# Patient Record
Sex: MALE | Race: WHITE | NOT HISPANIC OR LATINO | Employment: FULL TIME | URBAN - NONMETROPOLITAN AREA
[De-identification: names, ages, dates, MRNs, and addresses within clinical notes are randomized per-mention and may not be internally consistent; named-entity substitution may affect disease eponyms.]

---

## 2017-02-06 ENCOUNTER — ANESTHESIA (OUTPATIENT)
Dept: SURGERY | Facility: HOSPITAL | Age: 32
DRG: 853 | End: 2017-02-06
Payer: COMMERCIAL

## 2017-02-06 ENCOUNTER — ANESTHESIA EVENT (OUTPATIENT)
Dept: SURGERY | Facility: HOSPITAL | Age: 32
DRG: 853 | End: 2017-02-06
Payer: COMMERCIAL

## 2017-02-06 ENCOUNTER — HOSPITAL ENCOUNTER (INPATIENT)
Facility: HOSPITAL | Age: 32
LOS: 3 days | Discharge: HOME | DRG: 853 | End: 2017-02-10
Attending: PHYSICIAN ASSISTANT | Admitting: SURGERY
Payer: COMMERCIAL

## 2017-02-06 DIAGNOSIS — K35.32 ACUTE PERFORATED APPENDICITIS: ICD-10-CM

## 2017-02-06 LAB
ALBUMIN SERPL-MCNC: 3.9 G/DL (ref 3.4–5)
ALBUMIN UR-MCNC: NEGATIVE MG/DL
ALP SERPL-CCNC: 76 U/L (ref 40–150)
ALT SERPL W P-5'-P-CCNC: 41 U/L (ref 0–70)
ANION GAP SERPL CALCULATED.3IONS-SCNC: 7 MMOL/L (ref 3–14)
APPEARANCE UR: CLEAR
AST SERPL W P-5'-P-CCNC: 16 U/L (ref 0–45)
BASOPHILS # BLD AUTO: 0 10E9/L (ref 0–0.2)
BASOPHILS NFR BLD AUTO: 0.1 %
BILIRUB SERPL-MCNC: 0.6 MG/DL (ref 0.2–1.3)
BILIRUB UR QL STRIP: NEGATIVE
BUN SERPL-MCNC: 14 MG/DL (ref 7–30)
CALCIUM SERPL-MCNC: 8.8 MG/DL (ref 8.5–10.1)
CHLORIDE SERPL-SCNC: 101 MMOL/L (ref 94–109)
CO2 SERPL-SCNC: 30 MMOL/L (ref 20–32)
COLOR UR AUTO: NORMAL
CREAT SERPL-MCNC: 1.13 MG/DL (ref 0.66–1.25)
CRP SERPL-MCNC: 103 MG/L (ref 0–8)
DIFFERENTIAL METHOD BLD: ABNORMAL
EOSINOPHIL # BLD AUTO: 0 10E9/L (ref 0–0.7)
EOSINOPHIL NFR BLD AUTO: 0.2 %
ERYTHROCYTE [DISTWIDTH] IN BLOOD BY AUTOMATED COUNT: 11.7 % (ref 10–15)
GFR SERPL CREATININE-BSD FRML MDRD: 76 ML/MIN/1.7M2
GLUCOSE SERPL-MCNC: 90 MG/DL (ref 70–99)
GLUCOSE UR STRIP-MCNC: NEGATIVE MG/DL
HCT VFR BLD AUTO: 46.4 % (ref 40–53)
HGB BLD-MCNC: 16 G/DL (ref 13.3–17.7)
HGB UR QL STRIP: NEGATIVE
IMM GRANULOCYTES # BLD: 0.1 10E9/L (ref 0–0.4)
IMM GRANULOCYTES NFR BLD: 0.4 %
KETONES UR STRIP-MCNC: NEGATIVE MG/DL
LACTATE SERPL-SCNC: 1.2 MMOL/L (ref 0.4–2)
LEUKOCYTE ESTERASE UR QL STRIP: NEGATIVE
LYMPHOCYTES # BLD AUTO: 2.2 10E9/L (ref 0.8–5.3)
LYMPHOCYTES NFR BLD AUTO: 16.2 %
MCH RBC QN AUTO: 29.9 PG (ref 26.5–33)
MCHC RBC AUTO-ENTMCNC: 34.5 G/DL (ref 31.5–36.5)
MCV RBC AUTO: 87 FL (ref 78–100)
MONOCYTES # BLD AUTO: 1.2 10E9/L (ref 0–1.3)
MONOCYTES NFR BLD AUTO: 8.8 %
NEUTROPHILS # BLD AUTO: 10 10E9/L (ref 1.6–8.3)
NEUTROPHILS NFR BLD AUTO: 74.3 %
NITRATE UR QL: NEGATIVE
NRBC # BLD AUTO: 0 10*3/UL
NRBC BLD AUTO-RTO: 0 /100
PH UR STRIP: 7 PH (ref 4.7–8)
PLATELET # BLD AUTO: 227 10E9/L (ref 150–450)
POTASSIUM SERPL-SCNC: 3.5 MMOL/L (ref 3.4–5.3)
PROT SERPL-MCNC: 8.5 G/DL (ref 6.8–8.8)
RBC # BLD AUTO: 5.36 10E12/L (ref 4.4–5.9)
SODIUM SERPL-SCNC: 138 MMOL/L (ref 133–144)
SP GR UR STRIP: 1 (ref 1–1.03)
URN SPEC COLLECT METH UR: NORMAL
UROBILINOGEN UR STRIP-MCNC: NORMAL MG/DL (ref 0–2)
WBC # BLD AUTO: 13.4 10E9/L (ref 4–11)

## 2017-02-06 PROCEDURE — 99285 EMERGENCY DEPT VISIT HI MDM: CPT | Mod: 25

## 2017-02-06 PROCEDURE — 86140 C-REACTIVE PROTEIN: CPT | Performed by: PHYSICIAN ASSISTANT

## 2017-02-06 PROCEDURE — 96374 THER/PROPH/DIAG INJ IV PUSH: CPT

## 2017-02-06 PROCEDURE — 99284 EMERGENCY DEPT VISIT MOD MDM: CPT | Performed by: PHYSICIAN ASSISTANT

## 2017-02-06 PROCEDURE — 99222 1ST HOSP IP/OBS MODERATE 55: CPT | Mod: 57 | Performed by: SURGERY

## 2017-02-06 PROCEDURE — 83605 ASSAY OF LACTIC ACID: CPT | Performed by: PHYSICIAN ASSISTANT

## 2017-02-06 PROCEDURE — 25000125 ZZHC RX 250: Performed by: PHYSICIAN ASSISTANT

## 2017-02-06 PROCEDURE — 36415 COLL VENOUS BLD VENIPUNCTURE: CPT | Performed by: PHYSICIAN ASSISTANT

## 2017-02-06 PROCEDURE — 74177 CT ABD & PELVIS W/CONTRAST: CPT | Mod: TC

## 2017-02-06 PROCEDURE — 81003 URINALYSIS AUTO W/O SCOPE: CPT | Performed by: PHYSICIAN ASSISTANT

## 2017-02-06 PROCEDURE — 80053 COMPREHEN METABOLIC PANEL: CPT | Performed by: PHYSICIAN ASSISTANT

## 2017-02-06 PROCEDURE — 85025 COMPLETE CBC W/AUTO DIFF WBC: CPT | Performed by: PHYSICIAN ASSISTANT

## 2017-02-06 PROCEDURE — 25000128 H RX IP 250 OP 636: Performed by: PHYSICIAN ASSISTANT

## 2017-02-06 PROCEDURE — 87040 BLOOD CULTURE FOR BACTERIA: CPT | Performed by: PHYSICIAN ASSISTANT

## 2017-02-06 RX ORDER — IOPAMIDOL 612 MG/ML
100 INJECTION, SOLUTION INTRAVASCULAR ONCE
Status: COMPLETED | OUTPATIENT
Start: 2017-02-06 | End: 2017-02-06

## 2017-02-06 RX ORDER — ACETAMINOPHEN 10 MG/ML
1000 INJECTION, SOLUTION INTRAVENOUS ONCE
Status: COMPLETED | OUTPATIENT
Start: 2017-02-06 | End: 2017-02-06

## 2017-02-06 RX ORDER — MORPHINE SULFATE 2 MG/ML
4 INJECTION, SOLUTION INTRAMUSCULAR; INTRAVENOUS
Status: COMPLETED | OUTPATIENT
Start: 2017-02-06 | End: 2017-02-06

## 2017-02-06 RX ORDER — SODIUM CHLORIDE 9 MG/ML
1000 INJECTION, SOLUTION INTRAVENOUS CONTINUOUS
Status: DISCONTINUED | OUTPATIENT
Start: 2017-02-06 | End: 2017-02-07

## 2017-02-06 RX ADMIN — ACETAMINOPHEN 1000 MG: 10 INJECTION, SOLUTION INTRAVENOUS at 21:37

## 2017-02-06 RX ADMIN — MORPHINE SULFATE 2 MG: 2 INJECTION, SOLUTION INTRAMUSCULAR; INTRAVENOUS at 23:49

## 2017-02-06 RX ADMIN — SODIUM CHLORIDE 1000 ML: 9 INJECTION, SOLUTION INTRAVENOUS at 20:41

## 2017-02-06 RX ADMIN — TAZOBACTAM SODIUM AND PIPERACILLIN SODIUM 3.38 G: 375; 3 INJECTION, SOLUTION INTRAVENOUS at 21:59

## 2017-02-06 RX ADMIN — SODIUM CHLORIDE 1000 ML: 9 INJECTION, SOLUTION INTRAVENOUS at 21:59

## 2017-02-06 RX ADMIN — IOPAMIDOL 100 ML: 612 INJECTION, SOLUTION INTRAVASCULAR at 21:24

## 2017-02-06 ASSESSMENT — ENCOUNTER SYMPTOMS
APPETITE CHANGE: 1
CARDIOVASCULAR NEGATIVE: 1
HEADACHES: 0
DIARRHEA: 1
FEVER: 1
SHORTNESS OF BREATH: 0
ABDOMINAL PAIN: 1
NAUSEA: 0
ACTIVITY CHANGE: 1
VOMITING: 0
CHILLS: 0

## 2017-02-06 ASSESSMENT — LIFESTYLE VARIABLES: TOBACCO_USE: 1

## 2017-02-06 NOTE — IP AVS SNAPSHOT
[facility]    [address] [place] [address]    Phone:  [phone]    Fax:  [phone]                                       After Visit Summary   2/6/2017    Mason Garcia    MRN: [id number]           After Visit Summary Signature Page     I have received my discharge instructions, and my questions have been answered. I have discussed any challenges I see with this plan with the nurse or doctor.    ..........................................................................................................................................  Patient/Patient Representative Signature      ..........................................................................................................................................  Patient Representative Print Name and Relationship to Patient    ..................................................               ................................................  Date                                            Time    ..........................................................................................................................................  Reviewed by Signature/Title    ...................................................              ..............................................  Date                                                            Time

## 2017-02-06 NOTE — IP AVS SNAPSHOT
MRN:[id number]                      After Visit Summary   2/6/2017    Mason Garcia    MRN: [id number]           Thank you!     Thank you for choosing [facility] for your care. Our goal is always to provide you with excellent care. Hearing back from our patients is one way we can continue to improve our services. Please take a few minutes to complete the written survey that you may receive in the mail after you visit with us. Thank you!        Patient Information     Date Of Birth          1985        About your hospital stay     You were admitted on:  February 7, 2017 You last received care in the:  [facility]    You were discharged on:  February 10, 2017        Reason for your hospital stay       Acute perforated appendicitis                  Who to Call     For medical emergencies, please call 911.  For non-urgent questions about your medical care, please call your primary care provider or clinic, [phone]  For questions related to your surgery, please call your surgery clinic        Attending Provider     Provider    Megan Bonner, Chanel Whitt DO       Primary Care Provider Office Phone # Fax #    Ruben Valencia [phone]       [facility] [address] [place] [address]         When to contact your care team       Things to watch for are fevers greater than 101.3, pain uncontrolled by pain narcotics, deep red skin around the incision and puss coming out of the incision.                  After Care Instructions     Diet       Follow this diet upon discharge: Regular            Tubes and drains       You are going home with the following tubes or drains: CHERRY.  Tube cares per hospital or home care instructions.  Strip the drain every day.  Empty and record how much and what it looks like each day.  Bring with you to your surgery appointment            Wound care and dressings       No shower for 48 hours post procedure. May shower  "Postoperative Day (POD)  #2.  At that time, the bandages may come off.  There are steri-stirps over the wounds.  Its okay to shower with these on, do not scrub.  Just let the soapy water run over the incisions and pat dry.  The steri-strips will fall off on their own in approximately 2 weeks.    Do not immerse incision in water for two weeks.  No baths, hot tubs, pools, rivers, lakes, oceans, etc.                  Follow-up Appointments     Follow-up and recommended labs and tests        Surgery clinic early next week.  PCP within 1 week                  Your next 10 appointments already scheduled     Feb 13, 2017  3:45 PM   (Arrive by 3:30 PM)   Post Op with Pradeep Patten DO   [facility] ([facility])    [address] [place] [address]   [phone]              Further instructions from your care team       You have a hospital follow up with Dr. Valencia at [facility] on Wednesday, February 15th at 12:30 pm. If you need to reschedule these appointment please call [phone].    Pending Results     Date and Time Order Name Status Description    2/7/2017 0319 Fluid Culture Aerobic Bacterial Preliminary     2/7/2017 0149 Anaerobic bacterial culture Preliminary     2/6/2017 2028 Blood culture Preliminary     2/6/2017 2020 Blood culture Preliminary             Statement of Approval     Ordered          02/10/17 0702  I have reviewed and agree with all the recommendations and orders detailed in this document.   EFFECTIVE NOW     Approved and electronically signed by:  Pradeep Patten DO             Admission Information        Provider Department Dept Phone    2/6/2017 Chanel Pagan DO [facility] [phone]      Your Vitals Were     Blood Pressure Pulse Temperature Respirations Height Weight    117/62 mmHg 77 98.2  F (36.8  C) (Tympanic) 16 1.854 m (6' 1\") 104.327 kg (230 lb)    Pulse Oximetry                   97%           MyChart Information     " "[email or web address] lets you send messages to your doctor, view your test results, renew your prescriptions, schedule appointments and more. To sign up, go to [email or web address] . Click on \"Log in\" on the left side of the screen, which will take you to the Welcome page. Then click on \"Sign up Now\" on the right side of the page.     You will be asked to enter the access code listed below, as well as some personal information. Please follow the directions to create your username and password.     Your access code is: [email or web address]  Expires: 2017  7:16 AM     Your access code will  in 90 days. If you need help or a new code, please call your [facility] clinic or [phone].        Care EveryWhere ID     This is your Care EveryWhere ID. This could be used by other organizations to access your [facility] medical records  [id number]           Review of your medicines      START taking        Dose / Directions    amoxicillin-clavulanate 875-125 MG per tablet   Commonly known as:  AUGMENTIN   Used for:  Acute perforated appendicitis        Dose:  1 tablet   Take 1 tablet by mouth 2 times daily for 3 days   Quantity:  6 tablet   Refills:  0       oxyCODONE 5 MG IR tablet   Commonly known as:  ROXICODONE   Used for:  Acute perforated appendicitis        Dose:  5-10 mg   Take 1-2 tablets (5-10 mg) by mouth every 6 hours as needed for moderate to severe pain   Quantity:  30 tablet   Refills:  0       senna-docusate 8.6-50 MG per tablet   Commonly known as:  SENOKOT-S;PERICOLACE   Used for:  Acute perforated appendicitis        Dose:  2 tablet   Take 2 tablets by mouth 2 times daily   Quantity:  120 tablet   Refills:  1            Where to get your medicines      These medications were sent to [facility] - [place], [place] -  [address]  [place] [facility] [place] [facility] [address] [place] [address]     Phone:  [phone]    - amoxicillin-clavulanate 875-125 MG per tablet  - senna-docusate 8.6-50 MG per tablet "      Some of these will need a paper prescription and others can be bought over the counter. Ask your nurse if you have questions.     Bring a paper prescription for each of these medications    - oxyCODONE 5 MG IR tablet             Protect others around you: Learn how to safely use, store and throw away your medicines at www.disposemymeds.org.             Medication List: This is a list of all your medications and when to take them. Check marks below indicate your daily home schedule. Keep this list as a reference.      Medications           Morning Afternoon Evening Bedtime As Needed    amoxicillin-clavulanate 875-125 MG per tablet   Commonly known as:  AUGMENTIN   Take 1 tablet by mouth 2 times daily for 3 days                                oxyCODONE 5 MG IR tablet   Commonly known as:  ROXICODONE   Take 1-2 tablets (5-10 mg) by mouth every 6 hours as needed for moderate to severe pain   Last time this was given:  5 mg on 2/10/2017  9:31 AM                                senna-docusate 8.6-50 MG per tablet   Commonly known as:  SENOKOT-S;PERICOLACE   Take 2 tablets by mouth 2 times daily                                          More Information        Discharge Instructions for Laparoscopic Appendectomy (Appendix Removal)  You have had a procedure known as laparoscopic appendectomy to remove your appendix. The appendix is a worm-shaped hollow pouch attached to your large intestine. During your procedure, the doctor made two to four small incisions. One was near your bellybutton, and the others were elsewhere on your abdomen. Through one incision, the doctor inserted a thin tube with a camera attached (called a laparoscope). Surgical tools were inserted in the other incisions.  While you recover you may have discomfort in your shoulder and chest for up to 48 hours after surgery. This is common. It is caused by carbon dioxide (gas) used during the operation. It will go away.   Activity    Resume light activities  around your home as soon as possible.    Don t lift anything heavier than 10 pounds until your doctor says it s OK.    Limit sports and strenuous activities for 1 or 2 weeks.    Shower as usual:    Gently wash around your incisions with soap and water.    Don t bathe or soak in a tub until your incisions are well healed.    Wear loose-fitting clothes. This will help you be more comfortable and cause less irritation around your incisions.    Don t drive until you are no longer taking prescription pain medication.  Diet    Eat a bland, low-fat diet, such as the following:    Well-cooked soft cereals    Mashed potatoes    Plain toast or bread, crackers    Plain spaghetti    Rice    Macaroni (plain or with cheese)    Cottage cheese    Puddings    Low-fat yogurt    Low-fat milk    Ripe bananas    Drink 6 to 8 glasses of water a day, unless directed otherwise.    If you are constipated, take a fiber laxative such as Metamucil.  When to Call Your Doctor  Call your doctor immediately if you have any of the following:    Swelling, oozing, worsening pain, or unusual redness around the incision    Fever of 101.5 F (38.5 C) or higher    Increasing abdominal pain    Severe diarrhea, bloating, or constipation    Nausea or vomiting    5789-0412 [employer]. [address] [place] [address], [place] [address]. All rights reserved. This information is not intended as a substitute for professional medical care. Always follow your healthcare professional's instructions.                Discharge Instructions: Caring for Your Milad-Chiang Drainage Tube  Your doctor discharges you with a Milad-Chiang drainage tube. Doctors commonly leave this drain within the abdominal cavity after surgery. It helps prevent swelling and reduces the risk for infection. The tube is held in place by a few stitches. It is covered with a bandage. Your doctor will remove the drain when he or she determines you no longer need it.  Home care    Don t sleep  on the same side as the tube.    Secure the tube and bag inside your clothing with a safety pin. This helps keep the tube from being pulled out.    Empty your drain at least twice a day. Empty it more often if the drain is full.    Lift the opening on the drain.    Drain the fluid into a measuring cup.    Record the amount of fluid each time you empty the drain. Share this information with your doctor on your next visit.    Squeeze the bulb with your hands until you hear air coming out of the bulb if your doctor has instructed you to do so (sometimes the bulb is used as a reservoir without suction). Check with your doctor about specific drain instructions.    Close the opening.    Change the dressing around the tube every day.    Wash your hands.    Remove the old bandage.    Wash your hands again.    Wet a cotton swab and clean the skin around the incision and tube site. Use normal saline solution (salt and water). Or, you can use warm, soapy water.    Put a new bandage on the incision and tube site. Make the bandage large enough to cover the whole incision area.    Tape the bandage in place.    Keep the bandage and tube site dry when you shower. Ask your healthcare provider about the best way to do this.     Stripping  the tube helps keep blood clots from blocking the tube. Ask your nurse how often you should strip the tube. However, depending on where and why your doctor placed the tube, stripping may not be necessary. It may even be dangerous in some cases.     Hold the tubing where it leaves the skin, with one hand. This keeps it from pulling on the skin.    Pinch the tubing with the thumb and first finger of your other hand.    Slowly and firmly pull your thumb and first finger down the tubing. You may find it helpful to hold an alcohol swab between your fingers and the tube to lubricate the tubing.    If the pulling hurts or feels like it s coming out of the skin, stop. Begin again more gently.  Follow-up  care  Make a follow-up appointment as directed by our staff.  When to seek medical care  Call your doctor right away if you have any of the following:    New or increased pain around the tube    Redness, swelling, or warmth around the incision or tube    Drainage that is foul-smelling    Vomiting    Fever over 101.5 F (38.5 C)    Fluid leaking around the tube    Incision seems not to be healing    Stitches become loose    Tube falls out    Drainage that changes from light pink to dark red    Blood clots in the drainage bulb    A sudden increase or decrease in the amount of drainage (over 30 mL)     0566-0764 [employer]. [address] [place] [address]. All rights reserved. This information is not intended as a substitute for professional medical care. Always follow your healthcare professional's instructions.                Patient Education    Oxycodone Hydrochloride Oral capsule    Oxycodone Hydrochloride Oral solution    Oxycodone Hydrochloride Oral tablet    Oxycodone Hydrochloride Oral tablet [Abuse Deterrent]    Oxycodone Hydrochloride Oral tablet, extended-release  Oxycodone Hydrochloride Oral tablet  What is this medicine?  OXYCODONE (ox i KOE done) is a pain reliever. It is used to treat moderate to severe pain.  This medicine may be used for other purposes; ask your health care provider or pharmacist if you have questions.  What should I tell my health care provider before I take this medicine?  They need to know if you have any of these conditions:    Monroe's disease    brain tumor    drug abuse or addiction    head injury    heart disease    if you frequently drink alcohol containing drinks    kidney disease or problems going to the bathroom    liver disease    lung disease, asthma, or breathing problems    mental problems    an unusual or allergic reaction to oxycodone, codeine, hydrocodone, morphine, other medicines, foods, dyes, or preservatives    pregnant or trying to get  pregnant    breast-feeding  How should I use this medicine?  Take this medicine by mouth with a glass of water. Follow the directions on the prescription label. You can take it with or without food. If it upsets your stomach, take it with food. Take your medicine at regular intervals. Do not take it more often than directed. Do not stop taking except on your doctor's advice.  Some brands of this medicine, like Oxecta, have special instructions. Ask your doctor or pharmacist if these directions are for you: Do not cut, crush or chew this medicine. Swallow only one tablet at a time. Do not wet, soak, or lick the tablet before you take it.  Talk to your pediatrician regarding the use of this medicine in children. Special care may be needed.  Overdosage: If you think you have taken too much of this medicine contact a poison control center or emergency room at once.  NOTE: This medicine is only for you. Do not share this medicine with others.  What if I miss a dose?  If you miss a dose, take it as soon as you can. If it is almost time for your next dose, take only that dose. Do not take double or extra doses.  What may interact with this medicine?    alcohol    antihistamines    certain medicines used for nausea like chlorpromazine, droperidol    erythromycin    ketoconazole    medicines for depression, anxiety, or psychotic disturbances    medicines for sleep    muscle relaxants    naloxone    naltrexone    narcotic medicines (opiates) for pain    nilotinib    phenobarbital    phenytoin    rifampin    ritonavir    voriconazole  This list may not describe all possible interactions. Give your health care provider a list of all the medicines, herbs, non-prescription drugs, or dietary supplements you use. Also tell them if you smoke, drink alcohol, or use illegal drugs. Some items may interact with your medicine.  What should I watch for while using this medicine?  Tell your doctor or health care professional if your pain  does not go away, if it gets worse, or if you have new or a different type of pain. You may develop tolerance to the medicine. Tolerance means that you will need a higher dose of the medicine for pain relief. Tolerance is normal and is expected if you take this medicine for a long time.  Do not suddenly stop taking your medicine because you may develop a severe reaction. Your body becomes used to the medicine. This does NOT mean you are addicted. Addiction is a behavior related to getting and using a drug for a non-medical reason. If you have pain, you have a medical reason to take pain medicine. Your doctor will tell you how much medicine to take. If your doctor wants you to stop the medicine, the dose will be slowly lowered over time to avoid any side effects.  You may get drowsy or dizzy when you first start taking this medicine or change doses. Do not drive, use machinery, or do anything that may be dangerous until you know how the medicine affects you. Stand or sit up slowly.  There are different types of narcotic medicines (opiates) for pain. If you take more than one type at the same time, you may have more side effects. Give your health care provider a list of all medicines you use. Your doctor will tell you how much medicine to take. Do not take more medicine than directed. Call emergency for help if you have problems breathing.  This medicine will cause constipation. Try to have a bowel movement at least every 2 to 3 days. If you do not have a bowel movement for 3 days, call your doctor or health care professional.  Your mouth may get dry. Drinking water, chewing sugarless gum, or sucking on hard candy may help. See your dentist every 6 months.  What side effects may I notice from receiving this medicine?  Side effects that you should report to your doctor or health care professional as soon as possible:    allergic reactions like skin rash, itching or hives, swelling of the face, lips, or  tongue    breathing problems    confusion    feeling faint or lightheaded, falls    trouble passing urine or change in the amount of urine    unusually weak or tired  Side effects that usually do not require medical attention (report to your doctor or health care professional if they continue or are bothersome):    constipation    dry mouth    itching    nausea, vomiting    upset stomach  This list may not describe all possible side effects. Call your doctor for medical advice about side effects. You may report side effects to FDA at 6-612-IUJ-7323.  Where should I keep my medicine?  Keep out of the reach of children. This medicine can be abused. Keep your medicine in a safe place to protect it from theft. Do not share this medicine with anyone. Selling or giving away this medicine is dangerous and against the law.  Store at room temperature between 15 and 30 degrees C (59 and 86 degrees F). Protect from light. Keep container tightly closed.  This medicine may cause accidental overdose and death if it is taken by other adults, children, or pets. Flush any unused medicine down the toilet to reduce the chance of harm. Do not use the medicine after the expiration date.  NOTE: This sheet is a summary. It may not cover all possible information. If you have questions about this medicine, talk to your doctor, pharmacist, or health care provider.  NOTE:This sheet is a summary. It may not cover all possible information. If you have questions about this medicine, talk to your doctor, pharmacist, or health care provider. Copyright  2016 [employer]                Appendectomy    The goal of appendectomy is to remove the appendix safely. In most cases, the surgery lasts from 30 to 60 minutes. If your appendix has burst, surgery may take longer.  Before Surgery  You may receive fluids, antibiotics, and other medications through an IV (intravenous) line. Tell your doctor if you are allergic to any medications or have any other  health concerns. An anesthesiologist or nurse anesthetist will give you general anesthesia just before your appendectomy. This keeps you pain-free and allows you to sleep during the surgery.  Reaching the Appendix  One of 2 techniques may be used to reach the appendix. Your surgeon will discuss which is best for you:    Open surgery. One incision (several inches long) is made in your lower right side. A bigger incision may be used if the appendix has burst.    Laparoscopic surgery. From 2 to 4 small incisions are used. One is near your bellybutton. The others are elsewhere on your abdomen. A laparoscope, a thin tube with a camera attached, is inserted through one incision. The camera shows the inside of your abdomen on a monitor. This image helps guide the surgery. Surgical tools are inserted in the other incisions.  Finishing the Surgery  In most cases, the entire incision is closed with stitches or staples. Your surgeon may place a temporary drain in the wound or in the abdomen. This helps any excess fluid to drain which may help prevent infection. This drain is usually removed before you are discharged. If your appendix has burst, the outer layers of the incision may be left open. Leaving the skin open prevents infection from forming under the skin. It may heal on its own, or be closed about 5 days later.  Risks and Complications    Infection or bleeding from the incision site    Infection or swelling in the abdomen, or leakage of bowel material    Bowel ileus (slowness of bowel muscles) or bowel blockage    Problems from anesthesia     9321-6557 [employer]. [address] [place] [address], [place] [address]. All rights reserved. This information is not intended as a substitute for professional medical care. Always follow your healthcare professional's instructions.

## 2017-02-07 PROBLEM — K35.32 PERFORATED APPENDICITIS: Status: ACTIVE | Noted: 2017-02-07

## 2017-02-07 LAB
BACTERIA SPEC CULT: NORMAL
BASOPHILS # BLD AUTO: 0 10E9/L (ref 0–0.2)
BASOPHILS NFR BLD AUTO: 0.1 %
DIFFERENTIAL METHOD BLD: ABNORMAL
EOSINOPHIL # BLD AUTO: 0 10E9/L (ref 0–0.7)
EOSINOPHIL NFR BLD AUTO: 0 %
ERYTHROCYTE [DISTWIDTH] IN BLOOD BY AUTOMATED COUNT: 11.8 % (ref 10–15)
GRAM STN SPEC: ABNORMAL
HCT VFR BLD AUTO: 43.7 % (ref 40–53)
HGB BLD-MCNC: 15.4 G/DL (ref 13.3–17.7)
IMM GRANULOCYTES # BLD: 0.1 10E9/L (ref 0–0.4)
IMM GRANULOCYTES NFR BLD: 0.6 %
LYMPHOCYTES # BLD AUTO: 1.1 10E9/L (ref 0.8–5.3)
LYMPHOCYTES NFR BLD AUTO: 6.5 %
MCH RBC QN AUTO: 30.4 PG (ref 26.5–33)
MCHC RBC AUTO-ENTMCNC: 35.2 G/DL (ref 31.5–36.5)
MCV RBC AUTO: 86 FL (ref 78–100)
MICRO REPORT STATUS: ABNORMAL
MICRO REPORT STATUS: NORMAL
MONOCYTES # BLD AUTO: 0.6 10E9/L (ref 0–1.3)
MONOCYTES NFR BLD AUTO: 3.4 %
NEUTROPHILS # BLD AUTO: 14.7 10E9/L (ref 1.6–8.3)
NEUTROPHILS NFR BLD AUTO: 89.4 %
NRBC # BLD AUTO: 0 10*3/UL
NRBC BLD AUTO-RTO: 0 /100
PLATELET # BLD AUTO: 215 10E9/L (ref 150–450)
RBC # BLD AUTO: 5.06 10E12/L (ref 4.4–5.9)
SPECIMEN SOURCE: ABNORMAL
SPECIMEN SOURCE: NORMAL
WBC # BLD AUTO: 16.5 10E9/L (ref 4–11)

## 2017-02-07 PROCEDURE — 85025 COMPLETE CBC W/AUTO DIFF WBC: CPT | Performed by: SURGERY

## 2017-02-07 PROCEDURE — 87075 CULTR BACTERIA EXCEPT BLOOD: CPT | Performed by: SURGERY

## 2017-02-07 PROCEDURE — 25000125 ZZHC RX 250: Performed by: SURGERY

## 2017-02-07 PROCEDURE — 25800025 ZZH RX 258: Performed by: NURSE ANESTHETIST, CERTIFIED REGISTERED

## 2017-02-07 PROCEDURE — 25000128 H RX IP 250 OP 636: Performed by: SURGERY

## 2017-02-07 PROCEDURE — 87181 SC STD AGAR DILUTION PER AGT: CPT | Performed by: SURGERY

## 2017-02-07 PROCEDURE — 25800025 ZZH RX 258: Performed by: SURGERY

## 2017-02-07 PROCEDURE — 27110028 ZZH OR GENERAL SUPPLY NON-STERILE: Performed by: SURGERY

## 2017-02-07 PROCEDURE — 12000000 ZZH R&B MED SURG/OB

## 2017-02-07 PROCEDURE — 25000128 H RX IP 250 OP 636: Performed by: PHYSICIAN ASSISTANT

## 2017-02-07 PROCEDURE — 44970 LAPAROSCOPY APPENDECTOMY: CPT | Performed by: SURGERY

## 2017-02-07 PROCEDURE — 87077 CULTURE AEROBIC IDENTIFY: CPT | Performed by: SURGERY

## 2017-02-07 PROCEDURE — 87076 CULTURE ANAEROBE IDENT EACH: CPT | Performed by: SURGERY

## 2017-02-07 PROCEDURE — 27210995 ZZH RX 272: Performed by: SURGERY

## 2017-02-07 PROCEDURE — 25000128 H RX IP 250 OP 636

## 2017-02-07 PROCEDURE — 01999 UNLISTED ANES PROCEDURE: CPT | Performed by: NURSE ANESTHETIST, CERTIFIED REGISTERED

## 2017-02-07 PROCEDURE — 25000125 ZZHC RX 250: Performed by: NURSE ANESTHETIST, CERTIFIED REGISTERED

## 2017-02-07 PROCEDURE — 36415 COLL VENOUS BLD VENIPUNCTURE: CPT | Performed by: SURGERY

## 2017-02-07 PROCEDURE — 71000014 ZZH RECOVERY PHASE 1 LEVEL 2 FIRST HR: Performed by: SURGERY

## 2017-02-07 PROCEDURE — 87070 CULTURE OTHR SPECIMN AEROBIC: CPT | Performed by: SURGERY

## 2017-02-07 PROCEDURE — 25000566 ZZH SEVOFLURANE, EA 15 MIN: Performed by: NURSE ANESTHETIST, CERTIFIED REGISTERED

## 2017-02-07 PROCEDURE — 87186 SC STD MICRODIL/AGAR DIL: CPT | Performed by: SURGERY

## 2017-02-07 PROCEDURE — 36000056 ZZH SURGERY LEVEL 3 1ST 30 MIN: Performed by: SURGERY

## 2017-02-07 PROCEDURE — 0DTJ4ZZ RESECTION OF APPENDIX, PERCUTANEOUS ENDOSCOPIC APPROACH: ICD-10-PCS | Performed by: SURGERY

## 2017-02-07 PROCEDURE — 87184 SC STD DISK METHOD PER PLATE: CPT | Performed by: SURGERY

## 2017-02-07 PROCEDURE — 37000008 ZZH ANESTHESIA TECHNICAL FEE, 1ST 30 MIN: Performed by: SURGERY

## 2017-02-07 PROCEDURE — 25000128 H RX IP 250 OP 636: Performed by: NURSE ANESTHETIST, CERTIFIED REGISTERED

## 2017-02-07 PROCEDURE — 37000009 ZZH ANESTHESIA TECHNICAL FEE, EACH ADDTL 15 MIN: Performed by: SURGERY

## 2017-02-07 PROCEDURE — 25000132 ZZH RX MED GY IP 250 OP 250 PS 637: Performed by: SURGERY

## 2017-02-07 PROCEDURE — 36000058 ZZH SURGERY LEVEL 3 EA 15 ADDTL MIN: Performed by: SURGERY

## 2017-02-07 PROCEDURE — 40000275 ZZH STATISTIC RCP TIME EA 10 MIN

## 2017-02-07 PROCEDURE — 27210794 ZZH OR GENERAL SUPPLY STERILE: Performed by: SURGERY

## 2017-02-07 PROCEDURE — 88304 TISSUE EXAM BY PATHOLOGIST: CPT | Mod: TC | Performed by: SURGERY

## 2017-02-07 PROCEDURE — 87205 SMEAR GRAM STAIN: CPT | Performed by: SURGERY

## 2017-02-07 RX ORDER — MEPERIDINE HYDROCHLORIDE 25 MG/ML
12.5 INJECTION INTRAMUSCULAR; INTRAVENOUS; SUBCUTANEOUS
Status: DISCONTINUED | OUTPATIENT
Start: 2017-02-07 | End: 2017-02-07 | Stop reason: HOSPADM

## 2017-02-07 RX ORDER — FENTANYL CITRATE 50 UG/ML
25-50 INJECTION, SOLUTION INTRAMUSCULAR; INTRAVENOUS EVERY 5 MIN PRN
Status: DISCONTINUED | OUTPATIENT
Start: 2017-02-07 | End: 2017-02-07 | Stop reason: HOSPADM

## 2017-02-07 RX ORDER — NALOXONE HYDROCHLORIDE 0.4 MG/ML
.1-.4 INJECTION, SOLUTION INTRAMUSCULAR; INTRAVENOUS; SUBCUTANEOUS
Status: DISCONTINUED | OUTPATIENT
Start: 2017-02-07 | End: 2017-02-08 | Stop reason: ALTCHOICE

## 2017-02-07 RX ORDER — KETOROLAC TROMETHAMINE 30 MG/ML
INJECTION, SOLUTION INTRAMUSCULAR; INTRAVENOUS PRN
Status: DISCONTINUED | OUTPATIENT
Start: 2017-02-07 | End: 2017-02-07

## 2017-02-07 RX ORDER — SODIUM CHLORIDE, SODIUM LACTATE, POTASSIUM CHLORIDE, CALCIUM CHLORIDE 600; 310; 30; 20 MG/100ML; MG/100ML; MG/100ML; MG/100ML
INJECTION, SOLUTION INTRAVENOUS CONTINUOUS
Status: DISCONTINUED | OUTPATIENT
Start: 2017-02-07 | End: 2017-02-07 | Stop reason: HOSPADM

## 2017-02-07 RX ORDER — FENTANYL CITRATE 50 UG/ML
25-50 INJECTION, SOLUTION INTRAMUSCULAR; INTRAVENOUS
Status: DISCONTINUED | OUTPATIENT
Start: 2017-02-07 | End: 2017-02-07 | Stop reason: HOSPADM

## 2017-02-07 RX ORDER — ONDANSETRON 2 MG/ML
4 INJECTION INTRAMUSCULAR; INTRAVENOUS EVERY 6 HOURS PRN
Status: DISCONTINUED | OUTPATIENT
Start: 2017-02-07 | End: 2017-02-10 | Stop reason: HOSPADM

## 2017-02-07 RX ORDER — PHENYLEPHRINE HCL IN 0.9% NACL 1 MG/10 ML
SYRINGE (ML) INTRAVENOUS PRN
Status: DISCONTINUED | OUTPATIENT
Start: 2017-02-07 | End: 2017-02-07

## 2017-02-07 RX ORDER — ONDANSETRON 2 MG/ML
INJECTION INTRAMUSCULAR; INTRAVENOUS PRN
Status: DISCONTINUED | OUTPATIENT
Start: 2017-02-07 | End: 2017-02-07

## 2017-02-07 RX ORDER — LABETALOL HYDROCHLORIDE 5 MG/ML
10 INJECTION, SOLUTION INTRAVENOUS
Status: DISCONTINUED | OUTPATIENT
Start: 2017-02-07 | End: 2017-02-07 | Stop reason: HOSPADM

## 2017-02-07 RX ORDER — DEXAMETHASONE SODIUM PHOSPHATE 4 MG/ML
4 INJECTION, SOLUTION INTRA-ARTICULAR; INTRALESIONAL; INTRAMUSCULAR; INTRAVENOUS; SOFT TISSUE EVERY 10 MIN PRN
Status: DISCONTINUED | OUTPATIENT
Start: 2017-02-07 | End: 2017-02-07 | Stop reason: HOSPADM

## 2017-02-07 RX ORDER — ALBUTEROL SULFATE 0.83 MG/ML
2.5 SOLUTION RESPIRATORY (INHALATION) EVERY 4 HOURS PRN
Status: DISCONTINUED | OUTPATIENT
Start: 2017-02-07 | End: 2017-02-07 | Stop reason: HOSPADM

## 2017-02-07 RX ORDER — NEOSTIGMINE METHYLSULFATE 1 MG/ML
VIAL (ML) INJECTION PRN
Status: DISCONTINUED | OUTPATIENT
Start: 2017-02-07 | End: 2017-02-07

## 2017-02-07 RX ORDER — SODIUM CHLORIDE, SODIUM LACTATE, POTASSIUM CHLORIDE, CALCIUM CHLORIDE 600; 310; 30; 20 MG/100ML; MG/100ML; MG/100ML; MG/100ML
INJECTION, SOLUTION INTRAVENOUS CONTINUOUS PRN
Status: DISCONTINUED | OUTPATIENT
Start: 2017-02-07 | End: 2017-02-07

## 2017-02-07 RX ORDER — FENTANYL CITRATE 50 UG/ML
INJECTION, SOLUTION INTRAMUSCULAR; INTRAVENOUS PRN
Status: DISCONTINUED | OUTPATIENT
Start: 2017-02-07 | End: 2017-02-07

## 2017-02-07 RX ORDER — CEFAZOLIN SODIUM 1 G/3ML
INJECTION, POWDER, FOR SOLUTION INTRAMUSCULAR; INTRAVENOUS PRN
Status: DISCONTINUED | OUTPATIENT
Start: 2017-02-07 | End: 2017-02-07

## 2017-02-07 RX ORDER — PROPOFOL 10 MG/ML
INJECTION, EMULSION INTRAVENOUS PRN
Status: DISCONTINUED | OUTPATIENT
Start: 2017-02-07 | End: 2017-02-07

## 2017-02-07 RX ORDER — ONDANSETRON 2 MG/ML
4 INJECTION INTRAMUSCULAR; INTRAVENOUS EVERY 30 MIN PRN
Status: DISCONTINUED | OUTPATIENT
Start: 2017-02-07 | End: 2017-02-07 | Stop reason: HOSPADM

## 2017-02-07 RX ORDER — ACETAMINOPHEN 325 MG/1
650 TABLET ORAL EVERY 4 HOURS PRN
Status: DISCONTINUED | OUTPATIENT
Start: 2017-02-10 | End: 2017-02-10 | Stop reason: HOSPADM

## 2017-02-07 RX ORDER — MORPHINE SULFATE 4 MG/ML
4-6 INJECTION, SOLUTION INTRAMUSCULAR; INTRAVENOUS
Status: DISCONTINUED | OUTPATIENT
Start: 2017-02-07 | End: 2017-02-08 | Stop reason: ALTCHOICE

## 2017-02-07 RX ORDER — HYDRALAZINE HYDROCHLORIDE 20 MG/ML
2.5-5 INJECTION INTRAMUSCULAR; INTRAVENOUS EVERY 10 MIN PRN
Status: DISCONTINUED | OUTPATIENT
Start: 2017-02-07 | End: 2017-02-07 | Stop reason: HOSPADM

## 2017-02-07 RX ORDER — FENTANYL CITRATE 50 UG/ML
INJECTION, SOLUTION INTRAMUSCULAR; INTRAVENOUS
Status: DISCONTINUED
Start: 2017-02-07 | End: 2017-02-07 | Stop reason: HOSPADM

## 2017-02-07 RX ORDER — HYDROMORPHONE HYDROCHLORIDE 1 MG/ML
.3-.5 INJECTION, SOLUTION INTRAMUSCULAR; INTRAVENOUS; SUBCUTANEOUS EVERY 10 MIN PRN
Status: DISCONTINUED | OUTPATIENT
Start: 2017-02-07 | End: 2017-02-07 | Stop reason: HOSPADM

## 2017-02-07 RX ORDER — ACETAMINOPHEN 325 MG/1
975 TABLET ORAL EVERY 8 HOURS
Status: COMPLETED | OUTPATIENT
Start: 2017-02-07 | End: 2017-02-09

## 2017-02-07 RX ORDER — ONDANSETRON 4 MG/1
4 TABLET, ORALLY DISINTEGRATING ORAL EVERY 30 MIN PRN
Status: DISCONTINUED | OUTPATIENT
Start: 2017-02-07 | End: 2017-02-07 | Stop reason: HOSPADM

## 2017-02-07 RX ORDER — SODIUM CHLORIDE, SODIUM LACTATE, POTASSIUM CHLORIDE, CALCIUM CHLORIDE 600; 310; 30; 20 MG/100ML; MG/100ML; MG/100ML; MG/100ML
INJECTION, SOLUTION INTRAVENOUS CONTINUOUS
Status: DISCONTINUED | OUTPATIENT
Start: 2017-02-07 | End: 2017-02-08 | Stop reason: ALTCHOICE

## 2017-02-07 RX ORDER — DEXAMETHASONE SODIUM PHOSPHATE 10 MG/ML
INJECTION, SOLUTION INTRAMUSCULAR; INTRAVENOUS PRN
Status: DISCONTINUED | OUTPATIENT
Start: 2017-02-07 | End: 2017-02-07

## 2017-02-07 RX ORDER — ONDANSETRON 4 MG/1
4 TABLET, ORALLY DISINTEGRATING ORAL EVERY 6 HOURS PRN
Status: DISCONTINUED | OUTPATIENT
Start: 2017-02-07 | End: 2017-02-10 | Stop reason: HOSPADM

## 2017-02-07 RX ORDER — GLYCOPYRROLATE 0.2 MG/ML
INJECTION, SOLUTION INTRAMUSCULAR; INTRAVENOUS PRN
Status: DISCONTINUED | OUTPATIENT
Start: 2017-02-07 | End: 2017-02-07

## 2017-02-07 RX ORDER — OXYCODONE HYDROCHLORIDE 5 MG/1
5-10 TABLET ORAL
Status: DISCONTINUED | OUTPATIENT
Start: 2017-02-07 | End: 2017-02-10 | Stop reason: HOSPADM

## 2017-02-07 RX ORDER — CEFAZOLIN SODIUM 2 G/100ML
2 INJECTION, SOLUTION INTRAVENOUS
Status: DISCONTINUED | OUTPATIENT
Start: 2017-02-07 | End: 2017-02-07 | Stop reason: HOSPADM

## 2017-02-07 RX ORDER — LIDOCAINE HYDROCHLORIDE 20 MG/ML
INJECTION, SOLUTION INFILTRATION; PERINEURAL PRN
Status: DISCONTINUED | OUTPATIENT
Start: 2017-02-07 | End: 2017-02-07

## 2017-02-07 RX ORDER — NALOXONE HYDROCHLORIDE 0.4 MG/ML
.1-.4 INJECTION, SOLUTION INTRAMUSCULAR; INTRAVENOUS; SUBCUTANEOUS
Status: DISCONTINUED | OUTPATIENT
Start: 2017-02-07 | End: 2017-02-07 | Stop reason: HOSPADM

## 2017-02-07 RX ADMIN — SODIUM CHLORIDE, POTASSIUM CHLORIDE, SODIUM LACTATE AND CALCIUM CHLORIDE: 600; 310; 30; 20 INJECTION, SOLUTION INTRAVENOUS at 13:12

## 2017-02-07 RX ADMIN — LIDOCAINE HYDROCHLORIDE 40 MG: 20 INJECTION, SOLUTION INFILTRATION; PERINEURAL at 01:00

## 2017-02-07 RX ADMIN — SUCCINYLCHOLINE CHLORIDE 140 MG: 20 INJECTION, SOLUTION INTRAMUSCULAR; INTRAVENOUS at 01:00

## 2017-02-07 RX ADMIN — SODIUM CHLORIDE, POTASSIUM CHLORIDE, SODIUM LACTATE AND CALCIUM CHLORIDE: 600; 310; 30; 20 INJECTION, SOLUTION INTRAVENOUS at 02:40

## 2017-02-07 RX ADMIN — MORPHINE SULFATE 2 MG: 4 INJECTION, SOLUTION INTRAMUSCULAR; INTRAVENOUS at 04:26

## 2017-02-07 RX ADMIN — ACETAMINOPHEN 975 MG: 325 TABLET, FILM COATED ORAL at 11:04

## 2017-02-07 RX ADMIN — ACETAMINOPHEN 975 MG: 325 TABLET, FILM COATED ORAL at 04:27

## 2017-02-07 RX ADMIN — FENTANYL CITRATE 100 MCG: 50 INJECTION, SOLUTION INTRAMUSCULAR; INTRAVENOUS at 00:55

## 2017-02-07 RX ADMIN — FENTANYL CITRATE 50 MCG: 50 INJECTION, SOLUTION INTRAMUSCULAR; INTRAVENOUS at 03:21

## 2017-02-07 RX ADMIN — KETOROLAC TROMETHAMINE 30 MG: 30 INJECTION, SOLUTION INTRAMUSCULAR at 02:32

## 2017-02-07 RX ADMIN — SODIUM CHLORIDE: 9 INJECTION, SOLUTION INTRAVENOUS at 00:50

## 2017-02-07 RX ADMIN — MORPHINE SULFATE 2 MG: 4 INJECTION, SOLUTION INTRAMUSCULAR; INTRAVENOUS at 07:35

## 2017-02-07 RX ADMIN — MIDAZOLAM HYDROCHLORIDE 2 MG: 1 INJECTION, SOLUTION INTRAMUSCULAR; INTRAVENOUS at 00:50

## 2017-02-07 RX ADMIN — CEFAZOLIN 2 G: 1 INJECTION, POWDER, FOR SOLUTION INTRAMUSCULAR; INTRAVENOUS at 01:08

## 2017-02-07 RX ADMIN — NEOSTIGMINE METHYLSULFATE 3 MG: 1 INJECTION INTRAMUSCULAR; INTRAVENOUS; SUBCUTANEOUS at 02:31

## 2017-02-07 RX ADMIN — Medication 50 MCG: at 01:15

## 2017-02-07 RX ADMIN — Medication 50 MCG: at 01:44

## 2017-02-07 RX ADMIN — ROCURONIUM BROMIDE 10 MG: 10 INJECTION INTRAVENOUS at 01:18

## 2017-02-07 RX ADMIN — SODIUM CHLORIDE, POTASSIUM CHLORIDE, SODIUM LACTATE AND CALCIUM CHLORIDE: 600; 310; 30; 20 INJECTION, SOLUTION INTRAVENOUS at 01:21

## 2017-02-07 RX ADMIN — Medication 50 MCG: at 01:11

## 2017-02-07 RX ADMIN — TAZOBACTAM SODIUM AND PIPERACILLIN SODIUM 3.38 G: 375; 3 INJECTION, SOLUTION INTRAVENOUS at 21:58

## 2017-02-07 RX ADMIN — ROCURONIUM BROMIDE 10 MG: 10 INJECTION INTRAVENOUS at 01:23

## 2017-02-07 RX ADMIN — FENTANYL CITRATE 50 MCG: 50 INJECTION, SOLUTION INTRAMUSCULAR; INTRAVENOUS at 01:21

## 2017-02-07 RX ADMIN — TAZOBACTAM SODIUM AND PIPERACILLIN SODIUM 3.38 G: 375; 3 INJECTION, SOLUTION INTRAVENOUS at 16:30

## 2017-02-07 RX ADMIN — GLYCOPYRROLATE 0.4 MG: 0.2 INJECTION, SOLUTION INTRAMUSCULAR; INTRAVENOUS at 02:31

## 2017-02-07 RX ADMIN — OXYCODONE HYDROCHLORIDE 5 MG: 5 TABLET ORAL at 21:57

## 2017-02-07 RX ADMIN — DEXAMETHASONE SODIUM PHOSPHATE 10 MG: 10 INJECTION, SOLUTION INTRAMUSCULAR; INTRAVENOUS at 01:19

## 2017-02-07 RX ADMIN — PROPOFOL 200 MG: 10 INJECTION, EMULSION INTRAVENOUS at 01:00

## 2017-02-07 RX ADMIN — ONDANSETRON 4 MG: 2 INJECTION INTRAMUSCULAR; INTRAVENOUS at 02:27

## 2017-02-07 RX ADMIN — ROCURONIUM BROMIDE 10 MG: 10 INJECTION INTRAVENOUS at 01:09

## 2017-02-07 RX ADMIN — SODIUM CHLORIDE, POTASSIUM CHLORIDE, SODIUM LACTATE AND CALCIUM CHLORIDE: 600; 310; 30; 20 INJECTION, SOLUTION INTRAVENOUS at 04:11

## 2017-02-07 RX ADMIN — ACETAMINOPHEN 975 MG: 325 TABLET, FILM COATED ORAL at 20:14

## 2017-02-07 RX ADMIN — TAZOBACTAM SODIUM AND PIPERACILLIN SODIUM 3.38 G: 375; 3 INJECTION, SOLUTION INTRAVENOUS at 04:26

## 2017-02-07 RX ADMIN — TAZOBACTAM SODIUM AND PIPERACILLIN SODIUM 3.38 G: 375; 3 INJECTION, SOLUTION INTRAVENOUS at 10:01

## 2017-02-07 RX ADMIN — MORPHINE SULFATE 2 MG: 4 INJECTION, SOLUTION INTRAMUSCULAR; INTRAVENOUS at 16:28

## 2017-02-07 RX ADMIN — FENTANYL CITRATE 50 MCG: 50 INJECTION, SOLUTION INTRAMUSCULAR; INTRAVENOUS at 03:25

## 2017-02-07 ASSESSMENT — ENCOUNTER SYMPTOMS
NAUSEA: 0
CONSTIPATION: 0
FEVER: 1
DIARRHEA: 0
PALPITATIONS: 0
PAIN SEVERITY NOW: MODERATE
ABDOMINAL PAIN: 1
SUBJECTIVE PATIENT PAIN CONTROL: REQUIRING PAIN MEDICATION
FEVER: 0
ACTIVITY IMPAIRMENT: IMPAIRED DUE TO PAIN
CHILLS: 1
DIETARY ISSUES: NPO
VOMITING: 0
SHORTNESS OF BREATH: 0

## 2017-02-07 ASSESSMENT — PAIN DESCRIPTION - DESCRIPTORS
DESCRIPTORS: ACHING
DESCRIPTORS: ACHING

## 2017-02-07 ASSESSMENT — ACTIVITIES OF DAILY LIVING (ADL)
COGNITION: 0 - NO COGNITION ISSUES REPORTED
TOILETING: 0-->INDEPENDENT
RETIRED_COMMUNICATION: 0-->UNDERSTANDS/COMMUNICATES WITHOUT DIFFICULTY
DRESS: 0-->INDEPENDENT
FALL_HISTORY_WITHIN_LAST_SIX_MONTHS: NO
AMBULATION: 0-->INDEPENDENT
BATHING: 0-->INDEPENDENT
RETIRED_EATING: 0-->INDEPENDENT
SWALLOWING: 0-->SWALLOWS FOODS/LIQUIDS WITHOUT DIFFICULTY
TRANSFERRING: 0-->INDEPENDENT

## 2017-02-07 NOTE — BRIEF OP NOTE
[facility]   Brief Operative Note    Pre-operative diagnosis: Acute Perforated Apendicitis   Post-operative diagnosis * No post-op diagnosis entered *  Perforated appendicitis   Procedure: Procedure(s):  Laparoscopic Appendectomy  - Wound Class: IV-Dirty or Infected   Surgeon(s): Surgeon(s) and Role:     * Chanel Pagan, DO - Primary   Estimated blood loss: 75 mL    Specimens:   ID Type Source Tests Collected by Time Destination   1 : Peritoneal Purulent Ascites Fluid Peritoneum ANAEROBIC BACTERIAL CULTURE, GRAM STAIN, MISCELLANEOUS CULTURE AEROBIC BACTERIAL Chanel Pagan,  2/7/2017  1:44 AM    A :  Tissue Appendix SURGICAL PATHOLOGY EXAM Chanel Pagan DO 2/7/2017  1:55 AM       Findings: Perforated appendix with appendecolith and purulent ascitis

## 2017-02-07 NOTE — ED NOTES
Pt left with WYATT Christianson from surgery. Pt's significant other Joyce took pt's belongings and went home. She states that she will be back in the morning. Ok for staff to call her after pt gets to floor and advise of room number.

## 2017-02-07 NOTE — ANESTHESIA PREPROCEDURE EVALUATION
Anesthesia Evaluation     . Pt has not had prior anesthetic       ROS/MED HX    ENT/Pulmonary:     (+)FREDERICK risk factors snores loudly, tobacco use, Current use , . .    Neurologic:  - neg neurologic ROS     Cardiovascular:  - neg cardiovascular ROS   (+) ----. : . . . :. . No previous cardiac testing       METS/Exercise Tolerance:     Hematologic:  - neg hematologic  ROS       Musculoskeletal:  - neg musculoskeletal ROS       GI/Hepatic:  - neg GI/hepatic ROS       Renal/Genitourinary:  - ROS Renal section negative       Endo:     (+) Obesity, .      Psychiatric:  - neg psychiatric ROS       Infectious Disease:  - neg infectious disease ROS       Malignancy:      - no malignancy   Other:    - neg other ROS           Physical Exam  Normal systems: cardiovascular and dental    Airway   Mallampati: II  TM distance: >3 FB  Neck ROM: full    Dental     Cardiovascular       Pulmonary    breath sounds clear to auscultation    Other findings: Labs reviewed  230# 6'1  NPO 8pm solids and liquids                  Anesthesia Plan      History & Physical Review  History and physical reviewed and following examination; no interval change.    ASA Status:  2 emergent.    NPO Status:  > 4 hours    Plan for General, RSI and ETT with Intravenous induction. Maintenance will be Balanced.    PONV prophylaxis:  Ondansetron (or other 5HT-3) and Dexamethasone or Solumedrol  Additional equipment: Videolaryngoscope Discussed risks and benefits with patient for general anesthesia including sore throat, nausea, vomiting, aspiration, dental damage, loss of airway, CV complications, stroke, MI, death. Pt wishes to proceed.       Postoperative Care  Postoperative pain management:  Multi-modal analgesia.      Consents  Anesthetic plan, risks, benefits and alternatives discussed with:  Patient.  Use of blood products discussed: Yes.   Use of blood products discussed with Patient.  Consented to blood products.  .                          .

## 2017-02-07 NOTE — H&P
Mason Garcia is an 31 year old male who reports abdominal pain that started 3 days ago.  He has been having chills, but no fever for the past 2 days.  No nausea or vomiting.  Some loose stools but no constipation.  Pain worsened today so patient came to the ER.  CT scan shows perforated appendix.    No past medical history on file.    Allergies:   Allergies   Allergen Reactions     Sudafed [Pseudoephedrine]        Active Problems:    * No active hospital problems. *    Blood pressure 116/72, pulse 101, temperature 102.7  F (39.3  C), temperature source Oral, resp. rate 21, SpO2 95 %.    Review of Systems   Constitutional: Positive for chills. Negative for fever.   Respiratory: Negative for shortness of breath.    Cardiovascular: Negative for chest pain and palpitations.   Gastrointestinal: Positive for abdominal pain. Negative for nausea, vomiting, diarrhea and constipation.       Physical Exam   Constitutional: He appears well-developed and well-nourished.   HENT:   Head: Normocephalic and atraumatic.   Neck: Neck supple.   Cardiovascular: Regular rhythm.    Pulmonary/Chest: Effort normal. No respiratory distress.   Abdominal:   Soft, tender B/L LQ, localized LLQ rebound, no guarding   Musculoskeletal: He exhibits no edema.   Skin: Skin is warm and dry.       Assessment:  Perforated appendicitis    Plan:  Laparoscopic appendecomy    Chanel Pagan  2/6/2017

## 2017-02-07 NOTE — OP NOTE
PreOp: Acute Perforated Appendicitis  PostOp: Acute Perforated Appendicitis  Procedure: Laparoscopic Appendectomy  Surgeon: Chanel Pagan DO  Anesth: GETA  EBL: 20cc  Complications: None  Specimen: 1. Appendix     Procedure:    The patient was brought to the OR where he was properly identified, consent was confirmed and on the chart, a time out was performed.  The patient was then placed in the supine position where he underwent general anesthesia without complication.  The left arm was tucked.  A boss catheter was place under sterile conditions.      The abdomen was then prepped and draped in a sterile fashion.  The inferior umbilicus was the point of entry.  Using an fifteen blade scalpel, a small longitudinal incision was made, S retractors were used to bluntly dissect down to fascia. The umbilical stalk was grasped using a kocher and elevated.  An incision was made in the fascia and the 2 stay sutures of 0 vicryl were placed on either side.  The Levin trochar was inserted under direct visualization and the abdomen was insufflated up to the level of 15mmHg.   The laparoscope was inserted and there was no injury to the bowel noted.  Under direct camera visualization, a 5mm trochar was placed in the suprapubic region and a 5mm trochar placed in the left lower quadrant.     Using graspers, the appendix was identified, and carefully freed from the side wall.  It was dilated and thickened with adhesions and inflammatory changes.  A window was created at its base and it was amputed using an endogastrointestinal staple.  The mesoappendix was transected with the endo KALLI stapler as well.  The appendix was then placed into an endocatch bag and removed from the umbilical port site.  The port was returned, hemostasis checked.  The purulent ascites was suctioned from the pelvis.  A CHERRY drain was placed in the right lower quadrant.  The abdomen was deflated. The umbilical  port site was closed using 0 Vicryl figure of eight  stitch.   All port sites skin closures were accomplished using 4.0 Monocryl with good cosmetic effect.  Steri-strips and sterile 4X4 dressings were taped into place.  The patient tolerated the procedure well and was brought out of anesthesia without complication and was transferred to PACU in stable condition.

## 2017-02-07 NOTE — ANESTHESIA CARE TRANSFER NOTE
Patient: Mason Garcia    Procedure(s):  Laparoscopic Appendectomy  - Wound Class: IV-Dirty or Infected    Diagnosis: Acute Perforated Apendicitis  Diagnosis Additional Information: No value filed.    Anesthesia Type:   General, RSI, ETT     Note:  Airway :Nasal Cannula  Patient transferred to:PACU        Vitals: (Last set prior to Anesthesia Care Transfer)    CRNA VITALS  2/7/2017 0231 - 2/7/2017 0310      2/7/2017             Resp Rate (set): 8                Electronically Signed By: DARNELL Louis CRNA  February 7, 2017  3:10 AM

## 2017-02-07 NOTE — ED NOTES
"Pt back from CT scan, noted to be shaking and states feeling \"cold\". Temp checked and it is now 103. Pt placed back on monitors, including cardiac monitor. Reported to SAMMI Guzman. New medication orders see MAR. Pt again declined morphine, this was also reported to provider.  "

## 2017-02-07 NOTE — ED PROVIDER NOTES
History     Chief Complaint   Patient presents with     Abdominal Pain     suprapubic pain, for a couple days, worse today     The history is provided by the patient.     Mason Garcia is a 31 year old male who presented to the ED for evaluation of lower abdominal pain.  Pain began slowly a few days ago.  Significantly worse today.  Has developed a fever.  Some intermittent loose stools.  No nausea or vomiting.  Difficult to stand.  No LUTS.      I have reviewed the Medications, Allergies, Past Medical and Surgical History, and Social History in the Epic system.    Review of Systems   Constitutional: Positive for fever, activity change and appetite change. Negative for chills.   Respiratory: Negative for shortness of breath.    Cardiovascular: Negative.    Gastrointestinal: Positive for abdominal pain and diarrhea. Negative for nausea and vomiting.   Genitourinary: Negative.    Skin: Negative.    Neurological: Negative for headaches.       Physical Exam   BP: 115/71 mmHg  Pulse: 101  Temp: 100.9  F (38.3  C)  Resp: 18  SpO2: 98 %  Physical Exam   Constitutional: He is oriented to person, place, and time. He appears well-developed and well-nourished.   Appears uncomfortable    Abdominal:       Appreciable suprapubic pain on palpation   Has to ambulate in slight flexion    Neurological: He is alert and oriented to person, place, and time.   Skin: Skin is warm and dry.   Psychiatric: He has a normal mood and affect.   Nursing note and vitals reviewed.      ED Course   Procedures        Medications   sodium chloride (PF) 0.9% PF flush 3 mL (not administered)   sodium chloride (PF) 0.9% PF flush 3 mL (not administered)   morphine (PF) injection 4 mg (not administered)   0.9% sodium chloride BOLUS (0 mLs Intravenous Stopped 2/6/17 2157)     Followed by   0.9% sodium chloride infusion (1,000 mLs Intravenous New Bag 2/6/17 2159)   piperacillin-tazobactam (ZOSYN) infusion 3.375 g (3.375 g Intravenous New Bag 2/6/17 2159)    sodium chloride (PF) 0.9% PF flush 60 mL (60 mLs Intravenous Given 2/6/17 2124)   iopamidol (ISOVUE-300) IV solution 61% 100 mL (100 mLs Intravenous Given 2/6/17 2124)   acetaminophen (OFIRMEV) infusion 1,000 mg (0 mg Intravenous Stopped 2/6/17 2155)     Results for orders placed or performed during the hospital encounter of 02/06/17 (from the past 24 hour(s))   CBC with platelets differential   Result Value Ref Range    WBC 13.4 (H) 4.0 - 11.0 10e9/L    RBC Count 5.36 4.4 - 5.9 10e12/L    Hemoglobin 16.0 13.3 - 17.7 g/dL    Hematocrit 46.4 40.0 - 53.0 %    MCV 87 78 - 100 fl    MCH 29.9 26.5 - 33.0 pg    MCHC 34.5 31.5 - 36.5 g/dL    RDW 11.7 10.0 - 15.0 %    Platelet Count 227 150 - 450 10e9/L    Diff Method Automated Method     % Neutrophils 74.3 %    % Lymphocytes 16.2 %    % Monocytes 8.8 %    % Eosinophils 0.2 %    % Basophils 0.1 %    % Immature Granulocytes 0.4 %    Nucleated RBCs 0 0 /100    Absolute Neutrophil 10.0 (H) 1.6 - 8.3 10e9/L    Absolute Lymphocytes 2.2 0.8 - 5.3 10e9/L    Absolute Monocytes 1.2 0.0 - 1.3 10e9/L    Absolute Eosinophils 0.0 0.0 - 0.7 10e9/L    Absolute Basophils 0.0 0.0 - 0.2 10e9/L    Abs Immature Granulocytes 0.1 0 - 0.4 10e9/L    Absolute Nucleated RBC 0.0    Comprehensive metabolic panel   Result Value Ref Range    Sodium 138 133 - 144 mmol/L    Potassium 3.5 3.4 - 5.3 mmol/L    Chloride 101 94 - 109 mmol/L    Carbon Dioxide 30 20 - 32 mmol/L    Anion Gap 7 3 - 14 mmol/L    Glucose 90 70 - 99 mg/dL    Urea Nitrogen 14 7 - 30 mg/dL    Creatinine 1.13 0.66 - 1.25 mg/dL    GFR Estimate 76 >60 mL/min/1.7m2    GFR Estimate If Black >90   GFR Calc   >60 mL/min/1.7m2    Calcium 8.8 8.5 - 10.1 mg/dL    Bilirubin Total 0.6 0.2 - 1.3 mg/dL    Albumin 3.9 3.4 - 5.0 g/dL    Protein Total 8.5 6.8 - 8.8 g/dL    Alkaline Phosphatase 76 40 - 150 U/L    ALT 41 0 - 70 U/L    AST 16 0 - 45 U/L   CRP inflammation   Result Value Ref Range    CRP Inflammation 103.0 (H) 0.0 - 8.0  mg/L   Lactic acid   Result Value Ref Range    Lactic Acid 1.2 0.4 - 2.0 mmol/L   UA reflex to Microscopic   Result Value Ref Range    Color Urine Straw     Appearance Urine Clear     Glucose Urine Negative NEG mg/dL    Bilirubin Urine Negative NEG    Ketones Urine Negative NEG mg/dL    Specific Gravity Urine 1.003 1.003 - 1.035    Blood Urine Negative NEG    pH Urine 7.0 4.7 - 8.0 pH    Protein Albumin Urine Negative NEG mg/dL    Urobilinogen mg/dL Normal 0.0 - 2.0 mg/dL    Nitrite Urine Negative NEG    Leukocyte Esterase Urine Negative NEG    Source Midstream Urine         Critical Care time:  none               Labs Ordered and Resulted from Time of ED Arrival Up to the Time of Departure from the ED   CBC WITH PLATELETS DIFFERENTIAL - Abnormal; Notable for the following:     WBC 13.4 (*)     Absolute Neutrophil 10.0 (*)     All other components within normal limits   CRP INFLAMMATION - Abnormal; Notable for the following:     CRP Inflammation 103.0 (*)     All other components within normal limits   COMPREHENSIVE METABOLIC PANEL   URINE MACROSCOPIC WITH REFLEX TO MICRO   LACTIC ACID   PERIPHERAL IV CATHETER   BLOOD CULTURE   BLOOD CULTURE       Assessments & Plan (with Medical Decision Making)   Rather classic presentation for appendicitis.  CT scan shows likely perforated acute appendicitis.  IV fluids, IV Ofirmev, and Zosyn provided in the ED.  Lactic is negative.  Blood cultures drawn.  Case discussed with Dr. Pagan.  Discussed CT findings and I read her the official report.  She accepted his care for definitive treatment.     I have reviewed the nursing notes.    I have reviewed the findings, diagnosis, plan and need for follow up with the patient.    New Prescriptions    No medications on file       Final diagnoses:   Acute perforated appendicitis       2/6/2017   [facility]      Megan Bonner PA-C  02/06/17 [id number]    Megan Bonner PA-C  02/06/17 5058

## 2017-02-07 NOTE — PROGRESS NOTES
Mason Garcia is a 31 year old male patient POD#0 laparoscopic appendectomy.  Patient c/o abdominal soreness.  No nausea or vomiting.  Feels bloated.    1. Acute perforated appendicitis      No past medical history on file.  No current outpatient prescriptions on file.     Allergies   Allergen Reactions     Sudafed [Pseudoephedrine]      Active Problems:    Perforated appendicitis    Blood pressure 125/53, pulse 101, temperature 100.4  F (38  C), temperature source Tympanic, resp. rate 14, weight 104.327 kg (230 lb), SpO2 96 %.    Subjective:  Symptoms:  Stable.    Diet:  NPO.  No nausea or vomiting.    Activity level: Impaired due to pain.    Pain:  He complains of pain that is moderate.  Pain is requiring pain medication.      Objective:  General Appearance:  Well-appearing and in no acute distress.    Vital signs: (most recent): Blood pressure 125/53, pulse 101, temperature 100.4  F (38  C), temperature source Tympanic, resp. rate 14, weight 104.327 kg (230 lb), SpO2 96 %.  Fever.    Output: Producing urine and no stool output.    Abdomen: Abdomen is soft and distended.  (Appropriately tender, dressings C/D/I)    Assessment:    Condition: In stable condition.       Plan:   Encourage ambulation.  Start/continue incentive spirometry.  NPO.  Administer medications as ordered.   (IV abx until afebrile for at least 24 hours).       Chanel Pagan  2/7/2017

## 2017-02-07 NOTE — ED NOTES
After SAMMI Guzman spoke with ally Vallecilloron she states that she will see pt in the morning. Pt and fiance have concerns about waiting and are wondering why pt would not go to surgery right away. Reported this to SAMMI Guzman and to Cierra, nursing supervisor. Megan called Dr. Pagan and she will come in and see pt.

## 2017-02-08 LAB — COPATH REPORT: NORMAL

## 2017-02-08 PROCEDURE — 25800025 ZZH RX 258: Performed by: SURGERY

## 2017-02-08 PROCEDURE — 25000132 ZZH RX MED GY IP 250 OP 250 PS 637: Performed by: SURGERY

## 2017-02-08 PROCEDURE — 40000275 ZZH STATISTIC RCP TIME EA 10 MIN

## 2017-02-08 PROCEDURE — 12000000 ZZH R&B MED SURG/OB

## 2017-02-08 PROCEDURE — 25000128 H RX IP 250 OP 636: Performed by: SURGERY

## 2017-02-08 RX ADMIN — ACETAMINOPHEN 975 MG: 325 TABLET, FILM COATED ORAL at 12:23

## 2017-02-08 RX ADMIN — OXYCODONE HYDROCHLORIDE 10 MG: 5 TABLET ORAL at 06:27

## 2017-02-08 RX ADMIN — OXYCODONE HYDROCHLORIDE 10 MG: 5 TABLET ORAL at 02:40

## 2017-02-08 RX ADMIN — SODIUM CHLORIDE, POTASSIUM CHLORIDE, SODIUM LACTATE AND CALCIUM CHLORIDE: 600; 310; 30; 20 INJECTION, SOLUTION INTRAVENOUS at 02:38

## 2017-02-08 RX ADMIN — TAZOBACTAM SODIUM AND PIPERACILLIN SODIUM 3.38 G: 375; 3 INJECTION, SOLUTION INTRAVENOUS at 16:38

## 2017-02-08 RX ADMIN — OXYCODONE HYDROCHLORIDE 10 MG: 5 TABLET ORAL at 13:46

## 2017-02-08 RX ADMIN — TAZOBACTAM SODIUM AND PIPERACILLIN SODIUM 3.38 G: 375; 3 INJECTION, SOLUTION INTRAVENOUS at 09:46

## 2017-02-08 RX ADMIN — MORPHINE SULFATE 4 MG: 4 INJECTION, SOLUTION INTRAMUSCULAR; INTRAVENOUS at 10:29

## 2017-02-08 RX ADMIN — ACETAMINOPHEN 975 MG: 325 TABLET, FILM COATED ORAL at 03:46

## 2017-02-08 RX ADMIN — TAZOBACTAM SODIUM AND PIPERACILLIN SODIUM 3.38 G: 375; 3 INJECTION, SOLUTION INTRAVENOUS at 22:43

## 2017-02-08 RX ADMIN — ACETAMINOPHEN 975 MG: 325 TABLET, FILM COATED ORAL at 19:57

## 2017-02-08 RX ADMIN — TAZOBACTAM SODIUM AND PIPERACILLIN SODIUM 3.38 G: 375; 3 INJECTION, SOLUTION INTRAVENOUS at 03:46

## 2017-02-08 RX ADMIN — OXYCODONE HYDROCHLORIDE 5 MG: 5 TABLET ORAL at 20:30

## 2017-02-08 RX ADMIN — OXYCODONE HYDROCHLORIDE 10 MG: 5 TABLET ORAL at 16:23

## 2017-02-08 NOTE — PROGRESS NOTES
[facility] General Surgery Progress Note         Assessment and Plan:    Assessment:   POD #1  Progressing - tolerating sips  Begin clear liquids  CHERRY minimal clear serosanguinous      Plan:   Ambulate as tolerated  Continue to work on pulmonary toilet  Diet:  Clear liquids  Recheck labs  Continue parenteral antibiotics           Interval History:   The patient denies passing flatus.  Still complains of discomfort and is progressing slowly.          Significant Problems:    Active Problems:    Perforated appendicitis            Review of Systems:    The patient denies any chest pain, shortness of breath, excessive pain, fever, chills, purulent drainage from the wound, nausea or vomiting.         Medications:     No current facility-administered medications for this encounter.     Current Outpatient Prescriptions   Medication Sig     oxyCODONE (ROXICODONE) 5 MG IR tablet Take 1-2 tablets (5-10 mg) by mouth every 6 hours as needed for moderate to severe pain     amoxicillin-clavulanate (AUGMENTIN) 875-125 MG per tablet Take 1 tablet by mouth 2 times daily for 3 days     senna-docusate (SENOKOT-S;PERICOLACE) 8.6-50 MG per tablet Take 2 tablets by mouth 2 times daily             Physical Exam:     Patient Vitals for the past 24 hrs:   BP Temp Temp src Heart Rate Resp SpO2   02/10/17 0833 117/62 mmHg 98.2  F (36.8  C) Tympanic 73 16 97 %   02/10/17 0351 125/76 mmHg 98  F (36.7  C) Tympanic 68 18 -   02/10/17 0013 121/65 mmHg 98  F (36.7  C) Oral 63 16 -     I/O last 3 completed shifts:  In: 840 [P.O.:840]  Out: 60 [Drains:60]    General:  In no distress.  Chest:   Clear bilaterally  Cardiac:  S1S2 NSR  Abdomen:  Tender consistent with post surgical state.  Wounds clean/dry.  CHERRY minimal - stripped.    Extremities:  nontender          Data:   All laboratory data related to this surgery reviewed.  Continue inpatient, IV antibiotics until bowel function and oral intake normal.  Dr. Patten to assume  coverage.      Monse Carmen MD, FACS

## 2017-02-08 NOTE — ANESTHESIA POSTPROCEDURE EVALUATION
Patient: Mason Garcia    Procedure(s):  Laparoscopic Appendectomy  - Wound Class: IV-Dirty or Infected    Diagnosis:Acute Perforated Apendicitis  Diagnosis Additional Information: No value filed.    Anesthesia Type:  General, RSI, ETT    Note:  Anesthesia Post Evaluation    Patient location during evaluation: Bedside  Patient participation: Able to fully participate in evaluation  Level of consciousness: awake  Pain management: adequate  Airway patency: patent  Cardiovascular status: acceptable  Respiratory status: acceptable  Hydration status: acceptable  PONV: none     Anesthetic complications: None    Comments: No anesthesia complications          Last vitals:  Filed Vitals:    02/07/17 2025 02/07/17 2348 02/08/17 0800   BP: 115/55 112/59 108/52   Pulse: 77     Temp: 98.8  F (37.1  C) 97.8  F (36.6  C) 97.6  F (36.4  C)   Resp: 18 16 18   SpO2: 95% 100% 95%         Electronically Signed By: DARNELL Louis CRNA  February 8, 2017  10:17 AM

## 2017-02-09 LAB — LACTATE SERPL-SCNC: 0.7 MMOL/L (ref 0.4–2)

## 2017-02-09 PROCEDURE — 25000128 H RX IP 250 OP 636: Performed by: SURGERY

## 2017-02-09 PROCEDURE — 25000132 ZZH RX MED GY IP 250 OP 250 PS 637: Performed by: SURGERY

## 2017-02-09 PROCEDURE — 12000000 ZZH R&B MED SURG/OB

## 2017-02-09 PROCEDURE — 36415 COLL VENOUS BLD VENIPUNCTURE: CPT | Performed by: SURGERY

## 2017-02-09 PROCEDURE — 40000275 ZZH STATISTIC RCP TIME EA 10 MIN

## 2017-02-09 PROCEDURE — 83605 ASSAY OF LACTIC ACID: CPT | Performed by: SURGERY

## 2017-02-09 RX ADMIN — OXYCODONE HYDROCHLORIDE 5 MG: 5 TABLET ORAL at 14:34

## 2017-02-09 RX ADMIN — OXYCODONE HYDROCHLORIDE 10 MG: 5 TABLET ORAL at 06:34

## 2017-02-09 RX ADMIN — ACETAMINOPHEN 975 MG: 325 TABLET, FILM COATED ORAL at 20:21

## 2017-02-09 RX ADMIN — TAZOBACTAM SODIUM AND PIPERACILLIN SODIUM 3.38 G: 375; 3 INJECTION, SOLUTION INTRAVENOUS at 10:08

## 2017-02-09 RX ADMIN — OXYCODONE HYDROCHLORIDE 5 MG: 5 TABLET ORAL at 21:11

## 2017-02-09 RX ADMIN — ACETAMINOPHEN 975 MG: 325 TABLET, FILM COATED ORAL at 02:14

## 2017-02-09 RX ADMIN — OXYCODONE HYDROCHLORIDE 10 MG: 5 TABLET ORAL at 02:59

## 2017-02-09 RX ADMIN — TAZOBACTAM SODIUM AND PIPERACILLIN SODIUM 3.38 G: 375; 3 INJECTION, SOLUTION INTRAVENOUS at 03:47

## 2017-02-09 RX ADMIN — ACETAMINOPHEN 975 MG: 325 TABLET, FILM COATED ORAL at 11:54

## 2017-02-09 RX ADMIN — OXYCODONE HYDROCHLORIDE 5 MG: 5 TABLET ORAL at 18:48

## 2017-02-09 RX ADMIN — OXYCODONE HYDROCHLORIDE 10 MG: 5 TABLET ORAL at 00:08

## 2017-02-09 RX ADMIN — OXYCODONE HYDROCHLORIDE 5 MG: 5 TABLET ORAL at 10:54

## 2017-02-09 RX ADMIN — TAZOBACTAM SODIUM AND PIPERACILLIN SODIUM 3.38 G: 375; 3 INJECTION, SOLUTION INTRAVENOUS at 16:19

## 2017-02-09 RX ADMIN — TAZOBACTAM SODIUM AND PIPERACILLIN SODIUM 3.38 G: 375; 3 INJECTION, SOLUTION INTRAVENOUS at 22:05

## 2017-02-09 ASSESSMENT — PAIN DESCRIPTION - DESCRIPTORS: DESCRIPTORS: CRAMPING

## 2017-02-09 NOTE — PROGRESS NOTES
S: POD #2 lp appy for perforated appendicitis.  No overnight events, no acute issues, hemodynamically stable.  Febrile this morning  38.7.  Tolerating clear liquids.  Passing flatus.  Abdominal pain increases with flatus. No other complaints this morning.  Remains on zosyn    O:  Temp: 99.6  F (37.6  C) Temp  Min: 97.6  F (36.4  C)  Max: 101.6  F (38.7  C)  Resp: 16 Resp  Min: 16  Max: 18  SpO2: 95 % SpO2  Min: 95 %  Max: 97 %  Heart Rate: 80 Heart Rate  Min: 77  Max: 80  BP: 116/71 mmHg Systolic (24hrs), Av mmHg, Min:108 mmHg, Max:116 mmHg  Diastolic (24hrs), Av mmHg, Min:52 mmHg, Max:71 mmHg  I/O: 1.5 L/ 81+ urine x 4  PO: 500 mL  UOP: x 4  Gen: AAOx4, NAD, laying comfortably in bed  Lungs: CTA B/L  Heart: RRR no R/G/M  Abd: mildly distended, no rebound, no guarding, no signs of generalized peritonitis.  Drain 80 mL Serosanguinous fluid  Wounds: Surgical dressings intact, no seepage.     No labs    A/P  #1 POD #2 Lap Appy  #2 Perforated appendicitis     Looks okay.  Advancing diet.  Will remove bandages this morning.  Remain on IV abx until free of fevers.  Encouraged ambulation and incentive spirometry.

## 2017-02-09 NOTE — PROGRESS NOTES
Seeing patient during afternoon rounds.  Seen walking the halls with a smile on his face.  Clearly motivated to actively participate in his recovery.  We'll check labs in the morning and watch the fever curve to determine when its safe to discharge.  Patient was advised that with perforated appendicitis, there's a 30 % risk of developing a pelvic abscess.

## 2017-02-10 VITALS
SYSTOLIC BLOOD PRESSURE: 117 MMHG | TEMPERATURE: 98.2 F | DIASTOLIC BLOOD PRESSURE: 62 MMHG | HEART RATE: 77 BPM | HEIGHT: 73 IN | OXYGEN SATURATION: 97 % | RESPIRATION RATE: 16 BRPM | BODY MASS INDEX: 30.48 KG/M2 | WEIGHT: 230 LBS

## 2017-02-10 LAB
ANION GAP SERPL CALCULATED.3IONS-SCNC: 6 MMOL/L (ref 3–14)
BUN SERPL-MCNC: 11 MG/DL (ref 7–30)
CALCIUM SERPL-MCNC: 8.5 MG/DL (ref 8.5–10.1)
CHLORIDE SERPL-SCNC: 103 MMOL/L (ref 94–109)
CO2 SERPL-SCNC: 26 MMOL/L (ref 20–32)
CREAT SERPL-MCNC: 1.07 MG/DL (ref 0.66–1.25)
ERYTHROCYTE [DISTWIDTH] IN BLOOD BY AUTOMATED COUNT: 11.5 % (ref 10–15)
GFR SERPL CREATININE-BSD FRML MDRD: 80 ML/MIN/1.7M2
GLUCOSE SERPL-MCNC: 96 MG/DL (ref 70–99)
HCT VFR BLD AUTO: 40.2 % (ref 40–53)
HGB BLD-MCNC: 14 G/DL (ref 13.3–17.7)
MAGNESIUM SERPL-MCNC: 2.2 MG/DL (ref 1.6–2.3)
MCH RBC QN AUTO: 29.9 PG (ref 26.5–33)
MCHC RBC AUTO-ENTMCNC: 34.8 G/DL (ref 31.5–36.5)
MCV RBC AUTO: 86 FL (ref 78–100)
PHOSPHATE SERPL-MCNC: 3.6 MG/DL (ref 2.5–4.5)
PLATELET # BLD AUTO: 240 10E9/L (ref 150–450)
POTASSIUM SERPL-SCNC: 3.5 MMOL/L (ref 3.4–5.3)
RBC # BLD AUTO: 4.68 10E12/L (ref 4.4–5.9)
SODIUM SERPL-SCNC: 135 MMOL/L (ref 133–144)
WBC # BLD AUTO: 6.6 10E9/L (ref 4–11)

## 2017-02-10 PROCEDURE — 84100 ASSAY OF PHOSPHORUS: CPT | Performed by: SURGERY

## 2017-02-10 PROCEDURE — 25000132 ZZH RX MED GY IP 250 OP 250 PS 637: Performed by: SURGERY

## 2017-02-10 PROCEDURE — 83735 ASSAY OF MAGNESIUM: CPT | Performed by: SURGERY

## 2017-02-10 PROCEDURE — 36415 COLL VENOUS BLD VENIPUNCTURE: CPT | Performed by: SURGERY

## 2017-02-10 PROCEDURE — 40000275 ZZH STATISTIC RCP TIME EA 10 MIN

## 2017-02-10 PROCEDURE — 80048 BASIC METABOLIC PNL TOTAL CA: CPT | Performed by: SURGERY

## 2017-02-10 PROCEDURE — 25000128 H RX IP 250 OP 636: Performed by: SURGERY

## 2017-02-10 PROCEDURE — 85027 COMPLETE CBC AUTOMATED: CPT | Performed by: SURGERY

## 2017-02-10 RX ORDER — AMOXICILLIN 250 MG
2 CAPSULE ORAL 2 TIMES DAILY
Qty: 120 TABLET | Refills: 1 | Status: SHIPPED | OUTPATIENT
Start: 2017-02-10 | End: 2017-02-20

## 2017-02-10 RX ORDER — OXYCODONE HYDROCHLORIDE 5 MG/1
5-10 TABLET ORAL EVERY 6 HOURS PRN
Qty: 30 TABLET | Refills: 0 | Status: SHIPPED | OUTPATIENT
Start: 2017-02-10 | End: 2017-02-20

## 2017-02-10 RX ADMIN — OXYCODONE HYDROCHLORIDE 5 MG: 5 TABLET ORAL at 09:31

## 2017-02-10 RX ADMIN — OXYCODONE HYDROCHLORIDE 10 MG: 5 TABLET ORAL at 03:44

## 2017-02-10 RX ADMIN — TAZOBACTAM SODIUM AND PIPERACILLIN SODIUM 3.38 G: 375; 3 INJECTION, SOLUTION INTRAVENOUS at 03:45

## 2017-02-10 ASSESSMENT — PAIN DESCRIPTION - DESCRIPTORS
DESCRIPTORS: PRESSURE
DESCRIPTORS: PRESSURE

## 2017-02-10 NOTE — DISCHARGE SUMMARY
Physician Discharge Summary     Patient ID:  Mason Garcia  [id number]  31 year old  1985    Admit date: 2/6/2017    Discharge date and time: 2/10/2017     Admitting Physician: Chanel Pagan DO     Discharge Physician: Pradeep Patten DO    Admission Diagnoses: Acute perforated appendicitis [K35.2]  Perforated appendicitis [K35.2]    Discharge Diagnoses: Sepsis, Perforated appendicitis    Admission Condition: poor    Discharged Condition: good    Indication for Admission: acute perforated appendicitis    Hospital Course: Admitted on 2/6/17 septic from perforated appendicitis.  Taken to the OR later that night by Dr. Pagan.  He remained in the hospital receiving IV antibiotics.  On 2/10/17 he was tolerating a soft diet, passing flatus, afebrile for 24 hours, normalization of all labs and pain controlled with oral pain narcotics    Consults: none    Significant Diagnostic Studies: radiology: CT scan:   CT SCAN OF ABDOMEN AND PELVIS WITH IV CONTRAST     REPORT:  Lung bases are clear.  The liver is free of masses or biliary  duct enlargement.  Gallbladder is contracted.  No gallstones are seen.  The pancreas is normal.   The adrenal glands are normal.  The right  and left kidney show no evidence of mass or hydronephrosis.  There is  marked right lower quadrant inflammatory change consistent with acute  appendicitis.  In the pelvis, the bladder and rectum appear normal.  There is a small amount of free fluid seen in the pelvic cul-de-sac.  The regional skeleton is intact.     IMPRESSION:  RIGHT LOWER QUADRANT INFLAMMATORY CHANGES CONSISTENT WITH  ACUTE APPENDICITIS.  THIS REPORT WAS COMMUNICATED BY RADIOLOGIST TO  THE EMERGENCY ROOM.  Exam Date: Feb 06, 2017 09:30:43 PM  Author: MARCELLE DILLON  This report is final and signed    Treatments: IV hydration, antibiotics: Zosyn and surgery: Laparoscopic appendectomy    Discharge Exam:  Temp: 98  F (36.7  C) Temp  Min: 98  F (36.7  C)  Max: 100.5  F (38.1   C)  Resp: 18 Resp  Min: 16  Max: 18  SpO2: 97 % SpO2  Min: 96 %  Max: 98 %  Heart Rate: 68 Heart Rate  Min: 63  Max: 87  BP: 125/76 mmHg Systolic (24hrs), Av mmHg, Min:112 mmHg, Max:125 mmHg  Diastolic (24hrs), Av mmHg, Min:57 mmHg, Max:76 mmHg  UOP: x 2  PO: 1.3 L  Gen: AAOx4, NAD, seen ambulating down the halls without assistance  Abd: soft, ND/NT no rebound, no guarding, Drain 95cc SS fluid  Wounds: Clean, dry, intact, no erythema, necrosis or drainage worrisome for infection    Disposition: home    Patient Instructions:   Current Discharge Medication List      START taking these medications    Details   oxyCODONE (ROXICODONE) 5 MG IR tablet Take 1-2 tablets (5-10 mg) by mouth every 6 hours as needed for moderate to severe pain  Qty: 30 tablet, Refills: 0    Associated Diagnoses: Acute perforated appendicitis      amoxicillin-clavulanate (AUGMENTIN) 875-125 MG per tablet Take 1 tablet by mouth 2 times daily for 3 days  Qty: 6 tablet, Refills: 0    Associated Diagnoses: Acute perforated appendicitis      senna-docusate (SENOKOT-S;PERICOLACE) 8.6-50 MG per tablet Take 2 tablets by mouth 2 times daily  Qty: 120 tablet, Refills: 1    Comments: Hold for loose stools  Associated Diagnoses: Acute perforated appendicitis           Activity: activity as tolerated, no driving while on analgesics and no heavy lifting for 4 weeks  Diet: regular diet  Wound Care: keep wound clean and dry, reinforce dressing PRN and drain cares    Follow-up with Surgery in 1 week.    Signed:  Pradeep Patten  2/10/2017  7:03 AM

## 2017-02-10 NOTE — DISCHARGE INSTRUCTIONS
You have a hospital follow up with Dr. Valencia at [facility] on Wednesday, February 15th at 12:30 pm. If you need to reschedule these appointment please call [phone].

## 2017-02-10 NOTE — PROGRESS NOTES
S: POD #3 Lap appy for perforated appendicitis.  No overnight events, no acute issues, hemodynamically stable, afebrile, tolerating soft diet, pain controlled with oral antibiotics.    O:  Temp: 98  F (36.7  C) Temp  Min: 98  F (36.7  C)  Max: 100.5  F (38.1  C)  Resp: 18 Resp  Min: 16  Max: 18  SpO2: 97 % SpO2  Min: 96 %  Max: 98 %  Heart Rate: 68 Heart Rate  Min: 63  Max: 87  BP: 125/76 mmHg Systolic (24hrs), Av mmHg, Min:112 mmHg, Max:125 mmHg  Diastolic (24hrs), Av mmHg, Min:57 mmHg, Max:76 mmHg  UOP: x 2  PO: 1.3 L  Gen: AAOx4, NAD, seen ambulating down the halls without assistance  Abd: soft, ND/NT no rebound, no guarding, Drain 95cc SS fluid  Wounds: Clean, dry, intact, no erythema, necrosis or drainage worrisome for infection    Results for orders placed or performed during the hospital encounter of 17 (from the past 24 hour(s))   CBC with platelets   Result Value Ref Range    WBC 6.6 4.0 - 11.0 10e9/L    RBC Count 4.68 4.4 - 5.9 10e12/L    Hemoglobin 14.0 13.3 - 17.7 g/dL    Hematocrit 40.2 40.0 - 53.0 %    MCV 86 78 - 100 fl    MCH 29.9 26.5 - 33.0 pg    MCHC 34.8 31.5 - 36.5 g/dL    RDW 11.5 10.0 - 15.0 %    Platelet Count 240 150 - 450 10e9/L   Basic metabolic panel   Result Value Ref Range    Sodium 135 133 - 144 mmol/L    Potassium 3.5 3.4 - 5.3 mmol/L    Chloride 103 94 - 109 mmol/L    Carbon Dioxide 26 20 - 32 mmol/L    Anion Gap 6 3 - 14 mmol/L    Glucose 96 70 - 99 mg/dL    Urea Nitrogen 11 7 - 30 mg/dL    Creatinine 1.07 0.66 - 1.25 mg/dL    GFR Estimate 80 >60 mL/min/1.7m2    GFR Estimate If Black >90   GFR Calc   >60 mL/min/1.7m2    Calcium 8.5 8.5 - 10.1 mg/dL   Magnesium   Result Value Ref Range    Magnesium 2.2 1.6 - 2.3 mg/dL   Phosphorus   Result Value Ref Range    Phosphorus 3.6 2.5 - 4.5 mg/dL     A/P  #1 POD #3 Lap Appy  #2 Perforated appendicitis     Looks great.  Drain output has increased but is benign in character.  I think its safe for him to continue  his recovery at home with 3 days of oral antibiotics.  I'll leave the drain in for now, and have him see surgery clinic early next week for possible drain removal. We'll teach him how to strip, empty, measure and record drain output.

## 2017-02-12 LAB
BACTERIA SPEC CULT: ABNORMAL
BACTERIA SPEC CULT: NORMAL
BACTERIA SPEC CULT: NORMAL
Lab: NORMAL
MICRO REPORT STATUS: ABNORMAL
MICRO REPORT STATUS: NORMAL
MICRO REPORT STATUS: NORMAL
MICROORGANISM SPEC CULT: ABNORMAL
SPECIMEN SOURCE: ABNORMAL
SPECIMEN SOURCE: NORMAL
SPECIMEN SOURCE: NORMAL

## 2017-02-13 ENCOUNTER — OFFICE VISIT (OUTPATIENT)
Dept: SURGERY | Facility: OTHER | Age: 32
End: 2017-02-13
Attending: SURGERY
Payer: COMMERCIAL

## 2017-02-13 ENCOUNTER — TELEPHONE (OUTPATIENT)
Dept: CASE MANAGEMENT | Facility: HOSPITAL | Age: 32
End: 2017-02-13

## 2017-02-13 VITALS
HEIGHT: 73 IN | DIASTOLIC BLOOD PRESSURE: 62 MMHG | OXYGEN SATURATION: 99 % | HEART RATE: 79 BPM | RESPIRATION RATE: 18 BRPM | WEIGHT: 215 LBS | SYSTOLIC BLOOD PRESSURE: 114 MMHG | TEMPERATURE: 97.3 F | BODY MASS INDEX: 28.49 KG/M2

## 2017-02-13 DIAGNOSIS — Z90.49 STATUS POST LAPAROSCOPIC APPENDECTOMY: Primary | ICD-10-CM

## 2017-02-13 PROCEDURE — 99024 POSTOP FOLLOW-UP VISIT: CPT | Performed by: SURGERY

## 2017-02-13 ASSESSMENT — PAIN SCALES - GENERAL: PAINLEVEL: MILD PAIN (2)

## 2017-02-13 NOTE — MR AVS SNAPSHOT
"              After Visit Summary   2017    Mason Garcia    MRN: [id number]           Patient Information     Date Of Birth          1985        Visit Information        Provider Department      2017 3:45 PM Pradeep Patten, DO [facility] [place]         Follow-ups after your visit        Your next 10 appointments already scheduled     2017  1:00 PM CST   (Arrive by 12:45 PM)   Post Op with Jovanny Patino MD   [facility] [place] ([facility] [place] [facility])    [address]  [place] [address]   [phone]              Who to contact     If you have questions or need follow up information about today's clinic visit or your schedule please contact [facility] directly at [phone].  Normal or non-critical lab and imaging results will be communicated to you by MyChart, letter or phone within 4 business days after the clinic has received the results. If you do not hear from us within 7 days, please contact the clinic through MyChart or phone. If you have a critical or abnormal lab result, we will notify you by phone as soon as possible.  Submit refill requests through DoseMe or call your pharmacy and they will forward the refill request to us. Please allow 3 business days for your refill to be completed.          Additional Information About Your Visit        MyChart Information     DoseMe lets you send messages to your doctor, view your test results, renew your prescriptions, schedule appointments and more. To sign up, go to [email or web address] . Click on \"Log in\" on the left side of the screen, which will take you to the Welcome page. Then click on \"Sign up Now\" on the right side of the page.     You will be asked to enter the access code listed below, as well as some personal information. Please follow the directions to create your username and password.     Your access code is: [email or web address]  Expires: 2017  7:16 AM     Your access code will  " "in 90 days. If you need help or a new code, please call your [facility] clinic or [phone].        Care EveryWhere ID     This is your Care EveryWhere ID. This could be used by other organizations to access your [facility] medical records  [id number]        Your Vitals Were     Pulse Temperature Respirations Height Pulse Oximetry BMI (Body Mass Index)    79 97.3  F (36.3  C) (Tympanic) 18 6' 1\" (1.854 m) 99% 28.37 kg/m2       Blood Pressure from Last 3 Encounters:   02/13/17 114/62   02/10/17 117/62   04/20/15 127/77    Weight from Last 3 Encounters:   02/13/17 215 lb (97.5 kg)   02/07/17 230 lb (104.3 kg)              Today, you had the following     No orders found for display       Primary Care Provider Office Phone # Fax #    Ruben Valencia [phone]       [facility] [address] [address]        Thank you!     Thank you for choosing [facility] [place]  for your care. Our goal is always to provide you with excellent care. Hearing back from our patients is one way we can continue to improve our services. Please take a few minutes to complete the written survey that you may receive in the mail after your visit with us. Thank you!             Your Updated Medication List - Protect others around you: Learn how to safely use, store and throw away your medicines at www.disposemymeds.org.          This list is accurate as of: 2/13/17  4:11 PM.  Always use your most recent med list.                   Brand Name Dispense Instructions for use    oxyCODONE 5 MG IR tablet    ROXICODONE    30 tablet    Take 1-2 tablets (5-10 mg) by mouth every 6 hours as needed for moderate to severe pain       senna-docusate 8.6-50 MG per tablet    SENOKOT-S;PERICOLACE    120 tablet    Take 2 tablets by mouth 2 times daily         "

## 2017-02-13 NOTE — LETTER
[facility] [place]  [address]  [place] [address]  Phone: [phone]    February 13, 2017        Mason Garcia  [address] [place] [address]          To whom it may concern:    RE: Mason Garcia    Patient was seen and treated today at our clinic.  When the patient returns to work, the following restrictions apply until 3/7/17:    Lift, carry, push, and pull no more than: 10 lbs.     Please contact me for questions or concerns.      Sincerely,        Pradeep Patten, DO

## 2017-02-13 NOTE — PROGRESS NOTES
"S: Mr. Garcia returns one week after lap appy for perforated appendicitis.  Discharged on POD #3 with Augmentin.  Doing well post operatively, tolerating a general diet, having regular bowel movements, passing flatus, no diarrhea, doesn't take pain narcotics regularly.  Keeping track of drainage which has been steadily declining and clear in characteristics.  Less than 30cc for the past 2 days. Specifically denies fevers, chills, nausea, vomiting, shortness of breath.    O:  /62 (BP Location: Left arm, Patient Position: Chair, Cuff Size: Adult Large)  Pulse 79  Temp 97.3  F (36.3  C) (Tympanic)  Resp 18  Ht 6' 1\" (1.854 m)  Wt 215 lb (97.5 kg)  SpO2 99%  BMI 28.37 kg/m2  Gen: AAOx4, NAD WN/WD ambulating without assistance, smiling, joking  Abd: Soft, ND/NT no rebound, no guarding, drain serous fluid  Wound: clean, dry, intact, no erythema, necrosis or drainage worrisome for infection    A/P  #1 S/P laparoscopic appendectomy    Mr. Garcia looks good.  The drain was removed without incident.  Will see back in surgery clinic in 1 week for wound check.  I've provided my card with office phone number and told him not to hesitate to call with any questions, comments or concerns.  I'd like a call if he has any fevers over 101.3, nausea/vomiting, pain out of control.  He's been instructed to refrain from lifting greater than 10 lbs (or a gallon of milk) for another three weeks and to avoid strenuous activity as well.  "

## 2017-02-13 NOTE — NURSING NOTE
"Chief Complaint   Patient presents with     Other     follow up after surgery Drain Removal       Initial /62 (BP Location: Left arm, Patient Position: Chair, Cuff Size: Adult Large)  Pulse 79  Temp 97.3  F (36.3  C) (Tympanic)  Resp 18  Ht 6' 1\" (1.854 m)  Wt 215 lb (97.5 kg)  SpO2 99%  BMI 28.37 kg/m2 Estimated body mass index is 28.37 kg/(m^2) as calculated from the following:    Height as of this encounter: 6' 1\" (1.854 m).    Weight as of this encounter: 215 lb (97.5 kg).  Medication Reconciliation: complete   Jamia Downing LPN      "

## 2017-02-13 NOTE — TELEPHONE ENCOUNTER
Attempted to make follow up phone call with patient. Unable to reach patient at this time. First attempt. Will attempt again.

## 2017-02-14 ENCOUNTER — TELEPHONE (OUTPATIENT)
Dept: CASE MANAGEMENT | Facility: HOSPITAL | Age: 32
End: 2017-02-14

## 2017-02-14 NOTE — TELEPHONE ENCOUNTER
Mason Garcia, was discharged to home on 2/10/17  from [facility]. I spoke today with him  regarding his discharge.   He indicates he  receive(d) sufficient information upon discharge. Medications were reviewed in full on discharge, including: Medications to be started and any side effects. Prescriptions were received at discharge and were able to be filled. Medications are being taken as prescribed.   He indicates he saw Dr. Patten on 2/13/17 and has an appointment with Dr. Valencia on Feb 15 @1230 and has transportation available.   He  did not have any questions regarding discharge instructions or condition.  Per their request, the following employee (s) can be recognized for their outstanding services delivered:  All were really good  Suggestions to improve service: nothing indicated  He was informed he may receive a survey in the mail from the hospital. Asked if he would kindly complete the survey in order for [facility] to know if services fully met patient needs.

## 2017-02-16 LAB
BACTERIA SPEC CULT: ABNORMAL
Lab: ABNORMAL
MICRO REPORT STATUS: ABNORMAL
MICROORGANISM SPEC CULT: ABNORMAL
SPECIMEN SOURCE: ABNORMAL

## 2017-02-20 ENCOUNTER — OFFICE VISIT (OUTPATIENT)
Dept: SURGERY | Facility: OTHER | Age: 32
End: 2017-02-20
Attending: SURGERY
Payer: COMMERCIAL

## 2017-02-20 VITALS
SYSTOLIC BLOOD PRESSURE: 118 MMHG | TEMPERATURE: 98.2 F | HEART RATE: 93 BPM | DIASTOLIC BLOOD PRESSURE: 70 MMHG | BODY MASS INDEX: 29.16 KG/M2 | WEIGHT: 220 LBS | OXYGEN SATURATION: 98 % | HEIGHT: 73 IN

## 2017-02-20 DIAGNOSIS — Z90.49 STATUS POST LAPAROSCOPIC APPENDECTOMY: Primary | ICD-10-CM

## 2017-02-20 PROCEDURE — 99024 POSTOP FOLLOW-UP VISIT: CPT | Performed by: SURGERY

## 2017-02-20 ASSESSMENT — PAIN SCALES - GENERAL: PAINLEVEL: MILD PAIN (2)

## 2017-02-20 NOTE — PATIENT INSTRUCTIONS
Thank you for allowing Dr. Patino  and our surgical team to participate in your care.  If you have a scheduling or an appointment question please contact Korina, our Health Unit Coordinator at her direct line [phone].   ALL nursing questions or concerns can be directed to your surgical nurse at: [phone].

## 2017-02-20 NOTE — NURSING NOTE
"Chief Complaint   Patient presents with     Surgical Followup     status post laparoscopic appendectomy 2-7-17  Dr Pagan.  He has been getting tired quickly.       Initial /70  Pulse 93  Temp 98.2  F (36.8  C) (Tympanic)  Ht 6' 1\" (1.854 m)  Wt 220 lb (99.8 kg)  SpO2 98%  BMI 29.03 kg/m2 Estimated body mass index is 29.03 kg/(m^2) as calculated from the following:    Height as of this encounter: 6' 1\" (1.854 m).    Weight as of this encounter: 220 lb (99.8 kg).  Medication Reconciliation: complete   PEPE CASANOVA      "

## 2017-02-20 NOTE — LETTER
[facility] [place]  [address] [place] [address]  Phone: [phone]    February 20, 2017        Mason Garcia  [address] [place] [address]          To whom it may concern:    RE: Mason Garcia    Patient may return to work February 21, 2107 with the following:  No working or lifting restrictions    Please contact me for questions or concerns.      Sincerely,        Jovanny Patnio MD

## 2017-02-20 NOTE — MR AVS SNAPSHOT
After Visit Summary   2/20/2017    Mason Garcia    MRN: [id number]           Patient Information     Date Of Birth          1985        Visit Information        Provider Department      2/20/2017 1:00 PM Jovanny Patino MD [facility] [place]        Today's Diagnoses     Status post laparoscopic appendectomy    -  1      Care Instructions    Thank you for allowing Dr. Patino  and our surgical team to participate in your care.  If you have a scheduling or an appointment question please contact Korina, our Health Unit Coordinator at her direct line [phone].   ALL nursing questions or concerns can be directed to your surgical nurse at: [phone].          Follow-ups after your visit        Your next 10 appointments already scheduled     Feb 20, 2017  1:00 PM CST   (Arrive by 12:45 PM)   Post Op with MD Rm Almanza ([facility] [facility])    [address]  [place] [address] [phone]              Who to contact     If you have questions or need follow up information about today's clinic visit or your schedule please contact [facility] directly at [phone].  Normal or non-critical lab and imaging results will be communicated to you by MyChart, letter or phone within 4 business days after the clinic has received the results. If you do not hear from us within 7 days, please contact the clinic through [place] or phone. If you have a critical or abnormal lab result, we will notify you by phone as soon as possible.  Submit refill requests through Cardiosonic or call your pharmacy and they will forward the refill request to us. Please allow 3 business days for your refill to be completed.          Additional Information About Your Visit        MyChart Information     Cardiosonic lets you send messages to your doctor, view your test results, renew your prescriptions, schedule appointments and more. To sign up, go to [email or web address] .  "Click on \"Log in\" on the left side of the screen, which will take you to the Welcome page. Then click on \"Sign up Now\" on the right side of the page.     You will be asked to enter the access code listed below, as well as some personal information. Please follow the directions to create your username and password.     Your access code is: [email or web address]  Expires: 2017  7:16 AM     Your access code will  in 90 days. If you need help or a new code, please call your [facility] clinic or [phone].        Care EveryWhere ID     This is your Care EveryWhere ID. This could be used by other organizations to access your [facility] medical records  [id number]        Your Vitals Were     Pulse Temperature Height Pulse Oximetry BMI (Body Mass Index)       93 98.2  F (36.8  C) (Tympanic) 6' 1\" (1.854 m) 98% 29.03 kg/m2        Blood Pressure from Last 3 Encounters:   17 118/70   17 114/62   02/10/17 117/62    Weight from Last 3 Encounters:   17 220 lb (99.8 kg)   17 215 lb (97.5 kg)   17 230 lb (104.3 kg)              Today, you had the following     No orders found for display         Today's Medication Changes          These changes are accurate as of: 17 12:51 PM.  If you have any questions, ask your nurse or doctor.               Stop taking these medicines if you haven't already. Please contact your care team if you have questions.     oxyCODONE 5 MG IR tablet   Commonly known as:  ROXICODONE   Stopped by:  Jovanny Patino MD           senna-docusate 8.6-50 MG per tablet   Commonly known as:  SENOKOT-S;PERICOLACE   Stopped by:  Jovanny Patino MD                    Primary Care Provider Office Phone # Fax #    Ruben Valencia [phone]       [facility] [address] [place] [id number]        Thank you!     Thank you for choosing [facility] [place]  for your care. Our goal is always to provide you with excellent care. Hearing back from " our patients is one way we can continue to improve our services. Please take a few minutes to complete the written survey that you may receive in the mail after your visit with us. Thank you!             Your Updated Medication List - Protect others around you: Learn how to safely use, store and throw away your medicines at www.disposemymeds.org.      Notice  As of 2/20/2017 12:51 PM    You have not been prescribed any medications.

## 2017-02-25 ENCOUNTER — HOSPITAL ENCOUNTER (EMERGENCY)
Facility: HOSPITAL | Age: 32
Discharge: HOME | End: 2017-02-25
Attending: EMERGENCY MEDICINE | Admitting: EMERGENCY MEDICINE
Payer: COMMERCIAL

## 2017-02-25 VITALS
TEMPERATURE: 98.8 F | SYSTOLIC BLOOD PRESSURE: 127 MMHG | OXYGEN SATURATION: 97 % | RESPIRATION RATE: 20 BRPM | HEART RATE: 84 BPM | DIASTOLIC BLOOD PRESSURE: 78 MMHG

## 2017-02-25 DIAGNOSIS — J18.9 PNEUMONIA OF RIGHT MIDDLE LOBE DUE TO INFECTIOUS ORGANISM: ICD-10-CM

## 2017-02-25 DIAGNOSIS — J06.9 VIRAL URI: ICD-10-CM

## 2017-02-25 LAB
DEPRECATED S PYO AG THROAT QL EIA: NORMAL
FLUAV+FLUBV AG SPEC QL: NEGATIVE
FLUAV+FLUBV AG SPEC QL: NORMAL
MICRO REPORT STATUS: NORMAL
SPECIMEN SOURCE: NORMAL
SPECIMEN SOURCE: NORMAL

## 2017-02-25 PROCEDURE — 87804 INFLUENZA ASSAY W/OPTIC: CPT | Mod: 59 | Performed by: FAMILY MEDICINE

## 2017-02-25 PROCEDURE — 87880 STREP A ASSAY W/OPTIC: CPT | Performed by: EMERGENCY MEDICINE

## 2017-02-25 PROCEDURE — 99284 EMERGENCY DEPT VISIT MOD MDM: CPT | Mod: 25

## 2017-02-25 PROCEDURE — 71020 ZZHC CHEST TWO VIEWS, FRONT/LAT: CPT | Mod: TC

## 2017-02-25 PROCEDURE — 99283 EMERGENCY DEPT VISIT LOW MDM: CPT | Performed by: EMERGENCY MEDICINE

## 2017-02-25 PROCEDURE — 87081 CULTURE SCREEN ONLY: CPT | Performed by: EMERGENCY MEDICINE

## 2017-02-25 RX ORDER — AZITHROMYCIN 250 MG/1
TABLET, FILM COATED ORAL
Qty: 6 TABLET | Refills: 0 | Status: SHIPPED | OUTPATIENT
Start: 2017-02-25 | End: 2017-03-02

## 2017-02-25 ASSESSMENT — ENCOUNTER SYMPTOMS
FEVER: 0
SORE THROAT: 1
EYE PAIN: 0
NEUROLOGICAL NEGATIVE: 1
PHOTOPHOBIA: 0
ENDOCRINE NEGATIVE: 1
SHORTNESS OF BREATH: 0
CARDIOVASCULAR NEGATIVE: 1
RESPIRATORY NEGATIVE: 1
TREMORS: 0
CONSTITUTIONAL NEGATIVE: 1
MUSCULOSKELETAL NEGATIVE: 1
EYES NEGATIVE: 1
LIGHT-HEADEDNESS: 0
GASTROINTESTINAL NEGATIVE: 1
PSYCHIATRIC NEGATIVE: 1
EYE REDNESS: 0
WEAKNESS: 0
HEADACHES: 0

## 2017-02-25 NOTE — DISCHARGE INSTRUCTIONS
Pneumonia (Adult)  Pneumonia is an infection deep within the lungs. It is in the small air sacs (alveoli). Pneumonia may be caused by a virus or bacteria. Pneumonia caused by bacteria is usually treated with an antibiotic. Severe cases may need to be treated in the hospital. Milder cases can be treated at home. Symptoms usually start to get better during the first 2 days of treatment.    Home care  Follow these guidelines when caring for yourself at home:    Rest at home for the first 2 to 3 days, or until you feel stronger. Don t let yourself get overly tired when you go back to your activities.    Stay away from cigarette smoke - yours or other people s.    You may use acetaminophen or ibuprofen to control fever or pain, unless another medicine was prescribed. If you have chronic liver or kidney disease, talk with your health care provider before using these medicines. Also talk with your provider if you ve had a stomach ulcer or GI bleeding. Don t give aspirin to anyone younger than 18 years of age who is ill with a fever. It may cause severe liver damage.    Your appetite may be poor, so a light diet is fine.    Drink 6 to 8 glasses of fluids every day to make sure you are getting enough fluids. Beverages can include water, sport drinks, sodas without caffeine, juices, tea, or soup. Fluids will help loosen secretions in the lung. This will make it easier for you to cough up the phlegm (sputum). If you also have heart or kidney disease, check with your health care provider before you drink extra fluids.    Take antibiotic medicine prescribed until it is all gone, even if you are feeling better after a few days.  Follow-up care  Follow up with your health care provider in the next 2 to 3 days, or as advised. This is to be sure the medicine is helping you get better.  If you are 65 or older, you should get a pneumococcal vaccine and a yearly flu (influenza) shot. You should also get these vaccines if you have  chronic lung disease like asthma, emphysema, or COPD. Ask your provider about this.  When to seek medical advice  Call your health care provider right away if any of these occur:    You don t get better within the first 48 hours of treatment    Shortness of breath gets worse    Rapid breathing (more than 25 breaths per minute)    Coughing up blood    Chest pain gets worse with breathing    Fever of 102 F (38 C) or higher that doesn t get better with fever medicine    Weakness, dizziness, or fainting that gets worse    Thirst or dry mouth that gets worse    Sinus pain, headache, or a stiff neck    Chest pain not caused by coughing    You must follow up with your doctor in 12 to 24 hours or return to the ER for a recheck.     You have been given a prescription for a medication that can cause an allergic reactions. Return to the ER if you develop any itching, tongue swelling, wheezing or shortness of breath.    Your x-ray results are preliminary. You will be contacted if there is any change on the final x-ray reading.

## 2017-02-25 NOTE — ED AVS SNAPSHOT
[facility]    [address] [place] [address]    [place] [address]    Phone:  [phone]                                       Mason Garcia   MRN: [id number]    Department:  [facility]   Date of Visit:  2/25/2017           Patient Information     Date Of Birth          1985        Your diagnoses for this visit were:     Viral URI     Pneumonia of right middle lobe due to infectious organism        You were seen by Parminder Boss MD.      Follow-up Information     Follow up with Ruben Valencia MD.    Specialty:  Family Practice    Contact information:    [facility]  [address] [facility] [address]  [phone]          Discharge Instructions         Pneumonia (Adult)  Pneumonia is an infection deep within the lungs. It is in the small air sacs (alveoli). Pneumonia may be caused by a virus or bacteria. Pneumonia caused by bacteria is usually treated with an antibiotic. Severe cases may need to be treated in the hospital. Milder cases can be treated at home. Symptoms usually start to get better during the first 2 days of treatment.    Home care  Follow these guidelines when caring for yourself at home:    Rest at home for the first 2 to 3 days, or until you feel stronger. Don t let yourself get overly tired when you go back to your activities.    Stay away from cigarette smoke - yours or other people s.    You may use acetaminophen or ibuprofen to control fever or pain, unless another medicine was prescribed. If you have chronic liver or kidney disease, talk with your health care provider before using these medicines. Also talk with your provider if you ve had a stomach ulcer or GI bleeding. Don t give aspirin to anyone younger than 18 years of age who is ill with a fever. It may cause severe liver damage.    Your appetite may be poor, so a light diet is fine.    Drink 6 to 8 glasses of fluids every day to make sure you are getting enough fluids. Beverages can include  water, sport drinks, sodas without caffeine, juices, tea, or soup. Fluids will help loosen secretions in the lung. This will make it easier for you to cough up the phlegm (sputum). If you also have heart or kidney disease, check with your health care provider before you drink extra fluids.    Take antibiotic medicine prescribed until it is all gone, even if you are feeling better after a few days.  Follow-up care  Follow up with your health care provider in the next 2 to 3 days, or as advised. This is to be sure the medicine is helping you get better.  If you are 65 or older, you should get a pneumococcal vaccine and a yearly flu (influenza) shot. You should also get these vaccines if you have chronic lung disease like asthma, emphysema, or COPD. Ask your provider about this.  When to seek medical advice  Call your health care provider right away if any of these occur:    You don t get better within the first 48 hours of treatment    Shortness of breath gets worse    Rapid breathing (more than 25 breaths per minute)    Coughing up blood    Chest pain gets worse with breathing    Fever of 102 F (38 C) or higher that doesn t get better with fever medicine    Weakness, dizziness, or fainting that gets worse    Thirst or dry mouth that gets worse    Sinus pain, headache, or a stiff neck    Chest pain not caused by coughing    You must follow up with your doctor in 12 to 24 hours or return to the ER for a recheck.     You have been given a prescription for a medication that can cause an allergic reactions. Return to the ER if you develop any itching, tongue swelling, wheezing or shortness of breath.    Your x-ray results are preliminary. You will be contacted if there is any change on the final x-ray reading.              Review of your medicines      START taking        Dose / Directions Last dose taken    azithromycin 250 MG tablet   Commonly known as:  ZITHROMAX Z-JEFERSON   Quantity:  6 tablet        Two tablets on the  "first day, then one tablet daily for the next 4 days   Refills:  0          Our records show that you are taking the medicines listed below. If these are incorrect, please call your family doctor or clinic.        Dose / Directions Last dose taken    TYLENOL PO   Dose:  1000 mg        Take 1,000 mg by mouth   Refills:  0                Prescriptions were sent or printed at these locations (1 Prescription)                   Other Prescriptions                Printed at Department/Unit printer (1 of 1)         azithromycin (ZITHROMAX Z-JEFERSON) 250 MG tablet                Procedures and tests performed during your visit     Beta strep group A culture    Influenza A/B antigen    Rapid strep screen    XR Chest 2 Views      Orders Needing Specimen Collection     None      Pending Results     Date and Time Order Name Status Description    2/25/2017 0615 Beta strep group A culture In process     2/25/2017 0614 XR Chest 2 Views In process             Pending Culture Results     Date and Time Order Name Status Description    2/25/2017 0615 Beta strep group A culture In process             Thank you for choosing [facility]       Thank you for choosing [facility] for your care. Our goal is always to provide you with excellent care. Hearing back from our patients is one way we can continue to improve our services. Please take a few minutes to complete the written survey that you may receive in the mail after you visit with us. Thank you!        [employer] Information     [employer] lets you send messages to your doctor, view your test results, renew your prescriptions, schedule appointments and more. To sign up, go to [email or web address] . Click on \"Log in\" on the left side of the screen, which will take you to the Welcome page. Then click on \"Sign up Now\" on the right side of the page.     You will be asked to enter the access code listed below, as well as some personal information. Please follow the directions to create your username " and password.     Your access code is: [email or web address]  Expires: 2017  7:16 AM     Your access code will  in 90 days. If you need help or a new code, please call your [facility] clinic or [phone].        Care EveryWhere ID     This is your Care EveryWhere ID. This could be used by other organizations to access your [facility] medical records  [id number]        After Visit Summary       This is your record. Keep this with you and show to your community pharmacist(s) and doctor(s) at your next visit.

## 2017-02-25 NOTE — ED AVS SNAPSHOT
[facility]    [address] [address]    Phone:  [phone]                                       Mason Garcia   MRN: [id number]    Department:  [facility]   Date of Visit:  2/25/2017           After Visit Summary Signature Page     I have received my discharge instructions, and my questions have been answered. I have discussed any challenges I see with this plan with the nurse or doctor.    ..........................................................................................................................................  Patient/Patient Representative Signature      ..........................................................................................................................................  Patient Representative Print Name and Relationship to Patient    ..................................................               ................................................  Date                                            Time    ..........................................................................................................................................  Reviewed by Signature/Title    ...................................................              ..............................................  Date                                                            Time

## 2017-02-25 NOTE — ED NOTES
Face to face report given with opportunity to observe patient.  Report given to WYATT Costa.    BUSHRA FORDE  2/25/2017, 7:08 AM

## 2017-02-25 NOTE — ED PROVIDER NOTES
History     Chief Complaint   Patient presents with     Cough     HPI  Mason Garcia is a 31 year old male who presented today with complaints of cough and fever and concerns that sx may be a complication of recent appendix surgery. Patient has otherwise recovered well from appendix surgery. No additional complaints.     I have reviewed the Medications, Allergies, Past Medical and Surgical History, and Social History in the Epic system.    Review of Systems   Constitutional: Negative.  Negative for fever.   HENT: Positive for sore throat. Negative for ear pain.    Eyes: Negative.  Negative for photophobia, pain and redness.   Respiratory: Negative.  Negative for shortness of breath.    Cardiovascular: Negative.  Negative for chest pain.   Gastrointestinal: Negative.    Endocrine: Negative.    Genitourinary: Negative.    Musculoskeletal: Negative.    Skin: Negative.    Neurological: Negative.  Negative for tremors, weakness, light-headedness and headaches.   Psychiatric/Behavioral: Negative.        Physical Exam   BP: 131/74  Pulse: 84  Temp: 98.9  F (37.2  C)  Resp: 20  SpO2: 96 %  Physical Exam   Constitutional: He is oriented to person, place, and time. He appears well-developed and well-nourished.   HENT:   Head: Normocephalic and atraumatic.   Right Ear: External ear normal.   Left Ear: External ear normal.   Mouth/Throat: Oropharynx is clear and moist.   Eyes: Conjunctivae and EOM are normal. Pupils are equal, round, and reactive to light.   Neck: Normal range of motion. Neck supple.   Cardiovascular: Normal rate, regular rhythm and normal heart sounds.    Pulmonary/Chest: Effort normal and breath sounds normal.   Abdominal: Soft. Bowel sounds are normal.   Musculoskeletal: Normal range of motion.   Neurological: He is alert and oriented to person, place, and time.   Skin: Skin is warm and dry.   Psychiatric: He has a normal mood and affect. His behavior is normal. Judgment and thought content normal.        ED Course     ED Course     Procedures        CXR: ? RML infiltrate.        Labs Ordered and Resulted from Time of ED Arrival Up to the Time of Departure from the ED   INFLUENZA A/B ANTIGEN   RAPID STREP SCREEN   BETA STREP GROUP A CULTURE       Assessments & Plan (with Medical Decision Making)     I have reviewed the nursing notes.    I have reviewed the findings, diagnosis, plan and need for follow up with the patient.    New Prescriptions    No medications on file       Final diagnoses:   Viral URI   Pneumonia of right middle lobe due to infectious organism       2/25/2017   [facility]  89 year old male with uri sx and preliminary x-ray findings suggestive of pneumonia. Plan: Rx for Azithromycin. Patient to follow up with pcp next week and recheck in urgent care in 24 hours.      Parminder Boss MD  02/25/17 0702

## 2017-02-26 NOTE — PROGRESS NOTES
Chest XR report routed to PCP, Dr. DEVAUGHN Valencia at [facility].  Impression - Mild patchy infiltrate in the right upper lobe.  Prescribed Azithromycin at ED visit and advised to follow up with PCP next week.

## 2017-02-27 LAB
BACTERIA SPEC CULT: NORMAL
MICRO REPORT STATUS: NORMAL
SPECIMEN SOURCE: NORMAL

## 2017-02-28 ENCOUNTER — HOSPITAL ENCOUNTER (EMERGENCY)
Facility: HOSPITAL | Age: 32
Discharge: HOME | End: 2017-02-28
Attending: PHYSICIAN ASSISTANT | Admitting: PHYSICIAN ASSISTANT
Payer: COMMERCIAL

## 2017-02-28 VITALS
DIASTOLIC BLOOD PRESSURE: 83 MMHG | SYSTOLIC BLOOD PRESSURE: 124 MMHG | OXYGEN SATURATION: 97 % | TEMPERATURE: 98.2 F | HEART RATE: 78 BPM | RESPIRATION RATE: 14 BRPM

## 2017-02-28 DIAGNOSIS — S39.011A ABDOMINAL WALL STRAIN, INITIAL ENCOUNTER: ICD-10-CM

## 2017-02-28 LAB
ALBUMIN SERPL-MCNC: 3.6 G/DL (ref 3.4–5)
ALBUMIN UR-MCNC: 10 MG/DL
ALP SERPL-CCNC: 75 U/L (ref 40–150)
ALT SERPL W P-5'-P-CCNC: 61 U/L (ref 0–70)
ANION GAP SERPL CALCULATED.3IONS-SCNC: 7 MMOL/L (ref 3–14)
APPEARANCE UR: CLEAR
AST SERPL W P-5'-P-CCNC: 30 U/L (ref 0–45)
BACTERIA #/AREA URNS HPF: ABNORMAL /HPF
BASOPHILS # BLD AUTO: 0 10E9/L (ref 0–0.2)
BASOPHILS NFR BLD AUTO: 0.3 %
BILIRUB SERPL-MCNC: 0.5 MG/DL (ref 0.2–1.3)
BILIRUB UR QL STRIP: NEGATIVE
BUN SERPL-MCNC: 13 MG/DL (ref 7–30)
CALCIUM SERPL-MCNC: 8.4 MG/DL (ref 8.5–10.1)
CHLORIDE SERPL-SCNC: 107 MMOL/L (ref 94–109)
CO2 SERPL-SCNC: 27 MMOL/L (ref 20–32)
COLOR UR AUTO: YELLOW
CREAT SERPL-MCNC: 0.91 MG/DL (ref 0.66–1.25)
CRP SERPL-MCNC: 27.1 MG/L (ref 0–8)
DIFFERENTIAL METHOD BLD: NORMAL
EOSINOPHIL # BLD AUTO: 0.1 10E9/L (ref 0–0.7)
EOSINOPHIL NFR BLD AUTO: 1.4 %
ERYTHROCYTE [DISTWIDTH] IN BLOOD BY AUTOMATED COUNT: 11.9 % (ref 10–15)
GFR SERPL CREATININE-BSD FRML MDRD: ABNORMAL ML/MIN/1.7M2
GLUCOSE SERPL-MCNC: 97 MG/DL (ref 70–99)
GLUCOSE UR STRIP-MCNC: NEGATIVE MG/DL
HCT VFR BLD AUTO: 40.9 % (ref 40–53)
HGB BLD-MCNC: 14.4 G/DL (ref 13.3–17.7)
HGB UR QL STRIP: NEGATIVE
HYALINE CASTS #/AREA URNS LPF: 1 /LPF (ref 0–2)
IMM GRANULOCYTES # BLD: 0 10E9/L (ref 0–0.4)
IMM GRANULOCYTES NFR BLD: 0.3 %
KETONES UR STRIP-MCNC: NEGATIVE MG/DL
LACTATE SERPL-SCNC: 1.1 MMOL/L (ref 0.4–2)
LEUKOCYTE ESTERASE UR QL STRIP: NEGATIVE
LYMPHOCYTES # BLD AUTO: 1.6 10E9/L (ref 0.8–5.3)
LYMPHOCYTES NFR BLD AUTO: 27.7 %
MCH RBC QN AUTO: 30 PG (ref 26.5–33)
MCHC RBC AUTO-ENTMCNC: 35.2 G/DL (ref 31.5–36.5)
MCV RBC AUTO: 85 FL (ref 78–100)
MONOCYTES # BLD AUTO: 0.7 10E9/L (ref 0–1.3)
MONOCYTES NFR BLD AUTO: 11.7 %
MUCOUS THREADS #/AREA URNS LPF: PRESENT /LPF
NEUTROPHILS # BLD AUTO: 3.4 10E9/L (ref 1.6–8.3)
NEUTROPHILS NFR BLD AUTO: 58.6 %
NITRATE UR QL: NEGATIVE
NRBC # BLD AUTO: 0 10*3/UL
NRBC BLD AUTO-RTO: 0 /100
PH UR STRIP: 5.5 PH (ref 4.7–8)
PLATELET # BLD AUTO: 218 10E9/L (ref 150–450)
POTASSIUM SERPL-SCNC: 3.7 MMOL/L (ref 3.4–5.3)
PROT SERPL-MCNC: 7.5 G/DL (ref 6.8–8.8)
RBC # BLD AUTO: 4.8 10E12/L (ref 4.4–5.9)
RBC #/AREA URNS AUTO: 1 /HPF (ref 0–2)
SODIUM SERPL-SCNC: 141 MMOL/L (ref 133–144)
SP GR UR STRIP: 1.03 (ref 1–1.03)
URN SPEC COLLECT METH UR: ABNORMAL
UROBILINOGEN UR STRIP-MCNC: NORMAL MG/DL (ref 0–2)
WBC # BLD AUTO: 5.7 10E9/L (ref 4–11)
WBC #/AREA URNS AUTO: <1 /HPF (ref 0–2)

## 2017-02-28 PROCEDURE — 99285 EMERGENCY DEPT VISIT HI MDM: CPT | Performed by: PHYSICIAN ASSISTANT

## 2017-02-28 PROCEDURE — 81001 URINALYSIS AUTO W/SCOPE: CPT | Performed by: PHYSICIAN ASSISTANT

## 2017-02-28 PROCEDURE — 74177 CT ABD & PELVIS W/CONTRAST: CPT | Mod: TC

## 2017-02-28 PROCEDURE — 25000128 H RX IP 250 OP 636: Performed by: PHYSICIAN ASSISTANT

## 2017-02-28 PROCEDURE — 83605 ASSAY OF LACTIC ACID: CPT | Performed by: PHYSICIAN ASSISTANT

## 2017-02-28 PROCEDURE — 36415 COLL VENOUS BLD VENIPUNCTURE: CPT | Performed by: PHYSICIAN ASSISTANT

## 2017-02-28 PROCEDURE — 80053 COMPREHEN METABOLIC PANEL: CPT | Performed by: PHYSICIAN ASSISTANT

## 2017-02-28 PROCEDURE — 85025 COMPLETE CBC W/AUTO DIFF WBC: CPT | Performed by: PHYSICIAN ASSISTANT

## 2017-02-28 PROCEDURE — 99284 EMERGENCY DEPT VISIT MOD MDM: CPT | Mod: 25

## 2017-02-28 PROCEDURE — 86140 C-REACTIVE PROTEIN: CPT | Performed by: PHYSICIAN ASSISTANT

## 2017-02-28 PROCEDURE — 87040 BLOOD CULTURE FOR BACTERIA: CPT | Performed by: PHYSICIAN ASSISTANT

## 2017-02-28 RX ORDER — IOPAMIDOL 612 MG/ML
100 INJECTION, SOLUTION INTRAVASCULAR ONCE
Status: COMPLETED | OUTPATIENT
Start: 2017-02-28 | End: 2017-02-28

## 2017-02-28 RX ADMIN — SODIUM CHLORIDE 1000 ML: 9 INJECTION, SOLUTION INTRAVENOUS at 11:37

## 2017-02-28 RX ADMIN — IOPAMIDOL 100 ML: 612 INJECTION, SOLUTION INTRAVASCULAR at 12:38

## 2017-02-28 RX ADMIN — DIATRIZOATE MEGLUMINE AND DIATRIZOATE SODIUM 30 ML: 660; 100 SOLUTION ORAL; RECTAL at 12:38

## 2017-02-28 ASSESSMENT — ENCOUNTER SYMPTOMS
NAUSEA: 0
DYSURIA: 0
ANAL BLEEDING: 0
CONSTIPATION: 0
UNEXPECTED WEIGHT CHANGE: 0
NEUROLOGICAL NEGATIVE: 1
DIARRHEA: 0
COUGH: 1
SORE THROAT: 0
BLOOD IN STOOL: 0
MUSCULOSKELETAL NEGATIVE: 1
HEMATURIA: 0
HEADACHES: 0
CHILLS: 0
ABDOMINAL PAIN: 1
ABDOMINAL DISTENTION: 0
VOMITING: 0
FEVER: 1
SHORTNESS OF BREATH: 0
APPETITE CHANGE: 0
RECTAL PAIN: 0

## 2017-02-28 NOTE — ED NOTES
Measured one bottle of Methadone which was labeled 40mg in front of Dr. King and 2nd RN. 2ml volume. Medication dispensed back into bottle with witness of NICOLAS Alston RN.

## 2017-02-28 NOTE — ED NOTES
Pt ambulatory to room 5 with SO, pt had appendectomy on feb 7, 2017. Pt went back to work last week but started having RLQ yesterday and worse today. Pt feels a constant pressure then it spikes like he getting punched rating pain 3/10. Last bm yesterday, denies nausea. Pt gowned, call light in reach. Pt in the ER on 2/25/17 for possible pneumonia and put on zpack. Pt also reports severe night sweats lately.

## 2017-02-28 NOTE — ED AVS SNAPSHOT
[facility]    [address] [address]    Phone:  [phone]                                       Mason Garcia   MRN: [id number]    Department:  [facility]   Date of Visit:  2/28/2017           After Visit Summary Signature Page     I have received my discharge instructions, and my questions have been answered. I have discussed any challenges I see with this plan with the nurse or doctor.    ..........................................................................................................................................  Patient/Patient Representative Signature      ..........................................................................................................................................  Patient Representative Print Name and Relationship to Patient    ..................................................               ................................................  Date                                            Time    ..........................................................................................................................................  Reviewed by Signature/Title    ...................................................              ..............................................  Date                                                            Time

## 2017-02-28 NOTE — ED PROVIDER NOTES
History     Chief Complaint   Patient presents with     Abdominal Pain     RLQ- had appy 2/7/16  pain started last noc     Diarrhea     small amount yesterday     HPI  Mason Garcia is a 31 year old male who is s/p appendectomy on 2/7/17 complicated by ruptured appendix and drain placement following, who presents today with worsening RLQ pain since last night. He had URI symptoms with cough over the last week and night time fevers. Unsure how high as he didn't take his temp. The cough is starting to improve. He had his f/u with Dr. Patino last week and was cleared to go back to work.     I have reviewed the Medications, Allergies, Past Medical and Surgical History, and Social History in the Epic system.    Review of Systems   Constitutional: Positive for fever. Negative for appetite change, chills and unexpected weight change.   HENT: Positive for congestion. Negative for sore throat.    Respiratory: Positive for cough. Negative for shortness of breath.    Cardiovascular: Negative for chest pain.   Gastrointestinal: Positive for abdominal pain. Negative for abdominal distention, anal bleeding, blood in stool, constipation, diarrhea, nausea, rectal pain and vomiting.   Genitourinary: Negative.  Negative for dysuria, hematuria, scrotal swelling and testicular pain.   Musculoskeletal: Negative.    Skin: Negative.    Neurological: Negative.  Negative for headaches.   All other systems reviewed and are negative.     Past Medical History: History reviewed. No pertinent past medical history.    Past Surgical History   Procedure Laterality Date     Laparoscopic appendectomy N/A 2/7/2017     Procedure: LAPAROSCOPIC APPENDECTOMY;  Surgeon: Chanel Pagan DO;  Location: [facility]       Social History     Social History     Marital status: Single     Spouse name: N/A     Number of children: N/A     Years of education: N/A     Occupational History     Not on file.     Social History Main Topics     Smoking status: Current Every  Day Smoker     Packs/day: 1.00     Years: 7.00     Types: Cigarettes     Smokeless tobacco: Former User     Alcohol use Yes      Comment: rare     Drug use: No     Sexual activity: Not on file     Other Topics Concern     Not on file     Social History Narrative       Discharge Medication List as of 2/28/2017  1:42 PM      CONTINUE these medications which have NOT CHANGED    Details   Pseudoephedrine-APAP-DM (DAYQUIL OR) Historical      Acetaminophen (TYLENOL PO) Take 1,000 mg by mouth, Historical      azithromycin (ZITHROMAX Z-JEFERSON) 250 MG tablet Two tablets on the first day, then one tablet daily for the next 4 days, Disp-6 tablet, R-0, Local Print             Allergies: Sudafed [pseudoephedrine]      Physical Exam   BP: 129/82  Pulse: 87  Heart Rate: 70  Temp: 97.4  F (36.3  C)  Resp: 18  SpO2: 98 %  Physical Exam   Constitutional: He is oriented to person, place, and time. He appears well-developed and well-nourished.  Non-toxic appearance. He does not have a sickly appearance. He does not appear ill. No distress.   HENT:   Head: Normocephalic and atraumatic.   Nose: Nose normal.   Mouth/Throat: Oropharynx is clear and moist. No oropharyngeal exudate.   Eyes: Conjunctivae are normal. Pupils are equal, round, and reactive to light. Right eye exhibits no discharge. Left eye exhibits no discharge. No scleral icterus.   Neck: Normal range of motion. Neck supple.   Cardiovascular: Normal rate, regular rhythm, normal heart sounds and intact distal pulses.  Exam reveals no gallop and no friction rub.    No murmur heard.  Pulmonary/Chest: Effort normal and breath sounds normal. No respiratory distress. He has no wheezes. He has no rales.   Abdominal: Soft. Bowel sounds are normal. He exhibits no distension and no mass. There is tenderness in the right lower quadrant. There is no rebound and no guarding.   Suprapubic and laparoscopic incision sites are well healed and without erythema or swelling.    Musculoskeletal:  Normal range of motion. He exhibits no edema.   Lymphadenopathy:     He has no cervical adenopathy.   Neurological: He is alert and oriented to person, place, and time. No cranial nerve deficit. Coordination normal.   Skin: Skin is warm and dry. No rash noted. He is not diaphoretic. No erythema. No pallor.   Psychiatric: He has a normal mood and affect. His behavior is normal. Judgment and thought content normal.   Nursing note and vitals reviewed.      ED Course     ED Course     Procedures          Labs Ordered and Resulted from Time of ED Arrival Up to the Time of Departure from the ED   COMPREHENSIVE METABOLIC PANEL - Abnormal; Notable for the following:        Result Value    Calcium 8.4 (*)     All other components within normal limits   CRP INFLAMMATION - Abnormal; Notable for the following:     CRP Inflammation 27.1 (*)     All other components within normal limits   UA MACROSCOPIC WITH REFLEX TO MICRO AND CULTURE - Abnormal; Notable for the following:     Protein Albumin Urine 10 (*)     Bacteria Urine None (*)     Mucous Urine Present (*)     Hyaline Casts 1 (*)     All other components within normal limits   CBC WITH PLATELETS DIFFERENTIAL   LACTIC ACID   PERIPHERAL IV CATHETER   BLOOD CULTURE   BLOOD CULTURE       Assessments & Plan (with Medical Decision Making)   Pt is non-toxic and VS are WNL. He has RLQ tenderness on exam.   Labs are unremarkable other than elevated CRP at 27.   CT abd/pelvis with IV contrast reveals typical appendectomy post op changes, no evidence of abscess or infection.   Pt's coughing over the past week, I suspect, likely strained his internal sutures causing the increased pain. Supportive care recommended. He was discharged home with his SO in stable condition.     Plan: Take Ibuprofen or Aleve as directed for pain. May apply heat to your abdomen as needed for pain. Follow up with the general surgery department if your pain persists. Labs, urine, and CT of your abdomen were  normal today. Return here with new/worsening symptoms.       I have reviewed the nursing notes.    I have reviewed the findings, diagnosis, plan and need for follow up with the patient.    Discharge Medication List as of 2/28/2017  1:42 PM          Final diagnoses:   Abdominal wall strain, initial encounter       2/28/2017   [facility]     Tamica Scott PA-C  02/28/17 4914

## 2017-02-28 NOTE — LETTER
[facility]  [address]  [place] [address] [phone]    2017    Mason Garcia  [address] [place] [address]  [phone] (home)     : 1985      To Whom it may concern:    Mason Garcia was seen in our Emergency Department today, 2017.  No work for the next week due to appendectomy operation.    Sincerely,        Tamica Scott PA-C

## 2017-02-28 NOTE — DISCHARGE INSTRUCTIONS
Take Ibuprofen or Aleve as directed for pain. May apply heat to your abdomen as needed for pain. Follow up with the general surgery department if your pain persists. Labs, urine, and CT of your abdomen were normal today. Return here with new/worsening symptoms.         Muscle Strain in the Abdomen  A muscle strain is a stretching or tearing of the muscle fibers. It is also called a pulled muscle. The abdomen is protected by a thick wall of muscle in the front and sides. These muscles help with twisting and bending forward. Too much coughing, lifting heavy objects, or sudden jerking movements can sometimes cause a muscle strain in the abdomen. This causes pain that is worse when you move. The area may also feel tender or look swollen and bruised.  Home care    Apply an ice pack over the injured area for 15 to 20 minutes every 3 to 6 hours. You should do this for the first 24 to 48 hours. You can make an ice pack by filling a plastic bag that seals at the top with ice cubes and then wrapping it with a thin towel. Be careful not to injure your skin with the ice treatments. Ice should never be applied directly to skin. Continue the use of ice packs for relief of pain and swelling as needed. After 48 hours, apply heat (warm shower or warm bath) for 15 to 20 minutes several times a day, or alternate ice and heat.    You may use over-the-counter pain medicine to control pain, unless another pain medicine was prescribed. If you have liver or kidney disease, a stomach ulcer or GI bleeding, talk with your healthcare provider before using these medicines.  Follow-up care  Follow up with your healthcare provider, or as advised.  Call 911  Call 911 if you have:    Weakness, lightheaded, or faint    Chest pain  When to seek medical advice  Call your healthcare provider right away if any of these occur:    Pain gets worse or moves to the right lower abdomen, just below the waistline    Fever of 100.4 F (38 C) or above lasting for  24 to 48 hours    Vomiting    Severe abdominal pain that spreads to the back or toward the groin    Blood in the urine    Unexpected vaginal bleeding in women    [id number] [employer]. [address] [place] [address], [place] [address]. All rights reserved. This information is not intended as a substitute for professional medical care. Always follow your healthcare professional's instructions.

## 2017-02-28 NOTE — ED AVS SNAPSHOT
[facility]    [address]    [facility] [address]    Phone:  [phone]                                       Mason Garcia   MRN: [id number]    Department:  [facility]   Date of Visit:  2/28/2017           Patient Information     Date Of Birth          1985        Your diagnoses for this visit were:     Abdominal wall strain, initial encounter        You were seen by Tamica Scott PA-C.      Follow-up Information     Follow up with Ruben Valencia MD In 3 days.    Specialty:  Family Practice    Contact information:    [facility]  [address] [facility] [address]  [phone]          Follow up with [facility].    Specialty:  EMERGENCY MEDICINE    Why:  If symptoms worsen    Contact information:    [address]  [facility] [address] [phone]    Additional information:    From [facility]: Take [address]. Turn left at [address]. Turn left at the first stoplight on [address]. At the first stop sign, take a right onto [address]. Take a left into the parking lot and continue through until you reach the North enterance of the building.       From [place]: Take [address]. Take the [address] ramp towards [place]. Turn left onto [address]. Take a slight right onto [address]. Turn left at the first stoplight on [address]. At the first stop sign, take a right onto [address]. Take a left into the parking lot and continue through until you reach the North enterance of the building.       From [place]: Take [address]. Take a right at [address]. At the first stop sign, take a right onto [address]. Take a left into the parking lot and continue through until you reach the North enterance of the building.         Discharge Instructions       Take Ibuprofen or Aleve as directed for pain. May apply heat to your abdomen as needed for  pain. Follow up with the general surgery department if your pain persists. Labs, urine, and CT of your abdomen were normal today. Return here with new/worsening symptoms.         Muscle Strain in the Abdomen  A muscle strain is a stretching or tearing of the muscle fibers. It is also called a pulled muscle. The abdomen is protected by a thick wall of muscle in the front and sides. These muscles help with twisting and bending forward. Too much coughing, lifting heavy objects, or sudden jerking movements can sometimes cause a muscle strain in the abdomen. This causes pain that is worse when you move. The area may also feel tender or look swollen and bruised.  Home care    Apply an ice pack over the injured area for 15 to 20 minutes every 3 to 6 hours. You should do this for the first 24 to 48 hours. You can make an ice pack by filling a plastic bag that seals at the top with ice cubes and then wrapping it with a thin towel. Be careful not to injure your skin with the ice treatments. Ice should never be applied directly to skin. Continue the use of ice packs for relief of pain and swelling as needed. After 48 hours, apply heat (warm shower or warm bath) for 15 to 20 minutes several times a day, or alternate ice and heat.    You may use over-the-counter pain medicine to control pain, unless another pain medicine was prescribed. If you have liver or kidney disease, a stomach ulcer or GI bleeding, talk with your healthcare provider before using these medicines.  Follow-up care  Follow up with your healthcare provider, or as advised.  Call 911  Call 911 if you have:    Weakness, lightheaded, or faint    Chest pain  When to seek medical advice  Call your healthcare provider right away if any of these occur:    Pain gets worse or moves to the right lower abdomen, just below the waistline    Fever of 100.4 F (38 C) or above lasting for 24 to 48 hours    Vomiting    Severe abdominal pain that spreads to the back or toward the  groin    Blood in the urine    Unexpected vaginal bleeding in women    8110-6761 [employer]. [address] [place] [address], [place] [address]. All rights reserved. This information is not intended as a substitute for professional medical care. Always follow your healthcare professional's instructions.             Review of your medicines      Our records show that you are taking the medicines listed below. If these are incorrect, please call your family doctor or clinic.        Dose / Directions Last dose taken    azithromycin 250 MG tablet   Commonly known as:  ZITHROMAX Z-JEFERSON   Quantity:  6 tablet        Two tablets on the first day, then one tablet daily for the next 4 days   Refills:  0        DAYQUIL OR        Refills:  0        TYLENOL PO   Dose:  1000 mg        Take 1,000 mg by mouth   Refills:  0                Procedures and tests performed during your visit     Procedure/Test Number of Times Performed    Blood culture 2    CBC with platelets differential 1    CRP inflammation 1    CT Abdomen Pelvis w Contrast 1    Comprehensive metabolic panel 1    Lactic acid 1    Peripheral IV catheter 1    UA reflex to Microscopic and Culture 1      Orders Needing Specimen Collection     None      Pending Results     Date and Time Order Name Status Description    2/28/2017 1133 Blood culture In process     2/28/2017 1119 Blood culture In process     2/28/2017 1119 CT Abdomen Pelvis w Contrast Preliminary             Pending Culture Results     Date and Time Order Name Status Description    2/28/2017 1133 Blood culture In process     2/28/2017 1119 Blood culture In process             Thank you for choosing [facility]       Thank you for choosing [facility] for your care. Our goal is always to provide you with excellent care. Hearing back from our patients is one way we can continue to improve our services. Please take a few minutes to complete the written survey that you may receive in the mail after you visit with  "us. Thank you!        [employer] Information     [employer] lets you send messages to your doctor, view your test results, renew your prescriptions, schedule appointments and more. To sign up, go to [email or web address] . Click on \"Log in\" on the left side of the screen, which will take you to the Welcome page. Then click on \"Sign up Now\" on the right side of the page.     You will be asked to enter the access code listed below, as well as some personal information. Please follow the directions to create your username and password.     Your access code is: [email or web address]  Expires: 2017  7:16 AM     Your access code will  in 90 days. If you need help or a new code, please call your [facility] clinic or [phone].        Care EveryWhere ID     This is your Care EveryWhere ID. This could be used by other organizations to access your [facility] medical records  [id number]        After Visit Summary       This is your record. Keep this with you and show to your community pharmacist(s) and doctor(s) at your next visit.                  "

## 2017-03-06 LAB
BACTERIA SPEC CULT: NORMAL
BACTERIA SPEC CULT: NORMAL
Lab: NORMAL
MICRO REPORT STATUS: NORMAL
MICRO REPORT STATUS: NORMAL
SPECIMEN SOURCE: NORMAL
SPECIMEN SOURCE: NORMAL

## 2018-01-19 ENCOUNTER — APPOINTMENT (OUTPATIENT)
Dept: GENERAL RADIOLOGY | Facility: HOSPITAL | Age: 33
End: 2018-01-19
Attending: FAMILY MEDICINE
Payer: OTHER MISCELLANEOUS

## 2018-01-19 ENCOUNTER — TRANSFERRED RECORDS (OUTPATIENT)
Dept: HEALTH INFORMATION MANAGEMENT | Facility: HOSPITAL | Age: 33
End: 2018-01-19

## 2018-01-19 ENCOUNTER — HOSPITAL ENCOUNTER (EMERGENCY)
Facility: HOSPITAL | Age: 33
Discharge: ANOTHER HOSPITAL | End: 2018-01-19
Attending: FAMILY MEDICINE | Admitting: FAMILY MEDICINE
Payer: OTHER MISCELLANEOUS

## 2018-01-19 VITALS
OXYGEN SATURATION: 95 % | RESPIRATION RATE: 20 BRPM | HEART RATE: 93 BPM | DIASTOLIC BLOOD PRESSURE: 111 MMHG | TEMPERATURE: 98.7 F | SYSTOLIC BLOOD PRESSURE: 163 MMHG

## 2018-01-19 DIAGNOSIS — S62.92XB FRACTURE OF LEFT HAND, OPEN, INITIAL ENCOUNTER: ICD-10-CM

## 2018-01-19 LAB
ABO + RH BLD: NORMAL
ABO + RH BLD: NORMAL
ALBUMIN SERPL-MCNC: 4.4 G/DL (ref 3.4–5)
ALP SERPL-CCNC: 89 U/L (ref 40–150)
ALT SERPL W P-5'-P-CCNC: 64 U/L (ref 0–70)
ANION GAP SERPL CALCULATED.3IONS-SCNC: 11 MMOL/L (ref 3–14)
APTT PPP: 26 SEC (ref 24–37)
AST SERPL W P-5'-P-CCNC: 31 U/L (ref 0–45)
BASOPHILS # BLD AUTO: 0 10E9/L (ref 0–0.2)
BASOPHILS NFR BLD AUTO: 0.4 %
BILIRUB SERPL-MCNC: 0.4 MG/DL (ref 0.2–1.3)
BLD GP AB SCN SERPL QL: NORMAL
BLD PROD TYP BPU: NORMAL
BLD UNIT ID BPU: 0
BLOOD BANK CMNT PATIENT-IMP: NORMAL
BLOOD PRODUCT CODE: NORMAL
BPU ID: NORMAL
BUN SERPL-MCNC: 16 MG/DL (ref 7–30)
CALCIUM SERPL-MCNC: 8.6 MG/DL (ref 8.5–10.1)
CHLORIDE SERPL-SCNC: 103 MMOL/L (ref 94–109)
CK SERPL-CCNC: 407 U/L (ref 30–300)
CO2 SERPL-SCNC: 26 MMOL/L (ref 20–32)
CREAT SERPL-MCNC: 0.94 MG/DL (ref 0.66–1.25)
DIFFERENTIAL METHOD BLD: ABNORMAL
EOSINOPHIL # BLD AUTO: 0.3 10E9/L (ref 0–0.7)
EOSINOPHIL NFR BLD AUTO: 2.8 %
ERYTHROCYTE [DISTWIDTH] IN BLOOD BY AUTOMATED COUNT: 12.4 % (ref 10–15)
GFR SERPL CREATININE-BSD FRML MDRD: >90 ML/MIN/1.7M2
GLUCOSE BLDC GLUCOMTR-MCNC: 92 MG/DL (ref 70–99)
GLUCOSE SERPL-MCNC: 101 MG/DL (ref 70–99)
HCT VFR BLD AUTO: 48.9 % (ref 40–53)
HGB BLD-MCNC: 17.5 G/DL (ref 13.3–17.7)
IMM GRANULOCYTES # BLD: 0 10E9/L (ref 0–0.4)
IMM GRANULOCYTES NFR BLD: 0.4 %
INR PPP: 1.02 (ref 0.8–1.2)
LYMPHOCYTES # BLD AUTO: 4 10E9/L (ref 0.8–5.3)
LYMPHOCYTES NFR BLD AUTO: 35.9 %
MCH RBC QN AUTO: 31.3 PG (ref 26.5–33)
MCHC RBC AUTO-ENTMCNC: 35.8 G/DL (ref 31.5–36.5)
MCV RBC AUTO: 88 FL (ref 78–100)
MONOCYTES # BLD AUTO: 0.9 10E9/L (ref 0–1.3)
MONOCYTES NFR BLD AUTO: 8 %
NEUTROPHILS # BLD AUTO: 5.9 10E9/L (ref 1.6–8.3)
NEUTROPHILS NFR BLD AUTO: 52.5 %
NRBC # BLD AUTO: 0 10*3/UL
NRBC BLD AUTO-RTO: 0 /100
NUM BPU REQUESTED: 1
PLATELET # BLD AUTO: 254 10E9/L (ref 150–450)
POTASSIUM SERPL-SCNC: 3.8 MMOL/L (ref 3.4–5.3)
PROT SERPL-MCNC: 8.6 G/DL (ref 6.8–8.8)
RBC # BLD AUTO: 5.59 10E12/L (ref 4.4–5.9)
SODIUM SERPL-SCNC: 140 MMOL/L (ref 133–144)
SPECIMEN EXP DATE BLD: NORMAL
TRANSFUSION STATUS PATIENT QL: NORMAL
TRANSFUSION STATUS PATIENT QL: NORMAL
WBC # BLD AUTO: 11.3 10E9/L (ref 4–11)

## 2018-01-19 PROCEDURE — 99214 OFFICE O/P EST MOD 30 MIN: CPT | Performed by: SURGERY

## 2018-01-19 PROCEDURE — 68300005 ZZH TRAUMA EVALUATION W/O CC LEVEL III

## 2018-01-19 PROCEDURE — 85730 THROMBOPLASTIN TIME PARTIAL: CPT | Performed by: FAMILY MEDICINE

## 2018-01-19 PROCEDURE — 00000146 ZZHCL STATISTIC GLUCOSE BY METER IP

## 2018-01-19 PROCEDURE — 86900 BLOOD TYPING SEROLOGIC ABO: CPT | Performed by: FAMILY MEDICINE

## 2018-01-19 PROCEDURE — 25000128 H RX IP 250 OP 636

## 2018-01-19 PROCEDURE — 99285 EMERGENCY DEPT VISIT HI MDM: CPT | Mod: 25

## 2018-01-19 PROCEDURE — 73130 X-RAY EXAM OF HAND: CPT | Mod: TC,LT

## 2018-01-19 PROCEDURE — 99285 EMERGENCY DEPT VISIT HI MDM: CPT | Performed by: FAMILY MEDICINE

## 2018-01-19 PROCEDURE — 86922 COMPATIBILITY TEST ANTIGLOB: CPT | Performed by: FAMILY MEDICINE

## 2018-01-19 PROCEDURE — 36415 COLL VENOUS BLD VENIPUNCTURE: CPT | Performed by: FAMILY MEDICINE

## 2018-01-19 PROCEDURE — 82550 ASSAY OF CK (CPK): CPT | Performed by: FAMILY MEDICINE

## 2018-01-19 PROCEDURE — 96365 THER/PROPH/DIAG IV INF INIT: CPT

## 2018-01-19 PROCEDURE — 96376 TX/PRO/DX INJ SAME DRUG ADON: CPT

## 2018-01-19 PROCEDURE — 96375 TX/PRO/DX INJ NEW DRUG ADDON: CPT

## 2018-01-19 PROCEDURE — 73090 X-RAY EXAM OF FOREARM: CPT | Mod: TC,LT

## 2018-01-19 PROCEDURE — 85610 PROTHROMBIN TIME: CPT | Performed by: FAMILY MEDICINE

## 2018-01-19 PROCEDURE — 86850 RBC ANTIBODY SCREEN: CPT | Performed by: FAMILY MEDICINE

## 2018-01-19 PROCEDURE — 86901 BLOOD TYPING SEROLOGIC RH(D): CPT | Performed by: FAMILY MEDICINE

## 2018-01-19 PROCEDURE — 40000275 ZZH STATISTIC RCP TIME EA 10 MIN

## 2018-01-19 PROCEDURE — 85025 COMPLETE CBC W/AUTO DIFF WBC: CPT | Performed by: FAMILY MEDICINE

## 2018-01-19 PROCEDURE — 80053 COMPREHEN METABOLIC PANEL: CPT | Performed by: FAMILY MEDICINE

## 2018-01-19 RX ORDER — ONDANSETRON 2 MG/ML
4 INJECTION INTRAMUSCULAR; INTRAVENOUS ONCE
Status: COMPLETED | OUTPATIENT
Start: 2018-01-19 | End: 2018-01-19

## 2018-01-19 RX ORDER — LORAZEPAM 2 MG/ML
INJECTION INTRAMUSCULAR
Status: COMPLETED
Start: 2018-01-19 | End: 2018-01-19

## 2018-01-19 RX ORDER — ONDANSETRON 2 MG/ML
INJECTION INTRAMUSCULAR; INTRAVENOUS
Status: COMPLETED
Start: 2018-01-19 | End: 2018-01-19

## 2018-01-19 RX ORDER — CEFAZOLIN SODIUM 2 G/100ML
2 INJECTION, SOLUTION INTRAVENOUS ONCE
Status: DISCONTINUED | OUTPATIENT
Start: 2018-01-19 | End: 2018-01-19 | Stop reason: HOSPADM

## 2018-01-19 RX ORDER — FENTANYL CITRATE 50 UG/ML
INJECTION, SOLUTION INTRAMUSCULAR; INTRAVENOUS
Status: COMPLETED
Start: 2018-01-19 | End: 2018-01-19

## 2018-01-19 RX ADMIN — Medication 1 MG: at 04:10

## 2018-01-19 RX ADMIN — ONDANSETRON 4 MG: 2 INJECTION INTRAMUSCULAR; INTRAVENOUS at 03:52

## 2018-01-19 RX ADMIN — HYDROMORPHONE HYDROCHLORIDE 1 MG: 1 INJECTION, SOLUTION INTRAMUSCULAR; INTRAVENOUS; SUBCUTANEOUS at 03:40

## 2018-01-19 RX ADMIN — HYDROMORPHONE HYDROCHLORIDE 1 MG: 1 INJECTION, SOLUTION INTRAMUSCULAR; INTRAVENOUS; SUBCUTANEOUS at 04:07

## 2018-01-19 RX ADMIN — Medication 1 MG: at 03:40

## 2018-01-19 RX ADMIN — HYDROMORPHONE HYDROCHLORIDE 1 MG: 1 INJECTION, SOLUTION INTRAMUSCULAR; INTRAVENOUS; SUBCUTANEOUS at 04:10

## 2018-01-19 RX ADMIN — LORAZEPAM 1 MG: 2 INJECTION INTRAMUSCULAR; INTRAVENOUS at 03:40

## 2018-01-19 RX ADMIN — CEFAZOLIN SODIUM 2 G: 1 INJECTION, SOLUTION INTRAVENOUS at 03:24

## 2018-01-19 RX ADMIN — FENTANYL CITRATE 175 MCG: 50 INJECTION, SOLUTION INTRAMUSCULAR; INTRAVENOUS at 03:30

## 2018-01-19 RX ADMIN — Medication 1 MG: at 04:07

## 2018-01-19 ASSESSMENT — ENCOUNTER SYMPTOMS
PSYCHIATRIC NEGATIVE: 1
ENDOCRINE NEGATIVE: 1
FATIGUE: 0
EYES NEGATIVE: 1
GASTROINTESTINAL NEGATIVE: 1
NECK PAIN: 0
EXTREMITY NUMBNESS: 1
HEMATOLOGIC/LYMPHATIC NEGATIVE: 1
FEVER: 0
MUSCLE WEAKNESS: 0
STIFFNESS: 0
BACK PAIN: 0
CONSTITUTIONAL NEGATIVE: 1
RESPIRATORY NEGATIVE: 1
CARDIOVASCULAR NEGATIVE: 1

## 2018-01-19 NOTE — ED PROVIDER NOTES
History     Chief Complaint   Patient presents with     Hand Injury     left hand caught in  at work     Patient is a 32 year old male presenting with hand injury.   Hand Injury   Location:  Hand  Hand location:  L hand  Injury: yes    Time since incident:  30 minutes  Mechanism of injury: crush    Crush injury:     Mechanism:  Industrial machinery  Pain details:     Quality:  Unable to specify    Radiates to:  Does not radiate    Severity:  Severe    Onset quality:  Sudden    Timing:  Constant    Progression:  Unchanged  Dislocation: yes    Foreign body present:  No foreign bodies  Tetanus status:  Up to date  Prior injury to area:  No  Relieved by:  Narcotics (pain level manageable with IV dilaudid boluses )  Ineffective treatments:  None tried  Associated symptoms: decreased range of motion, numbness and tingling    Associated symptoms: no back pain, no fatigue, no fever, no muscle weakness, no neck pain, no stiffness and no swelling    Risk factors: no concern for non-accidental trauma, no known bone disorder, no frequent fractures and no recent illness      Mason Garcia is a 32 year old male who presents after was at work and got his left hand caught in a . Patient unable to move 4,5 digits following injury . Denies numbness prior to application of tourniquet by EMS . EMS placed tourniquet on scene secondary to patient becoming hypotensive secondary to concern of shock..   Upon arrival to the ER patient hand is wrapped , bleeding controlled. Vitals signs stable upon arrival . Level 1 trauma team activation called secondary to arterial tourniquet application and hypotension with concern of hemorrhagic shock.  .     Problem List:    Patient Active Problem List    Diagnosis Date Noted     Perforated appendicitis 02/07/2017     Priority: Medium        Past Medical History:    No past medical history on file.    Past Surgical History:    Past Surgical History:   Procedure Laterality Date      LAPAROSCOPIC APPENDECTOMY N/A 2/7/2017    Procedure: LAPAROSCOPIC APPENDECTOMY;  Surgeon: Chanel Pagan DO;  Location: [facility]       Family History:    Family History   Problem Relation Age of Onset     Cardiovascular Maternal Grandmother      cardiovascular disease     Respiratory Maternal Grandmother      Lung transplant       Social History:  Marital Status:  Single [1]  Social History   Substance Use Topics     Smoking status: Current Every Day Smoker     Packs/day: 1.00     Years: 7.00     Types: Cigarettes     Smokeless tobacco: Former User     Alcohol use Yes      Comment: rare        Medications:      Pseudoephedrine-APAP-DM (DAYQUIL OR)   Acetaminophen (TYLENOL PO)         Review of Systems   Constitutional: Negative.  Negative for fatigue and fever.   HENT: Negative.    Eyes: Negative.    Respiratory: Negative.    Cardiovascular: Negative.    Gastrointestinal: Negative.    Endocrine: Negative.    Genitourinary: Negative.    Musculoskeletal: Negative for back pain, neck pain and stiffness.        Open fracture of 4 th and 5th phalanx of left hand    Hematological: Negative.    Psychiatric/Behavioral: Negative.    All other systems reviewed and are negative.       Physical Exam          Physical Exam   Constitutional: He is oriented to person, place, and time. He appears well-developed and well-nourished. He appears distressed.   Patient on initial presentation diaphoretic.    HENT:   Head: Normocephalic and atraumatic.   Right Ear: External ear normal.   Left Ear: External ear normal.   Eyes: Pupils are equal, round, and reactive to light.   Neck: Normal range of motion. Neck supple.   Cardiovascular: Normal rate, regular rhythm, normal heart sounds and intact distal pulses.  Exam reveals no gallop and no friction rub.    No murmur heard.  Pulmonary/Chest: Effort normal and breath sounds normal. No respiratory distress. He has no wheezes. He has no rales. He exhibits no tenderness.   Abdominal: Soft. Bowel  sounds are normal. He exhibits no distension and no mass. There is no tenderness. There is no rebound and no guarding.   Musculoskeletal: He exhibits tenderness and deformity.   Left hand with obvious open  fracture dislocation of 4,5 phalanx with amputation of soft tissue    Neurological: He is alert and oriented to person, place, and time.   Skin: Skin is warm. He is diaphoretic.   Nursing note and vitals reviewed.      ED Course     ED Course     Procedures          Patient arrived to ER via EMS for left hand injury . Level 1 trauma team activation secondary to tourniquet application and initial hypotension on scene with concern of shock . Upon arrival on primary care survey no issues identified . Blood pressures stablilized at 134/70. Pulse 75. Patient diaphoretic secondary to discomfort. Tourniquet present upper arm.  Orders for peripheral IV , fluid bolus, trauma labs including type and cross. Dr Koehler now arrived. Injury unwrapped. NO active bleeding . Tourniquet released. Xrays ordered confirming fracture dislocation of 4,5 phalanx Palpable radial pulse and intact palmar pulse on ultrasound. 2 grams ancef ordered. Tetanus status confirmed. Fentanyl , dilaudid, ativan given for pain management. Discussed patient with Dr Saucedo of [facility] and patient will be transferred for further management as will need hand surgeon .All labs reviewed prior to transfer  Zofran given prior to transfer. Wound dressed with saline and loose sterile dressing prior to transfer to [facility] ER by ground transport in stable condition .   Trauma:  Level of trauma activation: Full  C-collar and immobilization: not indicated, cleared.  CSpine Clearance: none needed   GCS at arrival: 15  GCS at disposition: unchanged  Full Primary and Secondary survey with appropriate immobilization of spine completed in exam section.  Consults prior to admission or transfer: surgery  called at 0303  Procedures done in the ED: none         Results for orders placed or performed during the hospital encounter of 01/19/18   CBC with platelets differential   Result Value Ref Range    WBC 11.3 (H) 4.0 - 11.0 10e9/L    RBC Count 5.59 4.4 - 5.9 10e12/L    Hemoglobin 17.5 13.3 - 17.7 g/dL    Hematocrit 48.9 40.0 - 53.0 %    MCV 88 78 - 100 fl    MCH 31.3 26.5 - 33.0 pg    MCHC 35.8 31.5 - 36.5 g/dL    RDW 12.4 10.0 - 15.0 %    Platelet Count 254 150 - 450 10e9/L    Diff Method Automated Method     % Neutrophils 52.5 %    % Lymphocytes 35.9 %    % Monocytes 8.0 %    % Eosinophils 2.8 %    % Basophils 0.4 %    % Immature Granulocytes 0.4 %    Nucleated RBCs 0 0 /100    Absolute Neutrophil 5.9 1.6 - 8.3 10e9/L    Absolute Lymphocytes 4.0 0.8 - 5.3 10e9/L    Absolute Monocytes 0.9 0.0 - 1.3 10e9/L    Absolute Eosinophils 0.3 0.0 - 0.7 10e9/L    Absolute Basophils 0.0 0.0 - 0.2 10e9/L    Abs Immature Granulocytes 0.0 0 - 0.4 10e9/L    Absolute Nucleated RBC 0.0    Comprehensive metabolic panel   Result Value Ref Range    Sodium 140 133 - 144 mmol/L    Potassium 3.8 3.4 - 5.3 mmol/L    Chloride 103 94 - 109 mmol/L    Carbon Dioxide 26 20 - 32 mmol/L    Anion Gap 11 3 - 14 mmol/L    Glucose 101 (H) 70 - 99 mg/dL    Urea Nitrogen 16 7 - 30 mg/dL    Creatinine 0.94 0.66 - 1.25 mg/dL    GFR Estimate >90 >60 mL/min/1.7m2    GFR Estimate If Black >90 >60 mL/min/1.7m2    Calcium 8.6 8.5 - 10.1 mg/dL    Bilirubin Total 0.4 0.2 - 1.3 mg/dL    Albumin 4.4 3.4 - 5.0 g/dL    Protein Total 8.6 6.8 - 8.8 g/dL    Alkaline Phosphatase 89 40 - 150 U/L    ALT 64 0 - 70 U/L    AST 31 0 - 45 U/L   CK total   Result Value Ref Range    CK Total 407 (H) 30 - 300 U/L   INR   Result Value Ref Range    INR 1.02 0.80 - 1.20   Partial thromboplastin time   Result Value Ref Range    PTT 26 24.00 - 37.00 sec   ABO/Rh type and screen   Result Value Ref Range    Units Ordered 1     ABO B     RH(D) Pos     Antibody Screen Neg     Test Valid Only At [facility]        Specimen  Expires 01/22/2018    Blood component   Result Value Ref Range    Unit Number [id number]     Blood Component Type Red Blood Cells Leukocyte Reduced     Division Number 00     Status of Unit No longer available 01/19/2018 0406     Blood Product Code PENDING     Unit Status RET    Blood component   Result Value Ref Range    Unit Number [id number]     Blood Component Type Red Blood Cells Leukocyte Reduced     Division Number 00     Status of Unit Released to care unit 01/19/2018 0415     Blood Product Code [id number]     Unit Status ISS             Labs Ordered and Resulted from Time of ED Arrival Up to the Time of Departure from the ED   CBC WITH PLATELETS DIFFERENTIAL - Abnormal; Notable for the following:        Result Value    WBC 11.3 (*)     All other components within normal limits   COMPREHENSIVE METABOLIC PANEL - Abnormal; Notable for the following:     Glucose 101 (*)     All other components within normal limits   CK TOTAL - Abnormal; Notable for the following:     CK Total 407 (*)     All other components within normal limits   INR   PARTIAL THROMBOPLASTIN TIME   ABO/RH TYPE AND SCREEN   BLOOD COMPONENT   BLOOD COMPONENT       Assessments & Plan (with Medical Decision Making)     I have reviewed the nursing notes.    I have reviewed the findings, diagnosis, plan and need for follow up with the patient.      New Prescriptions    No medications on file       Final diagnoses:   Fracture of left hand, open, initial encounter       1/19/2018   [facility]     Lyric Edgar MD  01/19/18 6350       Lyric Edgar MD  01/19/18 5718

## 2018-01-19 NOTE — PROGRESS NOTES
RT attended Level 1 Trauma on patient, O2 placed at 3 lpm NC sats mid to upper 90's. RR 20-24. No other respiratory interventions indicated at this time.

## 2018-01-22 ENCOUNTER — HOSPITAL ENCOUNTER (EMERGENCY)
Facility: CLINIC | Age: 33
Discharge: HOME | End: 2018-01-22
Attending: EMERGENCY MEDICINE | Admitting: EMERGENCY MEDICINE
Payer: OTHER MISCELLANEOUS

## 2018-01-22 VITALS
OXYGEN SATURATION: 98 % | RESPIRATION RATE: 16 BRPM | HEART RATE: 82 BPM | TEMPERATURE: 98.2 F | SYSTOLIC BLOOD PRESSURE: 158 MMHG | DIASTOLIC BLOOD PRESSURE: 93 MMHG

## 2018-01-22 DIAGNOSIS — G89.18 ACUTE POST-OPERATIVE PAIN: ICD-10-CM

## 2018-01-22 DIAGNOSIS — Z51.89 VISIT FOR WOUND CHECK: ICD-10-CM

## 2018-01-22 PROCEDURE — 25000132 ZZH RX MED GY IP 250 OP 250 PS 637: Performed by: EMERGENCY MEDICINE

## 2018-01-22 PROCEDURE — 99283 EMERGENCY DEPT VISIT LOW MDM: CPT

## 2018-01-22 RX ORDER — OXYCODONE AND ACETAMINOPHEN 5; 325 MG/1; MG/1
2 TABLET ORAL ONCE
Status: COMPLETED | OUTPATIENT
Start: 2018-01-22 | End: 2018-01-22

## 2018-01-22 RX ORDER — OXYCODONE HYDROCHLORIDE 5 MG/1
5-10 TABLET ORAL EVERY 6 HOURS PRN
Qty: 20 TABLET | Refills: 0 | Status: SHIPPED | OUTPATIENT
Start: 2018-01-22 | End: 2018-03-30

## 2018-01-22 RX ORDER — OXYCODONE AND ACETAMINOPHEN 10; 325 MG/1; MG/1
1 TABLET ORAL EVERY 4 HOURS PRN
COMMUNITY
End: 2018-05-14

## 2018-01-22 RX ADMIN — OXYCODONE HYDROCHLORIDE AND ACETAMINOPHEN 2 TABLET: 5; 325 TABLET ORAL at 16:03

## 2018-01-22 ASSESSMENT — ENCOUNTER SYMPTOMS
CHILLS: 0
FEVER: 0
WOUND: 1
ARTHRALGIAS: 1

## 2018-01-22 NOTE — ED AVS SNAPSHOT
[facility]    [address]    [facility] [address]    Phone:  [phone]    Fax:  [phone]                                       Mason Garcia   MRN: [id number]    Department:  [facility]   Date of Visit:  1/22/2018           After Visit Summary Signature Page     I have received my discharge instructions, and my questions have been answered. I have discussed any challenges I see with this plan with the nurse or doctor.    ..........................................................................................................................................  Patient/Patient Representative Signature      ..........................................................................................................................................  Patient Representative Print Name and Relationship to Patient    ..................................................               ................................................  Date                                            Time    ..........................................................................................................................................  Reviewed by Signature/Title    ...................................................              ..............................................  Date                                                            Time

## 2018-01-22 NOTE — ED NOTES
Left hand injury on Friday.  Had surgery on Friday at [facility].  Was prescribed percocet.  Patient has run out.  ABCDs intact.

## 2018-01-22 NOTE — DISCHARGE INSTRUCTIONS
Please make an appointment to follow up with Hand Surgery as already arranged     Use tylenol and/or ibuprofen for pain or discomfort    Use Oxycodone for severe pain uncontrolled by above medications    Opioid Medication Information  You have been given a prescription for an opioid (narcotic) pain medicine and/or have received a pain medicine while here in the emergency department. These medicines can make you drowsy or impaired. You must not drive, operate dangerous equipment, or engage in any other dangerous activities while taking these medications. If you drive while taking these medications, you could be arrested for DUI, or driving under the influence. Do not drink any alcohol while you are taking these medications.   Opioid pain medications can cause addiction. If you have a history of chemical dependency of any type, you are at a higher risk of becoming addicted to pain medications. Only take these prescribed medications to treat your pain when all other options have been tried. Take it for as short a time and as few doses as possible. Store your pain pills in a secure place, as they are frequently stolen and provide a dangerous opportunity for children or visitors in your house to start abusing these powerful medications. We will not replace any lost or stolen medicine. As soon as your pain is better, you should flush all your remaining medication.   Many prescription pain medications contain Tylenol (acetaminophen), including Vicodin, Tylenol #3, Norco, Lortab, and Percocet. You should not take any extra pills of Tylenol if you are using these prescription medications or you can get very sick. Do not ever take more than 4000 mg of acetaminophen in any 24 hour period.  All opioids tend to cause constipation. Drink plenty of water and eat foods that have a lot of fiber, such as fruits, vegetables, prune juice, apple juice and high fiber cereal. Take a laxative if you don t move your bowels at least every  other day. Miralax, Milk of Magnesia, Colace, or Senna can be used to keep you regular.

## 2018-01-22 NOTE — ED AVS SNAPSHOT
[facility] Emergency Department    [address] [place] [address]    Phone:  [phone]    Fax:  [phone]                                       Mason Garcia   MRN: [id number]    Department:  [facility] Emergency Department   Date of Visit:  1/22/2018           Patient Information     Date Of Birth          1985        Your diagnoses for this visit were:     Visit for wound check     Acute post-operative pain        You were seen by Leon Patel MD.        Discharge Instructions       Please make an appointment to follow up with Hand Surgery as already arranged     Use tylenol and/or ibuprofen for pain or discomfort    Use Oxycodone for severe pain uncontrolled by above medications    Opioid Medication Information  You have been given a prescription for an opioid (narcotic) pain medicine and/or have received a pain medicine while here in the emergency department. These medicines can make you drowsy or impaired. You must not drive, operate dangerous equipment, or engage in any other dangerous activities while taking these medications. If you drive while taking these medications, you could be arrested for DUI, or driving under the influence. Do not drink any alcohol while you are taking these medications.   Opioid pain medications can cause addiction. If you have a history of chemical dependency of any type, you are at a higher risk of becoming addicted to pain medications. Only take these prescribed medications to treat your pain when all other options have been tried. Take it for as short a time and as few doses as possible. Store your pain pills in a secure place, as they are frequently stolen and provide a dangerous opportunity for children or visitors in your house to start abusing these powerful medications. We will not replace any lost or stolen medicine. As soon as your pain is better, you should flush all your remaining medication.   Many  prescription pain medications contain Tylenol (acetaminophen), including Vicodin, Tylenol #3, Norco, Lortab, and Percocet. You should not take any extra pills of Tylenol if you are using these prescription medications or you can get very sick. Do not ever take more than 4000 mg of acetaminophen in any 24 hour period.  All opioids tend to cause constipation. Drink plenty of water and eat foods that have a lot of fiber, such as fruits, vegetables, prune juice, apple juice and high fiber cereal. Take a laxative if you don t move your bowels at least every other day. Miralax, Milk of Magnesia, Colace, or Senna can be used to keep you regular.                  24 Hour Appointment Hotline       To make an appointment at any [facility], call [phone] ([phone]). If you don't have a family doctor or clinic, we will help you find one. [facility] clinics are conveniently located to serve the needs of you and your family.             Review of your medicines      START taking        Dose / Directions Last dose taken    oxyCODONE IR 5 MG tablet   Commonly known as:  ROXICODONE   Dose:  5-10 mg   Quantity:  20 tablet        Take 1-2 tablets (5-10 mg) by mouth every 6 hours as needed for moderate to severe pain   Refills:  0          Our records show that you are taking the medicines listed below. If these are incorrect, please call your family doctor or clinic.        Dose / Directions Last dose taken    DAYQUIL OR        Refills:  0        IBUPROFEN PO        Refills:  0        oxyCODONE-acetaminophen  MG per tablet   Commonly known as:  PERCOCET   Dose:  1 tablet        Take 1 tablet by mouth every 4 hours as needed for moderate to severe pain   Refills:  0        TYLENOL PO   Dose:  1000 mg        Take 1,000 mg by mouth   Refills:  0                Prescriptions were sent or printed at these locations (1 Prescription)                   Other Prescriptions                Printed at Department/Unit printer  (1 of 1)         oxyCODONE IR (ROXICODONE) 5 MG tablet                Orders Needing Specimen Collection     None      Pending Results     No orders found from 1/20/2018 to 1/23/2018.            Pending Culture Results     No orders found from 1/20/2018 to 1/23/2018.            Pending Results Instructions     If you had any lab results that were not finalized at the time of your Discharge, you can call the ED Lab Result RN at [phone]. You will be contacted by this team for any positive Lab results or changes in treatment. The nurses are available 7 days a week from 10A to 6:30P.  You can leave a message 24 hours per day and they will return your call.        Test Results From Your Hospital Stay               Clinical Quality Measure: Blood Pressure Screening     Your blood pressure was checked while you were in the emergency department today. The last reading we obtained was  BP: (!) 158/93 . Please read the guidelines below about what these numbers mean and what you should do about them.  If your systolic blood pressure (the top number) is less than 120 and your diastolic blood pressure (the bottom number) is less than 80, then your blood pressure is normal. There is nothing more that you need to do about it.  If your systolic blood pressure (the top number) is 120-139 or your diastolic blood pressure (the bottom number) is 80-89, your blood pressure may be higher than it should be. You should have your blood pressure rechecked within a year by a primary care provider.  If your systolic blood pressure (the top number) is 140 or greater or your diastolic blood pressure (the bottom number) is 90 or greater, you may have high blood pressure. High blood pressure is treatable, but if left untreated over time it can put you at risk for heart attack, stroke, or kidney failure. You should have your blood pressure rechecked by a primary care provider within the next 4 weeks.  If your provider in the emergency  "department today gave you specific instructions to follow-up with your doctor or provider even sooner than that, you should follow that instruction and not wait for up to 4 weeks for your follow-up visit.        Thank you for choosing [facility]       Thank you for choosing [facility] for your care. Our goal is always to provide you with excellent care. Hearing back from our patients is one way we can continue to improve our services. Please take a few minutes to complete the written survey that you may receive in the mail after you visit with us. Thank you!        [facility]," Information     [employer] lets you send messages to your doctor, view your test results, renew your prescriptions, schedule appointments and more. To sign up, go to [email or web address] . Click on \"Log in\" on the left side of the screen, which will take you to the Welcome page. Then click on \"Sign up Now\" on the right side of the page.     You will be asked to enter the access code listed below, as well as some personal information. Please follow the directions to create your username and password.     Your access code is: [email or web address]  Expires: 2018  4:34 PM     Your access code will  in 90 days. If you need help or a new code, please call your [facility] clinic or [phone].        Care EveryWhere ID     This is your Care EveryWhere ID. This could be used by other organizations to access your [facility] medical records  [id number]        Equal Access to Services     QUIN STUART : Hadii dylan Wesley, waaxda michelle, qaybta kaalmada wesley, marcia denise . So [facility] [phone].    ATENCIÓN: Si habla español, tiene a tobar disposición servicios gratuitos de asistencia lingüística. Yolanda velazco [phone].    We comply with applicable federal civil rights laws and [place] laws. We do not discriminate on the basis of race, color, national origin, age, disability, sex, sexual orientation, or gender " identity.            After Visit Summary       This is your record. Keep this with you and show to your community pharmacist(s) and doctor(s) at your next visit.

## 2018-01-23 NOTE — ED PROVIDER NOTES
History     Chief Complaint:  Wound Check     HPI   Mason Garcia is a 32 year old male who presents for a wound check. The patient was seen in the [facility] ER on 1/19, 3 days ago, after he got his left hand caught in a  at work. Level 1 trauma was activated secondary to arterial tourniquet application and hypotension with concern of hemorrhagic shock. The patient was transferred to [facility] and underwent complete amputation of 5th finger, open reduction and pinning of 4th finger with extensor tendon repair, and repair of extensor tendon and laceration of 3rd finger by Dr. Jones of hand surgery on 1/19. He was started on empiric antibiotic treatment with Keflex and given Percocet for pain. The patient has follow up with hand surgery tomorrow. The patient reports he has been taking 1-2 Percocet every 6-8 hours but continues to have severe pain, mostly at night when trying to sleep. He ran out of his Percocet and presents today due to severe pain. He has the most pain in his 4th finger and the are of 5th finger amputation. He denies any fevers or other concerns at this time.     Allergies:  Sudafed      Medications:    oxyCODONE-acetaminophen (PERCOCET)  MG per tablet  IBUPROFEN PO  Pseudoephedrine-APAP-DM (DAYQUIL OR)  Acetaminophen (TYLENOL PO)    Past Medical History:    The patient is not currently taking any prescribed medications.    Past Surgical History:    Appendectomy  Complete amputation of 5th finger, open reduction and pinning of 4th finger with extensor tendon repair, and repair of extensor tendon and laceration of 3rd finger    Family History:    History reviewed. No pertinent family history.     Social History:  Smoking status: Current everyday smoker  Alcohol use: Yes, rare  Marital Status:  Single [1]     Review of Systems   Constitutional: Negative for chills and fever.   Musculoskeletal: Positive for arthralgias.   Skin: Positive for wound.   All other systems reviewed and are  negative.      Physical Exam   Patient Vitals for the past 24 hrs:   BP Temp Temp src Pulse Resp SpO2   18 1527 (!) 158/93 98.2  F (36.8  C) Temporal 82 16 98 %       Physical Exam   HENT:   Right Ear: External ear normal.   Left Ear: External ear normal.   Nose: Nose normal.   Eyes: Right eye exhibits no discharge. Left eye exhibits no discharge. No scleral icterus.   Cardiovascular: Intact distal pulses.    Pulmonary/Chest: Effort normal.   Speaking in full sentences   Musculoskeletal:   Left hand.  There is amputation of the fifth digit at the PIP joint the skin is well-perfused here.  The ring finger has a pin from the distal tip of the finger Xeroform is over the wound distal sensation is intact refill is intact to the distal tip.  Limited range of motion due to pain and pain superficial soft tissue injury to the dorsum of the middle finger and second/third web spaces.   Neurological:   Alert    No gross motor or sensory deficits   Skin: Skin is warm.   Psychiatric: He has a normal mood and affect. Judgment normal.   Nursing note and vitals reviewed.        Emergency Department Course   Interventions:  1603: Percocet 2 tablets oral    Emergency Department Course:  Past medical records, nursing notes, and vitals reviewed.  1554: I performed an exam of the patient and obtained history, as documented above.    I rechecked the patient.  Findings and plan explained to the Patient. Patient discharged home with instructions regarding supportive care, medications, and reasons to return. The importance of close follow-up was reviewed.     Impression & Plan      Medical Decision Makin-year-old gentleman right-hand-dominant here with uncontrolled pain after recent hand injury put his hand into a .  A procedure 2 days ago at [facility].  He has no evidence of soft tissue infection years area and antibiotics has no evidence on examination of the gangrene.  Suspect he just has uncontrolled pain,will  refill that here .  he has follow-up with hand surgery at [facility] tomorrow already arranged.  He was comfortable and agreeable with that plan.  Diagnosis:    ICD-10-CM   1. Visit for wound check Z51.89   2. Acute post-operative pain G89.18       Disposition: Discharged to home    Discharge Medications:   Details   oxyCODONE IR (ROXICODONE) 5 MG tablet Take 1-2 tablets (5-10 mg) by mouth every 6 hours as needed for moderate to severe pain, Disp-20 tablet, R-0, Local Print     Aniya Mckinnon  1/22/2018   [facility] EMERGENCY DEPARTMENT    I, Aniya Mckinnon, am serving as a scribe at 3:54 PM on 1/22/2018 to document services personally performed by Leon Patel MD based on my observations and the provider's statements to me.          Leon Patel MD  01/22/18 2047

## 2018-01-26 NOTE — CONSULTS
[facility]     History and Physical / Consult note: Trauma Service         Date of Admission:  1/19/2018     Time of Admission/Consult Request (page/call): 1/19/2018 304  Time of my evaluation: 314  Consulting services:  None         Assessment & Plan      Mason Garcia is a 34 y.o. Male presents after getting hand stuck in a grinding machine at work. He has an avulsion open fracture dislocation of the distal 4th and fifth digit. Tourniquet on on arrival taken down on arrival.  He is hemodynamically stable, not actively bleeding, plan to transfer to a facility with ortho hand specialist, [facility] has accepted patient. Palpable radial artery, doppler signals in ulnar and palmar arch.  Do not feel he warrants air transport.      Trauma mechanism:Work injury, crush injury to left hand   Time/date of injury: 1/19/2018,   Known Injuries:  1. Traumatic avulsion fracture dislocation of the distal 4th and 5th phalanx   Other diagnoses:   None    Procedure:     Plan:  1. Transfer to higher level of care.   2. Wrap hand in saline moistened gauze      Code status: Full.         ETOH: N/A     Primary Care Physician       Tamica King        Chief Complaint       Left hand injury      History is obtained from the patient        History of Present Illness  Mason Garcia is a 34 year old male who presents from work by [employer] after responding to work incident. Patient was working on a grinding press, and got hand caught. He did not lose consciousness. A tourniquet was placed in field.          Past Surgical History:   Procedure Laterality Date     LAPAROSCOPIC APPENDECTOMY N/A 2/7/2017    Procedure: LAPAROSCOPIC APPENDECTOMY;  Surgeon: Chanel Pagan DO;  Location: [facility] OR       Allergies   Allergen Reactions     Sudafed [Pseudoephedrine]        No current facility-administered medications for this encounter.      Current Outpatient Prescriptions   Medication     oxyCODONE-acetaminophen (PERCOCET)   MG per tablet     IBUPROFEN PO     oxyCODONE IR (ROXICODONE) 5 MG tablet     Pseudoephedrine-APAP-DM (DAYQUIL OR)     Acetaminophen (TYLENOL PO)       HABITS:    Social History   Substance Use Topics     Smoking status: Current Every Day Smoker     Packs/day: 1.00     Years: 7.00     Types: Cigarettes     Smokeless tobacco: Former User     Alcohol use Yes      Comment: rare       Family History   Problem Relation Age of Onset     Cardiovascular Maternal Grandmother      cardiovascular disease     Respiratory Maternal Grandmother      Lung transplant            Review of Systems      CONSTITUTIONAL: No fever, chills, sweats, fatigue   EYES: no visual blurring, no double vision or visual loss  ENT: no decrease in hearing, no tinnitus, no vertigo, no hoarseness  RESPIRATORY: no shortness of breath, no cough, no sputum   CARDIOVASCULAR: no palpitations, no chest  pain, no exertional chest pain or pressure  GASTROINTESTINAL: no nausea or vomiting, or abd pain  GENITOURINARY: no dysuria, no frequency or hesitancy, no hematuria  MUSCULOSKELETAL: no weakness, no redness, no swelling, no joint pain,   SKIN: no rashes, ecchymoses, abrasions or lacerations  NEUROLOGIC: no numbness or tingling of hands, no numbness or tingling  of feet, no syncope, no tremors or weakness  PSYCHIATRIC: no sleep disturbances, no anxiety or depression     Physical Exam      Temp: 96.7  F (35.9  C) Temp src: Tympanic BP: 145/92 Pulse: 106   Resp: 18 SpO2: 99 % O2 Device: None (Room air)    Vital Signs with Ranges  Temp:  [96.7  F (35.9  C)] 96.7  F (35.9  C)  Pulse:  [106] 106  Resp:  [18] 18  BP: (145)/(92) 145/92  SpO2:  [99 %] 99 % 0 lbs 0 oz    Primary Survey:  Airway: patient talking  Breathing: symmetric respiratory effort bilaterally  Circulation: central pulses present and peripheral pulses present  Disability: Pupils - left 4 mm and brisk, right 4 mm and brisk      [place] Coma Scale - Total 15/15  Eye Response (E): 4  4=  spontaneous,  3= to verbal/voice, 2=  to pain, 1= No response   Verbal Response (V): 5   5= Orientated, converses,  4= Confused, converses, 3= Inappropriate words,  2= Incomprehensible sounds,  1=No response   Motor Response (M): 6   6= Obeys commands, 5= Localizes to pain, 4= Withdrawal to pain, 3=Fexion to pain, 2= Extension to pain, 1= No response    Secondary Survey:  General: alert, oriented to person, place, time  Head: atraumatic, normocephalic, trachea midline  Eyes: PERRLA, pupils 3mm, EOMI, corneas and conjunctivae clear  Ears: no drainage   Nose: nares patent, no drainage, nasal septum non-tender  Mouth/Throat: no exudates or erythema,  no dental tenderness or malocclusions, no tongue lacerations  Neck:   full AROM without pain or tenderness   Chest/Pulmonary: normal respiratory rate and rhythm,  bilateral clear breath sounds, no wheezes, rales or rhonchi, no chest wall tenderness or deformities,   Cardiovascular: S1, S2,  normal and regular rate and rhythm,   Abdomen: soft, non-tender, no guarding, no rebound tenderness and no tenderness to palpation  : deferred   Back/Spine: no deformity, no midline tenderness, no sacral tenderness,  no step-offs and no abrasions or contusions  Musculoskel/Extremities: normal extremities, full AROM of major joints without tenderness, edema, erythema, ecchymosis, or abrasions.   Hand: left hand open tissue defect of the 4th and fifth digit with bone visible, able to extend thumb on command, sensation intact on unaffected fingers, radial pulse palpable, ulnar signal present, palmar arch signal.  Right hand normal   Skin: no rashes, laceration, ecchymosis, skin warm and dry.   Neuro: PERRLA, alert, oriented x 4. CN II-XII grossly intact. No focal deficits. Strength 5/5 x 4 extremities.  Sensation intact.  Psychiatric: affect/mood normal, cooperative, normal judgement/insight and memory intact        Data

## 2018-03-03 ENCOUNTER — HOSPITAL ENCOUNTER (EMERGENCY)
Facility: HOSPITAL | Age: 33
Discharge: HOME | End: 2018-03-03
Attending: FAMILY MEDICINE | Admitting: FAMILY MEDICINE
Payer: OTHER MISCELLANEOUS

## 2018-03-03 ENCOUNTER — APPOINTMENT (OUTPATIENT)
Dept: GENERAL RADIOLOGY | Facility: HOSPITAL | Age: 33
End: 2018-03-03
Attending: FAMILY MEDICINE
Payer: OTHER MISCELLANEOUS

## 2018-03-03 VITALS
RESPIRATION RATE: 16 BRPM | TEMPERATURE: 98 F | HEART RATE: 68 BPM | OXYGEN SATURATION: 98 % | DIASTOLIC BLOOD PRESSURE: 78 MMHG | SYSTOLIC BLOOD PRESSURE: 129 MMHG

## 2018-03-03 DIAGNOSIS — L08.9 FINGER INFECTION: ICD-10-CM

## 2018-03-03 LAB
BASOPHILS # BLD AUTO: 0.1 10E9/L (ref 0–0.2)
BASOPHILS NFR BLD AUTO: 0.6 %
DIFFERENTIAL METHOD BLD: ABNORMAL
EOSINOPHIL # BLD AUTO: 0.4 10E9/L (ref 0–0.7)
EOSINOPHIL NFR BLD AUTO: 3.2 %
ERYTHROCYTE [DISTWIDTH] IN BLOOD BY AUTOMATED COUNT: 12.5 % (ref 10–15)
HCT VFR BLD AUTO: 45.7 % (ref 40–53)
HGB BLD-MCNC: 16.4 G/DL (ref 13.3–17.7)
IMM GRANULOCYTES # BLD: 0.1 10E9/L (ref 0–0.4)
IMM GRANULOCYTES NFR BLD: 0.4 %
LYMPHOCYTES # BLD AUTO: 2.9 10E9/L (ref 0.8–5.3)
LYMPHOCYTES NFR BLD AUTO: 23.6 %
MCH RBC QN AUTO: 31.4 PG (ref 26.5–33)
MCHC RBC AUTO-ENTMCNC: 35.9 G/DL (ref 31.5–36.5)
MCV RBC AUTO: 87 FL (ref 78–100)
MONOCYTES # BLD AUTO: 1.1 10E9/L (ref 0–1.3)
MONOCYTES NFR BLD AUTO: 9 %
NEUTROPHILS # BLD AUTO: 7.8 10E9/L (ref 1.6–8.3)
NEUTROPHILS NFR BLD AUTO: 63.2 %
NRBC # BLD AUTO: 0 10*3/UL
NRBC BLD AUTO-RTO: 0 /100
PLATELET # BLD AUTO: 233 10E9/L (ref 150–450)
RBC # BLD AUTO: 5.23 10E12/L (ref 4.4–5.9)
WBC # BLD AUTO: 12.3 10E9/L (ref 4–11)

## 2018-03-03 PROCEDURE — 96365 THER/PROPH/DIAG IV INF INIT: CPT

## 2018-03-03 PROCEDURE — 25000125 ZZHC RX 250: Performed by: FAMILY MEDICINE

## 2018-03-03 PROCEDURE — 99284 EMERGENCY DEPT VISIT MOD MDM: CPT | Mod: 25

## 2018-03-03 PROCEDURE — 85025 COMPLETE CBC W/AUTO DIFF WBC: CPT | Performed by: FAMILY MEDICINE

## 2018-03-03 PROCEDURE — 99284 EMERGENCY DEPT VISIT MOD MDM: CPT | Performed by: FAMILY MEDICINE

## 2018-03-03 PROCEDURE — 73140 X-RAY EXAM OF FINGER(S): CPT | Mod: TC,LT

## 2018-03-03 PROCEDURE — 36415 COLL VENOUS BLD VENIPUNCTURE: CPT | Performed by: FAMILY MEDICINE

## 2018-03-03 RX ORDER — CLINDAMYCIN PHOSPHATE 900 MG/50ML
900 INJECTION, SOLUTION INTRAVENOUS EVERY 8 HOURS
Status: DISCONTINUED | OUTPATIENT
Start: 2018-03-03 | End: 2018-03-03 | Stop reason: HOSPADM

## 2018-03-03 RX ORDER — CLINDAMYCIN HCL 300 MG
300 CAPSULE ORAL 4 TIMES DAILY
Qty: 28 CAPSULE | Refills: 0 | Status: SHIPPED | OUTPATIENT
Start: 2018-03-03 | End: 2018-03-10

## 2018-03-03 RX ADMIN — CLINDAMYCIN PHOSPHATE 900 MG: 18 INJECTION, SOLUTION INTRAVENOUS at 13:10

## 2018-03-03 ASSESSMENT — ENCOUNTER SYMPTOMS
DYSURIA: 0
COLOR CHANGE: 1
PSYCHIATRIC NEGATIVE: 1
SHORTNESS OF BREATH: 0
NUMBNESS: 1
JOINT SWELLING: 1
ABDOMINAL PAIN: 0
FEVER: 0
ACTIVITY CHANGE: 1

## 2018-03-03 NOTE — ED NOTES
Pt arrives to er with report of increased pain and swelling to hand, fingers.  Pt has recent trauma to hand and fingers in January that required

## 2018-03-03 NOTE — ED AVS SNAPSHOT
[facility] Emergency Department    [address] [address]    Phone:  [phone]                                       Mason Garcia   MRN: [id number]    Department:  [facility] Emergency Department   Date of Visit:  3/3/2018           After Visit Summary Signature Page     I have received my discharge instructions, and my questions have been answered. I have discussed any challenges I see with this plan with the nurse or doctor.    ..........................................................................................................................................  Patient/Patient Representative Signature      ..........................................................................................................................................  Patient Representative Print Name and Relationship to Patient    ..................................................               ................................................  Date                                            Time    ..........................................................................................................................................  Reviewed by Signature/Title    ...................................................              ..............................................  Date                                                            Time

## 2018-03-03 NOTE — ED AVS SNAPSHOT
[facility]    [address] [place] [address]    Phone:  [phone]                                       Mason Garcia   MRN: [id number]    Department:  [facility]   Date of Visit:  3/3/2018           Patient Information     Date Of Birth          1985        Your diagnoses for this visit were:     Finger infection left 4th       You were seen by Elysia Sapp MD.      Follow-up Information     Follow up with Ruben Valencia MD In 6 days.    Specialty:  Family Practice    Why:  Follow up ED visit    Contact information:    [facility]  [address] [facility] [address]  [phone]        Discharge References/Attachments     SKIN INFECTION, CELLULITIS (ENGLISH)      Future Appointments        Provider Department Dept Phone Center    3/15/2018 3:30 PM Roslyn Chaney OTR/L/CLT [facility] [phone] [facility]         Review of your medicines      START taking        Dose / Directions Last dose taken    clindamycin 300 MG capsule   Commonly known as:  CLEOCIN   Dose:  300 mg   Quantity:  28 capsule        Take 1 capsule (300 mg) by mouth 4 times daily for 7 days   Refills:  0          Our records show that you are taking the medicines listed below. If these are incorrect, please call your family doctor or clinic.        Dose / Directions Last dose taken    IBUPROFEN PO        Refills:  0        KEFLEX PO   Dose:  500 mg        Take 500 mg by mouth 3 times daily   Refills:  0        NEURONTIN PO        Take by mouth 3 times daily Take 300 mg in the morning and at mid-day, and 600 mg at bedtime.   Refills:  0        oxyCODONE IR 5 MG tablet   Commonly known as:  ROXICODONE   Dose:  5-10 mg   Quantity:  20 tablet        Take 1-2 tablets (5-10 mg) by mouth every 6 hours as needed for moderate to severe pain   Refills:  0        oxyCODONE-acetaminophen  MG per tablet   Commonly known as:  PERCOCET   Dose:  1 tablet         "Take 1 tablet by mouth every 4 hours as needed for moderate to severe pain   Refills:  0        TYLENOL PO   Dose:  1000 mg        Take 1,000 mg by mouth   Refills:  0                Prescriptions were sent or printed at these locations (1 Prescription)                   [id number] - [place], [place] - [address] AT [facility] [address]   [address], [place] [address]    Telephone:  [phone]   Fax:  [phone]   Hours:                  E-Prescribed (1 of 1)         clindamycin (CLEOCIN) 300 MG capsule                Procedures and tests performed during your visit     CBC with platelets differential    Peripheral IV catheter    XR Finger Left G/E 2 Views      Orders Needing Specimen Collection     None      Pending Results     No orders found from 3/1/2018 to 3/4/2018.            Pending Culture Results     No orders found from 3/1/2018 to 3/4/2018.            Thank you for choosing [facility]       Thank you for choosing [facility] for your care. Our goal is always to provide you with excellent care. Hearing back from our patients is one way we can continue to improve our services. Please take a few minutes to complete the written survey that you may receive in the mail after you visit with us. Thank you!        [employer] Information     [employer] lets you send messages to your doctor, view your test results, renew your prescriptions, schedule appointments and more. To sign up, go to [email or web address] . Click on \"Log in\" on the left side of the screen, which will take you to the Welcome page. Then click on \"Sign up Now\" on the right side of the page.     You will be asked to enter the access code listed below, as well as some personal information. Please follow the directions to create your username and password.     Your access code is: [email or web address]  Expires: 2018  4:34 PM     Your access code will  in 90 days. If you need help or a new code, please call your [facility] clinic " or [phone].        Care EveryWhere ID     This is your Care EveryWhere ID. This could be used by other organizations to access your [place] medical records  [id number]        Equal Access to Services     QUIN STUART : Vilma Wesley, washelley martinez, qaybta karozinada wesley, marcia pugh. So [facility] [phone].    ATENCIÓN: Si habla español, tiene a tobar disposición servicios gratuitos de asistencia lingüística. Llame al [phone].    We comply with applicable federal civil rights laws and [place] laws. We do not discriminate on the basis of race, color, national origin, age, disability, sex, sexual orientation, or gender identity.            After Visit Summary       This is your record. Keep this with you and show to your community pharmacist(s) and doctor(s) at your next visit.

## 2018-03-03 NOTE — ED PROVIDER NOTES
History     Chief Complaint   Patient presents with     Cellulitis     HPI  Mason Garcia is a 32 year old male who had a recent accident in December where he got his hand caught in a  at work.  He lost most of his fifth finger and about half of the distal phalanx of the fourth finger in the accident.  There was also a significant skin disruption along the radial side of the fourth finger.  That seemed to have healed fairly well, he was getting his range of motion back as far as flexion is concerned, however in the last few days he is gotten increased swelling, redness, and his range of motion has gone back to near 0.  He was placed on Keflex 2 days ago, he reports that the redness has actually gotten worse rather than better and that he is concerned that he may lose use of his finger if the infection is not improved.  He has not been running a fever, has not felt systemically ill.    Problem List:    Patient Active Problem List    Diagnosis Date Noted     Perforated appendicitis 02/07/2017     Priority: Medium        Past Medical History:    No past medical history on file.    Past Surgical History:    Past Surgical History:   Procedure Laterality Date     LAPAROSCOPIC APPENDECTOMY N/A 2/7/2017    Procedure: LAPAROSCOPIC APPENDECTOMY;  Surgeon: Chanel Pagan DO;  Location: [facility]       Family History:    Family History   Problem Relation Age of Onset     Cardiovascular Maternal Grandmother      cardiovascular disease     Respiratory Maternal Grandmother      Lung transplant       Social History:  Marital Status:  Single [1]  Social History   Substance Use Topics     Smoking status: Current Every Day Smoker     Packs/day: 1.00     Years: 7.00     Types: Cigarettes     Smokeless tobacco: Former User     Alcohol use Yes      Comment: rare        Medications:      Gabapentin (NEURONTIN PO)   Cephalexin (KEFLEX PO)   clindamycin (CLEOCIN) 300 MG capsule   oxyCODONE-acetaminophen (PERCOCET)  MG per  tablet   IBUPROFEN PO   oxyCODONE IR (ROXICODONE) 5 MG tablet   Acetaminophen (TYLENOL PO)         Review of Systems   Constitutional: Positive for activity change. Negative for fever.   Respiratory: Negative for shortness of breath.    Cardiovascular: Negative for chest pain.   Gastrointestinal: Negative for abdominal pain.   Genitourinary: Negative for dysuria.   Musculoskeletal: Positive for joint swelling.        Left 4th PIP joint.   Skin: Positive for color change.        Erythema, see HPI.   Neurological: Positive for numbness.        Numbness in 4th finger, somewhat worse than previous.   Psychiatric/Behavioral: Negative.        Physical Exam   BP: 133/80  Pulse: 97  Temp: 98.7  F (37.1  C)  Resp: 16  SpO2: 98 %      Physical Exam   Constitutional: He is oriented to person, place, and time. He appears well-developed and well-nourished. No distress.   HENT:   Head: Normocephalic and atraumatic.   Cardiovascular: Normal rate and regular rhythm.    Pulmonary/Chest: Effort normal and breath sounds normal.   Musculoskeletal: He exhibits edema and tenderness.   Neurological: He is alert and oriented to person, place, and time.   Skin: Skin is warm and dry. There is erythema.   Psychiatric: He has a normal mood and affect.   Nursing note and vitals reviewed.      ED Course     ED Course     Procedures       Labs Ordered and Resulted from Time of ED Arrival Up to the Time of Departure from the ED   CBC WITH PLATELETS DIFFERENTIAL - Abnormal; Notable for the following:        Result Value    WBC 12.3 (*)     All other components within normal limits   PERIPHERAL IV CATHETER       Assessments & Plan (with Medical Decision Making)   Patient has increased redness and decreased function with increased swelling of the fourth digit.  Keflex is apparently not working.  We will increase his medication to clindamycin, initial dose of 900 mg given in the emergency room.  He will follow this a 300 mg 4 times daily ×7 days.  He  should follow-up with primary care in 6 days for reevaluation, sooner if things are not improving.    I have reviewed the nursing notes.    I have reviewed the findings, diagnosis, plan and need for follow up with the patient.  New Prescriptions    CLINDAMYCIN (CLEOCIN) 300 MG CAPSULE    Take 1 capsule (300 mg) by mouth 4 times daily for 7 days       Final diagnoses:   Finger infection - left 4th       3/3/2018   [facility]     Elysia Sapp MD  03/03/18 1394

## 2018-03-15 ENCOUNTER — HOSPITAL ENCOUNTER (OUTPATIENT)
Dept: OCCUPATIONAL THERAPY | Facility: HOSPITAL | Age: 33
Setting detail: THERAPIES SERIES
End: 2018-03-15
Attending: PLASTIC SURGERY
Payer: OTHER MISCELLANEOUS

## 2018-03-15 PROCEDURE — 97166 OT EVAL MOD COMPLEX 45 MIN: CPT | Mod: GO

## 2018-03-15 PROCEDURE — 40000118 ZZH STATISTIC OT DEPT VISIT

## 2018-03-15 PROCEDURE — 97110 THERAPEUTIC EXERCISES: CPT | Mod: GO

## 2018-03-21 ENCOUNTER — HOSPITAL ENCOUNTER (OUTPATIENT)
Dept: OCCUPATIONAL THERAPY | Facility: HOSPITAL | Age: 33
Setting detail: THERAPIES SERIES
End: 2018-03-21
Attending: PLASTIC SURGERY
Payer: OTHER MISCELLANEOUS

## 2018-03-21 PROCEDURE — 97110 THERAPEUTIC EXERCISES: CPT | Mod: GO

## 2018-03-21 PROCEDURE — 40000118 ZZH STATISTIC OT DEPT VISIT

## 2018-03-26 ENCOUNTER — HOSPITAL ENCOUNTER (OUTPATIENT)
Dept: OCCUPATIONAL THERAPY | Facility: HOSPITAL | Age: 33
Setting detail: THERAPIES SERIES
End: 2018-03-26
Attending: PLASTIC SURGERY
Payer: OTHER MISCELLANEOUS

## 2018-03-26 PROCEDURE — 40000118 ZZH STATISTIC OT DEPT VISIT

## 2018-03-26 PROCEDURE — 97110 THERAPEUTIC EXERCISES: CPT | Mod: GO

## 2018-03-30 ENCOUNTER — HOSPITAL ENCOUNTER (EMERGENCY)
Facility: HOSPITAL | Age: 33
Discharge: HOME | End: 2018-03-30
Attending: NURSE PRACTITIONER | Admitting: NURSE PRACTITIONER
Payer: OTHER MISCELLANEOUS

## 2018-03-30 VITALS — RESPIRATION RATE: 16 BRPM | OXYGEN SATURATION: 98 % | TEMPERATURE: 98.2 F

## 2018-03-30 DIAGNOSIS — L74.512 HYPERHIDROSIS OF PALMS: ICD-10-CM

## 2018-03-30 DIAGNOSIS — R23.8 VESICULAR RASH: ICD-10-CM

## 2018-03-30 LAB
ALBUMIN SERPL-MCNC: 4.1 G/DL (ref 3.4–5)
ALP SERPL-CCNC: 94 U/L (ref 40–150)
ALT SERPL W P-5'-P-CCNC: 66 U/L (ref 0–70)
ANION GAP SERPL CALCULATED.3IONS-SCNC: 7 MMOL/L (ref 3–14)
AST SERPL W P-5'-P-CCNC: 32 U/L (ref 0–45)
BASOPHILS # BLD AUTO: 0.1 10E9/L (ref 0–0.2)
BASOPHILS NFR BLD AUTO: 0.4 %
BILIRUB SERPL-MCNC: 0.2 MG/DL (ref 0.2–1.3)
BUN SERPL-MCNC: 11 MG/DL (ref 7–30)
CALCIUM SERPL-MCNC: 8.7 MG/DL (ref 8.5–10.1)
CHLORIDE SERPL-SCNC: 106 MMOL/L (ref 94–109)
CO2 SERPL-SCNC: 27 MMOL/L (ref 20–32)
CREAT SERPL-MCNC: 0.87 MG/DL (ref 0.66–1.25)
CRP SERPL-MCNC: 5.5 MG/L (ref 0–8)
DIFFERENTIAL METHOD BLD: ABNORMAL
EOSINOPHIL # BLD AUTO: 0.4 10E9/L (ref 0–0.7)
EOSINOPHIL NFR BLD AUTO: 3.6 %
ERYTHROCYTE [DISTWIDTH] IN BLOOD BY AUTOMATED COUNT: 12.6 % (ref 10–15)
ERYTHROCYTE [SEDIMENTATION RATE] IN BLOOD BY WESTERGREN METHOD: 5 MM/H (ref 0–15)
GFR SERPL CREATININE-BSD FRML MDRD: >90 ML/MIN/1.7M2
GLUCOSE SERPL-MCNC: 116 MG/DL (ref 70–99)
HCT VFR BLD AUTO: 49.7 % (ref 40–53)
HGB BLD-MCNC: 17.6 G/DL (ref 13.3–17.7)
IMM GRANULOCYTES # BLD: 0.1 10E9/L (ref 0–0.4)
IMM GRANULOCYTES NFR BLD: 0.7 %
LYMPHOCYTES # BLD AUTO: 3.5 10E9/L (ref 0.8–5.3)
LYMPHOCYTES NFR BLD AUTO: 29.8 %
MCH RBC QN AUTO: 31.2 PG (ref 26.5–33)
MCHC RBC AUTO-ENTMCNC: 35.4 G/DL (ref 31.5–36.5)
MCV RBC AUTO: 88 FL (ref 78–100)
MONOCYTES # BLD AUTO: 0.9 10E9/L (ref 0–1.3)
MONOCYTES NFR BLD AUTO: 7.5 %
NEUTROPHILS # BLD AUTO: 6.8 10E9/L (ref 1.6–8.3)
NEUTROPHILS NFR BLD AUTO: 58 %
NRBC # BLD AUTO: 0 10*3/UL
NRBC BLD AUTO-RTO: 0 /100
PLATELET # BLD AUTO: 250 10E9/L (ref 150–450)
POTASSIUM SERPL-SCNC: 3.7 MMOL/L (ref 3.4–5.3)
PROT SERPL-MCNC: 8.1 G/DL (ref 6.8–8.8)
RBC # BLD AUTO: 5.64 10E12/L (ref 4.4–5.9)
SODIUM SERPL-SCNC: 140 MMOL/L (ref 133–144)
WBC # BLD AUTO: 11.7 10E9/L (ref 4–11)

## 2018-03-30 PROCEDURE — 86695 HERPES SIMPLEX TYPE 1 TEST: CPT | Performed by: NURSE PRACTITIONER

## 2018-03-30 PROCEDURE — 85652 RBC SED RATE AUTOMATED: CPT | Performed by: NURSE PRACTITIONER

## 2018-03-30 PROCEDURE — 80053 COMPREHEN METABOLIC PANEL: CPT | Performed by: NURSE PRACTITIONER

## 2018-03-30 PROCEDURE — 99214 OFFICE O/P EST MOD 30 MIN: CPT | Performed by: NURSE PRACTITIONER

## 2018-03-30 PROCEDURE — 85025 COMPLETE CBC W/AUTO DIFF WBC: CPT | Performed by: NURSE PRACTITIONER

## 2018-03-30 PROCEDURE — 36415 COLL VENOUS BLD VENIPUNCTURE: CPT | Performed by: NURSE PRACTITIONER

## 2018-03-30 PROCEDURE — 86140 C-REACTIVE PROTEIN: CPT | Performed by: NURSE PRACTITIONER

## 2018-03-30 PROCEDURE — 87389 HIV-1 AG W/HIV-1&-2 AB AG IA: CPT | Performed by: NURSE PRACTITIONER

## 2018-03-30 PROCEDURE — 86696 HERPES SIMPLEX TYPE 2 TEST: CPT | Performed by: NURSE PRACTITIONER

## 2018-03-30 PROCEDURE — 86780 TREPONEMA PALLIDUM: CPT | Performed by: NURSE PRACTITIONER

## 2018-03-30 PROCEDURE — G0463 HOSPITAL OUTPT CLINIC VISIT: HCPCS

## 2018-03-30 NOTE — DISCHARGE INSTRUCTIONS
Will call you with remaining lab results.   Palmar or plantar hyperhidrosis  - Although many of the same treatments used for axillary hyperhidrosis are effective for palmar or plantar hyperhidrosis, the approach to treatment is slightly different. In particular, iontophoresis plays a greater role in treatment.  First-line therapy -- Topical antiperspirants and iontophoresis are common treatments based upon the safety of these therapies. Topical antiperspirant treatment is typically tried first because of the ease of administration of this therapy.

## 2018-03-30 NOTE — ED AVS SNAPSHOT
[facility]    [address] [address]    Phone:  [phone]                                       Mason Garcia   MRN: [id number]    Department:  [facility]   Date of Visit:  3/30/2018           Patient Information     Date Of Birth          1985        Your diagnoses for this visit were:     Hyperhidrosis of palms     Vesicular rash        You were seen by Araceli Coughlin NP.      Follow-up Information     Please follow up.    Why:  Follow up with Surgeon on Tuesday as scheduled        Discharge Instructions       Will call you with remaining lab results.   Palmar or plantar hyperhidrosis  - Although many of the same treatments used for axillary hyperhidrosis are effective for palmar or plantar hyperhidrosis, the approach to treatment is slightly different. In particular, iontophoresis plays a greater role in treatment.  First-line therapy -- Topical antiperspirants and iontophoresis are common treatments based upon the safety of these therapies. Topical antiperspirant treatment is typically tried first because of the ease of administration of this therapy.       Your next 10 appointments already scheduled     Apr 02, 2018 12:30 PM CDT   Hand Treatment with Roslyn Hukka, OTR/L/CLT   [facility] ([facility] )    [address] [place] [address]   [phone]            Apr 06, 2018  1:30 PM CDT   Hand Treatment with Roslyn Hukka, OTR/L/CLT   [facility] ([facility] )    [address] [address]   [phone]            Apr 09, 2018 11:30 AM CDT   Hand Treatment with Roslyn Hukka, OTR/L/CLT   [facility] ([facility] )    [address] [place] [address]   [phone]            Apr 11, 2018 11:30 AM CDT   Hand Treatment with Roslyn Hukka, OTR/L/CLT   [facility] ([facility] )    [address] [address]   [phone]  "                Review of your medicines      Our records show that you are taking the medicines listed below. If these are incorrect, please call your family doctor or clinic.        Dose / Directions Last dose taken    IBUPROFEN PO        Refills:  0        NEURONTIN PO        Take by mouth 3 times daily Take 300 mg in the morning and at mid-day, and 600 mg at bedtime.   Refills:  0        oxyCODONE-acetaminophen  MG per tablet   Commonly known as:  PERCOCET   Dose:  1 tablet        Take 1 tablet by mouth every 4 hours as needed for moderate to severe pain   Refills:  0        TYLENOL PO   Dose:  1000 mg        Take 1,000 mg by mouth   Refills:  0                Procedures and tests performed during your visit     Anti Treponema    CBC with platelets differential    CRP inflammation    Comprehensive metabolic panel    Erythrocyte sedimentation rate auto    HIV Antigen Antibody Combo    Herpes Simplex Virus 1 and 2 IgG      Orders Needing Specimen Collection     None      Pending Results     Date and Time Order Name Status Description    3/30/2018 1743 HIV Antigen Antibody Combo In process     3/30/2018 1743 Herpes Simplex Virus 1 and 2 IgG In process     3/30/2018 1743 Anti Treponema In process             Pending Culture Results     No orders found from 3/28/2018 to 3/31/2018.            Thank you for choosing [facility]       Thank you for choosing [facility] for your care. Our goal is always to provide you with excellent care. Hearing back from our patients is one way we can continue to improve our services. Please take a few minutes to complete the written survey that you may receive in the mail after you visit with us. Thank you!        [employer] Information     [employer] lets you send messages to your doctor, view your test results, renew your prescriptions, schedule appointments and more. To sign up, go to [email or web address] . Click on \"Log in\" on the left side of the screen, which will take you to the " "Welcome page. Then click on \"Sign up Now\" on the right side of the page.     You will be asked to enter the access code listed below, as well as some personal information. Please follow the directions to create your username and password.     Your access code is: [email or web address]  Expires: 2018  5:34 PM     Your access code will  in 90 days. If you need help or a new code, please call your [facility] clinic or [phone].        Care EveryWhere ID     This is your Care EveryWhere ID. This could be used by other organizations to access your [facility] medical records  [id number]        Equal Access to Services     QUIN STUART : Vilma Wesley, mj martinez, cece olson, marcia pugh. So [facility] [phone].    ATENCIÓN: Si habla español, tiene a tobar disposición servicios gratuitos de asistencia lingüística. Llame al [phone].    We comply with applicable federal civil rights laws and [place] laws. We do not discriminate on the basis of race, color, national origin, age, disability, sex, sexual orientation, or gender identity.            After Visit Summary       This is your record. Keep this with you and show to your community pharmacist(s) and doctor(s) at your next visit.                  "

## 2018-03-30 NOTE — ED AVS SNAPSHOT
[facility]    [address] [address]    Phone:  [phone]                                       Mason Garcia   MRN: [id number]    Department:  [facility]   Date of Visit:  3/30/2018           After Visit Summary Signature Page     I have received my discharge instructions, and my questions have been answered. I have discussed any challenges I see with this plan with the nurse or doctor.    ..........................................................................................................................................  Patient/Patient Representative Signature      ..........................................................................................................................................  Patient Representative Print Name and Relationship to Patient    ..................................................               ................................................  Date                                            Time    ..........................................................................................................................................  Reviewed by Signature/Title    ...................................................              ..............................................  Date                                                            Time

## 2018-03-31 NOTE — ED PROVIDER NOTES
History     Chief Complaint   Patient presents with     Rash     left hand rash for the past couple of days      The history is provided by the patient. No  was used.     Mason Garcia is a 32 year old male who presents with a rash on his left hand.  Rashes been present for a couple weeks much worse in the past few days.  Has not applied anything or taken Benadryl.  Eleven weeks ago was injured at work and had a traumatic partial amputation with repair of his fourth and fifth digits on the same hand.  Denies pain, reports that it itches occasionally, the palmar aspect of the hand sweats quite a bit which is not normal for him.  Does not have the rash anywhere else.  No one else in his family has a similar rash. He is had no new soaps, lotions, or food.  No history of ever having a rash like this, allergies or eczema.   Since the injury to his hand occurred and was repaired, he has had 2 separate instances where he developed blisters on the surgical scars and had to be treated with antibiotics.      Review of Systems   Constitutional: Negative.    Skin: Positive for rash.        Rash to the palmar aspect of his left hand and fingers.       Physical Exam   Heart Rate: 80  Temp: 98.2  F (36.8  C)  Resp: 16  SpO2: 98 %      Physical Exam   Constitutional: He is oriented to person, place, and time. He appears well-developed and well-nourished. No distress.   HENT:   Head: Normocephalic and atraumatic.   Cardiovascular: Normal rate.    Pulmonary/Chest: Effort normal.   Musculoskeletal:        Left hand: He exhibits tenderness and deformity. He exhibits no swelling. Normal sensation noted.   Palmar aspect of the left hand shows a slightly erythematous swollen maculopapular and vesicular rash to the entire palm.  Initially it appeared to be draining, but after some time it was evident that it was sweating and patient was having to repeatedly wiped his hand off.  He would have mild pain at the  surgical sites of the fourth and fifth digits, there is no erythema, open areas or drainage.   Neurological: He is alert and oriented to person, place, and time.   Skin: He is not diaphoretic.   Psychiatric: He has a normal mood and affect. His behavior is normal.   Nursing note and vitals reviewed.      ED Course     ED Course     Procedures    No results found for this or any previous visit (from the past 24 hour(s)).    Medications - No data to display    Assessments & Plan (with Medical Decision Making)     I have reviewed the nursing notes.  I have reviewed the findings, diagnosis, plan and need for follow up with the patient.  New onset of a diaphoretic rash postsurgical.  Discussed with my colleague, does not appear to be HFM or dyshidrotic eczema.  We will do screening labs.  Nursing staff will call patient with the results and send to primary care.  Until results return, we will treat as hyperhidrosis due to the traumatic injury.  Given epic educational materials.  Patient is scheduled to follow-up early next week with his hand surgeon for his postsurgical evaluation.  Advised to return here if symptoms worsen or concerns develop.    Discharge Medication List as of 3/30/2018  6:40 PM          Final diagnoses:   Hyperhidrosis of palms   Vesicular rash       3/30/2018   [facility]     Araceli Coughlin NP  04/01/18 1128

## 2018-04-01 ASSESSMENT — ENCOUNTER SYMPTOMS: CONSTITUTIONAL NEGATIVE: 1

## 2018-04-02 LAB — HIV 1+2 AB+HIV1 P24 AG SERPL QL IA: NONREACTIVE

## 2018-04-03 LAB
HSV1 IGG SERPL QL IA: <0.2 AI (ref 0–0.8)
HSV2 IGG SERPL QL IA: 1.9 AI (ref 0–0.8)
T PALLIDUM IGG+IGM SER QL: NEGATIVE

## 2018-04-03 NOTE — PROGRESS NOTES
Results given to PCP nurse, reviewed history and visit. She will review with Dr. Valencia and call the patient, questioned repeat testing.

## 2018-04-03 NOTE — PROGRESS NOTES
Herpes Simplex Virus 1 and 2 IgG - Result given to Cathy Coughlni NP and she will follow up with patient.

## 2018-04-03 NOTE — ED NOTES
Lab result for Herpes Simplex virus 1 and 2 IgG faxed to Dr. Valencia for him to review and follow up with patient.  Faxed to [phone]

## 2018-04-06 ENCOUNTER — HOSPITAL ENCOUNTER (OUTPATIENT)
Dept: OCCUPATIONAL THERAPY | Facility: HOSPITAL | Age: 33
Setting detail: THERAPIES SERIES
End: 2018-04-06
Attending: PLASTIC SURGERY
Payer: OTHER MISCELLANEOUS

## 2018-04-06 PROCEDURE — 97110 THERAPEUTIC EXERCISES: CPT | Mod: GO

## 2018-04-06 PROCEDURE — 97140 MANUAL THERAPY 1/> REGIONS: CPT | Mod: GO

## 2018-04-06 PROCEDURE — 40000118 ZZH STATISTIC OT DEPT VISIT

## 2018-04-09 ENCOUNTER — HOSPITAL ENCOUNTER (OUTPATIENT)
Dept: OCCUPATIONAL THERAPY | Facility: HOSPITAL | Age: 33
Setting detail: THERAPIES SERIES
End: 2018-04-09
Attending: PLASTIC SURGERY
Payer: OTHER MISCELLANEOUS

## 2018-04-09 PROCEDURE — 97140 MANUAL THERAPY 1/> REGIONS: CPT | Mod: GO

## 2018-04-09 PROCEDURE — 97110 THERAPEUTIC EXERCISES: CPT | Mod: GO

## 2018-04-09 PROCEDURE — 40000118 ZZH STATISTIC OT DEPT VISIT

## 2018-04-11 ENCOUNTER — HOSPITAL ENCOUNTER (OUTPATIENT)
Dept: OCCUPATIONAL THERAPY | Facility: HOSPITAL | Age: 33
Setting detail: THERAPIES SERIES
End: 2018-04-11
Attending: PLASTIC SURGERY
Payer: OTHER MISCELLANEOUS

## 2018-04-11 PROCEDURE — 97110 THERAPEUTIC EXERCISES: CPT | Mod: GO

## 2018-04-11 PROCEDURE — 40000118 ZZH STATISTIC OT DEPT VISIT

## 2018-04-23 ENCOUNTER — HOSPITAL ENCOUNTER (OUTPATIENT)
Dept: OCCUPATIONAL THERAPY | Facility: HOSPITAL | Age: 33
Setting detail: THERAPIES SERIES
End: 2018-04-23
Attending: PLASTIC SURGERY
Payer: OTHER MISCELLANEOUS

## 2018-04-23 PROCEDURE — 40000118 ZZH STATISTIC OT DEPT VISIT

## 2018-04-23 PROCEDURE — 97110 THERAPEUTIC EXERCISES: CPT | Mod: GO

## 2018-04-25 ENCOUNTER — HOSPITAL ENCOUNTER (OUTPATIENT)
Dept: OCCUPATIONAL THERAPY | Facility: HOSPITAL | Age: 33
Setting detail: THERAPIES SERIES
End: 2018-04-25
Attending: PLASTIC SURGERY
Payer: OTHER MISCELLANEOUS

## 2018-04-25 PROCEDURE — 97110 THERAPEUTIC EXERCISES: CPT | Mod: GO

## 2018-04-25 PROCEDURE — 40000118 ZZH STATISTIC OT DEPT VISIT

## 2018-05-02 ENCOUNTER — HOSPITAL ENCOUNTER (OUTPATIENT)
Dept: OCCUPATIONAL THERAPY | Facility: HOSPITAL | Age: 33
Setting detail: THERAPIES SERIES
End: 2018-05-02
Attending: PLASTIC SURGERY
Payer: OTHER MISCELLANEOUS

## 2018-05-02 PROCEDURE — 40000118 ZZH STATISTIC OT DEPT VISIT

## 2018-05-02 PROCEDURE — 97110 THERAPEUTIC EXERCISES: CPT | Mod: GO

## 2018-05-04 ENCOUNTER — HOSPITAL ENCOUNTER (OUTPATIENT)
Dept: OCCUPATIONAL THERAPY | Facility: HOSPITAL | Age: 33
Setting detail: THERAPIES SERIES
End: 2018-05-04
Attending: PLASTIC SURGERY
Payer: OTHER MISCELLANEOUS

## 2018-05-04 PROCEDURE — 97110 THERAPEUTIC EXERCISES: CPT | Mod: GO

## 2018-05-04 PROCEDURE — 40000118 ZZH STATISTIC OT DEPT VISIT

## 2018-05-04 NOTE — PROGRESS NOTES
05/04/18 1302   Notes   Note Type Progress Note   Signing Clinician's Name / Credentials   Signing clinician's name / credentials Roslyn Chaney, OTR/L, CLT   Providers   Referring Physician Dr. Jones   Reporting Period   Reporting period from 03/15/18   Reporting period to 05/04/18   General Information   Rxs Authorized 12   Rxs Used 10   Medical Diagnosis Traumatic hand injury with traumatic amputation, fractures, extensor tendon injuries   Orders Evaluate And Treat As Indicated;Other   Other Orders splint per protocol, exercise per protocol, modalities as needed. home program   Insurance    Start Of Care Date 03/15/18   Onset date of current episode/exacerbation 01/19/18   Surgical procedure complete amputation 5th digit, repair of all three phalanx of 4th finger, extensor tendon repair   Date of surgery 01/19/18   Date for Next MD Appointment 05/09/18   Subjective Measures   Subjective Pt treated [id number].  Pt reports his hand is feeling ok now after being stiff in the morning.  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   Initial Pain level 4/10   Current Pain level 1/10   Functional Improvement Reported Self care activities;Gripping  (homemaking)   QuickDASH [Functional Disability Questionnaire; 0-100 (0=no dysfunction; 100=dysfunction)] Open Dash   Open Jar 4   Heavy Household Chores 3   Carry a shopping bag 1   Wash back 3   Cut food with knife 2   Recreational activities 5   Social activities 3   Work, daily activities 5   Pain 2   Tingling 2   Sleeping 3   QuickDASH Sum 33   QuickDASH Count 11   QuickDASH Disability/Symptom Score 50   Objective Measures   Objective Measures Edema;ROM;Strength   Edema   Location (anatomical) ring finger   Location Left   Edema Comments P1 6 cm improved 1 cm since initial evaluation  PIP 6.3 cm improved 9 mm  P2 5.2 improved 6 mm    ROM   Location (anatomical) Ring finger   Location Left   Motion Ext/Flex   ROM Comments MCP 0/89   no change  PIP -28/70  DIP -29/45   Strength   Location Left    48# improved 18# since initial evaluation   Therapeutic Exercise   Therapeutic Exercise Strengthening;Special Techniques   Skilled Interventions To Increase Strength   Minutes of Treatment 24   Special Techniques   Special Techniques CONTRACT RELAX SPECIAL TECHNIQUES   Contract Relax 1 set;3 reps   Details Toward IP ext of ring finger, followed by 2 minute stretch   Strengthening   Strengthening Isometrics ;Isotonics Wrist;Isotonics Forearm   Isometrics  3 sets;10 reps   Equipment blue flexbar   Isotonics Wrist Extension;Flexion   Sets/Reps 3 sets;10 reps   Resistance Blue  (flexbar)   Isotonics Forearm All Planes   Sets/Reps 3 sets;10 reps   Resistance Green  (flexbar)   Eating   Current Functional Task Cutting food   Previous Performance Level Independent   Current Performance Level Mild difficulty   Goal Target Task Cut food   Goal Target Performance Level Mild difficulty   Due Date 04/12/18   Hygiene/Toileting   Current Functional Task (holding bar of soap)   Previous Performance Level Independent   Current Performance Level Mild difficulty   Goal Target Task (hold a bar of soap)   Goal Target Performance Level Moderate difficulty   Due Date 04/12/18   Sports/Recreation   Current Functional Task Gripping   Previous Performance Level Independent   Curent Performance Level (haven't tried)   Goal Target Task (hold bow)   Goal Target Performance Level Moderate difficulty   Due Date 04/12/18   Assessment   Clinical Impression(s) Comments Pt has progressed toward or met his goals.  Physically his hand is doing well, emotionally pt is struggling   Response to Therapy: Improvements ROM;Strength;Edema;Pain   Plan   Homework use hand in functional tasks at work and at home, keep appointments with [facility]   Plan will proceed as per orders of Dr. Jones after appointment on the 9th.   Total Session Time   Timed Code Treatment Minutes 24    Total Treatment Time (sum of timed and untimed services) 30

## 2018-05-09 ENCOUNTER — HOSPITAL ENCOUNTER (OUTPATIENT)
Dept: OCCUPATIONAL THERAPY | Facility: HOSPITAL | Age: 33
Setting detail: THERAPIES SERIES
End: 2018-05-09
Attending: PLASTIC SURGERY
Payer: OTHER MISCELLANEOUS

## 2018-05-09 PROCEDURE — 40000118 ZZH STATISTIC OT DEPT VISIT

## 2018-05-09 PROCEDURE — 97110 THERAPEUTIC EXERCISES: CPT | Mod: GO

## 2018-05-11 ENCOUNTER — HOSPITAL ENCOUNTER (OUTPATIENT)
Dept: OCCUPATIONAL THERAPY | Facility: HOSPITAL | Age: 33
Setting detail: THERAPIES SERIES
End: 2018-05-11
Attending: PLASTIC SURGERY
Payer: OTHER MISCELLANEOUS

## 2018-05-11 PROCEDURE — 40000118 ZZH STATISTIC OT DEPT VISIT

## 2018-05-11 PROCEDURE — 97140 MANUAL THERAPY 1/> REGIONS: CPT | Mod: GO

## 2018-05-11 NOTE — PROGRESS NOTES
05/11/18 1353   Notes   Note Type Discharge Summary   Signing Clinician's Name / Credentials   Signing clinician's name / credentials Roslyn Chaney, OTR/L, CLT   Session Number   Session Number 12   Providers   Referring Physician Dr. Jones   Reporting Period   Reporting period from 03/15/18   Reporting period to 05/11/18   General Information   Rxs Authorized 12   Rxs Used 12   Medical Diagnosis Traumatic hand injury with traumatic amputation, fractures, extensor tendon injuries   Orders Evaluate And Treat As Indicated;Other   Other Orders splint per protocol, exercise per protocol, modalities as needed. home program   Insurance    Start Of Care Date 03/15/18   Onset date of current episode/exacerbation 01/19/18   Surgical procedure complete amputation 5th digit, repair of all three phalanx of 4th finger, extensor tendon repair   Date of surgery 01/19/18   Subjective Measures   Subjective Pt treated 1803-  Dr. Jones has not called back re: continuing OT.  Both this OTR and pt are comfortable with discharging to home program   Initial Pain level 4/10   Current Pain level 3/10   QuickDASH [Functional Disability Questionnaire; 0-100 (0=no dysfunction; 100=dysfunction)] Open Dash   Open Jar 3   Heavy Household Chores 2   Carry a shopping bag 1   Wash back 3   Cut food with knife 3   Recreational activities 4   Social activities 4   Work, daily activities 3   Pain 3   Tingling 2   Sleeping 3   QuickDASH Sum 31   QuickDASH Count 11   QuickDASH Disability/Symptom Score 45.45   Objective Measures   Objective Measures Edema;ROM;Strength   Edema   Location (anatomical) ring finger   Location Left   Edema Comments 5.9 cm improved 11 mm since initial eval.  PIP 6.2 improved 1 cm since initial evaluation P2 5.1 cm improved 7 mm since initial evaluation   ROM   Location (anatomical) Ring finger   Location Left   Motion Ext/Flex   ROM Comments MP 0/90, loss of 3 degrees since initial eval  PIP  -28/75, improved 22 degrees since  initial measuremtn  DIP  -28/48 loss of 10 degrees extension, gain of 22 degrees flex   Strength   Location Left    48#, [id number]# since initial evaluation, but plateau over last 3 weeks   Therapeutic Exercise   Therapeutic Exercise PROM   Skilled Interventions To Enhance Joint Rom   Minutes of Treatment 5   PROM   PROM PROM Fingers   PROM Fingers Extension;Flexion   Sets/Reps 1 set;15 reps   Manual   Manual Scar Mob Position   Skilled Interventions To Soften Scar Tissue   Minutes of Treatment 10   Scar Mob Position Sitting   Scar Mob Location dorsal finger   Description/ Technique circular   Time 10   Eating   Current Functional Task Cutting food   Previous Performance Level Independent   Current Performance Level Mild difficulty   Goal Target Task Cut food   Goal Target Performance Level Mild difficulty   Due Date 04/12/18   Hygiene/Toileting   Current Functional Task (holding a bar of soap)   Previous Performance Level Independent   Current Performance Level Moderate difficulty   Goal Target Task (hold a bar of soap)   Goal Target Performance Level Moderate difficulty   Due Date 04/12/18   Sports/Recreation   Current Functional Task Gripping   Previous Performance Level Independent   Curent Performance Level Unable   Goal Target Task (hold bow)   Goal Target Performance Level Moderate difficulty   Due Date 04/12/18   Assessment   Clinical Impression(s) Comments Pt has progressed toward or met his goals.  Physically his hand is doing well, emotionally pt is struggling   Response to Therapy: Improvements ROM;Strength;Edema;Pain   Plan   Homework use hand in functional tasks at work and at home, keep appointments with [facility]   Plan d/c OT   Total Session Time   Timed Code Treatment Minutes 15   Total Treatment Time (sum of timed and untimed services) 15

## 2018-05-14 ENCOUNTER — HOSPITAL ENCOUNTER (EMERGENCY)
Facility: HOSPITAL | Age: 33
Discharge: HOME | End: 2018-05-14
Attending: PHYSICIAN ASSISTANT | Admitting: PHYSICIAN ASSISTANT
Payer: OTHER MISCELLANEOUS

## 2018-05-14 VITALS
DIASTOLIC BLOOD PRESSURE: 83 MMHG | HEART RATE: 75 BPM | RESPIRATION RATE: 18 BRPM | BODY MASS INDEX: 29.16 KG/M2 | TEMPERATURE: 97.7 F | SYSTOLIC BLOOD PRESSURE: 129 MMHG | OXYGEN SATURATION: 96 % | HEIGHT: 73 IN | WEIGHT: 220 LBS

## 2018-05-14 DIAGNOSIS — F41.0 ANXIETY ATTACK: ICD-10-CM

## 2018-05-14 LAB
ALBUMIN SERPL-MCNC: 4.2 G/DL (ref 3.4–5)
ALP SERPL-CCNC: 94 U/L (ref 40–150)
ALT SERPL W P-5'-P-CCNC: 55 U/L (ref 0–70)
ANION GAP SERPL CALCULATED.3IONS-SCNC: 9 MMOL/L (ref 3–14)
AST SERPL W P-5'-P-CCNC: 30 U/L (ref 0–45)
BASOPHILS # BLD AUTO: 0 10E9/L (ref 0–0.2)
BASOPHILS NFR BLD AUTO: 0.4 %
BILIRUB SERPL-MCNC: 0.4 MG/DL (ref 0.2–1.3)
BUN SERPL-MCNC: 11 MG/DL (ref 7–30)
CALCIUM SERPL-MCNC: 8.9 MG/DL (ref 8.5–10.1)
CHLORIDE SERPL-SCNC: 104 MMOL/L (ref 94–109)
CO2 SERPL-SCNC: 25 MMOL/L (ref 20–32)
CREAT SERPL-MCNC: 0.9 MG/DL (ref 0.66–1.25)
DIFFERENTIAL METHOD BLD: ABNORMAL
EOSINOPHIL # BLD AUTO: 0.3 10E9/L (ref 0–0.7)
EOSINOPHIL NFR BLD AUTO: 2.3 %
ERYTHROCYTE [DISTWIDTH] IN BLOOD BY AUTOMATED COUNT: 12.4 % (ref 10–15)
GFR SERPL CREATININE-BSD FRML MDRD: >90 ML/MIN/1.7M2
GLUCOSE SERPL-MCNC: 109 MG/DL (ref 70–99)
HCT VFR BLD AUTO: 51.4 % (ref 40–53)
HGB BLD-MCNC: 17.8 G/DL (ref 13.3–17.7)
IMM GRANULOCYTES # BLD: 0 10E9/L (ref 0–0.4)
IMM GRANULOCYTES NFR BLD: 0.4 %
LYMPHOCYTES # BLD AUTO: 2.7 10E9/L (ref 0.8–5.3)
LYMPHOCYTES NFR BLD AUTO: 24.5 %
MCH RBC QN AUTO: 30.2 PG (ref 26.5–33)
MCHC RBC AUTO-ENTMCNC: 34.6 G/DL (ref 31.5–36.5)
MCV RBC AUTO: 87 FL (ref 78–100)
MONOCYTES # BLD AUTO: 0.7 10E9/L (ref 0–1.3)
MONOCYTES NFR BLD AUTO: 6.5 %
NEUTROPHILS # BLD AUTO: 7.2 10E9/L (ref 1.6–8.3)
NEUTROPHILS NFR BLD AUTO: 65.9 %
NRBC # BLD AUTO: 0 10*3/UL
NRBC BLD AUTO-RTO: 0 /100
PLATELET # BLD AUTO: 238 10E9/L (ref 150–450)
POTASSIUM SERPL-SCNC: 4 MMOL/L (ref 3.4–5.3)
PROT SERPL-MCNC: 8.2 G/DL (ref 6.8–8.8)
RBC # BLD AUTO: 5.89 10E12/L (ref 4.4–5.9)
SODIUM SERPL-SCNC: 138 MMOL/L (ref 133–144)
WBC # BLD AUTO: 10.9 10E9/L (ref 4–11)

## 2018-05-14 PROCEDURE — 85025 COMPLETE CBC W/AUTO DIFF WBC: CPT | Performed by: PHYSICIAN ASSISTANT

## 2018-05-14 PROCEDURE — 96360 HYDRATION IV INFUSION INIT: CPT

## 2018-05-14 PROCEDURE — 80053 COMPREHEN METABOLIC PANEL: CPT | Performed by: PHYSICIAN ASSISTANT

## 2018-05-14 PROCEDURE — 36415 COLL VENOUS BLD VENIPUNCTURE: CPT | Performed by: PHYSICIAN ASSISTANT

## 2018-05-14 PROCEDURE — 99284 EMERGENCY DEPT VISIT MOD MDM: CPT | Mod: 25

## 2018-05-14 PROCEDURE — 93010 ELECTROCARDIOGRAM REPORT: CPT | Performed by: INTERNAL MEDICINE

## 2018-05-14 PROCEDURE — 99284 EMERGENCY DEPT VISIT MOD MDM: CPT | Performed by: PHYSICIAN ASSISTANT

## 2018-05-14 PROCEDURE — 93005 ELECTROCARDIOGRAM TRACING: CPT

## 2018-05-14 PROCEDURE — 25000128 H RX IP 250 OP 636: Performed by: PHYSICIAN ASSISTANT

## 2018-05-14 RX ORDER — HYDROXYZINE HYDROCHLORIDE 25 MG/1
50-100 TABLET, FILM COATED ORAL EVERY 6 HOURS PRN
Qty: 60 TABLET | Refills: 1 | Status: SHIPPED | OUTPATIENT
Start: 2018-05-14 | End: 2018-08-26

## 2018-05-14 RX ADMIN — SODIUM CHLORIDE 1000 ML: 9 INJECTION, SOLUTION INTRAVENOUS at 11:12

## 2018-05-14 ASSESSMENT — ENCOUNTER SYMPTOMS
WEAKNESS: 0
PALPITATIONS: 0
PHOTOPHOBIA: 0
PSYCHIATRIC NEGATIVE: 1
NEUROLOGICAL NEGATIVE: 1
SPEECH DIFFICULTY: 0
FACIAL ASYMMETRY: 0
EYES NEGATIVE: 1
CONSTITUTIONAL NEGATIVE: 1
SEIZURES: 0
DIZZINESS: 0
SORE THROAT: 0
NUMBNESS: 0
SHORTNESS OF BREATH: 0
NECK PAIN: 0
NECK STIFFNESS: 0
HEADACHES: 0
FEVER: 0
LIGHT-HEADEDNESS: 0
CHILLS: 0

## 2018-05-14 NOTE — ED AVS SNAPSHOT
[facility]    [address] [address]    Phone:  [phone]                                       Mason Garcia   MRN: [id number]    Department:  [facility]   Date of Visit:  5/14/2018           After Visit Summary Signature Page     I have received my discharge instructions, and my questions have been answered. I have discussed any challenges I see with this plan with the nurse or doctor.    ..........................................................................................................................................  Patient/Patient Representative Signature      ..........................................................................................................................................  Patient Representative Print Name and Relationship to Patient    ..................................................               ................................................  Date                                            Time    ..........................................................................................................................................  Reviewed by Signature/Title    ...................................................              ..............................................  Date                                                            Time

## 2018-05-14 NOTE — DISCHARGE INSTRUCTIONS
No work today. Take the Hydroxyzine as prescribed for further anxiety attacks. Follow up with primary care on Wednesday as scheduled. Return here sooner with any new or worsening symptoms.     Anxiety Reaction  Anxiety is the feeling we all get when we think something bad might happen. It is a normal response to stress and usually causes only a mild reaction. When anxiety becomes more severe, it can interfere with daily life. In some cases, you may not even be aware of what it is you re anxious about. There may also be a genetic link or it may be a learned behavior in the home.  Both psychological and physical triggers cause stress reaction. It's often a response to fear or emotional stress, real or imagined. This stress may come from home, family, work, or social relationships.  During an anxiety reaction, you may feel:    Helpless    Nervous    Depressed    Irritable  Your body may show signs of anxiety in many ways. You may experience:    Dry mouth    Shakiness    Dizziness    Weakness    Trouble breathing    Breathing fast (hyperventilating)    Chest pressure    Sweating    Headache    Nausea    Diarrhea    Tiredness    Inability to sleep    Sexual problems  Home care    Try to locate the sources of stress in your life. They may not be obvious. These may include:  ? Daily hassles of life (such as traffic jams, missed appointments, or car troubles)  ? Major life changes, both good (new baby or job promotion) and bad (loss of job or loss of loved one)  ? Overload: feeling that you have too many responsibilities and can't take care of all of them at once  ? Feeling helpless or feeling that your problems are beyond what you re able to solve    Notice how your body reacts to stress. Learn to listen to your body signals. This will help you take action before the stress becomes severe.    When you can, do something about the source of your stress. (Avoid hassles, limit the amount of change that happens in your life at  one time and take a break when you feel overloaded).    Unfortunately, many stressful situations can't be avoided. It is necessary to learn how to better manage stress. There are many proven methods that will reduce your anxiety. These include simple things like exercise, good nutrition, and adequate rest. Also, there are certain techniques that are helpful:  ? Relaxation  ? Breathing exercises  ? Visualization  ? Biofeedback  ? Meditation  For more information about this, consult your healthcare provider or go to a local bookstore and review the many books and tapes available on this subject.  Follow-up care  If you feel that your anxiety is not responding to self-help measures, contact your healthcare provider or make an appointment with a counselor. You may need short-term psychological counseling and temporary medicine to help you manage stress.  Call 911  Call 911 if any of these happen:    Trouble breathing    Confusion    Drowsiness or trouble wakening    Fainting or loss of consciousness    Rapid heart rate    Seizure    New chest pain that becomes more severe, lasts longer, or spreads into your shoulder, arm, neck, jaw, or back  When to seek medical advice  Call your healthcare provider right away if any of these happen:    Your symptoms get worse    Severe headache not relieved by rest and mild pain reliever  Date Last Reviewed: 10/1/2017    6390-6125 [employer]. [address] [facility], [place] [address]. All rights reserved. This information is not intended as a substitute for professional medical care. Always follow your healthcare professional's instructions.

## 2018-05-14 NOTE — ED AVS SNAPSHOT
[facility]    [address]    [facility] [address]    Phone:  [phone]                                       Mason Garcia   MRN: [id number]    Department:  [facility]   Date of Visit:  5/14/2018           Patient Information     Date Of Birth          1985        Your diagnoses for this visit were:     Anxiety attack        You were seen by Tamica Scott PA-C.      Follow-up Information     Follow up with Ruben Valencia MD In 2 days.    Specialty:  Family Practice    Contact information:    [facility]  [address]  [facility] [address]  [phone]          Follow up with [facility].    Specialty:  EMERGENCY MEDICINE    Why:  If symptoms worsen    Contact information:    [address]  [facility] [address] [phone]    Additional information:    From [facility]: Take [address]. Turn left at [address]. Turn left at the first stoplight on [address]. At the first stop sign, take a right onto [address]. Take a left into the parking lot and continue through until you reach the North enterance of the building.       From [place]: Take [address]. Take the [address] ramp towards [employer]. Turn left onto [address]. Take a slight right onto [address]. Turn left at the first stoplight on [address]. At the first stop sign, take a right onto [address]. Take a left into the parking lot and continue through until you reach the North enterance of the building.       From [place]: Take [address]. Take a right at [address]. At the first stop sign, take a right onto [address]. Take a left into the parking lot and continue through until you reach the North enterance of the building.         Discharge Instructions       No work today. Take the Hydroxyzine as prescribed for further anxiety attacks. Follow up with primary care on Wednesday  as scheduled. Return here sooner with any new or worsening symptoms.     Anxiety Reaction  Anxiety is the feeling we all get when we think something bad might happen. It is a normal response to stress and usually causes only a mild reaction. When anxiety becomes more severe, it can interfere with daily life. In some cases, you may not even be aware of what it is you re anxious about. There may also be a genetic link or it may be a learned behavior in the home.  Both psychological and physical triggers cause stress reaction. It's often a response to fear or emotional stress, real or imagined. This stress may come from home, family, work, or social relationships.  During an anxiety reaction, you may feel:    Helpless    Nervous    Depressed    Irritable  Your body may show signs of anxiety in many ways. You may experience:    Dry mouth    Shakiness    Dizziness    Weakness    Trouble breathing    Breathing fast (hyperventilating)    Chest pressure    Sweating    Headache    Nausea    Diarrhea    Tiredness    Inability to sleep    Sexual problems  Home care    Try to locate the sources of stress in your life. They may not be obvious. These may include:  ? Daily hassles of life (such as traffic jams, missed appointments, or car troubles)  ? Major life changes, both good (new baby or job promotion) and bad (loss of job or loss of loved one)  ? Overload: feeling that you have too many responsibilities and can't take care of all of them at once  ? Feeling helpless or feeling that your problems are beyond what you re able to solve    Notice how your body reacts to stress. Learn to listen to your body signals. This will help you take action before the stress becomes severe.    When you can, do something about the source of your stress. (Avoid hassles, limit the amount of change that happens in your life at one time and take a break when you feel overloaded).    Unfortunately, many stressful situations can't be avoided. It is  necessary to learn how to better manage stress. There are many proven methods that will reduce your anxiety. These include simple things like exercise, good nutrition, and adequate rest. Also, there are certain techniques that are helpful:  ? Relaxation  ? Breathing exercises  ? Visualization  ? Biofeedback  ? Meditation  For more information about this, consult your healthcare provider or go to a local bookstore and review the many books and tapes available on this subject.  Follow-up care  If you feel that your anxiety is not responding to self-help measures, contact your healthcare provider or make an appointment with a counselor. You may need short-term psychological counseling and temporary medicine to help you manage stress.  Call 911  Call 911 if any of these happen:    Trouble breathing    Confusion    Drowsiness or trouble wakening    Fainting or loss of consciousness    Rapid heart rate    Seizure    New chest pain that becomes more severe, lasts longer, or spreads into your shoulder, arm, neck, jaw, or back  When to seek medical advice  Call your healthcare provider right away if any of these happen:    Your symptoms get worse    Severe headache not relieved by rest and mild pain reliever  Date Last Reviewed: 10/1/2017    7912-1366 [employer]. [address] [place] [address]. All rights reserved. This information is not intended as a substitute for professional medical care. Always follow your healthcare professional's instructions.             Review of your medicines      START taking        Dose / Directions Last dose taken    hydrOXYzine 25 MG tablet   Commonly known as:  ATARAX   Dose:   mg   Quantity:  60 tablet        Take 2-4 tablets ( mg) by mouth every 6 hours as needed for anxiety   Refills:  1                Prescriptions were sent or printed at these locations (1 Prescription)                   [facility] [address] [place] [address] [address] AT  "[facility] [address]   [address], [place] [address]    Telephone:  [phone]   Fax:  [phone]   Hours:                  E-Prescribed (1 of 1)         hydrOXYzine (ATARAX) 25 MG tablet                Procedures and tests performed during your visit     CBC with platelets differential    Cardiac Continuous Monitoring    Comprehensive metabolic panel    EKG 12 lead    Peripheral IV catheter      Orders Needing Specimen Collection     None      Pending Results     No orders found from 2018 to 5/15/2018.            Pending Culture Results     No orders found from 2018 to 5/15/2018.            Thank you for choosing [facility]       Thank you for choosing [facility] for your care. Our goal is always to provide you with excellent care. Hearing back from our patients is one way we can continue to improve our services. Please take a few minutes to complete the written survey that you may receive in the mail after you visit with us. Thank you!        [facility] Information     [facility] [employer] lets you send messages to your doctor, view your test results, renew your prescriptions, schedule appointments and more. To sign up, go to [email or web address] . Click on \"Log in\" on the left side of the screen, which will take you to the Welcome page. Then click on \"Sign up Now\" on the right side of the page.     You will be asked to enter the access code listed below, as well as some personal information. Please follow the directions to create your username and password.     Your access code is: [email or web address]  Expires: 2018 11:52 AM     Your access code will  in 90 days. If you need help or a new code, please call your [facility] clinic or [phone].        Care EveryWhere ID     This is your Care EveryWhere ID. This could be used by other organizations to access your [facility] medical records  [id number]        Equal Access to Services     QUIN STUART AH: Vilma Wesley, mj martinez, cece " marcia taylor ah. So [facility] [phone].    ATENCIÓN: Si habla español, tiene a tobar disposición servicios gratuitos de asistencia lingüística. Llame al [phone].    We comply with applicable federal civil rights laws and [place] laws. We do not discriminate on the basis of race, color, national origin, age, disability, sex, sexual orientation, or gender identity.            After Visit Summary       This is your record. Keep this with you and show to your community pharmacist(s) and doctor(s) at your next visit.

## 2018-05-14 NOTE — ED PROVIDER NOTES
History     Chief Complaint   Patient presents with     Shaking     onset 1 hr pta. diffuse through out body. slight dizziness     HPI  Mason Garcia is a 32 year old male who presents with whole body shaking x 1 hour. 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 He has had problems with anxiety and panic attacks since he had a  injury to his left hand 1 year ago. He did feel anxious during the shaking episode. He remained A&O throughout the episode. The shaking and anxiety resolved as soon as he was roomed in the ED. Denies fevers/chills, cough, abdominal pain, sore throat, headache, or CP.     Problem List:    Patient Active Problem List    Diagnosis Date Noted     Perforated appendicitis 02/07/2017     Priority: Medium        Past Medical History:    History reviewed. No pertinent past medical history.    Past Surgical History:    Past Surgical History:   Procedure Laterality Date     LAPAROSCOPIC APPENDECTOMY N/A 2/7/2017    Procedure: LAPAROSCOPIC APPENDECTOMY;  Surgeon: Chanel Pagan DO;  Location: [facility]       Family History:    Family History   Problem Relation Age of Onset     Cardiovascular Maternal Grandmother      cardiovascular disease     Respiratory Maternal Grandmother      Lung transplant       Social History:  Marital Status:  Single [1]  Social History   Substance Use Topics     Smoking status: Current Every Day Smoker     Packs/day: 1.00     Years: 7.00     Types: Cigarettes     Smokeless tobacco: Former User     Alcohol use Yes      Comment: rare        Medications:      hydrOXYzine (ATARAX) 25 MG tablet         Review of Systems   Constitutional: Negative.  Negative for chills and fever.   HENT: Negative for sore throat.    Eyes: Negative.  Negative for photophobia and visual disturbance.   Respiratory: Negative for shortness of breath.    Cardiovascular: Negative for chest pain and palpitations.   Musculoskeletal: Negative for neck pain and neck stiffness.   Skin:  "Negative for rash.   Neurological: Negative.  Negative for dizziness, seizures, syncope, facial asymmetry, speech difficulty, weakness, light-headedness, numbness and headaches.   Psychiatric/Behavioral: Negative.    All other systems reviewed and are negative.      Physical Exam   BP: 168/98  Pulse: 91  Heart Rate: 100  Temp: 97.5  F (36.4  C)  Resp: 22  Height: 185.4 cm (6' 1\")  Weight: 99.8 kg (220 lb)  SpO2: 98 %      Physical Exam   Constitutional: He is oriented to person, place, and time. He appears well-developed and well-nourished.  Non-toxic appearance. He does not have a sickly appearance. He does not appear ill. No distress.   HENT:   Head: Normocephalic and atraumatic.   Right Ear: Hearing, tympanic membrane, external ear and ear canal normal.   Left Ear: Hearing, tympanic membrane, external ear and ear canal normal.   Nose: Nose normal. Right sinus exhibits no maxillary sinus tenderness and no frontal sinus tenderness. Left sinus exhibits no maxillary sinus tenderness and no frontal sinus tenderness.   Mouth/Throat: Uvula is midline, oropharynx is clear and moist and mucous membranes are normal. No oropharyngeal exudate.   Eyes: Conjunctivae and EOM are normal. Pupils are equal, round, and reactive to light. Right eye exhibits no discharge. Left eye exhibits no discharge. No scleral icterus.   Fundoscopic exam:       The right eye shows no papilledema.        The left eye shows no papilledema.   Neck: Trachea normal, normal range of motion and full passive range of motion without pain. Neck supple. No JVD present. No Brudzinski's sign and no Kernig's sign noted.   Cardiovascular: Normal rate, regular rhythm, normal heart sounds and intact distal pulses.  Exam reveals no gallop and no friction rub.    No murmur heard.  Pulmonary/Chest: Effort normal and breath sounds normal. No respiratory distress. He has no wheezes. He has no rales.   Abdominal: Soft. Bowel sounds are normal.   Musculoskeletal: Normal " range of motion. He exhibits no edema.   Lymphadenopathy:     He has no cervical adenopathy.   Neurological: He is alert and oriented to person, place, and time. He has normal strength and normal reflexes. He displays no atrophy and no tremor. No cranial nerve deficit or sensory deficit. He exhibits normal muscle tone. He displays a negative Romberg sign. He displays no seizure activity. Coordination and gait normal. GCS eye subscore is 4. GCS verbal subscore is 5. GCS motor subscore is 6.   Skin: Skin is warm and dry. No rash noted. He is not diaphoretic.   Psychiatric: His behavior is normal. Judgment and thought content normal. His mood appears anxious (Slightly).   Nursing note and vitals reviewed.      ED Course     ED Course     Procedures          EKG: NSR, no acute ST-T changes.     Results for orders placed or performed during the hospital encounter of 05/14/18 (from the past 24 hour(s))   CBC with platelets differential   Result Value Ref Range    WBC 10.9 4.0 - 11.0 10e9/L    RBC Count 5.89 4.4 - 5.9 10e12/L    Hemoglobin 17.8 (H) 13.3 - 17.7 g/dL    Hematocrit 51.4 40.0 - 53.0 %    MCV 87 78 - 100 fl    MCH 30.2 26.5 - 33.0 pg    MCHC 34.6 31.5 - 36.5 g/dL    RDW 12.4 10.0 - 15.0 %    Platelet Count 238 150 - 450 10e9/L    Diff Method Automated Method     % Neutrophils 65.9 %    % Lymphocytes 24.5 %    % Monocytes 6.5 %    % Eosinophils 2.3 %    % Basophils 0.4 %    % Immature Granulocytes 0.4 %    Nucleated RBCs 0 0 /100    Absolute Neutrophil 7.2 1.6 - 8.3 10e9/L    Absolute Lymphocytes 2.7 0.8 - 5.3 10e9/L    Absolute Monocytes 0.7 0.0 - 1.3 10e9/L    Absolute Eosinophils 0.3 0.0 - 0.7 10e9/L    Absolute Basophils 0.0 0.0 - 0.2 10e9/L    Abs Immature Granulocytes 0.0 0 - 0.4 10e9/L    Absolute Nucleated RBC 0.0    Comprehensive metabolic panel   Result Value Ref Range    Sodium 138 133 - 144 mmol/L    Potassium 4.0 3.4 - 5.3 mmol/L    Chloride 104 94 - 109 mmol/L    Carbon Dioxide 25 20 - 32 mmol/L     Anion Gap 9 3 - 14 mmol/L    Glucose 109 (H) 70 - 99 mg/dL    Urea Nitrogen 11 7 - 30 mg/dL    Creatinine 0.90 0.66 - 1.25 mg/dL    GFR Estimate >90 >60 mL/min/1.7m2    GFR Estimate If Black >90 >60 mL/min/1.7m2    Calcium 8.9 8.5 - 10.1 mg/dL    Bilirubin Total 0.4 0.2 - 1.3 mg/dL    Albumin 4.2 3.4 - 5.0 g/dL    Protein Total 8.2 6.8 - 8.8 g/dL    Alkaline Phosphatase 94 40 - 150 U/L    ALT 55 0 - 70 U/L    AST 30 0 - 45 U/L       Medications   0.9% sodium chloride BOLUS (0 mLs Intravenous Stopped 5/14/18 1153)       Assessments & Plan (with Medical Decision Making)   His shaking resolved upon arrival into our ED. Neuro exam unremarkable. Labs WNL. I suspect anxiety attack as cause of his shaking. Seizure very unlikely. He was reassured by normal work up. Hydroxyzine was prescribed for anxiety for home.    Plan: No work today. Take the Hydroxyzine as prescribed for further anxiety attacks. Follow up with primary care on Wednesday as scheduled. Return here sooner with any new or worsening symptoms.     I have reviewed the nursing notes.    I have reviewed the findings, diagnosis, plan and need for follow up with the patient.    Discharge Medication List as of 5/14/2018 11:53 AM      START taking these medications    Details   hydrOXYzine (ATARAX) 25 MG tablet Take 2-4 tablets ( mg) by mouth every 6 hours as needed for anxiety, Disp-60 tablet, R-1, E-Prescribe             Final diagnoses:   Anxiety attack       5/14/2018   [facility]     Tamica Scott PA-C  05/14/18 0915

## 2018-05-14 NOTE — LETTER
May 14, 2018      To Whom It May Concern:      Mason MIRELES Garcia was seen in our Emergency Department today, 05/14/18.  No work today.    Sincerely,        Tamica Scott PA-C

## 2018-05-15 NOTE — ED NOTES
Labs drawn with IV insertion and sent  
Provider at bedside.  
The patient presents with an episode of generalized shakiness of his arms and legs while at work just prior to arrival. He was brought to the ED by a co-worker. He states his shaking resolved shortly after arrival. He states he has never had symptoms like this before. States he had some coffee this morning and a piece of cake just prior to the symptom onset. Denies headache, dizziness, numbness or tingling.  
IUD (Paraguard placement)

## 2018-05-22 ENCOUNTER — HOSPITAL ENCOUNTER (OUTPATIENT)
Dept: OCCUPATIONAL THERAPY | Facility: HOSPITAL | Age: 33
Setting detail: THERAPIES SERIES
End: 2018-05-22
Attending: PLASTIC SURGERY
Payer: OTHER MISCELLANEOUS

## 2018-05-22 PROCEDURE — 40000118 ZZH STATISTIC OT DEPT VISIT

## 2018-05-22 PROCEDURE — 97110 THERAPEUTIC EXERCISES: CPT | Mod: GO

## 2018-05-22 NOTE — PROGRESS NOTES
Please disregard discharge summary, Dr. Jones's office called back on 5/19/18 and asked that OT cont 1x/week for 4 more weeks.

## 2018-06-05 ENCOUNTER — HOSPITAL ENCOUNTER (OUTPATIENT)
Dept: OCCUPATIONAL THERAPY | Facility: HOSPITAL | Age: 33
Setting detail: THERAPIES SERIES
End: 2018-06-05
Attending: PLASTIC SURGERY
Payer: OTHER MISCELLANEOUS

## 2018-06-05 PROCEDURE — 97110 THERAPEUTIC EXERCISES: CPT | Mod: GO

## 2018-06-05 PROCEDURE — 40000118 ZZH STATISTIC OT DEPT VISIT

## 2018-06-12 ENCOUNTER — HOSPITAL ENCOUNTER (OUTPATIENT)
Dept: OCCUPATIONAL THERAPY | Facility: HOSPITAL | Age: 33
Setting detail: THERAPIES SERIES
End: 2018-06-12
Attending: PLASTIC SURGERY
Payer: OTHER MISCELLANEOUS

## 2018-06-12 PROCEDURE — 97140 MANUAL THERAPY 1/> REGIONS: CPT | Mod: GO

## 2018-06-12 PROCEDURE — 40000118 ZZH STATISTIC OT DEPT VISIT

## 2018-06-12 PROCEDURE — 97110 THERAPEUTIC EXERCISES: CPT | Mod: GO

## 2018-06-18 ENCOUNTER — HOSPITAL ENCOUNTER (OUTPATIENT)
Dept: OCCUPATIONAL THERAPY | Facility: HOSPITAL | Age: 33
Setting detail: THERAPIES SERIES
End: 2018-06-18
Attending: PLASTIC SURGERY
Payer: OTHER MISCELLANEOUS

## 2018-06-18 PROCEDURE — 97140 MANUAL THERAPY 1/> REGIONS: CPT | Mod: GO

## 2018-06-18 PROCEDURE — 40000118 ZZH STATISTIC OT DEPT VISIT

## 2018-06-18 NOTE — PROGRESS NOTES
06/18/18 1356   Notes   Note Type Progress Note   Signing Clinician's Name / Credentials   Signing clinician's name / credentials Roslyn Chaney, OTR/L, CLT   Session Number   Session Number 13/13, 3/4   Providers   Referring Physician Dr. Jones   Reporting Period   Reporting period from 05/11/18   Reporting period to 06/18/18   General Information   Rxs Authorized 4   Rxs Used 3   Medical Diagnosis Traumatic hand injury with traumatic amputation, fractures, extensor tendon injuries   Orders Evaluate And Treat As Indicated;Other   Other Orders splint per protocol, exercise per protocol, modalities as needed. home program   Insurance    Start Of Care Date 03/15/18   Onset date of current episode/exacerbation 01/19/18   Surgical procedure complete amputation 5th digit, repair of all three phalanx of 4th finger, extensor tendon repair   Date of surgery 01/19/18   Date PN needed 06/18/18   Date for Next MD Appointment 06/19/18   Subjective Measures   Subjective Pt treated [id number].  Pt reports he's sore, he's been moving so using his hand, trying to be careful, but it has made his hand a little more sore.     Initial Pain level 4/10   Current Pain level 3/10   QuickDASH [Functional Disability Questionnaire; 0-100 (0=no dysfunction; 100=dysfunction)] Open Dash   Open Jar 3   Heavy Household Chores 3   Carry a shopping bag 1   Wash back 1   Cut food with knife 2   Recreational activities 2   Social activities 4   Work, daily activities 4   Pain 3   Tingling 2   Sleeping 1   QuickDASH Sum 26   QuickDASH Count 11   QuickDASH Disability/Symptom Score 34.09   Objective Measures   Objective Measures Edema;ROM;Strength   Edema   Location (anatomical) ring finger   Location Left   Edema Comments 5.8 improved 3 mm since last update   ROM   Location (anatomical) Ring finger   Location Left   Motion Ext/Flex   ROM Comments MCP 0/89 no change,   PIP  -24/86  improved 1 degree ext 2 degrees flex,  DIP -39/59  improved 7 degrees  extension 5 degrees flexion   Strength   Location Left    43# loss of 8# since last update   Manual   Manual Scar Mob Position;MFR   Skilled Interventions To Soften Scar Tissue   Minutes of Treatment 16   MFR Finger   Position Sitting   Description/ Technique unwinding, crossed hands   Time 8   Scar Mob Position Sitting   Scar Mob Location dorsal finger   Description/ Technique circular   Time 8   Hand Goals   Hand Goals Eating;Hygiene/Toileting;Sports/Recreation   Eating   Current Functional Task Cutting food   Previous Performance Level Independent   Current Performance Level Mild difficulty   Goal Target Task Cut food   Goal Target Performance Level Mild difficulty   Due Date 04/12/18   Date Goal Met 06/18/18   Hygiene/Toileting   Current Functional Task (holding a bar of soap)   Previous Performance Level Independent   Current Performance Level Moderate difficulty   Goal Target Task (hold bar of soap)   Goal Target Performance Level Moderate difficulty   Due Date 04/12/18   Date Goal Met 06/18/18   Sports/Recreation   Current Functional Task Gripping   Previous Performance Level Independent   Curent Performance Level Unable   Goal Target Task (hold bow)   Goal Target Performance Level Moderate difficulty   Due Date 04/12/18   Assessment   Clinical Impression(s) Comments Pt has been diagnosised with PTSD d/t this injury, he has been going to counseling.  He's had to leave work on a few occassions.   Response to Therapy: Lack of Improvement Pain   Response to Therapy: Delcines in Strength   Response to Therapy: Improvements Edema;ROM   Plan   Homework use hand as tolerated   Plan will proceed as per orders of Dr. Edward   Total Session Time   Timed Code Treatment Minutes 16   Total Treatment Time (sum of timed and untimed services) 25

## 2018-08-26 ENCOUNTER — HOSPITAL ENCOUNTER (INPATIENT)
Facility: HOSPITAL | Age: 33
LOS: 1 days | Discharge: HOME | DRG: 880 | End: 2018-08-27
Attending: PHYSICIAN ASSISTANT | Admitting: PSYCHIATRY & NEUROLOGY
Payer: OTHER MISCELLANEOUS

## 2018-08-26 DIAGNOSIS — F43.10 PTSD (POST-TRAUMATIC STRESS DISORDER): ICD-10-CM

## 2018-08-26 DIAGNOSIS — R45.851 SUICIDAL IDEATION: ICD-10-CM

## 2018-08-26 LAB
ALBUMIN SERPL-MCNC: 4 G/DL (ref 3.4–5)
ALBUMIN UR-MCNC: NEGATIVE MG/DL
ALP SERPL-CCNC: 99 U/L (ref 40–150)
ALT SERPL W P-5'-P-CCNC: 61 U/L (ref 0–70)
AMPHETAMINES UR QL SCN: NEGATIVE
ANION GAP SERPL CALCULATED.3IONS-SCNC: 9 MMOL/L (ref 3–14)
APAP SERPL-MCNC: <2 MG/L (ref 10–20)
APPEARANCE UR: CLEAR
AST SERPL W P-5'-P-CCNC: 38 U/L (ref 0–45)
BARBITURATES UR QL: NEGATIVE
BASOPHILS # BLD AUTO: 0.1 10E9/L (ref 0–0.2)
BASOPHILS NFR BLD AUTO: 0.4 %
BENZODIAZ UR QL: NEGATIVE
BILIRUB SERPL-MCNC: 0.3 MG/DL (ref 0.2–1.3)
BILIRUB UR QL STRIP: NEGATIVE
BUN SERPL-MCNC: 12 MG/DL (ref 7–30)
CALCIUM SERPL-MCNC: 8.5 MG/DL (ref 8.5–10.1)
CANNABINOIDS UR QL SCN: NEGATIVE
CHLORIDE SERPL-SCNC: 106 MMOL/L (ref 94–109)
CO2 SERPL-SCNC: 25 MMOL/L (ref 20–32)
COCAINE UR QL: NEGATIVE
COLOR UR AUTO: NORMAL
CREAT SERPL-MCNC: 0.86 MG/DL (ref 0.66–1.25)
DIFFERENTIAL METHOD BLD: ABNORMAL
EOSINOPHIL # BLD AUTO: 0.4 10E9/L (ref 0–0.7)
EOSINOPHIL NFR BLD AUTO: 2.8 %
ERYTHROCYTE [DISTWIDTH] IN BLOOD BY AUTOMATED COUNT: 13.2 % (ref 10–15)
ETHANOL SERPL-MCNC: <0.01 G/DL
GFR SERPL CREATININE-BSD FRML MDRD: >90 ML/MIN/1.7M2
GLUCOSE SERPL-MCNC: 95 MG/DL (ref 70–99)
GLUCOSE UR STRIP-MCNC: NEGATIVE MG/DL
HCT VFR BLD AUTO: 48 % (ref 40–53)
HGB BLD-MCNC: 17.4 G/DL (ref 13.3–17.7)
HGB UR QL STRIP: NEGATIVE
IMM GRANULOCYTES # BLD: 0.1 10E9/L (ref 0–0.4)
IMM GRANULOCYTES NFR BLD: 0.4 %
KETONES UR STRIP-MCNC: NEGATIVE MG/DL
LEUKOCYTE ESTERASE UR QL STRIP: NEGATIVE
LYMPHOCYTES # BLD AUTO: 3.1 10E9/L (ref 0.8–5.3)
LYMPHOCYTES NFR BLD AUTO: 21.9 %
MCH RBC QN AUTO: 31.1 PG (ref 26.5–33)
MCHC RBC AUTO-ENTMCNC: 36.3 G/DL (ref 31.5–36.5)
MCV RBC AUTO: 86 FL (ref 78–100)
METHADONE UR QL SCN: NEGATIVE
MONOCYTES # BLD AUTO: 0.9 10E9/L (ref 0–1.3)
MONOCYTES NFR BLD AUTO: 6.7 %
NEUTROPHILS # BLD AUTO: 9.6 10E9/L (ref 1.6–8.3)
NEUTROPHILS NFR BLD AUTO: 67.8 %
NITRATE UR QL: NEGATIVE
NRBC # BLD AUTO: 0 10*3/UL
NRBC BLD AUTO-RTO: 0 /100
OPIATES UR QL SCN: NEGATIVE
PCP UR QL SCN: NEGATIVE
PH UR STRIP: 7 PH (ref 4.7–8)
PLATELET # BLD AUTO: 228 10E9/L (ref 150–450)
POTASSIUM SERPL-SCNC: 3.8 MMOL/L (ref 3.4–5.3)
PROT SERPL-MCNC: 7.8 G/DL (ref 6.8–8.8)
RBC # BLD AUTO: 5.59 10E12/L (ref 4.4–5.9)
SALICYLATES SERPL-MCNC: 4 MG/DL
SODIUM SERPL-SCNC: 140 MMOL/L (ref 133–144)
SOURCE: NORMAL
SP GR UR STRIP: 1.01 (ref 1–1.03)
UROBILINOGEN UR STRIP-MCNC: NORMAL MG/DL (ref 0–2)
WBC # BLD AUTO: 14.1 10E9/L (ref 4–11)

## 2018-08-26 PROCEDURE — 25000132 ZZH RX MED GY IP 250 OP 250 PS 637

## 2018-08-26 PROCEDURE — 80053 COMPREHEN METABOLIC PANEL: CPT | Performed by: PHYSICIAN ASSISTANT

## 2018-08-26 PROCEDURE — 80320 DRUG SCREEN QUANTALCOHOLS: CPT | Performed by: PHYSICIAN ASSISTANT

## 2018-08-26 PROCEDURE — 80329 ANALGESICS NON-OPIOID 1 OR 2: CPT | Performed by: PHYSICIAN ASSISTANT

## 2018-08-26 PROCEDURE — 99285 EMERGENCY DEPT VISIT HI MDM: CPT

## 2018-08-26 PROCEDURE — 85025 COMPLETE CBC W/AUTO DIFF WBC: CPT | Performed by: PHYSICIAN ASSISTANT

## 2018-08-26 PROCEDURE — 80307 DRUG TEST PRSMV CHEM ANLYZR: CPT | Performed by: PHYSICIAN ASSISTANT

## 2018-08-26 PROCEDURE — 99284 EMERGENCY DEPT VISIT MOD MDM: CPT | Mod: Z6 | Performed by: PHYSICIAN ASSISTANT

## 2018-08-26 PROCEDURE — 36415 COLL VENOUS BLD VENIPUNCTURE: CPT | Performed by: PHYSICIAN ASSISTANT

## 2018-08-26 PROCEDURE — 81003 URINALYSIS AUTO W/O SCOPE: CPT | Performed by: PHYSICIAN ASSISTANT

## 2018-08-26 RX ORDER — NICOTINE 21 MG/24HR
1 PATCH, TRANSDERMAL 24 HOURS TRANSDERMAL DAILY
Status: DISCONTINUED | OUTPATIENT
Start: 2018-08-27 | End: 2018-08-27 | Stop reason: HOSPADM

## 2018-08-26 RX ORDER — NICOTINE 21 MG/24HR
PATCH, TRANSDERMAL 24 HOURS TRANSDERMAL
Status: COMPLETED
Start: 2018-08-26 | End: 2018-08-26

## 2018-08-26 RX ORDER — VENLAFAXINE HYDROCHLORIDE 75 MG/1
75 CAPSULE, EXTENDED RELEASE ORAL DAILY
Status: ON HOLD | COMMUNITY
End: 2018-11-05

## 2018-08-26 RX ORDER — NICOTINE 21 MG/24HR
1 PATCH, TRANSDERMAL 24 HOURS TRANSDERMAL DAILY
Status: DISCONTINUED | OUTPATIENT
Start: 2018-08-27 | End: 2018-08-26

## 2018-08-26 RX ADMIN — NICOTINE 1 PATCH: 21 PATCH, EXTENDED RELEASE TRANSDERMAL at 22:11

## 2018-08-26 ASSESSMENT — ENCOUNTER SYMPTOMS
NEUROLOGICAL NEGATIVE: 1
DYSPHORIC MOOD: 1
CARDIOVASCULAR NEGATIVE: 1
RESPIRATORY NEGATIVE: 1
NERVOUS/ANXIOUS: 1
CONSTITUTIONAL NEGATIVE: 1
SLEEP DISTURBANCE: 1

## 2018-08-26 NOTE — ED NOTES
"Patient arrives by self for evaluation of suicidal thoughts. Patient reports in January injured his left hand in a work accident. Patient has had PTSD since the accident. Currently follows with Melia at [facility], reports this is really helping him however feels once a week meetings is not enough. Patient is hoping to return to work soon. Patient states the past several days he has been experiencing suicidal thoughts, no plan in place. Today patient was playing with 1 year old daughter when he reports urinating himself and feeling like he could be \"extremely destructive.\" Patient then had his sister-in-law  his daughter and came to the hospital to be at a safe place. Report occasional alcohol use, no illicit drug use. Security at bedside. Patient up to bathroom to give urine sample.   "

## 2018-08-26 NOTE — ED NOTES
Per nursing supervisor patient should be able to be admitted here on [facility] [address] after 2300.  Central Intake is aware of this: they will re-page the provider at/around 2300 for confirmation and acceptance.

## 2018-08-26 NOTE — IP AVS SNAPSHOT
"                  MRN:[id number]                      After Visit Summary   8/26/2018    Mason Garcia    MRN: [id number]           Thank you!     Thank you for choosing [facility] for your care. Our goal is always to provide you with excellent care. Hearing back from our patients is one way we can continue to improve our services. Please take a few minutes to complete the written survey that you may receive in the mail after you visit with us. Thank you!        Patient Information     Date Of Birth          1985        Designated Caregiver       Most Recent Value    Caregiver    Will someone help with your care after discharge? no      About your hospital stay     You were admitted on:  August 27, 2018 You last received care in the:  [facility]    You were discharged on:  August 27, 2018       Who to Call     For medical emergencies, please call 911.  For non-urgent questions about your medical care, please call your primary care provider or clinic, [phone]          Attending Provider     Provider Specialty    David Johnson PA Physician Assistant - Medical    Giancarlo Lomas MD Psychiatry    Gwendolyn Vazquez Mc, APRN CNP Nurse Practitioner Psych/Mental Health       Primary Care Provider Office Phone # Fax #    Ruben Valencia [phone]      Pending Results     No orders found for last 3 day(s).            Statement of Approval     Ordered          08/27/18 6065  I have reviewed and agree with all the recommendations and orders detailed in this document.  EFFECTIVE NOW     Approved and electronically signed by:  Víctor Sims NP             Admission Information     Date & Time Provider Department Dept. Phone    8/26/2018 Gwendolyn Vazquez Mc, APRN CNP [facility] [phone]      Your Vitals Were     Blood Pressure Pulse Temperature Respirations Height Weight    128/73 78 98.4  F (36.9  C) (Tympanic) 18 1.854 m (6' 1\") 99.3 kg (219 lb)    Pulse Oximetry BMI (Body " "Mass Index)                98% 28.89 kg/m2          [employer] Information     [employer] lets you send messages to your doctor, view your test results, renew your prescriptions, schedule appointments and more. To sign up, go to [email or web address] . Click on \"Log in\" on the left side of the screen, which will take you to the Welcome page. Then click on \"Sign up Now\" on the right side of the page.     You will be asked to enter the access code listed below, as well as some personal information. Please follow the directions to create your username and password.     Your access code is: [email or web address]  Expires: 11/15/2018 10:11 AM     Your access code will  in 90 days. If you need help or a new code, please call your [facility] clinic or [phone].        Care EveryWhere ID     This is your Care EveryWhere ID. This could be used by other organizations to access your [facility] medical records  [id number]        Equal Access to Services     [facility]AD [facility] Center[facility] : Hadii dylan hoffmano Sobecca, waaxda luqadaha, qaybta kaalmada aderatnayareymundo, marcia denise . So [facility] [phone].    ATENCIÓN: Si habla español, tiene a tobar disposición servicios gratuitos de asistencia lingüística. Llame al [phone].    We comply with applicable federal civil rights laws and [place] laws. We do not discriminate on the basis of race, color, national origin, age, disability, sex, sexual orientation, or gender identity.               Review of your medicines      START taking        Dose / Directions    prazosin 1 MG capsule   Commonly known as:  MINIPRESS   Used for:  PTSD (post-traumatic stress disorder)        Dose:  1 mg   Take 1 capsule (1 mg) by mouth At Bedtime   Quantity:  30 capsule   Refills:  0         CONTINUE these medicines which have NOT CHANGED        Dose / Directions    venlafaxine 75 MG 24 hr capsule   Commonly known as:  EFFEXOR-XR        Dose:  75 mg   Take 75 mg by mouth   Refills:  0          "   Where to get your medicines      These medications were sent to [id number] - [place], [place] - [address] AT [facility]  [address], [place] [address]     Phone:  [phone]     prazosin 1 MG capsule                Protect others around you: Learn how to safely use, store and throw away your medicines at www.disposemymeds.org.             Medication List: This is a list of all your medications and when to take them. Check marks below indicate your daily home schedule. Keep this list as a reference.      Medications           Morning Afternoon Evening Bedtime As Needed    prazosin 1 MG capsule   Commonly known as:  MINIPRESS   Take 1 capsule (1 mg) by mouth At Bedtime                                venlafaxine 75 MG 24 hr capsule   Commonly known as:  EFFEXOR-XR   Take 75 mg by mouth

## 2018-08-26 NOTE — IP AVS SNAPSHOT
[facility]    [address] [place] [address]    Phone:  [phone]    Fax:  [phone]                                       After Visit Summary   8/26/2018    Mason Garcia    MRN: [id number]           After Visit Summary Signature Page     I have received my discharge instructions, and my questions have been answered. I have discussed any challenges I see with this plan with the nurse or doctor.    ..........................................................................................................................................  Patient/Patient Representative Signature      ..........................................................................................................................................  Patient Representative Print Name and Relationship to Patient    ..................................................               ................................................  Date                                            Time    ..........................................................................................................................................  Reviewed by Signature/Title    ...................................................              ..............................................  Date                                                            Time          22EPIC Rev 08/18

## 2018-08-26 NOTE — ED PROVIDER NOTES
History     Chief Complaint   Patient presents with     Psychiatric Evaluation     HPI  Maosn Garcia is a 32 year old male with Hx PTSD, depression, who presents ambulatory to the ER due to SI. Mason reports he has been experiencing more frequent thoughts of suicide. He had a traumatic injury at work last year that needed plastic surgery and rehab and the closer he gets to returning to work, the more anxious he has become. In addition, he wet himself yesterday and has been doing this at night also. He reports his brain receives the message to use the restroom, but he just freezes in place. He denies LUTS. No fevers.     Problem List:    Patient Active Problem List    Diagnosis Date Noted     Perforated appendicitis 02/07/2017     Priority: Medium        Past Medical History:    No past medical history on file.    Past Surgical History:    Past Surgical History:   Procedure Laterality Date     LAPAROSCOPIC APPENDECTOMY N/A 2/7/2017    Procedure: LAPAROSCOPIC APPENDECTOMY;  Surgeon: Chanel Pagan DO;  Location: [facility]       Family History:    Family History   Problem Relation Age of Onset     Cardiovascular Maternal Grandmother      cardiovascular disease     Respiratory Maternal Grandmother      Lung transplant       Social History:  Marital Status:  Single [1]  Social History   Substance Use Topics     Smoking status: Current Every Day Smoker     Packs/day: 1.00     Years: 7.00     Types: Cigarettes     Smokeless tobacco: Former User     Alcohol use Yes      Comment: rare        Medications:      venlafaxine (EFFEXOR-XR) 75 MG 24 hr capsule         Review of Systems   Constitutional: Negative.    Respiratory: Negative.    Cardiovascular: Negative.    Skin: Negative.    Neurological: Negative.    Psychiatric/Behavioral: Positive for dysphoric mood, sleep disturbance and suicidal ideas. The patient is nervous/anxious.        Physical Exam   BP: 142/91  Pulse: 106  Temp: 99.1  F (37.3  C)  Resp: 16  SpO2: 99  %      Physical Exam   Constitutional: He is oriented to person, place, and time. He appears well-developed and well-nourished. No distress.   Eyes: Conjunctivae are normal.   Cardiovascular: Normal rate and normal heart sounds.    Pulmonary/Chest: Effort normal and breath sounds normal.   Neurological: He is alert and oriented to person, place, and time.   Skin: Skin is warm and dry.   Psychiatric: His speech is normal. He exhibits a depressed mood. He expresses suicidal ideation. He expresses suicidal plans.   Behaviors oscillate between withdrawn to agitated.    Nursing note and vitals reviewed.      ED Course     ED Course     Procedures      Results for orders placed or performed during the hospital encounter of 08/26/18 (from the past 24 hour(s))   Drug Screen Urine (Range)   Result Value Ref Range    Amphetamine Qual Urine Negative NEG^Negative    Barbiturates Qual Urine Negative NEG^Negative    Benzodiazepine Qual Urine Negative NEG^Negative    Cannabinoids Qual Urine Negative NEG^Negative    Cocaine Qual Urine Negative NEG^Negative    Opiates Qualitative Urine Negative NEG^Negative    Methadone Qual Urine Negative NEG^Negative    PCP Qual Urine Negative NEG^Negative   UA reflex to Microscopic   Result Value Ref Range    Color Urine Light Yellow     Appearance Urine Clear     Glucose Urine Negative NEG^Negative mg/dL    Bilirubin Urine Negative NEG^Negative    Ketones Urine Negative NEG^Negative mg/dL    Specific Gravity Urine 1.012 1.003 - 1.035    Blood Urine Negative NEG^Negative    pH Urine 7.0 4.7 - 8.0 pH    Protein Albumin Urine Negative NEG^Negative mg/dL    Urobilinogen mg/dL Normal 0.0 - 2.0 mg/dL    Nitrite Urine Negative NEG^Negative    Leukocyte Esterase Urine Negative NEG^Negative    Source Midstream Urine    Acetaminophen level   Result Value Ref Range    Acetaminophen Level <2 mg/L   Alcohol ethyl   Result Value Ref Range    Ethanol g/dL <0.01 0.01 g/dL   CBC with platelets differential    Result Value Ref Range    WBC 14.1 (H) 4.0 - 11.0 10e9/L    RBC Count 5.59 4.4 - 5.9 10e12/L    Hemoglobin 17.4 13.3 - 17.7 g/dL    Hematocrit 48.0 40.0 - 53.0 %    MCV 86 78 - 100 fl    MCH 31.1 26.5 - 33.0 pg    MCHC 36.3 31.5 - 36.5 g/dL    RDW 13.2 10.0 - 15.0 %    Platelet Count 228 150 - 450 10e9/L    Diff Method Automated Method     % Neutrophils 67.8 %    % Lymphocytes 21.9 %    % Monocytes 6.7 %    % Eosinophils 2.8 %    % Basophils 0.4 %    % Immature Granulocytes 0.4 %    Nucleated RBCs 0 0 /100    Absolute Neutrophil 9.6 (H) 1.6 - 8.3 10e9/L    Absolute Lymphocytes 3.1 0.8 - 5.3 10e9/L    Absolute Monocytes 0.9 0.0 - 1.3 10e9/L    Absolute Eosinophils 0.4 0.0 - 0.7 10e9/L    Absolute Basophils 0.1 0.0 - 0.2 10e9/L    Abs Immature Granulocytes 0.1 0 - 0.4 10e9/L    Absolute Nucleated RBC 0.0    Comprehensive metabolic panel   Result Value Ref Range    Sodium 140 133 - 144 mmol/L    Potassium 3.8 3.4 - 5.3 mmol/L    Chloride 106 94 - 109 mmol/L    Carbon Dioxide 25 20 - 32 mmol/L    Anion Gap 9 3 - 14 mmol/L    Glucose 95 70 - 99 mg/dL    Urea Nitrogen 12 7 - 30 mg/dL    Creatinine 0.86 0.66 - 1.25 mg/dL    GFR Estimate >90 >60 mL/min/1.7m2    GFR Estimate If Black >90 >60 mL/min/1.7m2    Calcium 8.5 8.5 - 10.1 mg/dL    Bilirubin Total 0.3 0.2 - 1.3 mg/dL    Albumin 4.0 3.4 - 5.0 g/dL    Protein Total 7.8 6.8 - 8.8 g/dL    Alkaline Phosphatase 99 40 - 150 U/L    ALT 61 0 - 70 U/L    AST 38 0 - 45 U/L   Salicylate level   Result Value Ref Range    Salicylate Level 4 mg/dL       Medications - No data to display    Assessments & Plan (with Medical Decision Making)     I have reviewed the nursing notes.  I have reviewed the findings, diagnosis, plan and need for follow up with the patient.    New Prescriptions    No medications on file       Final diagnoses:   Suicidal ideation   PTSD (post-traumatic stress disorder)   I agree with DEC assessment that Mason would benefit from admission. Labs are acceptable.  He is here voluntarily and resting comfortably. We are waiting for appropriate staffing for placement here as he has been accepted to [facility].     Mason has spent the evening with us. He remains calm, voluntary and ate dinner. We are still waiting for staffing here for acceptance. He does smoke 1PPD, so nicotine patch ordered.     8/26/2018   [facility]     David Johnson, PA  08/29/18 1044

## 2018-08-27 VITALS
HEART RATE: 78 BPM | WEIGHT: 219 LBS | BODY MASS INDEX: 29.03 KG/M2 | OXYGEN SATURATION: 98 % | HEIGHT: 73 IN | TEMPERATURE: 98.4 F | SYSTOLIC BLOOD PRESSURE: 128 MMHG | RESPIRATION RATE: 18 BRPM | DIASTOLIC BLOOD PRESSURE: 73 MMHG

## 2018-08-27 PROBLEM — R45.851 DEPRESSION WITH SUICIDAL IDEATION: Status: ACTIVE | Noted: 2018-08-27

## 2018-08-27 PROBLEM — F32.A DEPRESSION WITH SUICIDAL IDEATION: Status: ACTIVE | Noted: 2018-08-27

## 2018-08-27 PROCEDURE — 25000132 ZZH RX MED GY IP 250 OP 250 PS 637: Performed by: PHYSICIAN ASSISTANT

## 2018-08-27 PROCEDURE — 12400000 ZZH R&B MH

## 2018-08-27 PROCEDURE — 25000132 ZZH RX MED GY IP 250 OP 250 PS 637: Performed by: NURSE PRACTITIONER

## 2018-08-27 PROCEDURE — 99223 1ST HOSP IP/OBS HIGH 75: CPT | Performed by: NURSE PRACTITIONER

## 2018-08-27 RX ORDER — BISACODYL 10 MG
10 SUPPOSITORY, RECTAL RECTAL DAILY PRN
Status: DISCONTINUED | OUTPATIENT
Start: 2018-08-27 | End: 2018-08-27 | Stop reason: HOSPADM

## 2018-08-27 RX ORDER — PRAZOSIN HYDROCHLORIDE 1 MG/1
1 CAPSULE ORAL AT BEDTIME
Qty: 30 CAPSULE | Refills: 0 | Status: SHIPPED | OUTPATIENT
Start: 2018-08-27 | End: 2018-11-02

## 2018-08-27 RX ORDER — HYDROXYZINE HYDROCHLORIDE 25 MG/1
25 TABLET, FILM COATED ORAL EVERY 4 HOURS PRN
Status: DISCONTINUED | OUTPATIENT
Start: 2018-08-27 | End: 2018-08-27 | Stop reason: HOSPADM

## 2018-08-27 RX ORDER — TRAZODONE HYDROCHLORIDE 50 MG/1
50 TABLET, FILM COATED ORAL
Status: DISCONTINUED | OUTPATIENT
Start: 2018-08-27 | End: 2018-08-27 | Stop reason: HOSPADM

## 2018-08-27 RX ORDER — ACETAMINOPHEN 325 MG/1
650 TABLET ORAL EVERY 4 HOURS PRN
Status: DISCONTINUED | OUTPATIENT
Start: 2018-08-27 | End: 2018-08-27 | Stop reason: HOSPADM

## 2018-08-27 RX ORDER — ALUMINA, MAGNESIA, AND SIMETHICONE 2400; 2400; 240 MG/30ML; MG/30ML; MG/30ML
30 SUSPENSION ORAL EVERY 4 HOURS PRN
Status: DISCONTINUED | OUTPATIENT
Start: 2018-08-27 | End: 2018-08-27 | Stop reason: HOSPADM

## 2018-08-27 RX ADMIN — NICOTINE 1 PATCH: 14 PATCH, EXTENDED RELEASE TRANSDERMAL at 08:26

## 2018-08-27 RX ADMIN — HYDROXYZINE HYDROCHLORIDE 25 MG: 25 TABLET ORAL at 01:21

## 2018-08-27 ASSESSMENT — ACTIVITIES OF DAILY LIVING (ADL)
BATHING: 0-->INDEPENDENT
GROOMING: INDEPENDENT
TRANSFERRING: 0-->INDEPENDENT
TOILETING: 0-->INDEPENDENT
AMBULATION: 0-->INDEPENDENT
RETIRED_EATING: 0-->INDEPENDENT
FALL_HISTORY_WITHIN_LAST_SIX_MONTHS: NO
COGNITION: 0 - NO COGNITION ISSUES REPORTED
RETIRED_COMMUNICATION: 0-->UNDERSTANDS/COMMUNICATES WITHOUT DIFFICULTY
DRESS: 0-->INDEPENDENT
SWALLOWING: 0-->SWALLOWS FOODS/LIQUIDS WITHOUT DIFFICULTY
ORAL_HYGIENE: INDEPENDENT
DRESS: INDEPENDENT

## 2018-08-27 NOTE — PLAN OF CARE
"ADMISSION NOTE    Reason for admission Suicide Idiation.  Safety concerns: New onset urinary incontinence.  Risk for or history of violence: feels like being \"destructive\". Hx of destroying personal items. Has  background.  Full skin assessment: completed. Skin clear and intact with exception of linear scratch on right clavicle/neck.  Left hand has missing tips/part of little and ring fingers and multiple surgical scars (had revision of amputation on 8/3/18).    Patient arrived on unit from [facility] ED accompanied by security on 8/27/2018  00:07 AM.   Status on arrival: Ambulatory, calmed and cooperative.  /94  Pulse 85  Temp 97.6  F (36.4  C) (Tympanic)  Resp 16  Ht 1.854 m (6' 1\")  Wt 99.3 kg (219 lb)  SpO2 98%  BMI 28.89 kg/m2  Patient given tour of unit and Welcome to  unit papers given to patient, wanding completed, belongings inventoried, and admission assessment initiated.   Patient's legal status on arrival is Voluntary. Appropriate legal rights discussed with and copy given to patient. Patient Bill of Rights discussed with and copy given to patient.   Patient denies SI, HI, and thoughts of self harm and contracts for safety while on unit.      Pt arrived to ER by family transport after pt called relative to  his daughter and bring him to ER as patient was feeling like \"being destructive and unsafe\". Per [facility] assessment, pt has been experiencing SI with plan of shooting or crashing his car for several days. Pt has Hx of trauma as a child, PTSD, depression and anxiety. Pt sees [facility] at [facility], but believes is not enough. Pt states has increase worry about the unknown, nightmares, difficulty concentrating, racing thoughts, tension, irritability and having stressors regarding going back to work after work injury and what he will be able to do now with fingers being amputated. Work injury occurred one year ago in January. Had recent revision surgery of " amputation on 8/3/18. Pt also has been experiencing new onset bladder incontinence without warning, causing pt to feel ashamed and unable to cope. States he has continues pain rating 2-4 to left hand. Pt states was in [facility] facility for short period of time in 2006 while in  service. Utox and alcohol labs negative. WBC 14.1    Pt tired and requested to go to bed soon after arrival. At 0115 pt c/o feeling anxious and unable to sleep. Atarax 25 mg given with effectiveness. Will continue to monitor.    MARGARITA APODACA  8/27/2018  3:08 AM

## 2018-08-27 NOTE — PLAN OF CARE
Problem: Patient Care Overview  Goal: Team Discussion  Team Plan:   BEHAVIORAL TEAM DISCUSSION    Participants: Víctor Sims NP, Gwendolyn Vazquez NP,Jenn Carbone LSW,  Veda West LSW,  Patricia Varghese RN, Memo Roper RN. Norm[facility], Maria G Carpenter OT, Mariah Hansen OT  Progress: New patient   Continued Stay Criteria/Rationale: SW and NP to assess   Medical/Physical: Surgery on left hand    Precautions:   Behavioral Orders   Procedures     Code 1 - Restrict to Unit     Routine Programming     As clinically indicated     Self Injury Precaution     Status 15     Every 15 minutes.     Plan: SW and NP to assess   Rationale for change in precautions or plan: None
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
## 2018-08-27 NOTE — PLAN OF CARE
Face to face end of shift report received from Sara DYE RN. Rounding completed. Patient observed.     Harriet Church  8/27/2018  7:41 AM

## 2018-08-27 NOTE — PLAN OF CARE
Problem: Patient Care Overview  Goal: Individualization & Mutuality  Pt. Will follow treatment team recommendations during hospital stay.   Pt. Will sleep 6-8 hours a night during hospital stay.   Pt. Will administer ADLs with out prompting during hospital stay.       Outcome: Therapy, progress toward functional goals is gradual  Pt. States wants a medication change and then be able to go home, slept approximately 4 hours last night, independent with ADL's, has been up and about on unit, will continue to monitor progress.    Problem: Suicidal Behavior (Adult)  Goal: Suicidal Behavior is Absent/Minimized/Managed  Suicidal Behavior is Absent/Minimized/Managed by discharge.  Pt. Will not harm self during hospital stay.    Outcome: Therapy, progress toward functional goals is gradual  Pt. Has had no self harm or harm to others, verbally contracts for safety, will continue to monitor.

## 2018-08-27 NOTE — PLAN OF CARE
Discharge Note    Patient Discharged to home on 8/27/2018 11:28 AM via Private Car.     Patient informed of discharge instructions in AVS. patient verbalizes understanding and denies having any questions pertaining to AVS. Patient stable at time of discharge. Patient denies SI, HI, and thoughts of self harm at time of discharge. All personal belongings returned to patient. Discharge prescriptions sent to [facility] via electronic communication.     Harriet Church  8/27/2018  11:28 AM

## 2018-08-27 NOTE — ED NOTES
Face to face report given with opportunity to observe patient.    Report given to WYATT Loaiza   8/26/2018  11:00 PM

## 2018-08-27 NOTE — PROGRESS NOTES
08/27/18 0056   Patient Belongings   Did you bring any home meds/supplements to the hospital?  No   Patient Belongings other (see comments)  (Red Shoes, red/blue boxers, red t shirt, red shorts, black wallet, business cards, white socks)   Disposition of Belongings Other (see comment)  (PT cubby in belongings room)   Belongings Search Yes   Clothing Search Yes   Second Staff Day RN    List items sent to safe: Key ring w/ 3x keys and key fob, samsung phone w/black case, [place], [place] permit to carry, master card x2, cross bow permit, [place] fishing license, 2x visa cards, dental insurance card, lift truck permit, medica card, 2x casino cards  All other belongings put in assigned cubby in belongings room.     I have reviewed my belongings list on admission and verify that it is correct.     Patient signature_______________________________    Second staff witness (if patient unable to sign) ______________________________       I have received all my belongings at discharge.    Patient signature________________________________    Bakari   8/27/2018  1:02 AM

## 2018-09-03 NOTE — PROGRESS NOTES
OT Discharge    Please refer to progress note dated 6/18/2018 for status of patient at last OT appointment.  Plan had been to continue per recommendations of Dr. Jones.  Pt previously scheduled appointments after that date per chart review.      OT is discontinued at this time.

## 2018-09-18 ENCOUNTER — TRANSFERRED RECORDS (OUTPATIENT)
Dept: HEALTH INFORMATION MANAGEMENT | Facility: CLINIC | Age: 33
End: 2018-09-18

## 2018-09-25 ENCOUNTER — HOSPITAL ENCOUNTER (OUTPATIENT)
Dept: BEHAVIORAL HEALTH | Facility: HOSPITAL | Age: 33
Discharge: HOME | End: 2018-09-25
Attending: PSYCHIATRY & NEUROLOGY | Admitting: PSYCHIATRY & NEUROLOGY
Payer: OTHER MISCELLANEOUS

## 2018-09-25 PROCEDURE — 90791 PSYCH DIAGNOSTIC EVALUATION: CPT | Performed by: SOCIAL WORKER

## 2018-09-25 PROCEDURE — 90791 PSYCH DIAGNOSTIC EVALUATION: CPT

## 2018-09-25 ASSESSMENT — ANXIETY QUESTIONNAIRES
6. BECOMING EASILY ANNOYED OR IRRITABLE: NEARLY EVERY DAY
GAD7 TOTAL SCORE: 20
5. BEING SO RESTLESS THAT IT IS HARD TO SIT STILL: MORE THAN HALF THE DAYS
7. FEELING AFRAID AS IF SOMETHING AWFUL MIGHT HAPPEN: NEARLY EVERY DAY
3. WORRYING TOO MUCH ABOUT DIFFERENT THINGS: NEARLY EVERY DAY
1. FEELING NERVOUS, ANXIOUS, OR ON EDGE: NEARLY EVERY DAY
2. NOT BEING ABLE TO STOP OR CONTROL WORRYING: NEARLY EVERY DAY

## 2018-09-25 ASSESSMENT — PATIENT HEALTH QUESTIONNAIRE - PHQ9: 5. POOR APPETITE OR OVEREATING: NEARLY EVERY DAY

## 2018-09-25 NOTE — PLAN OF CARE
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 symptoms are likely to increase in severity and functioning may further decline.  If the client participates and complies with recommended treatment, the prognosis if fair.      Yes, the patient has been informed that any other mental health professional providing mental health services to me will need access to this Diagnostic Assessment in order to develop a treatment plan and receive payment.    DENISE FarahSW

## 2018-09-26 ASSESSMENT — ANXIETY QUESTIONNAIRES: GAD7 TOTAL SCORE: 20

## 2018-09-26 ASSESSMENT — PATIENT HEALTH QUESTIONNAIRE - PHQ9: SUM OF ALL RESPONSES TO PHQ QUESTIONS 1-9: 18

## 2018-10-01 ENCOUNTER — HOSPITAL ENCOUNTER (OUTPATIENT)
Dept: BEHAVIORAL HEALTH | Facility: HOSPITAL | Age: 33
Discharge: HOME | End: 2018-10-01
Attending: PSYCHIATRY & NEUROLOGY
Payer: OTHER MISCELLANEOUS

## 2018-10-01 NOTE — PROGRESS NOTES
"Group Therapy Progress Notes     Client Initial Individualized Goals for Treatment: Improve interpersonal relationships by learning adaptive communication and coping skills  Promote constructive management of emotions (examples: depression, anxiety, anger) by developing effective cognitive/behavioral coping skills    Area of Treatment Focus:  Symptom Stabilization and Management and Develop / Improve Independent Living / Socialization Skills    Therapeutic Interventions/Treatment Strategies:  Facilitate increased self awareness  Provide education regarding hygiene    Response to Treatment Strategies:  Accepted Feedback and Gave Feedback    Name of Groups:  Hygeine/Self Care - Time: 11:10-12:00    Description and Therapeutic Outcome:   OT group - Hygiene and Self Care  This group focus is on self care and proper hygiene.  Clients will explore self care topics such as diet, exercise, spirituality, overall health management, self improvement, hygiene, self regulation, leisure, sleep, and sobriety; as well as learn how those aspects can be addressed for overall health and wellness.  Clients will have the opportunity to assess their current hygiene routines and habits, and make changes for improvement if necessary.    Pt admits to poor quality sleep due to small child as well as tossing and turning due to inability to \"shut my brain off.\"  Pt open to sleep hygiene tips; verbalizes understanding. Pt reports pain in left hand 3-4/10 as well as anxiety 5/10; healing touch provided with relief noted: Jimbo's mind clear completed to promote relaxation followed by hands in motion/hands at rest to L hand/fingers for pain management.  Pt notes anxiety decreased to 2/10 and pain \"is less\" post healing touch.      Is this a Weekly Review of the Progress on the Treatment Plan?  NO    Are Treatment Plan Goals being addressed?  YES      Are Treatment Plan Strategies to Address Goals Effective?  YES      Are there any current contracts " in place?  NO

## 2018-10-01 NOTE — PROGRESS NOTES
"Group Therapy Progress Notes     Client Initial Individualized Goals for Treatment:     Improve interpersonal relationships by learning adaptive communication and coping skills  Promote constructive management of emotions (examples: depression, anxiety, anger) by developing effective cognitive/behavioral coping skills     Area of Treatment Focus:  Symptom Stabilization and Management    Therapeutic Interventions/Treatment Strategies:  Assist clients in establishing / strengthening support network  Assist to identify treatment goals  Assess / reassess for appropriate therapy program involvement, encourage participation in therapies  Engage in safety planning when indicated  Facilitate increased self awareness  Teach adaptive coping skills and communication skills    Response to Treatment Strategies:  Accepted Feedback, Gave Feedback and Listened     Name of Groups:  Interpersonal Effectiveness: \"I Statements\" - Time: 10-10:50; Assertiveness 1-1:50    Description and Therapeutic Outcome:     During \"I Statements\" group, Mason presented as focused, alert, and participatory. Mason role played I Statements and grasped the concept. Pt referred to suicidal ideations when he gets in his car and will incorporate skills: 5 to 1, 3 Good Things- (His daughter, girlfriend, PHP); Awareness, Surfing the urge, Just Like Me. Discussed Hello Emotion skill.    During the follow up Assertiveness group, Mason identified difficulty feeling and expressing anger \"as I see that my moods affect the family so I don't say anything; difficulty expressing my needs, opinions; difficulty recognizing that my needs are important\". Pt acknowledges that assertiveness will help him be more confident, have boundaries, express feelings\". Discussed Codependency of caring for everyone in the home but not himself.      Is this a Weekly Review of the Progress on the Treatment Plan?  NO    Are Treatment Plan Goals being addressed?  YES      Are Treatment Plan " Strategies to Address Goals Effective?  YES      Are there any current contracts in place?  NO

## 2018-10-01 NOTE — TREATMENT PLAN
Initial Treatment Plan     Name: Mason Garcia MRN: [id number]    : 1985 Sex: male    Initial Individualized Treatment Plan:      I. The areas of treatment focus identified are:  Stabilize mental health status/personal safety      II.  Goals and treatment strategies include:         A. Client will report on their:  Ability to maintain safety and/or destructive impulses by talking to staff as needed and actively engaging in safety planning.  Need for more information, support and/or assistance.  Efforts to improve reality orientation by discussing these needs in groups  Increase or change in symptoms          B. Client will identify skills to:  Improve interpersonal relationships by learning adaptive communication and coping skills  Promote constructive management of emotions (examples: depression, anxiety, anger) by developing effective cognitive/behavioral coping skills          C. Client will agree to:  Be responsible to prioritize and work goals, including regular attendance as scheduled, and ask for assistance as needed.                D. This client will report on cultural, racial, health or spiritual issues that impact their wellbeing                   Assessment of this client's strengths includes:  Willingness and openness to additional supports and programming  Commitment to abstain from chemical use while in the program      Specific Issues(s):  Learn to interrupt negative thought patterns and replace with coping skills to reduce interference in multiple life areas.  Decrease chemical use as a negative coping skill.                   Intervention:                Develop increased coping skills by practicing the skills in a group setting.                These services will include group therapy and OT group therapy daily and individual therapy and              medications management as needed.            E. Client agrees to:  Partner with the treatment team to identify, prioritize and work on  goals.  Be responsible for active participation in the treatment plan, work with assigned treatment team, regularly attend groups, and ask for help as needed.  Maintain personal safety by: using positive coping skills, talking to supports or going to the ED if not able to stay safe.       Date Plan Started: 10/01/18      Date Plan Stopped: Will reassess on 10/8/18      Admit Date/Time: 10/1/18      Team Members Contributing to Plan:  Dr. Lindy Espino, [facility]  Madeleine Kenyon, [facility]  Amparo Dillon, DESTINY

## 2018-10-02 ENCOUNTER — HOSPITAL ENCOUNTER (OUTPATIENT)
Dept: BEHAVIORAL HEALTH | Facility: HOSPITAL | Age: 33
End: 2018-10-02
Attending: PSYCHIATRY & NEUROLOGY
Payer: OTHER MISCELLANEOUS

## 2018-10-02 PROCEDURE — H0035 MH PARTIAL HOSP TX UNDER 24H: HCPCS | Performed by: SOCIAL WORKER

## 2018-10-02 NOTE — PROGRESS NOTES
"Group Therapy Progress Notes     Client Initial Individualized Goals for Treatment:     Improve interpersonal relationships by learning adaptive communication and coping skills  Promote constructive management of emotions (examples: depression, anxiety, anger) by developing effective cognitive/behavioral coping skills    Area of Treatment Focus:  Symptom Stabilization and Management    Therapeutic Interventions/Treatment Strategies:  Assist clients in establishing / strengthening support network  Assist to identify treatment goals  Assess / reassess for appropriate therapy program involvement, encourage participation in therapies  Engage in safety planning when indicated  Teach adaptive coping skills and communication skills    Response to Treatment Strategies:  Accepted Feedback, Gave Feedback and Listened     Name of Groups:  Relationships - Time: 10-10:50; 1-1:50    Description and Therapeutic Outcome:       During Relationships group, Mason presented as moderately flat but did respond that he blames himself for the work accident and has difficulty accepting it was just an accident. Discussed pt applying \"I Statements\" in his communication with his girlfriend rather than \"I don't express what I feel and my girlfriend takes it personally\". Discussed applying \"I don't want to talk now\" which is what pt said, but to add \"we can talk later\".    During the follow up Relationships group, pt owned that \"my drinking alcohol was a habit\" wheras previously pt had minimized his drinking. Pt identified \"Judging, Blaming and Shaming as his communication stoppers. Pt again referred to being shamed by his dad was beneficial and has difficulty recognizing the erosiveness of shame and shames his stepson. Pt does not recognize saying \"Let's all turn around and look at him because he wants attention\" that this approach is shaming. Pt acknowledges receiving minimal affirmations from caregivers. Discussed Self care, 3 C's, 3 Good " Things, present Moment.      Is this a Weekly Review of the Progress on the Treatment Plan?  NO    Are Treatment Plan Goals being addressed?  YES      Are Treatment Plan Strategies to Address Goals Effective?  YES      Are there any current contracts in place?  NO

## 2018-10-02 NOTE — PROGRESS NOTES
Group Therapy Progress Notes     Client Initial Individualized Goals for Treatment:  Improve interpersonal relationships by learning adaptive communication and coping skills  Promote constructive management of emotions (examples: depression, anxiety, anger) by developing effective cognitive/behavioral coping skills    Area of Treatment Focus:  Symptom Stabilization and Management    Therapeutic Interventions/Treatment Strategies:  Facilitate increased self awareness  Provide education regarding recognizing warning signs    Response to Treatment Strategies:  Accepted Feedback, Gave Feedback, Listened  and Accepted Support    Name of Groups:  Warning Signs - Time: 11:10-12:00    Description and Therapeutic Outcome:   OT group - Recognizing warning signs of relapse - aftercare planning  This group provides psychoeducation related to developing strategies/skills to support effective identification of warning signs as well as identifying strategies to prevent relapse.  Education provided on development of healthy coping skills to manage and reduce the distress of symptoms.  Handouts provided on the principles of self management: warning signs, taking action, and seeking outside help. Clients will increase knowledge on personal warning signs/triggers, taking action/connecting, and seeking help/identifying people who can help. Education/discussion provided around what to do if/when relapse occurs.  Mason is quiet and withdrawn in OT group today, only participates when cued.  Chooses not to share or discuss his current warning signs, and supports with the group.           Is this a Weekly Review of the Progress on the Treatment Plan?  NO    Are Treatment Plan Goals being addressed?  YES      Are Treatment Plan Strategies to Address Goals Effective?  YES      Are there any current contracts in place?  NO

## 2018-10-03 ENCOUNTER — HOSPITAL ENCOUNTER (OUTPATIENT)
Dept: BEHAVIORAL HEALTH | Facility: HOSPITAL | Age: 33
End: 2018-10-03
Attending: PSYCHIATRY & NEUROLOGY
Payer: OTHER MISCELLANEOUS

## 2018-10-03 PROCEDURE — H0035 MH PARTIAL HOSP TX UNDER 24H: HCPCS | Performed by: SOCIAL WORKER

## 2018-10-03 NOTE — PROGRESS NOTES
"Group Therapy Progress Notes     Client Initial Individualized Goals for Treatment:     Improve interpersonal relationships by learning adaptive communication and coping skills  Promote constructive management of emotions (examples: depression, anxiety, anger) by developing effective cognitive/behavioral coping skills     Area of Treatment Focus:  Symptom Stabilization and Management    Therapeutic Interventions/Treatment Strategies:  Assist clients in establishing / strengthening support network  Assist to identify treatment goals  Assess / reassess for appropriate therapy program involvement, encourage participation in therapies  Engage in safety planning when indicated  Teach adaptive coping skills and communication skills    Response to Treatment Strategies:  Accepted Feedback, Gave Feedback and Listened     Name of Groups:  Interpersonal Effectiveness - Time: 10-10:50; Trauma 1-1:50    Description and Therapeutic Outcome:     During I.E. Group, Mason presented as calm and initiated responses. Pt reports challenges with communication with his girlfriend \"but we don't argue\". Discussed addressing issues honestly rather than walking away and never dealing with the issues. Discussed self awareness and self care and avoidance issues.    During Trauma group, Mason was tearful ad referred to the trauma and flashbacks of the work-related accident of  his fingers being damaged. Pt has therapy but reinforced Present Moment skill and discussed the inaccurate recording of time by the hypothalamus.Pt reports his \"little girl\" is his grounding. Discussed increasing his \"toolbox\" of skills. Pt will work on the THE skill as well as 3 C's and Just Like Me and Present Moment. Pt tends to become anxious as the afternoon proceeds. Processed grounding skills.      Is this a Weekly Review of the Progress on the Treatment Plan?  NO    Are Treatment Plan Goals being addressed?  YES      Are Treatment Plan Strategies to Address Goals " Effective?  YES      Are there any current contracts in place?  NO

## 2018-10-03 NOTE — PROGRESS NOTES
Group Therapy Progress Notes     Client Initial Individualized Goals for Treatment:  Improve interpersonal relationships by learning adaptive communication and coping skills  Promote constructive management of emotions (examples: depression, anxiety, anger) by developing effective cognitive/behavioral coping skills     Area of Treatment Focus:  Symptom Stabilization and Management and Develop / Improve Independent Living / Socialization Skills    Therapeutic Interventions/Treatment Strategies:  Facilitate increased self awareness  Provide education regarding emotion regulation  Teach adaptive coping skills and communication skills    Response to Treatment Strategies:  Accepted Feedback, Gave Feedback, Listened , Focused on Goals, Attentive, Accepted Support and Alert    Name of Groups:  Emotion Regulation - Time: 11:10-12:00    Description and Therapeutic Outcome:   Mason states that he is doing much better today, he is bright and participatory.  In this group, clients explore their emotions and work towards healthy ways to cope with feelings.  Clients are given tools for calming or alerting and learn about sensory items for emotion regulation.  Education provided on creating a safe space for reflection, us of PLEASE, opposite action, focus on positive events, checking that facts, bedtime beads, self soothing and SOS.  Encouraged clients to also look at how well their basic needs are being met and focus on picking on need at a time to improve.  Mason states that he needs to improve his basic need of exercise.  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  Worked through healthy alternatives and options.      Is this a Weekly Review of the Progress on the Treatment Plan?  NO    Are Treatment Plan Goals being addressed?  YES      Are Treatment Plan Strategies to Address Goals Effective?  YES      Are there any current contracts in place?  NO

## 2018-10-04 ENCOUNTER — HOSPITAL ENCOUNTER (OUTPATIENT)
Dept: BEHAVIORAL HEALTH | Facility: HOSPITAL | Age: 33
End: 2018-10-04
Attending: PSYCHIATRY & NEUROLOGY
Payer: OTHER MISCELLANEOUS

## 2018-10-04 PROCEDURE — H0035 MH PARTIAL HOSP TX UNDER 24H: HCPCS | Performed by: SOCIAL WORKER

## 2018-10-04 NOTE — PROGRESS NOTES
"Group Therapy Progress Notes     Client Initial Individualized Goals for Treatment:     Improve interpersonal relationships by learning adaptive communication and coping skills  Promote constructive management of emotions (examples: depression, anxiety, anger) by developing effective cognitive/behavioral coping skills     Area of Treatment Focus:  Symptom Stabilization and Management    Therapeutic Interventions/Treatment Strategies:  Assist clients in establishing / strengthening support network  Assist to identify treatment goals  Assess / reassess for appropriate therapy program involvement, encourage participation in therapies  Engage in safety planning when indicated  Teach adaptive coping skills and communication skills    Response to Treatment Strategies:  Accepted Feedback and Gave Feedback Listened    Name of Groups:  Default and Focused Mode of the Brain - Time: 10-10:50; 1-1:50    Description and Therapeutic Outcome:     During Default and Focused Modes group, Mason presented as relaxed, comfortable and participatory, initiating responses.Pt identifies being alone is a trigger for him for self harm ideation. Another stressor is his girlfriend sleeping for hours - due to shift work -and he is alone with his 1year old.Pt loves the child and feels grounded with the child but \"it's hard to not have those thoughts when I'm alone for long periods\". Discussed awareness when his mood shifts and gaining mastery of the negative thoughts with interruption: Pt is journaling, applyong 3 C's, 3 Good Things and 5 to 1 and working on Present Moment.    During the follow up group, pt easily engaged with the process and gave feedback regarding the 50-80% Default Mode. Pt recognizes that his PTS incident became PTSD due to ruminating and energized by depression issues in his \"cloud\"      Is this a Weekly Review of the Progress on the Treatment Plan?  NO    Are Treatment Plan Goals being addressed?  YES      Are Treatment " Plan Strategies to Address Goals Effective?  YES      Are there any current contracts in place?  NO

## 2018-10-04 NOTE — PROGRESS NOTES
Group Therapy Progress Notes     Client Initial Individualized Goals for Treatment: Improve interpersonal relationships by learning adaptive communication and coping skills  Promote constructive management of emotions (examples: depression, anxiety, anger) by developing effective cognitive/behavioral coping skills    Area of Treatment Focus:  Symptom Stabilization and Management    Therapeutic Interventions/Treatment Strategies:  Provide education regarding memory    Response to Treatment Strategies:  Accepted Feedback, Gave Feedback, Listened , Focused on Goals, Attentive, Accepted Support and Alert    Name of Groups:  Memory - Time: 11:10-12:00    Description and Therapeutic Outcome:   OT group on memory discusses and aids group participants to identify what type of learner they are I.e. Right vs left brained and Kinesthetic, auditory, or visual. Which allows to gain insight into strengths that they possess in order to be successful with everyday challenges and to manage tasks that require memory. For example: appointments, taking medication, compensatory techniques involving time management etc. Provided handouts on tips to aid your memory as well as ways to minimize memory loss. Mason is bright and participatory in OT group today.  States that he feels his memory is good.  Identified his learning style as kinesthetic. Also states that he had a hard time learning in school and required extra help and extra time for processing info.           Is this a Weekly Review of the Progress on the Treatment Plan?  NO    Are Treatment Plan Goals being addressed?  YES      Are Treatment Plan Strategies to Address Goals Effective?  YES      Are there any current contracts in place?  NO

## 2018-10-04 NOTE — PROGRESS NOTES
Group Therapy Progress Notes     Client Initial Individualized Goals for Treatment:     Improve interpersonal relationships by learning adaptive communication and coping skills  Promote constructive management of emotions (examples: depression, anxiety, anger) by developing effective cognitive/behavioral coping skills    Area of Treatment Focus:  Symptom Stabilization and Management    Therapeutic Interventions/Treatment Strategies:  Assist clients in establishing / strengthening support network  Assist to identify treatment goals  Assess / reassess for appropriate therapy program involvement, encourage participation in therapies  Engage in safety planning when indicated  Teach adaptive coping skills and communication skills    Response to Treatment Strategies:  Accepted Feedback, Gave Feedback and Listened     Name of Groups:  Psychotherapy group 9-10    Description and Therapeutic Outcome:   In psychotherapy group, today was Mason's first day of group, he was quiet and guarded in sharing information about himself and states that he is attended group due to his PTSD from an accident he was in at his job. He shares that he was experiencing anxiety and depressive symptoms and has been struggling with self harm thoughts. He states that he lives with his fiance and has a baby girl.    Is this a Weekly Review of the Progress on the Treatment Plan?  NO    Are Treatment Plan Goals being addressed?  YES      Are Treatment Plan Strategies to Address Goals Effective?  YES      Are there any current contracts in place?  NO

## 2018-10-04 NOTE — PROGRESS NOTES
"Group Therapy Progress Notes     Client Initial Individualized Goals for Treatment:     Improve interpersonal relationships by learning adaptive communication and coping skills  Promote constructive management of emotions (examples: depression, anxiety, anger) by developing effective cognitive/behavioral coping skills    Area of Treatment Focus:  Symptom Stabilization and Management    Therapeutic Interventions/Treatment Strategies:  Assist clients in establishing / strengthening support network  Assist to identify treatment goals  Assess / reassess for appropriate therapy program involvement, encourage participation in therapies  Engage in safety planning when indicated  Teach adaptive coping skills and communication skills    Response to Treatment Strategies:  Accepted Feedback, Gave Feedback and Listened     Name of Groups:  Psychotherapy wrap up group - 2-3    Description and Therapeutic Outcome:   In psychotherapy wrap up group, Mason discussed some of his struggles this week - it has been a rough week starting the program - he processed that he is thinking because he is now focusing on the real issues and not trying to push it away, he is struggling more with anxiety.  He reports he is starting to use the skills of \"the the\", 3C's and just like me.  He reports increase PTSD thoughts today related to his work trauma - was able to discuss strategies to practice tonight to interrupt his thinking - had discussed trauma as an illusion today so will practice grounding skills.  Mason presented as engaged today - is listening to feedback and seems open to trying some of the skills to interrupt his interference.  Reviewed evening plans and safety issues.  Mason remains appropriate for PHP.    Is this a Weekly Review of the Progress on the Treatment Plan?  NO    Are Treatment Plan Goals being addressed?  YES      Are Treatment Plan Strategies to Address Goals Effective?  YES      Are there any current contracts in " place?  NO

## 2018-10-04 NOTE — PROGRESS NOTES
"Group Therapy Progress Notes     Client Initial Individualized Goals for Treatment:     Improve interpersonal relationships by learning adaptive communication and coping skills  Promote constructive management of emotions (examples: depression, anxiety, anger) by developing effective cognitive/behavioral coping skills    Area of Treatment Focus:  Symptom Stabilization and Management    Therapeutic Interventions/Treatment Strategies:  Assist clients in establishing / strengthening support network  Assist to identify treatment goals  Assess / reassess for appropriate therapy program involvement, encourage participation in therapies  Engage in safety planning when indicated  Teach adaptive coping skills and communication skills    Response to Treatment Strategies:  Accepted Feedback, Gave Feedback and Listened     Name of Groups:  Psychotherapy wrap up group 1:30-2:30    Description and Therapeutic Outcome:     In psychotherapy wrap up group, Mason states that he had a better day and is becoming more comfortable with the group. He states that he was a little overwhelmed starting a group that was all women but identifies that they are all working on similar issues. Mason states that he knows he needs to work on his communication and that he has thinking errors that get in the way of his relationships. His take was related to \"fighting fair\" worksheets and communication. He continues to become more engaged in the group process. Mason states that he feels safe tonight, he is appropriate for PHP.         Is this a Weekly Review of the Progress on the Treatment Plan?  NO    Are Treatment Plan Goals being addressed?  YES      Are Treatment Plan Strategies to Address Goals Effective?  YES      Are there any current contracts in place?  NO           "

## 2018-10-04 NOTE — PROGRESS NOTES
"Group Therapy Progress Notes     Client Initial Individualized Goals for Treatment:     Improve interpersonal relationships by learning adaptive communication and coping skills  Promote constructive management of emotions (examples: depression, anxiety, anger) by developing effective cognitive/behavioral coping skills    Area of Treatment Focus:  Symptom Stabilization and Management    Therapeutic Interventions/Treatment Strategies:  Assist clients in establishing / strengthening support network  Assist to identify treatment goals  Assess / reassess for appropriate therapy program involvement, encourage participation in therapies  Engage in safety planning when indicated  Teach adaptive coping skills and communication skills    Response to Treatment Strategies:  Accepted Feedback, Gave Feedback and Listened     Name of Groups:  Psychotherapy wrap up group 1:30-2:30    Description and Therapeutic Outcome:     In psychotherapy wrap up group, Mason states that his take from today was about communication. He then describes that he has struggled with communication with his fiance's teenage son's and that he found that shaming the 13 year old son worked effectively for him. He identifies that he was raised in a shame based family and that this is how he grew up and what he know.  Mason became defensive in addressing these issues and states that he does not find shaming to be \"bad\" and that it created respect with his lisseth son.  Processed shame based behavior in the psychotherapy group and supported Mason in evaluating how he communicates with his \"step\" kids and how this may impact his relationship with them. Mason states plans to spend time with his fiuyen grimes and feels safe.  Mason is appropriate for PHP.     Is this a Weekly Review of the Progress on the Treatment Plan?  NO    Are Treatment Plan Goals being addressed?  YES      Are Treatment Plan Strategies to Address Goals Effective?  YES      Are there any " current contracts in place?  NO

## 2018-10-04 NOTE — PROGRESS NOTES
"Group Therapy Progress Notes     Client Initial Individualized Goals for Treatment:     Improve interpersonal relationships by learning adaptive communication and coping skills  Promote constructive management of emotions (examples: depression, anxiety, anger) by developing effective cognitive/behavioral coping skills    Area of Treatment Focus:  Symptom Stabilization and Management    Therapeutic Interventions/Treatment Strategies:  Assist clients in establishing / strengthening support network  Assist to identify treatment goals  Assess / reassess for appropriate therapy program involvement, encourage participation in therapies  Engage in safety planning when indicated  Teach adaptive coping skills and communication skills    Response to Treatment Strategies:  Accepted Feedback, Gave Feedback and Listened     Name of Groups:  Psychotherapy group 9-10AM    Description and Therapeutic Outcome:     In psychotherapy group, Mason states that he had a rough night and did find himself experiencing some self harm thoughts but did not have intent to act on them. He states that he was frustrated with his fiance and her son and that he was feeling overwhelmed with household responsibilities. Processed focusing on \"one thing\" and to respect that he may be feeling overwhelmed from starting PHP and in processing emotions that he hasn't talked about before. Mason was more engaged in the group and was open to support from his peers. He identified that he does become easily frustrated and struggles with anger sometimes, he is interested in working on how to interrupt this pattern.         Is this a Weekly Review of the Progress on the Treatment Plan?  NO    Are Treatment Plan Goals being addressed?  YES      Are Treatment Plan Strategies to Address Goals Effective?  YES      Are there any current contracts in place?  NO           "

## 2018-10-05 ENCOUNTER — HOSPITAL ENCOUNTER (OUTPATIENT)
Dept: BEHAVIORAL HEALTH | Facility: HOSPITAL | Age: 33
End: 2018-10-05
Attending: PSYCHIATRY & NEUROLOGY
Payer: OTHER MISCELLANEOUS

## 2018-10-05 PROCEDURE — H0035 MH PARTIAL HOSP TX UNDER 24H: HCPCS | Performed by: SOCIAL WORKER

## 2018-10-05 PROCEDURE — 99214 OFFICE O/P EST MOD 30 MIN: CPT | Performed by: NURSE PRACTITIONER

## 2018-10-05 NOTE — PROGRESS NOTES
"Adult Partial Hospitalization Program Progress Note    Chief Complaint/Reason for visit: \"hypersexual\"    HPI/Interval History (presenting symptoms):  Mason Garcia is a 32 year old male is seen alone today for 15 minutes of face to face interview for medication management.  Patient reports over past month since increase in Effexor to 150 mg that he has increased time spent watching pornography and also increased masturbating. Patient stated he has not increased the frequency of sexual intercourse. He stated he did not have issues on 75 mg of Effexor. Had previously been on Zoloft twice, once very effective and stopped when mood euthymic.  Second time \"didn't work\". Stated he missed appointment with PCP for medication management on Monday. He is encouraged to reschedule this and discuss options other than antidepressants for mood stabilization. Patient may respond better to Lamictal or atypical antipsychotic rather than antidepressants. Increased hypersexuality may be component of undiagnosed bipolar disorder rather than unipolar depression. Behaviors persisted prior to start of antidepressants and staff note patient is bisexual.  Patient stated he has \"been doing well [with mood] last two days\". Stated he has periods of time where his mood is euthymic and does not feel he needs medications. Personality traits appear to influence patient's perception of wellness also.    Review of Systems:  Allergies   Allergen Reactions     Sudafed [Pseudoephedrine]      There were no vitals taken for this visit.  Constitutional: no weight loss, fever, night sweats  Neuro: no migraines, seizures or TBI's noted  Resp: no cough or shortness of breath  CV: no chest pains or palpitations  Endo: no history of polyuria, polydipsia, skin or hair changes, heat or cold intolerance  GI: no dysphagia, abdominal pain, nausea, vomiting, change in bowel habits  Psych: no significant anxiety, depression, or panic  Nutrition: no reported changes " in appetite or weight.    Social History     Social History     Marital status: Single     Spouse name: N/A       1     Years of education: N/A     Social History Main Topics     Smoking status: Current Every Day Smoker     Packs/day: 1.00     Years: 7.00     Types: Cigarettes     Smokeless tobacco: Former User     Alcohol use Yes      Comment: rare     Drug use: No     Sexual activity: Not on file     Other Topics Concern     Not on file     Social History Narrative     Medications:  Current Outpatient Prescriptions   Medication Sig Dispense Refill     prazosin (MINIPRESS) 1 MG capsule Take 1 capsule (1 mg) by mouth At Bedtime 30 capsule 0     venlafaxine (EFFEXOR-XR) 75 MG 24 hr capsule Take 150 mg by mouth daily        Patient Active Problem List   Diagnosis     Perforated appendicitis     Depression with suicidal ideation     Medication Education has been provided as needed regarding the benefits and side effects of medication.    Evidence of current violence or abuse:  Patient does not endorse current violence or abuse.    Other: Primary care provider has been notified per [facility] routine if patient has one.    Psychiatric Exam: Mental Status exam reveals 32 year oldThere were no vitals taken for this visit.  Appearance:  awake, alert  Attitude:  cooperative  Eye Contact:  fair  Mood:  sad   Affect:  mood congruent  Speech:  clear, coherent  Psychomotor Behavior:  no evidence of tardive dyskinesia, dystonia, or tics  Thought Process:  goal oriented  Associations:  no loose associations  Thought Content:  no evidence of suicidal ideation or homicidal ideation  Insight:  fair  Judgment:  fair  Oriented to:  time, person, and place  Attention Span and Concentration:  intact  Recent and Remote Memory:  intact  Fund of Knowledge: appropriate  Muscle Strength and Tone: normal  Gait and Station: Normal    Impression DSM-V :  Depression, NOS  PTSD  Panic Disorder w/o agoraphobia  R/O Other Bipolar Disorder    Plan:   1)  "Rescheduled missed appointment (was to see 10/1/18) with primary care  2) Request 75 mg pills of Effexor from primary provider as stated he is \"almost out\" of 150 mg pills  3) Talk with doctor about medications for depression other than antidepressants, such as Lamictal    Partial Hospitalization Programming. This patient will benefit most from a partial program to further target mental health symptoms through clinically recognized therapeutic interventions pertinent to this patient's individual symptoms and illness. Without the intensive, structured combination of active treatment services provided in this programming, this patient is at increased risk for inpatient (re)hospitalization. It is not believed that this patient would benefit from a less intensive or restrictive outpatient setting at this time.     Attestation:  Patient has been seen and evaluated by me,  DARNELL Villarreal CNP    "

## 2018-10-05 NOTE — PROGRESS NOTES
"Group Therapy Progress Notes     Client Initial Individualized Goals for Treatment:     Improve interpersonal relationships by learning adaptive communication and coping skills  Promote constructive management of emotions (examples: depression, anxiety, anger) by developing effective cognitive/behavioral coping skills     Area of Treatment Focus:  Symptom Stabilization and Management    Therapeutic Interventions/Treatment Strategies:  Assist clients in establishing / strengthening support network  Assist to identify treatment goals  Engage in safety planning when indicated  Teach adaptive coping skills and communication skills    Response to Treatment Strategies:  Accepted Feedback, Gave Feedback and Listened     Name of Groups:  Codependency - Time: 10-10:50; 1-1:50    Description and Therapeutic Outcome:     During Codependency group, Mason presented as attentive and expressed his assertiveness with his girlfriend regarding declining to go and be with her at her job before she closed up the store for the night. \"I just didn't want to go and I know she is safe where she works\". This was a positive step for Mason due to Codependency issues that  he acknowledges: people pleasing, enabling and other-concerned.    During the follow up Codependency group, Mason recognized manipulation and codependency issues in his ex relationship and lacking in him applying assertiveness and boundaries. Pt presented as transparent regarding sexual struggles he is having toward his sister-in-law who now makes excuses for him not to come over to her house. Discussed her cells mirroring this and not going to talk to her about his struggle with this. Pt is amenable to practicing skills to be healthy_ 3C's; 3 Good Things; 5 to 1, Journaling.      Is this a Weekly Review of the Progress on the Treatment Plan?  NO    Are Treatment Plan Goals being addressed?  YES      Are Treatment Plan Strategies to Address Goals Effective?  YES      Are " there any current contracts in place?  NO

## 2018-10-05 NOTE — PROGRESS NOTES
Group Therapy Progress Notes     Client Initial Individualized Goals for Treatment:   Improve interpersonal relationships by learning adaptive communication and coping skills  Promote constructive management of emotions (examples: depression, anxiety, anger) by developing effective cognitive/behavioral coping skills       Area of Treatment Focus:  Symptom Stabilization and Management and Cultural/ Racial / Health / Spiritual    Therapeutic Interventions/Treatment Strategies:  Facilitate increased self awareness  Teach adaptive coping skills and communication skills    Response to Treatment Strategies:  Accepted Feedback, Gave Feedback, Listened , Focused on Goals, Attentive, Accepted Support and Alert    Name of Groups:  Stress Management - Time: 11:10-12:00    Description and Therapeutic Outcome:   OT Psychoeducation Group: Stress Management  This group assists clients to learn healthy ways to manage stress when symptoms of their illness decrease ability to function and engage in activities.  Education provided on the thinking brain vs emotional brain (frontal lobe vs brain stem) and how to utilize initial coping skills to regain use of the frontal lobe.  Clients will work on identifying triggers leading to increased stress and practice coping strategies.  The purpose is to teach  occupational life balance, focusing on techniques and skills to improve function and increase independence with activities of daily living and instrumental activities of daily living.  Clients will learn about increasing daily structure by formulating weekly and daily routines with individualized goals related to treatment, leisure, self care, and accessing community resources.  Clients will look to create a more balanced lifestyle that supports overall physical and mental wellness.  Specifically taught on deep breathing and meditation/mindfulness.       Is this a Weekly Review of the Progress on the Treatment Plan?  NO    Are Treatment  Plan Goals being addressed?  YES      Are Treatment Plan Strategies to Address Goals Effective?  YES      Are there any current contracts in place?  NO

## 2018-10-08 ENCOUNTER — HOSPITAL ENCOUNTER (OUTPATIENT)
Dept: BEHAVIORAL HEALTH | Facility: HOSPITAL | Age: 33
End: 2018-10-08
Attending: PSYCHIATRY & NEUROLOGY
Payer: OTHER MISCELLANEOUS

## 2018-10-08 PROCEDURE — H0035 MH PARTIAL HOSP TX UNDER 24H: HCPCS | Performed by: SOCIAL WORKER

## 2018-10-08 NOTE — PROGRESS NOTES
"Group Therapy Progress Notes     Client Initial Individualized Goals for Treatment:     Improve interpersonal relationships by learning adaptive communication and coping skills  Promote constructive management of emotions (examples: depression, anxiety, anger) by developing effective cognitive/behavioral coping skills    Area of Treatment Focus:  Symptom Stabilization and Management    Therapeutic Interventions/Treatment Strategies:  Assist clients in establishing / strengthening support network  Assist to identify treatment goals  Assess / reassess for appropriate therapy program involvement, encourage participation in therapies  Engage in safety planning when indicated  Teach adaptive coping skills and communication skills    Response to Treatment Strategies:  Accepted Feedback, Gave Feedback and Listened     Name of Groups:  Psychotherapy group 9-10AM    Description and Therapeutic Outcome:     In psychotherapy group, Mason states that he had a better night and that he and his fiance talked about some issues they have been struggling with. He states that he did use \"i\" statements to initiate the conversation. Mason states that he found the discussion yesterday very helpful and also addressed that he does struggle with some codependency issues as well. He states that he appreciates how supportive the group has been and that he is starting to journal and look at the worksheets from the sessions at night.     Mason shares individually with this writer in a separate visit before group this morning that he feels he has been \"hypersexual' and is constantly thinking about sex. He states that he does watch pornography frequently and admits that it is to the point of possibly being a problem for him. Mason asks if it might be his medication impacted his sex drive, we will set him up to meet with the nurse practitioner to discuss this with her.  Mason states that he has noted the \"hypersexual\" issues possibly prior to when " his medication was adjusted. He states that he watches porn on his phone and acknowledges his bisexuality and his choices of partners and intimacy.  Discussed both meeting with the NP regarding medication and also looking into therapy with a professional who specializes in sexual issues and addiction, he states that this might be helpful. He then states that he was only referred to [facility] due to his workmans comp case for his injury and he has not been in therapy to address these other issues.        Is this a Weekly Review of the Progress on the Treatment Plan?  NO    Are Treatment Plan Goals being addressed?  YES      Are Treatment Plan Strategies to Address Goals Effective?  YES      Are there any current contracts in place?  NO

## 2018-10-08 NOTE — PROGRESS NOTES
Group Therapy Progress Notes     Client Initial Individualized Goals for Treatment: Will report on symptoms and identify skills to use to manage anxiety symptoms and Will Improve Mindfulness / Stay in the Here and Now Regularly practice intentionally focusing on what is occurring in the environment, what you are sensing, thoughts that are popping into your head, emotions that you are feeling, without judging any of it, just noticing it.  Regular practice is ideally stopping to do this on schedule, 4 times a day for four minutes each time.  Intentionally bringing your attention to something beautiful, pleasant, or interesting that is occurring or is present in your immediate environment or experience on a regular basis.    Area of Treatment Focus:  Symptom Stabilization and Management    Therapeutic Interventions/Treatment Strategies:  Assist clients in establishing / strengthening support network  Assist with discharge planning  Assess / reassess for appropriate therapy program involvement, encourage participation in therapies  Assess / reassess level of potential for harm to self or others  Engage in safety planning when indicated  Facilitate increased self awareness  Set limits on intrusive / aggressive behaviors  Teach adaptive coping skills and communication skills  Use reality based supportive approach    Response to Treatment Strategies:  Accepted Feedback, Gave Feedback, Listened , Focused on Goals and Attentive    Name of Groups:  Psychotherapy - Time: 9-9:50    Description and Therapeutic Outcome: In   Psychotherapy group Mason reviewed his weekend. He reported increased motivation which has been a struggle for him. He cleaned the garage and worked on a [employer] project with his step-son. Skills used over the weekend were writing in a journal, the 3C's, 5 to 1, and going for a walk. Mason reports that he finds the journal a helpful tool for him. Mason is reporting hypersexual feelings and has been advised  to work on this with his individual therapist. Struggled to stay focused during group. Got up and made coffee, paced, and sighed while other group members were talking. He did not offer any feedback to other group members.    Are Treatment Plan Goals being addressed?  YES      Are Treatment Plan Strategies to Address Goals Effective?  YES      Are there any current contracts in place?  YES

## 2018-10-08 NOTE — PROGRESS NOTES
Group Therapy Progress Notes     Client Initial Individualized Goals for Treatment:     Improve interpersonal relationships by learning adaptive communication and coping skills  Promote constructive management of emotions (examples: depression, anxiety, anger) by developing effective cognitive/behavioral coping skills    Area of Treatment Focus:  Symptom Stabilization and Management    Therapeutic Interventions/Treatment Strategies:  Assist clients in establishing / strengthening support network  Assist to identify treatment goals  Assess / reassess for appropriate therapy program involvement, encourage participation in therapies  Engage in safety planning when indicated  Teach adaptive coping skills and communication skills    Response to Treatment Strategies:  Accepted Feedback, Gave Feedback and Listened     Name of Groups:  Psychotherapy wrap up group 1:30-2:30    Description and Therapeutic Outcome:     In psychotherapy wrap up group, Mason states that he had a good day today and his take away was related to codependency. Mason states that he enjoyed the powerpoint about codependency and states that he realizes that this has impacted his relationships significantly. Mason states that he also appreciates the discussions regarding relationships. He does not have plans for the weekend but states that he looks forward to spending time with his daughter and family. He continues to become more engaged in the group process. Mason states that he feels safe tonight, he is appropriate for PHP.         Is this a Weekly Review of the Progress on the Treatment Plan?  NO    Are Treatment Plan Goals being addressed?  YES      Are Treatment Plan Strategies to Address Goals Effective?  YES      Are there any current contracts in place?  NO

## 2018-10-08 NOTE — PROGRESS NOTES
Group Therapy Progress Notes     Client Initial Individualized Goals for Treatment:     Improve interpersonal relationships by learning adaptive communication and coping skills  Promote constructive management of emotions (examples: depression, anxiety, anger) by developing effective cognitive/behavioral coping skills    Area of Treatment Focus:  Symptom Stabilization and Management    Therapeutic Interventions/Treatment Strategies:  Assist clients in establishing / strengthening support network  Assist to identify treatment goals  Assess / reassess for appropriate therapy program involvement, encourage participation in therapies  Engage in safety planning when indicated  Teach adaptive coping skills and communication skills    Response to Treatment Strategies:  Accepted Feedback, Gave Feedback and Listened     Name of Groups:  Psychotherapy group 9-10AM    Description and Therapeutic Outcome:     In psychotherapy group, Mason states that he had a good sleep last night but does help get up with the baby when his fiance is tired. He states that he did experience some frustration with his fiance picking up an extra shift last night but that he chose to not take this personally and did some journaling to process his feelings and what he learned at group yesterday. He states that he is enjoying taking time to journal and that he plans to continue this when he is done with PHP.  Mason is positive this morning and very engaging in discussion with his peers.     Is this a Weekly Review of the Progress on the Treatment Plan?  NO    Are Treatment Plan Goals being addressed?  YES      Are Treatment Plan Strategies to Address Goals Effective?  YES      Are there any current contracts in place?  NO

## 2018-10-08 NOTE — PROGRESS NOTES
Group Therapy Progress Notes     Client Initial Individualized Goals for Treatment: Will report on symptoms and identify skills to use to manage anxiety symptoms and Will Improve Mindfulness / Stay in the Here and Now Regularly practice intentionally focusing on what is occurring in the environment, what you are sensing, thoughts that are popping into your head, emotions that you are feeling, without judging any of it, just noticing it.  Regular practice is ideally stopping to do this on schedule, 4 times a day for four minutes each time.  Intentionally bringing your attention to something beautiful, pleasant, or interesting that is occurring or is present in your immediate environment or experience on a regular basis.    Area of Treatment Focus:  Symptom Stabilization and Management    Therapeutic Interventions/Treatment Strategies:  Facilitate increased self awareness  Provide education regarding communication styles  Provide feedback about social skills    Response to Treatment Strategies:  Accepted Feedback, Gave Feedback, Listened , Focused on Goals, Attentive, Accepted Support and Alert    Name of Groups:  Assertiveness - Time: 11:10-12:00    Description and Therapeutic Outcome:   OT Group - Assertiveness   This group also focuses on passive, aggressive, and assertive communication styles and educates clients on how each of the styles can affect our relationships and how we respond to situations. Talked through scenarios where each type of communication style is used.  Educated in the use of SAS (state that problem, ask for what you need, and spell out the benefits of cooperation).  Mason states that for years he used passive communication in his relationship with his boyfriend.  Admits it was very unhealthy for him and often resulted in him acting aggressively in the long run.  States that in his current relationship, there is a sense of respect for each other and he has been using assertive communication  more often and states it feels good and rewarding.         Is this a Weekly Review of the Progress on the Treatment Plan?  NO    Are Treatment Plan Goals being addressed?  YES      Are Treatment Plan Strategies to Address Goals Effective?  YES      Are there any current contracts in place?  NO

## 2018-10-08 NOTE — PROGRESS NOTES
"Group Therapy Progress Notes     Client Initial Individualized Goals for Treatment:     Improve interpersonal relationships by learning adaptive communication and coping skills  Promote constructive management of emotions (examples: depression, anxiety, anger) by developing effective cognitive/behavioral coping skills    Area of Treatment Focus:  Symptom Stabilization and Management    Therapeutic Interventions/Treatment Strategies:  Assist clients in establishing / strengthening support network  Assist to identify treatment goals  Assess / reassess for appropriate therapy program involvement, encourage participation in therapies  Engage in safety planning when indicated  Teach adaptive coping skills and communication skills    Response to Treatment Strategies:  Accepted Feedback, Gave Feedback and Listened     Name of Groups:  Psychotherapy group 9-10AM    Description and Therapeutic Outcome:     In psychotherapy group, Mason states that he had a good night and got to bed earlier.  He states that he did become frustrated with his catrachito's work schedule but chose to use 3C's and gratitude to interrupt his negative thoughts. Mason states that he did enjoy spending time with his daughter and that she is \"his world\" and he loves her. Mason states that he did spend some time with his fiuyen's sons last night as well and that this went well, he states he practiced patience with them and this worked well for him. He states that he did experience anxiety yesterday and last night and used skills to redirect including being metric and dialectical.       Is this a Weekly Review of the Progress on the Treatment Plan?  NO    Are Treatment Plan Goals being addressed?  YES      Are Treatment Plan Strategies to Address Goals Effective?  YES      Are there any current contracts in place?  NO           "

## 2018-10-08 NOTE — PROGRESS NOTES
Group Therapy Progress Notes     Client Initial Individualized Goals for Treatment: Will report on symptoms and identify skills to use to manage anxiety symptoms and Will Improve Mindfulness / Stay in the Here and Now Regularly practice intentionally focusing on what is occurring in the environment, what you are sensing, thoughts that are popping into your head, emotions that you are feeling, without judging any of it, just noticing it.  Regular practice is ideally stopping to do this on schedule, 4 times a day for four minutes each time.  Intentionally bringing your attention to something beautiful, pleasant, or interesting that is occurring or is present in your immediate environment or experience on a regular basis.    Area of Treatment Focus:  Symptom Stabilization and Management    Therapeutic Interventions/Treatment Strategies:  Assist clients in establishing / strengthening support network  Assist with discharge planning  Assess / reassess for appropriate therapy program involvement, encourage participation in therapies  Assess / reassess level of potential for harm to self or others  Engage in safety planning when indicated  Facilitate increased self awareness  Set limits on intrusive / aggressive behaviors  Teach adaptive coping skills and communication skills  Use reality based supportive approach    Response to Treatment Strategies:  Accepted Feedback, Gave Feedback, Listened , Focused on Goals and Attentive    Name of Groups:   Psychtherapy Wrap Up - Time: 2:00-3:00      Description and Therapeutic Outcome:   In psychotherapy wrap up group, Mason reviewed his take away from today - reviewed family dynamics and noted that this topic was particularly hard for him today and he was unsure why = was able process some family issues as his parents split up when he was very young - was able to look at how his step dad was able to fill in this role.  Mason was somewhat distracted needing to discuss some increased  anxiety today - he has been stopping, getting up and journaling when he has something he needs to process. This has been somewhat distracting to the group - we also had a new group member today and he was having some personality conflicts with her - he did point this out in this group - he did this in an appropriate manner and tried to use skills to do this and used 3 good things.  Will continue to give Mason feedback- he seems slightly more open today to feedback but it appears to still give him anxiety.  Mason also is very focused on himself and does present as very disinterested when peers are processing - getting up to journal, get coffee, use the restroom.  Will help to assist him in increased awareness of his behaviors.  He did note that he will practice 5:1 tonight.  Mason remains appropriate for PHP.       Is this a Weekly Review of the Progress on the Treatment Plan?  YES    Are Treatment Plan Goals being addressed?  YES      Are Treatment Plan Strategies to Address Goals Effective?  YES      Are there any current contracts in place?  YES

## 2018-10-08 NOTE — PROGRESS NOTES
"Group Therapy Progress Notes     Client Initial Individualized Goals for Treatment:     Improve interpersonal relationships by learning adaptive communication and coping skills  Promote constructive management of emotions (examples: depression, anxiety, anger) by developing effective cognitive/behavioral coping skills    Area of Treatment Focus:  Symptom Stabilization and Management    Therapeutic Interventions/Treatment Strategies:  Assist clients in establishing / strengthening support network  Assist to identify treatment goals  Assess / reassess for appropriate therapy program involvement, encourage participation in therapies  Engage in safety planning when indicated  Teach adaptive coping skills and communication skills    Response to Treatment Strategies:  Accepted Feedback, Gave Feedback and Listened     Name of Groups:  Psychotherapy wrap up group 1:30-2:30    Description and Therapeutic Outcome:     In psychotherapy wrap up group, Mason states that he had a good day today and his take away was related to how he has used Default for quite some time now and that he knows he often reacts rather than responds to situations and that this has not been working for him.  He liked the \"dead body\" analogy of all of the baggage he carries for others and that this impacts his ability to deal with daily stress. He continues to become more engaged in the group process. Mason states that he feels safe tonight, he is appropriate for PHP.         Is this a Weekly Review of the Progress on the Treatment Plan?  NO    Are Treatment Plan Goals being addressed?  YES      Are Treatment Plan Strategies to Address Goals Effective?  YES      Are there any current contracts in place?  NO           "

## 2018-10-08 NOTE — TREATMENT PLAN
Individualized Treatment Plan     Date of Plan: 2018    Name: Mason Garcia MRN: [id number]    : 1985    Programs:  [facility] ()     DSM-V Diagnoses: 296.22 (F32.1)  Major Depressive Disorder, Single Episode, Moderate With anxious distress;  PTSD    DA Date: 19  Psychosocial & Contextual Factors: family of origin issues, financial hardship, health issues, mental health symptoms, parent-child stress and relationship stress  WHODAS: 31  LOCUS: 21      Team Members Contributing to Plan:  MD Letitia Fitzgerald, [facility]  Madeleine Kenyon, [facility]  Amparo Dillon, [facility]    Client Strengths:  caring, goal-focused, intelligent, motivated, open to learning, open to suggestions / feedback, responsible parent, support of family, friends and providers, supportive, wants to learn, willing to ask questions, willing to relate to others and work history    Client Participation in Plan:  Contributed to goals and plan   Agrees with plan   Received copy of treatment plan     Areas of Vulnerability:  Suicidal Ideation   Poor impulse control   Anxiety  Depressive symptoms   Trauma/Abuse/Neglect    Long-Term Goals:  Knowledge about illness and management of symptoms   Maintenance of personal safety   Effective management of impulsivity     Abuse Prevention Plan:  Safe, therapeutic environment   Safety coping plan as needed   Education regarding illness and skill development     Discharge Criteria:  Satisfactory progress toward treatment goals   Improvement re: identified problems and symptoms   Ability to continue recovery at next level of service   Has a discharge plan in place   Regular attendance as scheduled         Areas of Treatment Focus            Area of Treatment Focus:   Symptom Stabilization and Management  Start Date:    10/8/18    Goal:  Target Date: 10/15/18 Status: Active  Will report on symptoms and identify skills to use to manage anxiety symptoms and Will Improve  Mindfulness / Stay in the Here and Now Regularly practice intentionally focusing on what is occurring in the environment, what you are sensing, thoughts that are popping into your head, emotions that you are feeling, without judging any of it, just noticing it.  Regular practice is ideally stopping to do this on schedule, 4 times a day for four minutes each time.  Intentionally bringing your attention to something beautiful, pleasant, or interesting that is occurring or is present in your immediate environment or experience on a regular basis.      Progress:    Current progress - Mason has been engaged and transparent in his first week of group. During his first two days of PHP Mason was honest about experiencing increased anxiety and some brief suicidal ideation.  He met with the clinical  regarding these concerns as well as the nurse practitioner regarding medication questions. By the end of the week Mason was sharing about current and past stressors and issues. He has been using his journal each day and reports reviewing handouts from group in the evenings. He has been very engaged in the group conversations and supportive of his peers.       Current treatment plan is being addressed above -Mason was able to identify triggers that would increase his anxiety symptoms as well as suicidal ideation.  He reached out to supports to help process these and has been very transparent in group discussions regarding his trauma and his relationships.  Mason is learning to identify his symptoms regarding anxiety and PTSD and is learning skills to interrupt these symptoms.     Safety issues - Mason is actively working on increasing his coping skills and has used these skills in group and at home as well as grounding techniques.       Medications - Mason has been following with the NP doing medications management in the PHP program - he will be following up with a provider      Chemical use - no issues noted in this  area.      Mental Status Assessment:    Appearance:   Appropriate   Eye Contact:   Fair   Psychomotor Behavior: Restless   Attitude:   Cooperative  Guarded   Orientation:   Person Place Time Situation All  Speech   Rate / Production: Normal    Volume:  Normal   Mood:    Anxious  Irritable   Affect:    Constricted  Restricted  Worrisome   Thought Content:  Referential Thinking  Rumination   Thought Form:  Coherent  Flight of Ideas  Circumstantial  Insight:    Fair     Diagnosis - No change in MH diagnosis at this time.     Mason would greatly benefit from continued services in PHP. He has responded very well to the group setting and content - Mason is reporting using the coping techniques he is learning.  Mason still reports struggling at times with negative thinking and needing to interrupt his  thinking so it does not get too bad.  Mason is able to then use grounding techniques to reduce the interference and his supports at home.     Although he is making progress,  Mason remains at risk to decompensate due to his negative self concept, which quickly leads to suicidal ideation and depressive thoughts.    Mason has worked hard to start the process of interrupting  negative thoughts and to use coping skills - he does lack the confidence that he can use his skills when he is struggling and quickly goes back into thinking there is no hope.  Mason has not gotten to the point of replacing the negative thoughts with healthier ones .  PHP level of intervention is necessary to maintain what he is learning and to improve his condition and function level and to prevent relapse or hospitalization.  The PHP services are being provided in lieu of psychiatric inpatient hospitalization, no less intensive level of care would be sufficient to provide the medically necessary treatment     he requires.  Mason would benefit from an additional 5 days of PHP programming to work on the above treatment plan and allow to discharge to a  lesser level of care in the community.  Anticipated discharge date would be reviewed next week.                  Treatment Strategies:   Assist clients in establishing / strengthening support network  Assist with discharge planning  Assess / reassess for appropriate therapy program involvement, encourage participation in therapies  Assess / reassess level of potential for harm to self or others  Engage in safety planning when indicated  Set limits on intrusive / aggressive behaviors  Teach adaptive coping skills and communication skills  Use reality based supportive approach    These services will include group therapy and OT group therapy daily and individual therapy and medications management as needed.

## 2018-10-08 NOTE — PROGRESS NOTES
"Group Therapy Progress Notes     Client Initial Individualized Goals for Treatment:       Will report on symptoms and identify skills to use to manage anxiety symptoms and Will Improve Mindfulness / Stay in the Here and Now Regularly practice intentionally focusing on what is occurring in the environment, what you are sensing, thoughts that are popping into your head, emotions that you are feeling, without judging any of it, just noticing it.  Regular practice is ideally stopping to do this on schedule, 4 times a day for four minutes each time.  Intentionally bringing your attention to something beautiful, pleasant, or interesting that is occurring or is present in your immediate environment or experience on a regular basis.     Area of Treatment Focus:  Symptom Stabilization and Management    Therapeutic Interventions/Treatment Strategies:  Assist clients in establishing / strengthening support network  Assist with discharge planning  Assess / reassess for appropriate therapy program involvement, encourage participation in therapies  Assess / reassess level of potential for harm to self or others  Engage in safety planning when indicated  Facilitate increased self awareness  Set limits on intrusive / aggressive behaviors  Teach adaptive coping skills and communication skills  Use reality based supportive approach    Response to Treatment Strategies:  Accepted Feedback, Gave Feedback, Listened , Focused on Goals and Attentive    Name of Groups:  Grounding skills - Time: 10-10:50; Family Dynamics 1-1:50    Description and Therapeutic Outcome:     During Grounding group, Mason presented as positive and receptive. Pt processed the Dialectical skill due to experiencing \"upset\" after being directed regarding not discussing the hypersexuality in group, but with his therapist. Pt was amenable to implementing Just Like Me and 3 Good things as well as 5 to 1 and rewiring to Focused Mode.    During Family Dynamics group, " "Mason identified being the Dependent and rotating with the role of Co-dependent. especially in intimate relationships - his ex as well as with his present relationship. Discussed Boundaries and assertiveness. Pt is also working on his relationship with his two stepson especially the younger one whom he identified as there Gering. Discussed recognizing the \"why\" reflected in the [place] emotion of the Gering - Fear, Confusion, insecurity and Shame. Pt reports when his dad shamed him \"it worked for me\". Discussed the corrosive action of shame and loss of identity.      Is this a Weekly Review of the Progress on the Treatment Plan?  YES    Are Treatment Plan Goals being addressed?  YES      Are Treatment Plan Strategies to Address Goals Effective?  YES      Are there any current contracts in place?  NO           "

## 2018-10-09 ENCOUNTER — HOSPITAL ENCOUNTER (OUTPATIENT)
Dept: BEHAVIORAL HEALTH | Facility: HOSPITAL | Age: 33
End: 2018-10-09
Attending: PSYCHIATRY & NEUROLOGY
Payer: OTHER MISCELLANEOUS

## 2018-10-09 PROCEDURE — H0035 MH PARTIAL HOSP TX UNDER 24H: HCPCS | Performed by: SOCIAL WORKER

## 2018-10-09 NOTE — PROGRESS NOTES
"Group Therapy Progress Notes     Client Initial Individualized Goals for Treatment: Will report on symptoms and identify skills to use to manage anxiety symptoms and Will Improve Mindfulness / Stay in the Here and Now Regularly practice intentionally focusing on what is occurring in the environment, what you are sensing, thoughts that are popping into your head, emotions that you are feeling, without judging any of it, just noticing it.  Regular practice is ideally stopping to do this on schedule, 4 times a day for four minutes each time.  Intentionally bringing your attention to something beautiful, pleasant, or interesting that is occurring or is present in your immediate environment or experience on a regular basis.    Area of Treatment Focus:  Symptom Stabilization and Management    Therapeutic Interventions/Treatment Strategies:  Assist clients in establishing / strengthening support network  Assist with discharge planning  Assess / reassess for appropriate therapy program involvement, encourage participation in therapies  Assess / reassess level of potential for harm to self or others  Engage in safety planning when indicated  Facilitate increased self awareness  Set limits on intrusive / aggressive behaviors  Teach adaptive coping skills and communication skills  Use reality based supportive approach    Response to Treatment Strategies:  Accepted Feedback, Gave Feedback, Listened , Focused on Goals and Attentive    Name of Groups:   Psychtherapy Wrap Up - Time: 2:00-3:00      Description and Therapeutic Outcome:   In psychotherapy wrap up group Mason reviewed his takeaway from today. Mason reports that he became calmer as the day progressed. He found the Alpha Stem and playing with a Pola ball helpful tools. Mason stated that \"to be grounded is to be in touch with reality, emotion, and to accept what we cannot control.\" He would like to be in this place. He plans to use 3c's, 5 to 1, assertiveness, and 3 good " things tonight. He states that he is finding the 5 to 1 skill the most helpful. Mason remained focused and alert during this group. He did not fidget as much and used a Pola ball to remain focused. Mason will practice grounding techniques to divert any self harming thoughts. He remains appropriate for PHP.      Is this a Weekly Review of the Progress on the Treatment Plan?  NO    Are Treatment Plan Goals being addressed?  YES      Are Treatment Plan Strategies to Address Goals Effective?  YES      Are there any current contracts in place?  YES

## 2018-10-09 NOTE — PROGRESS NOTES
"Group Therapy Progress Notes     Client Initial Individualized Goals for Treatment:     Will report on symptoms and identify skills to use to manage anxiety symptoms and Will Improve Mindfulness / Stay in the Here and Now Regularly practice intentionally focusing on what is occurring in the environment, what you are sensing, thoughts that are popping into your head, emotions that you are feeling, without judging any of it, just noticing it.  Regular practice is ideally stopping to do this on schedule, 4 times a day for four minutes each time.  Intentionally bringing your attention to something beautiful, pleasant, or interesting that is occurring or is present in your immediate environment or experience on a regular basis.     Area of Treatment Focus:  Symptom Stabilization and Management    Therapeutic Interventions/Treatment Strategies:  Assist clients in establishing / strengthening support network  Assist to identify treatment goals  Assess / reassess for appropriate therapy program involvement, encourage participation in therapies  Assess / reassess level of potential for harm to self or others  Engage in safety planning when indicated  Facilitate increased self awareness  Teach adaptive coping skills and communication skills    Response to Treatment Strategies:  Accepted Feedback, Gave Feedback and Listened     Name of Groups:  Grounding 10-10:50; 1-1:50    Description and Therapeutic Outcome:     During Grounding group, pt presented as moderately relaxed as he had used the Alpha Stim due to being stressed in the Psychotherapy group because of Suicidal Ideations yesterday and this morning. Pt will practice 3 C's, 3 Good Things, 5 to 1- \"which seems to work well for me\";     During the follow up Grounding group, pt presented as alert, social and participatory. Pt presented as insightful regarding his comprehension of the grounding skills. Pt employs journalling for grounding and is amenable to being aware when " his journalling is depressing. Pt will work on Present Moment, Gratitude.    Is this a Weekly Review of the Progress on the Treatment Plan?  NO    Are Treatment Plan Goals being addressed?  YES      Are Treatment Plan Strategies to Address Goals Effective?  YES      Are there any current contracts in place?  NO

## 2018-10-09 NOTE — PROGRESS NOTES
Group Therapy Progress Notes     Client Initial Individualized Goals for Treatment: Will report on symptoms and identify skills to use to manage anxiety symptoms and Will Improve Mindfulness / Stay in the Here and Now Regularly practice intentionally focusing on what is occurring in the environment, what you are sensing, thoughts that are popping into your head, emotions that you are feeling, without judging any of it, just noticing it.  Regular practice is ideally stopping to do this on schedule, 4 times a day for four minutes each time.  Intentionally bringing your attention to something beautiful, pleasant, or interesting that is occurring or is present in your immediate environment or experience on a regular basis.    Area of Treatment Focus:  Symptom Stabilization and Management    Therapeutic Interventions/Treatment Strategies:  Assist clients in establishing / strengthening support network  Assist with discharge planning  Assess / reassess for appropriate therapy program involvement, encourage participation in therapies  Assess / reassess level of potential for harm to self or others  Engage in safety planning when indicated  Facilitate increased self awareness  Set limits on intrusive / aggressive behaviors  Teach adaptive coping skills and communication skills  Use reality based supportive approach    Response to Treatment Strategies:  Accepted Feedback, Gave Feedback, Listened , Focused on Goals and Attentive    Name of Groups:  Psychotherapy - Time: 9-9:50    Description and Therapeutic Outcome: In psychotherapy group Mason reviewed his night. Mason reports thoughts of self harm and states that he is unable to identify a trigger. Mason was able to redirect these thoughts by spending time with his daughter and going for a walk with his son. He states that he tried to use his journal, 5 to 1, and 3 good things but that those skills did not disrupt the racing thoughts he was having. Mason states that he had  suicidal thoughts this morning and wanted to skip group. He disrupted this thought by thinking about his family. When cued to use 3 good things, Mason came up with Family as his reason for wanting to be alive and focused on that. Mason was encouraged to continue to use diversion techniques when he has thoughts of self harm. Mason is also going to try [employer] Stem to help disrupt these thoughts. Mason listened during the group. Mason still has times where he fidgets and paces during the group. He tends to sigh and be restless in his chair when others are talking. Mason reports that he has a primary care appointment set up for Oct 24.    Are Treatment Plan Goals being addressed?  YES      Are Treatment Plan Strategies to Address Goals Effective?  YES      Are there any current contracts in place?  YES

## 2018-10-09 NOTE — PROGRESS NOTES
Group Therapy Progress Notes     Client Initial Individualized Goals for Treatment: Will report on symptoms and identify skills to use to manage anxiety symptoms and Will Improve Mindfulness / Stay in the Here and Now Regularly practice intentionally focusing on what is occurring in the environment, what you are sensing, thoughts that are popping into your head, emotions that you are feeling, without judging any of it, just noticing it.  Regular practice is ideally stopping to do this on schedule, 4 times a day for four minutes each time.  Intentionally bringing your attention to something beautiful, pleasant, or interesting that is occurring or is present in your immediate environment or experience on a regular basis.    Area of Treatment Focus:  Symptom Stabilization and Management    Therapeutic Interventions/Treatment Strategies:  Assist clients in establishing / strengthening support network  Provide feedback about social skills  Teach adaptive coping skills and communication skills    Response to Treatment Strategies:  Accepted Feedback, Gave Feedback, Listened , Focused on Goals, Attentive, Accepted Support and Alert    Name of Groups:  Employment Planning - Time: 11:10-12:00    Description and Therapeutic Outcome:    OT life skills group - Jobs and/or Volunteer Work  This group focus involves looking at finding an activity in life that is meaningful, enjoyable, and gives them a sense of purpose, for many people that involves jobs or volunteering.  Each client is encouraged to share what their activity is and what are their barriers to participating in that activity.  Also discussed healthy coping skills they can use when faced with a stressful, overwhelming situation.   Mason states that being a parent is his meaningful activity.  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  States that being a parent is the most difficult and rewarding experience of his life.      Is this a Weekly Review of the  Progress on the Treatment Plan?  NO    Are Treatment Plan Goals being addressed?  YES      Are Treatment Plan Strategies to Address Goals Effective?  YES      Are there any current contracts in place?  NO

## 2018-10-10 ENCOUNTER — HOSPITAL ENCOUNTER (OUTPATIENT)
Dept: BEHAVIORAL HEALTH | Facility: HOSPITAL | Age: 33
End: 2018-10-10
Attending: PSYCHIATRY & NEUROLOGY
Payer: OTHER MISCELLANEOUS

## 2018-10-10 PROCEDURE — H0035 MH PARTIAL HOSP TX UNDER 24H: HCPCS | Performed by: SOCIAL WORKER

## 2018-10-10 NOTE — PROGRESS NOTES
"Group Therapy Progress Notes     Client Initial Individualized Goals for Treatment:     Will report on symptoms and identify skills to use to manage anxiety symptoms and Will Improve Mindfulness / Stay in the Here and Now Regularly practice intentionally focusing on what is occurring in the environment, what you are sensing, thoughts that are popping into your head, emotions that you are feeling, without judging any of it, just noticing it.  Regular practice is ideally stopping to do this on schedule, 4 times a day for four minutes each time.  Intentionally bringing your attention to something beautiful, pleasant, or interesting that is occurring or is present in your immediate environment or experience on a regular basis.     Area of Treatment Focus:  Symptom Stabilization and Management    Therapeutic Interventions/Treatment Strategies:  Assist clients in establishing / strengthening support network  Assist to identify treatment goals  Assess / reassess for appropriate therapy program involvement, encourage participation in therapies  Assess / reassess level of potential for harm to self or others  Engage in safety planning when indicated  Facilitate increased self awareness  Teach adaptive coping skills and communication skills    Response to Treatment Strategies:  Accepted Feedback, Gave Feedback and Listened     Name of Groups:  Mindfulness - Time: 10-10:50: Moment of Awe; Boundaries 1-1:50    Description and Therapeutic Outcome:     During Moment of Awe group, Mason was fully engaged and identified his \"Moment\" as \"The Beginning of My Life\". This referred to the birth of his child, and the awesomeness of the moment and the change it brought to his life \"as I love my daughter\".     During Boundaries group, Mason was mostly focused on telling his girlfriend \"to stay out of other people's business\" Redirected pt to focus on boundaries he needs to work on. Pt recognized that he can apply 3 C's to avoid becoming " "involved in the neighbors issues and also wants to work on \"touching\" without asking boundaries. Pt continues to journal, apply 3 Good Things, and Just Like Me skills       Is this a Weekly Review of the Progress on the Treatment Plan?  NO    Are Treatment Plan Goals being addressed?  YES      Are Treatment Plan Strategies to Address Goals Effective?  YES      Are there any current contracts in place?  NO           "

## 2018-10-10 NOTE — TREATMENT PLAN
Individualized Treatment Plan     Date of Plan: October 10, 2018    Name: Mason Garcia MRN: [id number]    : 1985    Programs:  [facility] ()     DSM-V Diagnoses: 296.22 (F32.1)  Major Depressive Disorder, Single Episode, Moderate With anxious distress;  PTSD                                           DA Date: 19  Psychosocial & Contextual Factors: family of origin issues, financial hardship, health issues, mental health symptoms, parent-child stress and relationship stress  WHODAS: 31  LOCUS: 21    Team Members Contributing to Plan:  Dr. Lindy Espino, [facility]  Madeleine Kenyon, [facility]  Amparo Dillon, DESTINY    Client Strengths:  caring, goal-focused, intelligent, motivated, open to learning, open to suggestions / feedback, responsible parent, support of family, friends and providers, supportive, wants to learn, willing to ask questions, willing to relate to others and work history    Client Participation in Plan:  Contributed to goals and plan   Agrees with plan   Received copy of treatment plan     Areas of Vulnerability:  Suicidal Ideation   Poor impulse control   Anxiety  Depressive symptoms   Trauma/Abuse/Neglect    Long-Term Goals:  Knowledge about illness and management of symptoms   Maintenance of personal safety   Effective management of impulsivity     Abuse Prevention Plan:  Safe, therapeutic environment   Safety coping plan as needed   Education regarding illness and skill development   Coordination with care providers   Impluse control education and intervention   Medication adjustment/management (MI/CD)     Discharge Criteria:  Satisfactory progress toward treatment goals   Improvement re: identified problems and symptoms   Ability to continue recovery at next level of service   Has a discharge plan in place   Has safety/coping plan in place   Regular attendance as scheduled         Areas of Treatment Focus            Area of Treatment Focus:   Community  Resources / Support and Discharge Planning  Start Date: 10/15/18          Goal:  Target Date: 10/19/18 Status: Active  Will improve wellness related behaviors by continuing to use coping skills that are working for him and practice, practice practice, Will develop an aftercare / transition plan by Friday to include the necessary supports he needs in the community and Will increase daily structured activities by developing a plan of what he will fill his time with      Progress:    Current progress - This has been a very up and down week for Mason.  He has had some great successes in using his skills and being able to gain his confidence that he does have some control over his emotion regulation and how he respond and reacts to things.  There were other days when he was deeply entrenched in his emotions and struggled to use any skills independently.  He was able to come back on Friday and finish out the week on a positive note and realize that he could survive discomfort and disruption.     Current treatment plan is being addressed above - This week we will continue to encourage Mason to practice early interruption and using his skills and to increase his confidence and mastery of his grounding techniques.  We will also work with him to develop a community based plan of additional supports for when he finishes the PHP program.     Safety issues - Mason is actively working on increasing his coping skills and has used these skills in group and at home as well as grounding techniques.  He does continue to have almost daily self harm thoughts and is working to interrupt these early.  He is realizing that at this point, elimination if not the goal but reduction and management of the thoughts.     Medications - Mason has been following with the NP doing medications management in the PHP program as well as his PCP - he will be following up with a provider      Chemical use - no issues noted in this area.      Mental Status  Assessment:    Appearance:   Appropriate   Eye Contact:   Fair   Psychomotor Behavior: Hyperactive  Restless   Attitude:   Cooperative  Guarded   Orientation:   All  Speech   Rate / Production: Normal    Volume:  Normal   Mood:    Anxious  Depressed   Affect:    Labile   Thought Content:  Clear   Thought Form:  Coherent  Logical   Insight:    Fair     Diagnosis - No change in MH diagnosis at this time.     Mason would greatly benefit from continued services in PHP.  Mason has responded to the group setting and content at times - he is reporting using the coping techniques he is learning.  He still reports struggling at times with negative thinking and needing to interrupt his  thinking so it does not get too bad.  He is able to then use grounding techniques to reduce the interference and his supports at home.     Although Mason is making progress,  he remains at risk to decompensate due to his negative self concept, which quickly leads to thoughts of self harm. It should be noted that as Mason has progressed through the program, features of a pervasive pattern of instability of interpersonal relationships, self-image and affect has been noted.  In particular, at times displays rapidly shifting and shallow expressions of emotion, at times frantic efforts to avoid imagined abandonment, a pattern of unstable and intense interpersonal relationships characterized by alternating between extremes of idealization and devaluation, he notes impulsivity and obsessive thoughts with pornography and hypersexuality, recurrent thoughts of self harm, and at times affect instability due to marked reactivity of mood.  This will be monitored     Kenya has worked hard to start the process of interrupting  negative thoughts and to use coping skills - he does lack the confidence that he can use his skills when he is struggling and quickly goes back into thinking there is no hope.  He has not gotten to the point of replacing the negative  thoughts with healthier ones.  PHP level of intervention is necessary to maintain what he is learning and to improve his condition and function level and to prevent relapse or hospitalization.  The PHP services are being provided in lieu of psychiatric inpatient hospitalization, no less intensive level of care would be sufficient to provide the medically necessary treatment     he requires.  Mason  would benefit from an additional 5 days of PHP programming to work on the above treatment plan and allow to discharge to a lesser level of care in the community.  Anticipated discharge date would be reviewed next week.                  Treatment Strategies:   Assist clients in establishing / strengthening support network  Assist with discharge planning  Engage in safety planning when indicated  Facilitate increased self awareness  Teach adaptive coping skills and communication skills    These services will include group therapy and OT group therapy daily and individual therapy and medications management as needed.

## 2018-10-10 NOTE — PROGRESS NOTES
Group Therapy Progress Notes     Client Initial Individualized Goals for Treatment: Will report on symptoms and identify skills to use to manage anxiety symptoms and Will Improve Mindfulness / Stay in the Here and Now Regularly practice intentionally focusing on what is occurring in the environment, what you are sensing, thoughts that are popping into your head, emotions that you are feeling, without judging any of it, just noticing it.  Regular practice is ideally stopping to do this on schedule, 4 times a day for four minutes each time.  Intentionally bringing your attention to something beautiful, pleasant, or interesting that is occurring or is present in your immediate environment or experience on a regular basis.    Area of Treatment Focus:  Symptom Stabilization and Management    Therapeutic Interventions/Treatment Strategies:  Assist clients in establishing / strengthening support network  Assist with discharge planning  Assess / reassess for appropriate therapy program involvement, encourage participation in therapies  Assess / reassess level of potential for harm to self or others  Engage in safety planning when indicated  Facilitate increased self awareness  Set limits on intrusive / aggressive behaviors  Teach adaptive coping skills and communication skills  Use reality based supportive approach    Response to Treatment Strategies:  Accepted Feedback, Gave Feedback, Listened , Focused on Goals and Attentive    Name of Groups:  Psychotherapy - Time: 9-9:50    Description and Therapeutic Outcome: In psychotherapy group Mason reviewed his night. Mason reported that he was able to use the grounding skills last night. He used 3 good things, just like me, and the 3 c's. Mason also wrote in his journal. Discussed skills he can use when he has thoughts of self harm while driving. Mason was directed to think about the upcoming  he is going to on Friday, and how people feel when they lose someone to a car  Ruby gave some good feedback today. He did have some issues with interrupting people when he wanted to express his thoughts.Mason was attentive, goal oriented, listened well, and accepted feedback.     Are Treatment Plan Goals being addressed?  YES      Are Treatment Plan Strategies to Address Goals Effective?  YES      Are there any current contracts in place?  YES

## 2018-10-10 NOTE — PROGRESS NOTES
Group Therapy Progress Notes     Client Initial Individualized Goals for Treatment: Will report on symptoms and identify skills to use to manage anxiety symptoms and Will Improve Mindfulness / Stay in the Here and Now Regularly practice intentionally focusing on what is occurring in the environment, what you are sensing, thoughts that are popping into your head, emotions that you are feeling, without judging any of it, just noticing it.  Regular practice is ideally stopping to do this on schedule, 4 times a day for four minutes each time.  Intentionally bringing your attention to something beautiful, pleasant, or interesting that is occurring or is present in your immediate environment or experience on a regular basis.    Area of Treatment Focus:  Symptom Stabilization and Management    Therapeutic Interventions/Treatment Strategies:  Assist clients in establishing / strengthening support network  Assist with discharge planning  Assess / reassess for appropriate therapy program involvement, encourage participation in therapies  Assess / reassess level of potential for harm to self or others  Engage in safety planning when indicated  Facilitate increased self awareness  Set limits on intrusive / aggressive behaviors  Teach adaptive coping skills and communication skills  Use reality based supportive approach    Response to Treatment Strategies:  Accepted Feedback, Gave Feedback, Listened , Focused on Goals and Attentive    Name of Groups:   Psychtherapy Wrap Up - Time: 2:00-3:00      Description and Therapeutic Outcome:   In psychotherapy wrap up group Mason reviewed his takeaway from today. Mason stated that his 'moment of awe' was when his daughter was born, and described some of the emotions that he felt. Mason discussed feeling that his girlfriend worries about others. He was encouraged to use the 3c's when he feels this. When reviewing boundaries, Mason expressed that he finds setting boundaries difficult. He  will become more mindful of boundaries in his life. Tonight he plans to utilize the 3 good things and present moment skills. During the wrap up group, Mason did some pacing. Mason gave feedback and listened. He is focused on goals. Mason remains appropriate for PHP.      Is this a Weekly Review of the Progress on the Treatment Plan?  NO    Are Treatment Plan Goals being addressed?  YES      Are Treatment Plan Strategies to Address Goals Effective?  YES      Are there any current contracts in place?  YES

## 2018-10-11 ENCOUNTER — HOSPITAL ENCOUNTER (OUTPATIENT)
Dept: BEHAVIORAL HEALTH | Facility: HOSPITAL | Age: 33
End: 2018-10-11
Attending: PSYCHIATRY & NEUROLOGY
Payer: OTHER MISCELLANEOUS

## 2018-10-11 PROCEDURE — H0035 MH PARTIAL HOSP TX UNDER 24H: HCPCS | Performed by: SOCIAL WORKER

## 2018-10-11 NOTE — PROGRESS NOTES
Group Therapy Progress Notes     Client Initial Individualized Goals for Treatment: Will report on symptoms and identify skills to use to manage anxiety symptoms and Will Improve Mindfulness / Stay in the Here and Now Regularly practice intentionally focusing on what is occurring in the environment, what you are sensing, thoughts that are popping into your head, emotions that you are feeling, without judging any of it, just noticing it.  Regular practice is ideally stopping to do this on schedule, 4 times a day for four minutes each time.  Intentionally bringing your attention to something beautiful, pleasant, or interesting that is occurring or is present in your immediate environment or experience on a regular basis.    Area of Treatment Focus:  Symptom Stabilization and Management    Therapeutic Interventions/Treatment Strategies:  Provide education regarding healthy leisure    Response to Treatment Strategies:  Accepted Feedback, Gave Feedback, Listened , Focused on Goals and Accepted Support    Name of Groups:  Healthy Leisure - Time: 11:10-12:00    Description and Therapeutic Outcome:   Mason is quiet and bit distracted in OT group today.  Educated in the benefits of healthy leisure participation and the six types of leisure (social, physical, spiritual, relaxation, cognitive and creative).  Clients worked to identify healthy leisure they are currently participating in but also created a healthy leisure goal.  Mason set the leisure goal of going to Buddhist this weekend. States that this is something he has to take the initiative to do as he is often home alone on Sundays with his daughter.          Is this a Weekly Review of the Progress on the Treatment Plan?  NO    Are Treatment Plan Goals being addressed?  YES      Are Treatment Plan Strategies to Address Goals Effective?  YES      Are there any current contracts in place?  NO

## 2018-10-11 NOTE — PROGRESS NOTES
"Group Therapy Progress Notes     Client Initial Individualized Goals for Treatment: Will report on symptoms and identify skills to use to manage anxiety symptoms and Will Improve Mindfulness / Stay in the Here and Now Regularly practice intentionally focusing on what is occurring in the environment, what you are sensing, thoughts that are popping into your head, emotions that you are feeling, without judging any of it, just noticing it.  Regular practice is ideally stopping to do this on schedule, 4 times a day for four minutes each time.  Intentionally bringing your attention to something beautiful, pleasant, or interesting that is occurring or is present in your immediate environment or experience on a regular basis.    Area of Treatment Focus:  Symptom Stabilization and Management    Therapeutic Interventions/Treatment Strategies:  Assist clients in establishing / strengthening support network  Assist with discharge planning  Assess / reassess for appropriate therapy program involvement, encourage participation in therapies  Assess / reassess level of potential for harm to self or others  Engage in safety planning when indicated  Facilitate increased self awareness  Set limits on intrusive / aggressive behaviors  Teach adaptive coping skills and communication skills  Use reality based supportive approach    Response to Treatment Strategies:  Accepted Feedback, Gave Feedback, Listened , Focused on Goals and Attentive    Name of Groups:  Psychotherapy - Time: 9-9:50    Description and Therapeutic Outcome:   In psychotherapy group, Mason reviewed his evening - initially he did well, was unable to fix his truck - ran in to more problems and had increased anxiety to stepped back and left the truck - went to town with his GF - used his skills to reduce anxiety - it took a while but it got better.  They got home and she started to \"unload\" on him about not being able to be there for him emotionally all the time and " feeling drained.  He became very overwhelmed in group, crying difficulty processing.  He started to leave the room but stayed and listened to the feedback from a group member.  He then started to cry again, sat in the boudreaux and cried loudly, walked down the boudreaux and then came back.  Encouraged him to use the group as his support - focused on internal locus of control and grounding for him.    Part way through the second group, Mason came quickly out of the group room and was presenting as if he was hyperventilating.  He went into the boudreaux bathroom and continued to breathe loudly.  Monitored from the boudreaux.  Had a male staff knock on the door and check on his well being - he said he was okay.  The loud breathing had stopped.  He continued on the bathroom - monitored from the boudreaux with male staff.  After a short time, the male staff knocked on the door again to check on him and suggested he come and use the Alpha Stim and we would have OT come and work with him using Healing touch.  Mason came out of the bathroom and agreed to use the Alpha Stim - worked with Mariah from OT for healing touch and then returned to the group room with the Alpha Stim.  He came out at break time to say he was feeling much more calm - he did ask another group member to come with him for a smoke break so he was not alone.  He did return and states he is feeling much more calm.  Mariah from OT will return to do healing touch on his hand.     Are Treatment Plan Goals being addressed?  No      Are Treatment Plan Strategies to Address Goals Effective?  YES      Are there any current contracts in place?  YES
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
## 2018-10-11 NOTE — PROGRESS NOTES
Group Therapy Progress Notes     Client Initial Individualized Goals for Treatment: Will report on symptoms and identify skills to use to manage anxiety symptoms and Will Improve Mindfulness / Stay in the Here and Now Regularly practice intentionally focusing on what is occurring in the environment, what you are sensing, thoughts that are popping into your head, emotions that you are feeling, without judging any of it, just noticing it.  Regular practice is ideally stopping to do this on schedule, 4 times a day for four minutes each time.  Intentionally bringing your attention to something beautiful, pleasant, or interesting that is occurring or is present in your immediate environment or experience on a regular basis.    Area of Treatment Focus:  Symptom Stabilization and Management    Therapeutic Interventions/Treatment Strategies:  Assist clients in establishing / strengthening support network  Assist with discharge planning  Assess / reassess for appropriate therapy program involvement, encourage participation in therapies  Assess / reassess level of potential for harm to self or others  Engage in safety planning when indicated  Facilitate increased self awareness  Set limits on intrusive / aggressive behaviors  Teach adaptive coping skills and communication skills  Use reality based supportive approach    Response to Treatment Strategies:  Accepted Feedback, Gave Feedback, Listened , Focused on Goals and Attentive    Name of Groups:   Psychtherapy Wrap Up - Time: 2:00-3:00      Description and Therapeutic Outcome:   In psychotherapy wrap up group Mason reviewed his takeaway from today - he continued to struggle throughout the day - often left the room and paced in the boudreaux, used Alpha Stim a couple times, met with Mariah for healing touch twice.  He is also using other things on the sensory cart.  He did journal several pages today - suggested that he stop processing the information until he has more emotional  "control and to focus on distraction and grounding techniques as well as interruption.  Mason really struggled to do this - continued to sit with very sad and despondent body language, at times with his lower lip out and tears.  Tried to help ground him with setting a time limit on the emotions, discussing his need to let this go for now and regroup as it is likely fueled by \"things in his cloud\" and not just about his conversation.  He is still stating he thinks they are going to break up = again tried to ground him with focusing on the reality of the conversation and not looking at it in fear and filling in the blanks with misinformation.  He noted that his GF is coming to pick him up today - discussed the reality of getting in the car with his current body language verses putting closure to the emotions right now and focusing on good things about her and the relationship.  He was unable to do this.  Again attempted to empower him with having control over his emotions and how he is going to set the tone when he walks out of the hospital.  Encouraged him to leave the negative in the group room. Mason left with the same sad and despondent body language he displayed in group.  Mason remains appropriate for PHP as long as he is willing to learn and use the information.  He is disruptive to the group and causes the other group members to struggle with their own PTSD and anxiety.  He does not listen to their feedback and continues to stay in a \"poor me\" state despite significant effort on the part of the group to support and encourage him.  If Mason presents this way again tomorrow, we will need to discuss with him if this is the right program for him.  He might be better served in a DBT program where he can focus more effort on learning about emotion regulation.    Is this a Weekly Review of the Progress on the Treatment Plan?  NO    Are Treatment Plan Goals being addressed?  YES      Are Treatment Plan Strategies to " Address Goals Effective?  YES      Are there any current contracts in place?  YES

## 2018-10-12 ENCOUNTER — HOSPITAL ENCOUNTER (OUTPATIENT)
Dept: BEHAVIORAL HEALTH | Facility: HOSPITAL | Age: 33
End: 2018-10-12
Attending: PSYCHIATRY & NEUROLOGY
Payer: OTHER MISCELLANEOUS

## 2018-10-12 PROCEDURE — H0035 MH PARTIAL HOSP TX UNDER 24H: HCPCS | Performed by: SOCIAL WORKER

## 2018-10-12 NOTE — PROGRESS NOTES
Group Therapy Progress Notes     Client Initial Individualized Goals for Treatment: Will report on symptoms and identify skills to use to manage anxiety symptoms and Will Improve Mindfulness / Stay in the Here and Now Regularly practice intentionally focusing on what is occurring in the environment, what you are sensing, thoughts that are popping into your head, emotions that you are feeling, without judging any of it, just noticing it.  Regular practice is ideally stopping to do this on schedule, 4 times a day for four minutes each time.  Intentionally bringing your attention to something beautiful, pleasant, or interesting that is occurring or is present in your immediate environment or experience on a regular basis.    Area of Treatment Focus:  Symptom Stabilization and Management    Therapeutic Interventions/Treatment Strategies:  Assist clients in establishing / strengthening support network  Assist with discharge planning  Assess / reassess for appropriate therapy program involvement, encourage participation in therapies  Assess / reassess level of potential for harm to self or others  Engage in safety planning when indicated  Facilitate increased self awareness  Set limits on intrusive / aggressive behaviors  Teach adaptive coping skills and communication skills  Use reality based supportive approach    Response to Treatment Strategies:  Accepted Feedback, Gave Feedback, Listened , Focused on Goals and Attentive    Name of Groups:  Psychotherapy - Time: 9-9:50    Description and Therapeutic Outcome:   In psychotherapy group, Mason reviewed his evening - he was praised for making it through the night and showing up today - was able to process his evening - did end up eventually talking to his girlfriend and feels like they were able to weather the conflict.  States he was able to use his skills and get himself out of the spot he was in.  Is going to a  with his GF today - plans to use mindfulness and  focus on fast grounding skills - will use 5:1, walking.  Mason did well today and was more engaged, less distracting - actually listened to his peers today and gave some feedback.  Mason will be leaving at 11 for the .  He remains appropriate for PHP.    Are Treatment Plan Goals being addressed?  No      Are Treatment Plan Strategies to Address Goals Effective?  YES      Are there any current contracts in place?  YES

## 2018-10-12 NOTE — PROGRESS NOTES
"Group Therapy Progress Notes     Client Initial Individualized Goals for Treatment:     Will report on symptoms and identify skills to use to manage anxiety symptoms and Will Improve Mindfulness / Stay in the Here and Now Regularly practice intentionally focusing on what is occurring in the environment, what you are sensing, thoughts that are popping into your head, emotions that you are feeling, without judging any of it, just noticing it.  Regular practice is ideally stopping to do this on schedule, 4 times a day for four minutes each time.  Intentionally bringing your attention to something beautiful, pleasant, or interesting that is occurring or is present in your immediate environment or experience on a regular basis.      Area of Treatment Focus:  Symptom Stabilization and Management     Therapeutic Interventions/Treatment Strategies:    Assist clients in establishing / strengthening support network  Assist to identify treatment goals  Assess / reassess for appropriate therapy program involvement, encourage participation in therapies  Assess / reassess level of potential for harm to self or others  Engage in safety planning when indicated  Facilitate increased self awareness  Teach adaptive coping skills and communication skills     Response to Treatment Strategies:  Accepted Feedback, Gave Feedback, Listened  and Attentive    Name of Groups:  Boundaries - Time: 10-10:50; 1-1:50    Description and Therapeutic Outcome:     During Boundaries group, Mason presented as positive, focused and participatoryPt is diligently working on 5 to 1, validation of his girlfriend tears, and 3 C's. Pt has also taken his stepson -with whom he has conflict- for a walk \"and I am working on not shaming him after you guys addressed that with me\". Pt identified Rigid  Boundaries: few close relationships; detached even with intimate partners. Porous Boundaries: Difficulty saying NO; accepting of disrespect by not responding. \"I do " "have a healthy boundary of valuing my own opinion but have difficulty  communicating it to my girlfriend\". Discussed assertiveness, Be Metric, self care.    Pt did not attend the follow up Boundaries group, as he had a preplanned appointment.      Is this a Weekly Review of the Progress on the Treatment Plan?  NO    Are Treatment Plan Goals being addressed?  YES      Are Treatment Plan Strategies to Address Goals Effective?  YES      Are there any current contracts in place?  NO           "

## 2018-10-12 NOTE — PROGRESS NOTES
Group Therapy Progress Notes     Client Initial Individualized Goals for Treatment: Will report on symptoms and identify skills to use to manage anxiety symptoms and Will Improve Mindfulness / Stay in the Here and Now Regularly practice intentionally focusing on what is occurring in the environment, what you are sensing, thoughts that are popping into your head, emotions that you are feeling, without judging any of it, just noticing it.  Regular practice is ideally stopping to do this on schedule, 4 times a day for four minutes each time.  Intentionally bringing your attention to something beautiful, pleasant, or interesting that is occurring or is present in your immediate environment or experience on a regular basis.    Area of Treatment Focus:  Symptom Stabilization and Management    Therapeutic Interventions/Treatment Strategies:  Assist clients in establishing / strengthening support network  Assist with discharge planning  Assess / reassess for appropriate therapy program involvement, encourage participation in therapies  Assess / reassess level of potential for harm to self or others  Engage in safety planning when indicated  Facilitate increased self awareness  Set limits on intrusive / aggressive behaviors  Teach adaptive coping skills and communication skills  Use reality based supportive approach    Response to Treatment Strategies:  Accepted Feedback, Gave Feedback, Listened , Focused on Goals and Attentive    Name of Groups:   Psychtherapy Wrap Up - Time: 2:00-3:00      Description and Therapeutic Outcome:   Mason was not present for this group due to having to attend a .  He will return on Monday.    Is this a Weekly Review of the Progress on the Treatment Plan?  NO    Are Treatment Plan Goals being addressed?  YES      Are Treatment Plan Strategies to Address Goals Effective?  YES      Are there any current contracts in place?  YES

## 2018-10-15 ENCOUNTER — HOSPITAL ENCOUNTER (OUTPATIENT)
Dept: BEHAVIORAL HEALTH | Facility: HOSPITAL | Age: 33
End: 2018-10-15
Attending: PSYCHIATRY & NEUROLOGY
Payer: OTHER MISCELLANEOUS

## 2018-10-15 PROCEDURE — H0035 MH PARTIAL HOSP TX UNDER 24H: HCPCS | Performed by: SOCIAL WORKER

## 2018-10-15 NOTE — PROGRESS NOTES
Group Therapy Progress Notes     Client Initial Individualized Goals for Treatment:  Will improve wellness related behaviors by continuing to use coping skills that are working for him and practice, practice practice, Will develop an aftercare / transition plan by Friday to include the necessary supports he needs in the community and Will increase daily structured activities by developing a plan of what he will fill his time with    Area of Treatment Focus:  Community Resources / Support and Discharge Planning      Therapeutic Interventions/Treatment Strategies:  Assist clients in establishing / strengthening support network  Assist with discharge planning  Assess / reassess for appropriate therapy program involvement, encourage participation in therapies  Assess / reassess level of potential for harm to self or others  Engage in safety planning when indicated  Facilitate increased self awareness  Set limits on intrusive / aggressive behaviors  Teach adaptive coping skills and communication skills  Use reality based supportive approach    Response to Treatment Strategies:  Accepted Feedback, Gave Feedback, Listened , Focused on Goals and Attentive    Name of Groups:  Psychotherapy 9-10am        Description and Therapeutic Outcome: Mason reports that he had a good weekend. He was able to finish fixing his truck and he went to [place] with the family. He experienced some anxiety at the  on Friday. He states that he used mindfulness and stepped outside to reduce this feeling. He used the 5-1 skill to ground himself at [place] and used the Just like me skill when he was frustrated with his sister in law. During group Mason had trouble remaining attentive. He got up 3 times and fidgeted in his chair when others were talking.              Are Treatment Plan Goals being addressed?  YES      Are Treatment Plan Strategies to Address Goals Effective?  YES      Are there any current contracts in place?  YES

## 2018-10-15 NOTE — PROGRESS NOTES
Group Therapy Progress Notes     Client Initial Individualized Goals for Treatment:   Will improve wellness related behaviors by continuing to use coping skills that are working for him and practice, practice practice, Will develop an aftercare / transition plan by Friday to include the necessary supports he needs in the community and Will increase daily structured activities by developing a plan of what he will fill his time with  Area of Treatment Focus:  Symptom Stabilization and Management    Therapeutic Interventions/Treatment Strategies:  Facilitate increased self awareness  Provide education regarding sleep    Response to Treatment Strategies:  Accepted Feedback, Gave Feedback, Listened  and Attentive    Name of Groups:  Sleep - Time: 11:10-12:00    Description and Therapeutic Outcome:   OT Life Skills Group - Healthy Sleep Habits  Clients will explore healthy sleep habits to utilize as well as look at their current sleep habits. Clients will identify importance of sleep for overall physical and mental health, receive education on how lack of sleep affects the body, identify barriers to healthy sleep, develop a healthy sleep routine, and utilize handouts and sleep diary to track sleep habits and daily routines affecting sleep.  Pt reports he can function on 4-5 hours of sleep but feels the best rested when getting 8-9 hours/night.       Is this a Weekly Review of the Progress on the Treatment Plan?  NO    Are Treatment Plan Goals being addressed?  YES      Are Treatment Plan Strategies to Address Goals Effective?  YES      Are there any current contracts in place?  NO
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
## 2018-10-15 NOTE — PROGRESS NOTES
Group Therapy Progress Notes     Client Initial Individualized Goals for Treatment:  Will improve wellness related behaviors by continuing to use coping skills that are working for him and practice, practice practice, Will develop an aftercare / transition plan by Friday to include the necessary supports he needs in the community and Will increase daily structured activities by developing a plan of what he will fill his time with    Area of Treatment Focus:  Community Resources / Support and Discharge Planning      Therapeutic Interventions/Treatment Strategies:  Assist clients in establishing / strengthening support network  Assist with discharge planning  Assess / reassess for appropriate therapy program involvement, encourage participation in therapies  Assess / reassess level of potential for harm to self or others  Engage in safety planning when indicated  Facilitate increased self awareness  Set limits on intrusive / aggressive behaviors  Teach adaptive coping skills and communication skills  Use reality based supportive approach    Response to Treatment Strategies:  Accepted Feedback, Gave Feedback, Listened , Focused on Goals and Attentive    Name of Groups:  Psychotherapy Wrap-up 2-3pm    Description and Therapeutic Outcome:  During psychotherapy wrap up group Mason reviewed his takeaway from the day. Mason reports that he is having more anxiety as the day progresses. He used Alpha Stim and the 5to1 skill to help ground himself during group. He plans to see his therapist tonight and wants to have a family night. He is mindful of not bringing his anxiety home and was receptive to feedback to use the present moment skill. Mason plans to use the 5-1 skill and the 3 cs this evening. During the group Mason had trouble staying in his seat and was fidgeting. Mason remains appropriate for PHP.          Are Treatment Plan Goals being addressed?  YES      Are Treatment Plan Strategies to Address Goals  Effective?  YES      Are there any current contracts in place?  YES

## 2018-10-16 ENCOUNTER — HOSPITAL ENCOUNTER (OUTPATIENT)
Dept: BEHAVIORAL HEALTH | Facility: HOSPITAL | Age: 33
End: 2018-10-16
Attending: PSYCHIATRY & NEUROLOGY
Payer: OTHER MISCELLANEOUS

## 2018-10-16 PROCEDURE — H0035 MH PARTIAL HOSP TX UNDER 24H: HCPCS | Performed by: SOCIAL WORKER

## 2018-10-16 NOTE — PROGRESS NOTES
Group Therapy Progress Notes     Client Initial Individualized Goals for Treatment:  Will improve wellness related behaviors by continuing to use coping skills that are working for him and practice, practice practice, Will develop an aftercare / transition plan by Friday to include the necessary supports he needs in the community and Will increase daily structured activities by developing a plan of what he will fill his time with    Area of Treatment Focus:  Community Resources / Support and Discharge Planning      Therapeutic Interventions/Treatment Strategies:  Assist clients in establishing / strengthening support network  Assist with discharge planning  Assess / reassess for appropriate therapy program involvement, encourage participation in therapies  Assess / reassess level of potential for harm to self or others  Engage in safety planning when indicated  Facilitate increased self awareness  Set limits on intrusive / aggressive behaviors  Teach adaptive coping skills and communication skills  Use reality based supportive approach    Response to Treatment Strategies:  Accepted Feedback, Gave Feedback, Listened , Focused on Goals and Attentive    Name of Groups:  Psychotherapy 9-10am      Description and Therapeutic Outcome: In psychotherapy group Mason reviewed his night. He states that he got a financial thing in the mail and began experiencing intrusive thoughts. He expressed that he had some suicidal thoughts and was going to to go to the hospital but changed his mind. He spent time with his daughter and played a game with her and called his girlfriend to talk and used that to divert his thoughts. Mason was attentive in group this morning. The group began with a short stretching exercise and he was more relaxed after this. During group Mason made eye contact with those who were talking and leaned forward in his chair.      Are Treatment Plan Goals being addressed?  YES      Are Treatment Plan Strategies to  Address Goals Effective?  YES      Are there any current contracts in place?  YES

## 2018-10-16 NOTE — PROGRESS NOTES
"Group Therapy Progress Notes     Client Initial Individualized Goals for Treatment:     Will improve wellness related behaviors by continuing to use coping skills that are working for him and practice, practice practice, Will develop an aftercare / transition plan by Friday to include the necessary supports he needs in the community and Will increase daily structured activities by developing a plan of what he will fill his time with    Area of Treatment Focus:  Community Resources / Support and Discharge Planning    Therapeutic Interventions/Treatment Strategies:  Assist clients in establishing / strengthening support network  Assist with discharge planning  Assess / reassess for appropriate therapy program involvement, encourage participation in therapies  Assess / reassess level of potential for harm to self or others  Engage in safety planning when indicated  Facilitate increased self awareness  Teach adaptive coping skills and communication skills    Response to Treatment Strategies:  Accepted Feedback, Gave Feedback and Listened     Name of Groups:  Gratitude    Description and Therapeutic Outcome:     During Gratitude group, pt presented as focused, pleasant - in spite of experiencing suidcidal ideations last night. Pt applied Present Moment, support of his girlfriend and thinking of his one year old daughter to interrupt suicidal thoughts. Pt is also having his girlfriend remove the guns from the house.    During Self Esteem group, Mason presented as relaxed, appropriately social, and participatory. Pt identified strengths as :Strong, getting better at being a man and father, learning how to cope with negative feelings (as referred to in gratitude group), and \"I could make my life over\" ; ibrahima and plans to attend a men's group at his [employer].    Is this a Weekly Review of the Progress on the Treatment Plan?  NO    Are Treatment Plan Goals being addressed?  YES      Are Treatment Plan Strategies to Address " Goals Effective?  YES      Are there any current contracts in place?  NO    -

## 2018-10-16 NOTE — PROGRESS NOTES
Group Therapy Progress Notes     Client Initial Individualized Goals for Treatment:  Will improve wellness related behaviors by continuing to use coping skills that are working for him and practice, practice practice, Will develop an aftercare / transition plan by Friday to include the necessary supports he needs in the community and Will increase daily structured activities by developing a plan of what he will fill his time with    Area of Treatment Focus:  Community Resources / Support and Discharge Planning      Therapeutic Interventions/Treatment Strategies:  Assist clients in establishing / strengthening support network  Assist with discharge planning  Assess / reassess for appropriate therapy program involvement, encourage participation in therapies  Assess / reassess level of potential for harm to self or others  Engage in safety planning when indicated  Facilitate increased self awareness  Set limits on intrusive / aggressive behaviors  Teach adaptive coping skills and communication skills  Use reality based supportive approach    Response to Treatment Strategies:  Accepted Feedback, Gave Feedback, Listened , Focused on Goals and Attentive    Name of Groups:  Psychotherapy Wrap-up 2-3pm    Description and Therapeutic Outcome:  In psychotherapy wrap up group Mason reviewed his take away from the day. Mason saw his doctor today and will be weaning off of one med. He also plans to inquire about reducing another med at his next appointment. Mason is experiencing some anxiety about his mom. He has not been able to contact her for a few days and is concerned about this. He plans to call her apartment complex and check up on her to reduce his anxiety. His takeaway for the day was that helping other people is easy and he finds helping himself hard. Mason was guided to stay in the present moment and not to think of the worst scenarios regarding his mom. Mason was attentive and engaged with others during the group.  Mason remains appropriate for PHP.      Are Treatment Plan Goals being addressed?  YES      Are Treatment Plan Strategies to Address Goals Effective?  YES      Are there any current contracts in place?  YES

## 2018-10-17 ENCOUNTER — HOSPITAL ENCOUNTER (OUTPATIENT)
Dept: BEHAVIORAL HEALTH | Facility: HOSPITAL | Age: 33
End: 2018-10-17
Attending: PSYCHIATRY & NEUROLOGY
Payer: OTHER MISCELLANEOUS

## 2018-10-17 PROCEDURE — H0035 MH PARTIAL HOSP TX UNDER 24H: HCPCS | Performed by: SOCIAL WORKER

## 2018-10-17 NOTE — PROGRESS NOTES
Group Therapy Progress Notes     Client Initial Individualized Goals for Treatment:  Will improve wellness related behaviors by continuing to use coping skills that are working for him and practice, practice practice, Will develop an aftercare / transition plan by Friday to include the necessary supports he needs in the community and Will increase daily structured activities by developing a plan of what he will fill his time with    Area of Treatment Focus:  Community Resources / Support and Discharge Planning      Therapeutic Interventions/Treatment Strategies:  Assist clients in establishing / strengthening support network  Assist with discharge planning  Assess / reassess for appropriate therapy program involvement, encourage participation in therapies  Assess / reassess level of potential for harm to self or others  Engage in safety planning when indicated  Facilitate increased self awareness  Set limits on intrusive / aggressive behaviors  Teach adaptive coping skills and communication skills  Use reality based supportive approach    Response to Treatment Strategies:  Accepted Feedback, Gave Feedback, Listened , Focused on Goals and Attentive    Name of Groups:  Psychotherapy Wrap-up 2-3pm    Description and Therapeutic Outcome:  In psychotherapy wrap up group Mason reviewed his take away from the day. Mason stated that the skill he liked from today is Riding the wave. He stated that this helps to remind him that the emotion he is experiencing is temporary. Mason is focused on shifting his thinking to be more positive. He stated that the skills he finds most useful are 5-1 and the 3cs. He is going to try and start using Hello emotion as well. Mason plans to spend time with his family this evening and he is going to set some boundaries for video games. He also plans to take some time tonight to learn some simple bia. Mason was attentive during the group. He displayed open body language and was focused on the  person who was talking.  Mason remains appropriate for PHP.      Are Treatment Plan Goals being addressed?  YES      Are Treatment Plan Strategies to Address Goals Effective?  YES      Are there any current contracts in place?  YES

## 2018-10-17 NOTE — PROGRESS NOTES
Group Therapy Progress Notes     Client Initial Individualized Goals for Treatment: Will improve wellness related behaviors by continuing to use coping skills that are working for him and practice, practice practice, Will develop an aftercare / transition plan by Friday to include the necessary supports he needs in the community and Will increase daily structured activities by developing a plan of what he will fill his time with    Area of Treatment Focus:  Symptom Stabilization and Management    Therapeutic Interventions/Treatment Strategies:  Provide education regarding exercise  Teach adaptive coping skills and communication skills    Response to Treatment Strategies:  Accepted Feedback, Gave Feedback, Listened , Focused on Goals, Attentive, Accepted Support and Alert    Name of Groups:  Exercise - Time: 11:10-12:00    Description and Therapeutic Outcome:   Exercise  OT life skills group with focus on the benefits of exercise.  Education provided with handouts on positive effects of exercise and mental health, different activities to try, ways to improve follow through, how to take your pulse, and target heart rates for age.  Worksheets provided for clients to create individualized exercise goals and exercise log. Theraband is offered with demonstrations as well as a handouts with specific theraband home exercise program guidelines.  Mason states that he has started adding small amounts of exercise into his daily routine activities.  States that because of this he is sleeping better and feeling better.  Also get exercise goal to swim in the pool at a local hotel, which will provided him with exercise, time with his family and fun.         Is this a Weekly Review of the Progress on the Treatment Plan?  NO    Are Treatment Plan Goals being addressed?  YES      Are Treatment Plan Strategies to Address Goals Effective?  YES      Are there any current contracts in place?  NO

## 2018-10-17 NOTE — PROGRESS NOTES
"Group Therapy Progress Notes     Client Initial Individualized Goals for Treatment:     Will improve wellness related behaviors by continuing to use coping skills that are working for him and practice, practice practice, Will develop an aftercare / transition plan by Friday to include the necessary supports he needs in the community and Will increase daily structured activities by developing a plan of what he will fill his time with    Area of Treatment Focus:  Community Resources / Support and Discharge Planning    Therapeutic Interventions/Treatment Strategies:  Assist clients in establishing / strengthening support network  Assist with discharge planning  Assess / reassess for appropriate therapy program involvement, encourage participation in therapies  Facilitate increased self awareness    Response to Treatment Strategies:  Accepted Feedback, Gave Feedback and Listened     Name of Groups:  Coping Skills - Time: 10-10:50; Thought Feeling Action(TFA) 1-1:50    Description and Therapeutic Outcome:     During Coping Skills group, Mason presented as alert and relaxed. Pt highlighted skills that are effective for him which are Default/FocusedMode of the Brain, the concept that cells mirror, 3 C's and applying \"What\" rather than \"Why\" when asking questions in a relationship and recognizes that WHY triggers defenses.    During TFA group, Mason was engaged and responsive and highlighted implementing Surfing the Uge and Riding the wave as a skill he wants to nurture and recognizing hat \"The urge is temporary\". Pt plans to develop 3 Good Things and especially Hello Emotion skills and interrupting negative thoughts with the skills that are pertinent to him.      Is this a Weekly Review of the Progress on the Treatment Plan?  NO    Are Treatment Plan Goals being addressed?  YES      Are Treatment Plan Strategies to Address Goals Effective?  YES      Are there any current contracts in place?  NO           "

## 2018-10-17 NOTE — PROGRESS NOTES
Group Therapy Progress Notes     Client Initial Individualized Goals for Treatment:  Will improve wellness related behaviors by continuing to use coping skills that are working for him and practice, practice practice, Will develop an aftercare / transition plan by Friday to include the necessary supports he needs in the community and Will increase daily structured activities by developing a plan of what he will fill his time with    Area of Treatment Focus:  Community Resources / Support and Discharge Planning      Therapeutic Interventions/Treatment Strategies:  Assist clients in establishing / strengthening support network  Assist with discharge planning  Assess / reassess for appropriate therapy program involvement, encourage participation in therapies  Assess / reassess level of potential for harm to self or others  Engage in safety planning when indicated  Facilitate increased self awareness  Set limits on intrusive / aggressive behaviors  Teach adaptive coping skills and communication skills  Use reality based supportive approach    Response to Treatment Strategies:  Accepted Feedback, Gave Feedback, Listened , Focused on Goals and Attentive    Name of Groups:  Psychotherapy 9-10am      Description and Therapeutic Outcome: In psychotherapy group Mason reviewed his evening. Mason reports that he had some stress and intrusive thoughts. He found other things to focus on to interrupt this. Mason is also practicing prayer as a skill. Mason was able to contact his mom last night and reports feeling relieved that she is doing well. He has expressed an interest in learning a new skill and plans to try bia. During the group Mason was engaged and leaned forward to listen when others were talking. Mason had a calm manner and did not fidget as much today.    Are Treatment Plan Goals being addressed?  YES      Are Treatment Plan Strategies to Address Goals Effective?  YES      Are there any current contracts in  place?  YES

## 2018-10-18 ENCOUNTER — HOSPITAL ENCOUNTER (OUTPATIENT)
Dept: BEHAVIORAL HEALTH | Facility: HOSPITAL | Age: 33
End: 2018-10-18
Attending: PSYCHIATRY & NEUROLOGY
Payer: OTHER MISCELLANEOUS

## 2018-10-18 PROCEDURE — H0035 MH PARTIAL HOSP TX UNDER 24H: HCPCS | Performed by: SOCIAL WORKER

## 2018-10-18 NOTE — DISCHARGE SUMMARY
Adult Partial Hospitalization Program Discharge Summary/Instructions      Patient: Mason Garcia    MRN: [id number]  : 1985    Age: 32 year old Sex: male    Admission Date: 10/01/18  Discharge Date: 10/19/18  Diagnosis: Major Depressive Disorder, Single Episode, Moderate With anxious distress;  PTSD; Cluster B features including at times displays rapidly shifting and shallow expressions of emotion, at times frantic efforts to avoid imagined abandonment, at times a pattern of unstable and intense interpersonal relationships characterized by alternating between extremes of idealization and devaluation, at times recurrent thoughts of self harm, and at times affect instability due to marked reactivity of mood.      Client Strengths:  caring, goal-focused, intelligent, motivated, open to learning, open to suggestions / feedback, responsible parent, support of family, friends and providers, supportive, wants to learn, willing to ask questions, willing to relate to others and work history    Long-Term Goals:  Knowledge about illness and management of symptoms   Maintenance of personal safety   Effective management of impulsivity       Discharge Criteria:  Satisfactory progress toward treatment goals   Improvement re: identified problems and symptoms   Ability to continue recovery at next level of service   Has a discharge plan in place   Has safety/coping plan in place   Regular attendance as scheduled     Area of Treatment Focus:  Progress   Personal Safety, Symptom Stabilization and Management and Community Resources / Support and Discharge Planning    Personal Safety - Mason focused on personal safety during the first week of programming - he did struggle early on with having suicidal ideation -  Several days he would come in and report struggling with SI the night before and there were times he struggled while in the program but was able to use the group to decrease his risk.  Mason did not end up needing to  "come into the ED during that time and was able to use his support to remain safe.  As time went on, Mason was able to interrupt the negative thoughts and start using his coping skills to divert his thoughts away from suicidal ideation.  He reported to the group that he did have some increased anxiety at times and often this would send him into a full blown panic attack - by the end of the group reported being able to walk around the halls, use his skills and within 5 minutes felt under control and returned to group.    Symptom Stabilization and Management - Mason was able to demonstrate the ability to interrupt negative thoughts and to use coping skills to manage anxiety, depression and suicidal ideation.  Specific coping skills that have worked for include 5:1, be metric, walking, 3 C's, gratitude, 3 Good things, grounding techniques such as \"my name is Mason, I am walking the halls, I am not at work, I am not having the accident happen right now\" and other grounding self talk, wisemind, just like me.  Mason was able to demonstrate the ability to use these skills in a variety of settings and under different circumstances. This last week of the group has been much more productive for Mason - last week was tough for him and he really struggled with emotion regulation and letting go.  He had a couple of days where he struggled emotionally and had a very difficult time pulling himself out of the spot he was in and really struggled to use his coping skills effectively.  This week has shown significant growth for Mason as the skills appear to be downloading for him - he is showing much more internal locus of control instead of constantly seeking outside reinforcement and support to help him cope.  He is presenting as much more confident in his ability to use his skills and mastery over his emotions.    Community Resources/Support and Discharge planning - Mason was able to develop the ability to continue recovery at next " level service.  His supports and discharge plan include continuing in therapy with Melia at [facility] as well as his PCP Dr. Vaelncia for medications review.   Mason would also benefit from stopping by the [facility] office on Tuesdays from 3-4:30 for a drop in time to help him in his transition to other community based services.       Prognosis: Good - if Mason remains invested in his therapy, his current path of wellness and his community based support plan, he does have the skill level to continue using and building his skills, however he will need to remain focused on his internal locus of control and not focus on external supports.  If he starts to focus too much on external supports, his pattern of instability of interpersonal relationships, self-image and affect will become prominent again and he will start to slip back into his default mode of anxiety, depression and SI.     Personal Safety: Interrupt early and use your coping skills to divert your thought patterns - do NOT dwell on them.    * Follow your safety plan    * Call crisis lines as needed:    PHP program - [phone] - call if you have questions or are struggling    [place] Area:  [facility], Crisis stabilization housing- [phone]  [facility] Crisis Line: 1-681.670.6917  [facility]: 989-3384  Sexual Assault Program [facility]: [phone] or 1-447.413.3890  [place] [facility]: 1-136.714.1421 Ext: 279       Calls answered Mon-Fri-8:00 am--4:30 pm    [place]:  [facility]: [phone]  [facility] first call for help: [phone]  [facility]:  [phone]    [place] Area:  Warm Line: 1-278.293.5439       Calls answered Tuesday--Saturday 4:00 pm--10:00 pm  [place] Crisis Line - [phone]  [place] [facility] [phone]    [place] Statewide:  [place] Crisis and Referral Services: 1-932.386.5026  National Suicide Prevention Lifeline:  6-299-484-TALK (1041)   - hyw1xpll- Text  Life  to 18541  First Call for Help: 2-1-1  [facility] Helpline- 2-520-JGIE-HELP      I have learned to manage my symptoms by:  Using my above coping and grounding skills.    My Supports are:  My family, my Spiritism, friends, therapist     Managing Symptoms and Preventing Relapse    * Go to all of your appointments    * Take all medications as directed.      * Carry a current list if medication with you    * Do not use illicit (street) drugs.  Avoid alcohol    * Report these symptoms to your care team. These are early signs of relapse:   Thoughts of suicide   Losing more sleep   Increased confusion   Mood getting worse   Feeling more aggressive   Racing thoughts that you can't interrupt    *Use these skills daily:  Interrupt right away and use your skills    Mason was seen during the program by our NP from [facility] for medications management.    Follow up with medications manager: Dr. Valencia - PCP    Next visit: One month  Mason is working with Dr. Valencia on Medication changes and just saw him this week and is working off the Effexor so the dosage has changed.    Current Outpatient Prescriptions:      prazosin (MINIPRESS) 1 MG capsule, Take 1 capsule (1 mg) by mouth At Bedtime, Disp: 30 capsule, Rfl: 0     venlafaxine (EFFEXOR-XR) 75 MG 24 hr capsule, Take 150 mg by mouth daily , Disp: , Rfl:     Follow up with your therapist: [facility]   Next visit: Monday or Tuesday of next week    Go to group therapy and / or support groups at: Mason would also benefit from stopping by the [facility] office on Tuesdays from 3-4:30 for a drop in time     See your medical doctor about:  As needed    Copy of summary sent to: [facility], Dr. Valencia

## 2018-10-18 NOTE — PROGRESS NOTES
"Group Therapy Progress Notes     Client Initial Individualized Goals for Treatment:  Will improve wellness related behaviors by continuing to use coping skills that are working for him and practice, practice practice, Will develop an aftercare / transition plan by Friday to include the necessary supports he needs in the community and Will increase daily structured activities by developing a plan of what he will fill his time with    Area of Treatment Focus:  Community Resources / Support and Discharge Planning      Therapeutic Interventions/Treatment Strategies:  Assist clients in establishing / strengthening support network  Assist with discharge planning  Assess / reassess for appropriate therapy program involvement, encourage participation in therapies  Assess / reassess level of potential for harm to self or others  Engage in safety planning when indicated  Facilitate increased self awareness  Set limits on intrusive / aggressive behaviors  Teach adaptive coping skills and communication skills  Use reality based supportive approach    Response to Treatment Strategies:  Accepted Feedback, Gave Feedback, Listened , Focused on Goals and Attentive    Name of Groups:  Psychotherapy Wrap-up 2-3pm    Description and Therapeutic Outcome:  In psychotherapy wrap up group Mason reviewed his take away from the day.  Discussed that he had a \"light bulb go off today\" and realized he has never forgiveness himself for the accident - and feels he is finally able to forgive himself - able to look at the lesson in it, different priorities now, some flashback thoughts but far less intense.  He states that normally flashbacks send him into panic attacks - today he took 5, walked the halls, did grounding techniques such as \"My name is Mason, I am walking the halls, I am not at work\" and such - also started reading a few words on the sign each time around to distract his brain and it really worked - good in about 5 and went back to " group.  Mason was more attentive in this group - more focused and gave better support to peers.  Mason remains appropriate for PHP.      Are Treatment Plan Goals being addressed?  Yes      Are Treatment Plan Strategies to Address Goals Effective?  YES      Are there any current contracts in place?  YES

## 2018-10-18 NOTE — PROGRESS NOTES
"Group Therapy Progress Notes     Client Initial Individualized Goals for Treatment:     Will improve wellness related behaviors by continuing to use coping skills that are working for him and practice, practice practice, Will develop an aftercare / transition plan by Friday to include the necessary supports he needs in the community and Will increase daily structured activities by developing a plan of what he will fill his time with    Area of Treatment Focus:  Community Resources / Support and Discharge Planning    Therapeutic Interventions/Treatment Strategies:  Assist clients in establishing / strengthening support network  Assist with discharge planning  Assess / reassess for appropriate therapy program involvement, encourage participation in therapies  Facilitate increased self awareness    Response to Treatment Strategies:  Accepted Feedback, Gave Feedback and Listened     Name of Groups:  Grounding skills - Time: 10-10:50; Coping skills 1-1:50    Description and Therapeutic Outcome:     During Grounding skills group, pt presented as focused, bright and participatory. Pt easily engaged in role-playing \"Taking the mind for a Walk\" and recognized the adverse power of listening to negative thinking. Pt also role-played \"I Statements\" which incorporated the THE skill of not allowing your label to define you. Discussed not personalizing his son's moods.    During Coping skills group, pt presented as pleasant and participatory. Pt identified negative thoughts of : I am an idiot; No one is going to love me; I don't deserve my girlfriend\".  Pt implemented coping skills: My feeling are like a wave that comes and goes, I can ride this out; my anxiety won't kill me, it just doesn't feel good right now\". Pt continues to practice 5 to 1, 3 Good Things and is working on 3 C's, Just Like Me.    Is this a Weekly Review of the Progress on the Treatment Plan?  NO    Are Treatment Plan Goals being addressed?  YES      Are " Treatment Plan Strategies to Address Goals Effective?  YES      Are there any current contracts in place?  NO

## 2018-10-18 NOTE — PROGRESS NOTES
Team consultation to review patient's progress and treatment plan.  The patient remains appropriate for the PHP program.  Partial Hospitalization (PHP) services continue to be medically necessary to improve or maintain the client's condition and functional level and to prevent relapse or hospitalization.  PHP services will be provided in lieu of psychiatric hospitalization, no less intensive level of care would be sufficient to provide the medically necessary treatment the client requires.

## 2018-10-18 NOTE — PROGRESS NOTES
Group Therapy Progress Notes     Client Initial Individualized Goals for Treatment:   Will improve wellness related behaviors by continuing to use coping skills that are working for him and practice, practice practice, Will develop an aftercare / transition plan by Friday to include the necessary supports he needs in the community and Will increase daily structured activities by developing a plan of what he will fill his time with       Area of Treatment Focus:  Symptom Stabilization and Management and Cultural/ Racial / Health / Spiritual    Therapeutic Interventions/Treatment Strategies:  Provide education regarding EFT and nutrition  Teach adaptive coping skills and communication skills    Response to Treatment Strategies:  Accepted Feedback, Gave Feedback, Listened , Focused on Goals, Attentive, Accepted Support and Alert    Name of Groups:  Nutrition - Time: 11:10-12:00    Description and Therapeutic Outcome:   OT wellness group on Nutrition (diet/healthy food)  This group explores a variety of topics and educates on the relationship between physical health and mental health and how these aspects support the other.  Clients will learn skills to help with utilizing a balanced diet for health management.  Clients will increase knowledge and awareness on how exercise supports self care and wellness, identify barriers to healthy food choices, and will develop a feasible plan to obtain a healthy meal plan.  Clients will learn how to read Nutrition Facts Labels and will develop mindfulness skills related to eating. Also discussed the coping strategy of taping for stress, anxiety, and pain. Provided patients information on EFT/tapping (Emotional Freedom Technique) and hands on demonstration also provided.         Is this a Weekly Review of the Progress on the Treatment Plan?  NO    Are Treatment Plan Goals being addressed?  YES      Are Treatment Plan Strategies to Address Goals Effective?  YES      Are there any  current contracts in place?  NO

## 2018-10-18 NOTE — PROGRESS NOTES
Group Therapy Progress Notes     Client Initial Individualized Goals for Treatment:  Will improve wellness related behaviors by continuing to use coping skills that are working for him and practice, practice practice, Will develop an aftercare / transition plan by Friday to include the necessary supports he needs in the community and Will increase daily structured activities by developing a plan of what he will fill his time with    Area of Treatment Focus:  Community Resources / Support and Discharge Planning      Therapeutic Interventions/Treatment Strategies:  Assist clients in establishing / strengthening support network  Assist with discharge planning  Assess / reassess for appropriate therapy program involvement, encourage participation in therapies  Assess / reassess level of potential for harm to self or others  Engage in safety planning when indicated  Facilitate increased self awareness  Set limits on intrusive / aggressive behaviors  Teach adaptive coping skills and communication skills  Use reality based supportive approach    Response to Treatment Strategies:  Accepted Feedback, Gave Feedback, Listened , Focused on Goals and Attentive    Name of Groups:  Psychotherapy 9-10am      Description and Therapeutic Outcome: In psychotherapy group Mason reviewed his evening - did good until bed time - baby was fussy so he did not sleep hard - had a very hard time controlling racing thoughts.  Finally got up and walked around - struggling some this morning - discussed coping skills he can use this morning - is using sensory items and will focus on the other group members.  Discussed discharge plans - sees Melia for therapy early next week, just saw Dr. Valencia and he is working to change some meds so has another appointment set with him and would like to stay with him.  Mason was somewhat distracted and distracting with getting up but was able to settle more as the group went on. And did give feedback to his  peers.    Are Treatment Plan Goals being addressed?  Yes      Are Treatment Plan Strategies to Address Goals Effective?  YES      Are there any current contracts in place?  YES

## 2018-10-19 ENCOUNTER — HOSPITAL ENCOUNTER (OUTPATIENT)
Dept: BEHAVIORAL HEALTH | Facility: HOSPITAL | Age: 33
End: 2018-10-19
Attending: PSYCHIATRY & NEUROLOGY
Payer: OTHER MISCELLANEOUS

## 2018-10-19 PROCEDURE — H0035 MH PARTIAL HOSP TX UNDER 24H: HCPCS | Performed by: SOCIAL WORKER

## 2018-10-19 ASSESSMENT — ANXIETY QUESTIONNAIRES
2. NOT BEING ABLE TO STOP OR CONTROL WORRYING: MORE THAN HALF THE DAYS
3. WORRYING TOO MUCH ABOUT DIFFERENT THINGS: NEARLY EVERY DAY
6. BECOMING EASILY ANNOYED OR IRRITABLE: SEVERAL DAYS
5. BEING SO RESTLESS THAT IT IS HARD TO SIT STILL: MORE THAN HALF THE DAYS
1. FEELING NERVOUS, ANXIOUS, OR ON EDGE: MORE THAN HALF THE DAYS
GAD7 TOTAL SCORE: 15
IF YOU CHECKED OFF ANY PROBLEMS ON THIS QUESTIONNAIRE, HOW DIFFICULT HAVE THESE PROBLEMS MADE IT FOR YOU TO DO YOUR WORK, TAKE CARE OF THINGS AT HOME, OR GET ALONG WITH OTHER PEOPLE: SOMEWHAT DIFFICULT
7. FEELING AFRAID AS IF SOMETHING AWFUL MIGHT HAPPEN: MORE THAN HALF THE DAYS

## 2018-10-19 ASSESSMENT — PATIENT HEALTH QUESTIONNAIRE - PHQ9: 5. POOR APPETITE OR OVEREATING: NEARLY EVERY DAY

## 2018-10-19 NOTE — IP AVS SNAPSHOT
"                  MRN:[id number]                      After Visit Summary   10/19/2018    Mason Garcia    MRN: [id number]           Visit Information        Provider Department      10/19/2018  9:00 AM [facility] Partial        MyChart Information     [employer] lets you send messages to your doctor, view your test results, renew your prescriptions, schedule appointments and more. To sign up, go to [email or web address] . Click on \"Log in\" on the left side of the screen, which will take you to the Welcome page. Then click on \"Sign up Now\" on the right side of the page.     You will be asked to enter the access code listed below, as well as some personal information. Please follow the directions to create your username and password.     Your access code is: [email or web address]  Expires: 11/15/2018 10:11 AM     Your access code will  in 90 days. If you need help or a new code, please call your Mobile clinic or [phone].        Care EveryWhere ID     This is your Care EveryWhere ID. This could be used by other organizations to access your Mobile medical records  [id number]        Equal Access to Services     QUIN STUART : Hadii dylan Wesley, waaxda lutabitha, qaybta kaalmada wesley, marcia pugh. So [facility] [phone].    ATENCIÓN: Si habla español, tiene a tobar disposición servicios gratuitos de asistencia lingüística. Llame al [phone].    We comply with applicable federal civil rights laws and [place] laws. We do not discriminate on the basis of race, color, national origin, age, disability, sex, sexual orientation, or gender identity.            "

## 2018-10-19 NOTE — IP AVS SNAPSHOT
Medication List       Patient:  MAGALY GERONIMO   :  1985   Physician:  Ruben Valencia MD           This is your record.  Keep this with you and show to your community pharmacist(s) and physician(s) at each visit.     Allergies:  SUDAFED [PSEUDOEPHEDRINE] - (reactions not documented)               Medications  Valid as of: 2018 - 12:49 PM    Generic Name Brand Name Tablet Size Instructions for use    Prazosin HCl MINIPRESS 1 MG Take 1 capsule (1 mg) by mouth At Bedtime        Venlafaxine HCl EFFEXOR-XR 75 MG Take 150 mg by mouth daily         .           .           .           .

## 2018-10-19 NOTE — PROGRESS NOTES
"Group Therapy Progress Notes     Client Initial Individualized Goals for Treatment:     Will improve wellness related behaviors by continuing to use coping skills that are working for him and practice, practice practice, Will develop an aftercare / transition plan by Friday to include the necessary supports he needs in the community and Will increase daily structured activities by developing a plan of what he will fill his time with    Area of Treatment Focus:  Community Resources / Support and Discharge Planning    Therapeutic Interventions/Treatment Strategies:  Assist clients in establishing / strengthening support network  Assist with discharge planning  Assess / reassess for appropriate therapy program involvement, encourage participation in therapies  Facilitate increased self awareness    Response to Treatment Strategies:  Accepted Feedback, Gave Feedback, Listened  and Focused on Goals    Name of Groups:  Locus of Control - Time: 10-10:50; 1-1:50    Description and Therapeutic Outcome:     During Locus of Control group, Mason presented as bright, social and easily responsive. Pt grasped the concept and feedback represented this. Discussed having the one stepson only say two lines of prayer at the dinner table \"as I'm wonndering if he he is skipping dinner because he doesn't wasn't to pray\".    During the follow up group, pt was amenable to developing his inner locus of control to wean him from dependence on his girlfriend and to become \"Nolan among men\" Pt presents with a positive grasp of the skills: Awareness. 3 Good Things, 3 C's, Surfing the urge, Gratitude, Present Moment and Hello Emotion\".      Is this a Weekly Review of the Progress on the Treatment Plan?  NO    Are Treatment Plan Goals being addressed?  YES      Are Treatment Plan Strategies to Address Goals Effective?  YES      Are there any current contracts in place?  NO           "

## 2018-10-19 NOTE — PROGRESS NOTES
Group Therapy Progress Notes     Client Initial Individualized Goals for Treatment:  Will improve wellness related behaviors by continuing to use coping skills that are working for him and practice, practice practice, Will develop an aftercare / transition plan by Friday to include the necessary supports he needs in the community and Will increase daily structured activities by developing a plan of what he will fill his time with    Area of Treatment Focus:  Community Resources / Support and Discharge Planning      Therapeutic Interventions/Treatment Strategies:  Assist clients in establishing / strengthening support network  Assist with discharge planning  Assess / reassess for appropriate therapy program involvement, encourage participation in therapies  Assess / reassess level of potential for harm to self or others  Engage in safety planning when indicated  Facilitate increased self awareness  Set limits on intrusive / aggressive behaviors  Teach adaptive coping skills and communication skills  Use reality based supportive approach    Response to Treatment Strategies:  Accepted Feedback, Gave Feedback, Listened , Focused on Goals and Attentive    Name of Groups:  Psychotherapy 9-10am      Description and Therapeutic Outcome: In psychotherapy group, today is Mason's last day. Mason reviewed his evening. Mason reports that he had some stress and intrusive thoughts. He found other things to focus on to interrupt this. Mason is also practicing prayer as a skill. He describes that he is trying to encourage his fiance's son's to lead prayer at dinner and became frustrated that one of the boys will not eat at the table. He was encouraged by his peers to be patient with the boys and he accepted this feedback and agreed with his peers.  He has expressed an interest in learning a new skill and plans to try bia. Mason was fidgety this morning and did interrupt others when he had a thought. He acknowledged this and  "apologized for being \"hyper\" this morning.     Are Treatment Plan Goals being addressed?  YES      Are Treatment Plan Strategies to Address Goals Effective?  YES      Are there any current contracts in place?  YES             "

## 2018-10-19 NOTE — PROGRESS NOTES
Group Therapy Progress Notes     Client Initial Individualized Goals for Treatment: Will improve wellness related behaviors by continuing to use coping skills that are working for him and practice, practice practice, Will develop an aftercare / transition plan by Friday to include the necessary supports he needs in the community and Will increase daily structured activities by developing a plan of what he will fill his time with    Area of Treatment Focus:  Symptom Stabilization and Management    Therapeutic Interventions/Treatment Strategies:  Facilitate increased self awareness  Provide education regarding use of sensory input for calming and alerting  Teach adaptive coping skills and communication skills    Response to Treatment Strategies:  Accepted Feedback, Gave Feedback, Listened , Focused on Goals, Attentive, Accepted Support and Alert    Name of Groups:  Stress Management - Time: 11:10-12:00    Description and Therapeutic Outcome:   Mason is bright and participatory in OT group today.  Smiling and interacting well with his peers.  Education provided on the use of calming and alerting sensory input.  Discussed examples of each type of sensory input.  Encouraged clients to explore sensory input to determine what they will respond best too.  Mason was given the opportunity to create his own sensory kit.  Sensory items included deck of cards, stress ball, koosh ball, aromatherapy, puzzle books, splat balls, worry stone, crayons, bubbles, weighted collar, weighted animal and hot/cold pack.         Is this a Weekly Review of the Progress on the Treatment Plan?  NO    Are Treatment Plan Goals being addressed?  YES      Are Treatment Plan Strategies to Address Goals Effective?  YES      Are there any current contracts in place?  NO

## 2018-10-23 ASSESSMENT — PATIENT HEALTH QUESTIONNAIRE - PHQ9: SUM OF ALL RESPONSES TO PHQ QUESTIONS 1-9: 14

## 2018-10-23 ASSESSMENT — ANXIETY QUESTIONNAIRES: GAD7 TOTAL SCORE: 15

## 2018-10-23 NOTE — PROGRESS NOTES
"Group Therapy Progress Notes     Client Initial Individualized Goals for Treatment:  Will improve wellness related behaviors by continuing to use coping skills that are working for him and practice, practice practice, Will develop an aftercare / transition plan by Friday to include the necessary supports he needs in the community and Will increase daily structured activities by developing a plan of what he will fill his time with    Area of Treatment Focus:  Community Resources / Support and Discharge Planning      Therapeutic Interventions/Treatment Strategies:  Assist clients in establishing / strengthening support network  Assist with discharge planning  Assess / reassess for appropriate therapy program involvement, encourage participation in therapies  Assess / reassess level of potential for harm to self or others  Engage in safety planning when indicated  Facilitate increased self awareness  Set limits on intrusive / aggressive behaviors  Teach adaptive coping skills and communication skills  Use reality based supportive approach    Response to Treatment Strategies:  Accepted Feedback, Gave Feedback, Listened , Focused on Goals and Attentive    Name of Groups:  Psychotherapy wrap up group      Description and Therapeutic Outcome: In psychotherapy wrap up group Mason describes his take from the day and from being in the PHP program. He states that he liked the OT  Group of making calming items. He states that he is glad he participated in PHP and learned a lot about himself. He states that he did not realize how his codependent tendencies have affected his relationships.  He was very productive in the group and a strong participant.  Mason will continue to work with Melia from [facility] and see's his PCP for medication management. He intends to return to work in the near future.        did interrupt others when he had a thought. He acknowledged this and apologized for being \"hyper\" this morning.     Are " Treatment Plan Goals being addressed?  YES      Are Treatment Plan Strategies to Address Goals Effective?  YES      Are there any current contracts in place?  YES

## 2018-11-02 ENCOUNTER — HOSPITAL ENCOUNTER (INPATIENT)
Facility: HOSPITAL | Age: 33
LOS: 3 days | Discharge: HOME | DRG: 885 | End: 2018-11-05
Attending: PHYSICIAN ASSISTANT | Admitting: PSYCHIATRY & NEUROLOGY
Payer: OTHER MISCELLANEOUS

## 2018-11-02 DIAGNOSIS — R45.851 SUICIDAL IDEATION: ICD-10-CM

## 2018-11-02 DIAGNOSIS — F43.10 PTSD (POST-TRAUMATIC STRESS DISORDER): ICD-10-CM

## 2018-11-02 DIAGNOSIS — F33.1 MODERATE EPISODE OF RECURRENT MAJOR DEPRESSIVE DISORDER (H): Primary | ICD-10-CM

## 2018-11-02 LAB
ALBUMIN SERPL-MCNC: 3.9 G/DL (ref 3.4–5)
ALBUMIN UR-MCNC: NEGATIVE MG/DL
ALP SERPL-CCNC: 89 U/L (ref 40–150)
ALT SERPL W P-5'-P-CCNC: 67 U/L (ref 0–70)
AMPHETAMINES UR QL SCN: NEGATIVE
ANION GAP SERPL CALCULATED.3IONS-SCNC: 8 MMOL/L (ref 3–14)
APPEARANCE UR: CLEAR
AST SERPL W P-5'-P-CCNC: 29 U/L (ref 0–45)
BARBITURATES UR QL: NEGATIVE
BASOPHILS # BLD AUTO: 0.1 10E9/L (ref 0–0.2)
BASOPHILS NFR BLD AUTO: 0.5 %
BENZODIAZ UR QL: NEGATIVE
BILIRUB SERPL-MCNC: 0.2 MG/DL (ref 0.2–1.3)
BILIRUB UR QL STRIP: NEGATIVE
BUN SERPL-MCNC: 16 MG/DL (ref 7–30)
CALCIUM SERPL-MCNC: 8.5 MG/DL (ref 8.5–10.1)
CANNABINOIDS UR QL SCN: NEGATIVE
CHLORIDE SERPL-SCNC: 106 MMOL/L (ref 94–109)
CO2 SERPL-SCNC: 24 MMOL/L (ref 20–32)
COCAINE UR QL: NEGATIVE
COLOR UR AUTO: YELLOW
CREAT SERPL-MCNC: 1 MG/DL (ref 0.66–1.25)
DIFFERENTIAL METHOD BLD: ABNORMAL
EOSINOPHIL # BLD AUTO: 0.4 10E9/L (ref 0–0.7)
EOSINOPHIL NFR BLD AUTO: 3.2 %
ERYTHROCYTE [DISTWIDTH] IN BLOOD BY AUTOMATED COUNT: 12.9 % (ref 10–15)
ETHANOL SERPL-MCNC: <0.01 G/DL
GFR SERPL CREATININE-BSD FRML MDRD: 86 ML/MIN/1.7M2
GLUCOSE SERPL-MCNC: 94 MG/DL (ref 70–99)
GLUCOSE UR STRIP-MCNC: NEGATIVE MG/DL
HCT VFR BLD AUTO: 49.3 % (ref 40–53)
HGB BLD-MCNC: 17.4 G/DL (ref 13.3–17.7)
HGB UR QL STRIP: NEGATIVE
IMM GRANULOCYTES # BLD: 0.1 10E9/L (ref 0–0.4)
IMM GRANULOCYTES NFR BLD: 0.6 %
KETONES UR STRIP-MCNC: NEGATIVE MG/DL
LEUKOCYTE ESTERASE UR QL STRIP: NEGATIVE
LYMPHOCYTES # BLD AUTO: 3.1 10E9/L (ref 0.8–5.3)
LYMPHOCYTES NFR BLD AUTO: 25 %
MCH RBC QN AUTO: 30.6 PG (ref 26.5–33)
MCHC RBC AUTO-ENTMCNC: 35.3 G/DL (ref 31.5–36.5)
MCV RBC AUTO: 87 FL (ref 78–100)
METHADONE UR QL SCN: NEGATIVE
MONOCYTES # BLD AUTO: 1.1 10E9/L (ref 0–1.3)
MONOCYTES NFR BLD AUTO: 8.9 %
NEUTROPHILS # BLD AUTO: 7.7 10E9/L (ref 1.6–8.3)
NEUTROPHILS NFR BLD AUTO: 61.8 %
NITRATE UR QL: NEGATIVE
NRBC # BLD AUTO: 0 10*3/UL
NRBC BLD AUTO-RTO: 0 /100
OPIATES UR QL SCN: NEGATIVE
PCP UR QL SCN: NEGATIVE
PH UR STRIP: 6 PH (ref 4.7–8)
PLATELET # BLD AUTO: 233 10E9/L (ref 150–450)
POTASSIUM SERPL-SCNC: 3.9 MMOL/L (ref 3.4–5.3)
PROT SERPL-MCNC: 7.8 G/DL (ref 6.8–8.8)
RBC # BLD AUTO: 5.68 10E12/L (ref 4.4–5.9)
SODIUM SERPL-SCNC: 138 MMOL/L (ref 133–144)
SOURCE: NORMAL
SP GR UR STRIP: 1.01 (ref 1–1.03)
UROBILINOGEN UR STRIP-MCNC: NORMAL MG/DL (ref 0–2)
WBC # BLD AUTO: 12.5 10E9/L (ref 4–11)

## 2018-11-02 PROCEDURE — 81003 URINALYSIS AUTO W/O SCOPE: CPT | Performed by: PHYSICIAN ASSISTANT

## 2018-11-02 PROCEDURE — 80053 COMPREHEN METABOLIC PANEL: CPT | Performed by: PHYSICIAN ASSISTANT

## 2018-11-02 PROCEDURE — 12400000 ZZH R&B MH

## 2018-11-02 PROCEDURE — 85025 COMPLETE CBC W/AUTO DIFF WBC: CPT | Performed by: PHYSICIAN ASSISTANT

## 2018-11-02 PROCEDURE — 99283 EMERGENCY DEPT VISIT LOW MDM: CPT | Mod: Z6 | Performed by: PHYSICIAN ASSISTANT

## 2018-11-02 PROCEDURE — 99285 EMERGENCY DEPT VISIT HI MDM: CPT

## 2018-11-02 PROCEDURE — 80307 DRUG TEST PRSMV CHEM ANLYZR: CPT | Performed by: PHYSICIAN ASSISTANT

## 2018-11-02 PROCEDURE — 25000132 ZZH RX MED GY IP 250 OP 250 PS 637: Performed by: PHYSICIAN ASSISTANT

## 2018-11-02 PROCEDURE — 80320 DRUG SCREEN QUANTALCOHOLS: CPT | Performed by: PHYSICIAN ASSISTANT

## 2018-11-02 PROCEDURE — 36415 COLL VENOUS BLD VENIPUNCTURE: CPT | Performed by: PHYSICIAN ASSISTANT

## 2018-11-02 RX ORDER — HYDROXYZINE HYDROCHLORIDE 25 MG/1
50-100 TABLET, FILM COATED ORAL EVERY 4 HOURS PRN
Status: DISCONTINUED | OUTPATIENT
Start: 2018-11-02 | End: 2018-11-05 | Stop reason: HOSPADM

## 2018-11-02 RX ORDER — ACETAMINOPHEN 325 MG/1
650 TABLET ORAL EVERY 4 HOURS PRN
Status: DISCONTINUED | OUTPATIENT
Start: 2018-11-02 | End: 2018-11-05 | Stop reason: HOSPADM

## 2018-11-02 RX ORDER — OLANZAPINE 10 MG/2ML
10 INJECTION, POWDER, FOR SOLUTION INTRAMUSCULAR 3 TIMES DAILY PRN
Status: DISCONTINUED | OUTPATIENT
Start: 2018-11-02 | End: 2018-11-05 | Stop reason: HOSPADM

## 2018-11-02 RX ORDER — ALUMINA, MAGNESIA, AND SIMETHICONE 2400; 2400; 240 MG/30ML; MG/30ML; MG/30ML
30 SUSPENSION ORAL EVERY 4 HOURS PRN
Status: DISCONTINUED | OUTPATIENT
Start: 2018-11-02 | End: 2018-11-05 | Stop reason: HOSPADM

## 2018-11-02 RX ORDER — OLANZAPINE 10 MG/1
10 TABLET ORAL 3 TIMES DAILY PRN
Status: DISCONTINUED | OUTPATIENT
Start: 2018-11-02 | End: 2018-11-05 | Stop reason: HOSPADM

## 2018-11-02 RX ORDER — BISACODYL 10 MG
10 SUPPOSITORY, RECTAL RECTAL DAILY PRN
Status: DISCONTINUED | OUTPATIENT
Start: 2018-11-02 | End: 2018-11-05 | Stop reason: HOSPADM

## 2018-11-02 RX ORDER — TRAZODONE HYDROCHLORIDE 50 MG/1
50 TABLET, FILM COATED ORAL
Status: DISCONTINUED | OUTPATIENT
Start: 2018-11-02 | End: 2018-11-05 | Stop reason: HOSPADM

## 2018-11-02 RX ORDER — LORAZEPAM 1 MG/1
1 TABLET ORAL ONCE
Status: COMPLETED | OUTPATIENT
Start: 2018-11-02 | End: 2018-11-02

## 2018-11-02 RX ADMIN — LORAZEPAM 1 MG: 1 TABLET ORAL at 20:57

## 2018-11-02 ASSESSMENT — ENCOUNTER SYMPTOMS
FEVER: 0
SLEEP DISTURBANCE: 1
HALLUCINATIONS: 0
SHORTNESS OF BREATH: 0
NERVOUS/ANXIOUS: 1
DYSPHORIC MOOD: 1
CONFUSION: 0
DECREASED CONCENTRATION: 1

## 2018-11-02 NOTE — IP AVS SNAPSHOT
[facility]    [address] [place] [id number]    Phone:  [phone]    Fax:  [phone]                                       After Visit Summary   11/2/2018    Mason Garcia    MRN: [id number]           After Visit Summary Signature Page     I have received my discharge instructions, and my questions have been answered. I have discussed any challenges I see with this plan with the nurse or doctor.    ..........................................................................................................................................  Patient/Patient Representative Signature      ..........................................................................................................................................  Patient Representative Print Name and Relationship to Patient    ..................................................               ................................................  Date                                   Time    ..........................................................................................................................................  Reviewed by Signature/Title    ...................................................              ..............................................  Date                                               Time          22EPIC Rev 08/18

## 2018-11-02 NOTE — IP AVS SNAPSHOT
MRN:[id number]                      After Visit Summary   11/2/2018    Mason Garcia    MRN: [id number]           Thank you!     Thank you for choosing [facility] for your care. Our goal is always to provide you with excellent care. Hearing back from our patients is one way we can continue to improve our services. Please take a few minutes to complete the written survey that you may receive in the mail after you visit with us. Thank you!        Patient Information     Date Of Birth          1985        About your hospital stay     You were admitted on:  November 2, 2018 You last received care in the: [facility]    You were discharged on:  November 5, 2018       Who to Call     For medical emergencies, please call 911.  For non-urgent questions about your medical care, please call your primary care provider or clinic, [phone]          Attending Provider     Provider Specialty    Megan Bonner PA-C Emergency Medicine    Giancarlo Lomas MD Psychiatry    Víctor Sims NP Nurse Practitioner Psych/Mental Health       Primary Care Provider Office Phone # Fax #    Ruben Valencia [phone]      Further instructions from your care team       Behavioral Discharge Planning and Instructions    Summary: Mason was admitted to  with increased suicidal ideation     Main Diagnosis: Major Depressive Disorder, recurrent, moderate, PTSD, Panic Disorder w/O agoraphobia, R/O Other Bipolar Disorder, Cluster B Personality Traits    Major Treatments, Procedures and Findings: Stabilize with medications, connect with community programs.    Symptoms to Report: feeling more aggressive, increased confusion, losing more sleep, mood getting worse or thoughts of suicide    Lifestyle Adjustment: Take all medications as prescribed, meet with doctor/ medication provider, out patient therapist, , and [facility] worker as scheduled. Abstain from alcohol or any unprescribed  drugs.    Psychiatry Follow-up:       [facility]   Med management - FRANTZ Martin - November 7th @ 10:00  [address] [place] [address]  Phone: [phone] Fax: [phone]    [facility]   Therapy - Melia - as arranged   [address] [place] [address]  [phone]    Fax: [phone]    Resources:   Crisis Intervention: 720.946.4683 or 835-239-1646 (TTY: 793.769.1456).  Call anytime for help.  National [place] on Mental Illness (www.mn.lou.org): 507.782.1782 or 893-419-0273.  Alcoholics Anonymous (www.alcoholics-anonymous.org): Check your phone book for your local chapter.  Suicide Awareness Voices of Education (SAVE) (www.save.org): 131-087-PBQJ (3178)  National Suicide Prevention Line (www.mentalhealthmn.org): 479-853-MROE (5739)  Mental Health Consumer/Survivor Network of [place] (www.mhcsn.net): 743.902.6007 or 393-694-0155  Mental Health Association of [place] (www.mentalhealth.org): 814.585.1068 or 532-707-0448    General Medication Instructions:   See your medication sheet(s) for instructions.   Take all medicines as directed.  Make no changes unless your doctor suggests them.   Go to all your doctor visits.  Be sure to have all your required lab tests. This way, your medicines can be refilled on time.  Do not use any drugs not prescribed by your doctor.  Avoid alcohol.    [place] Area:  [facility], [facility] stabilization [facility]- [phone]  [facility] Crisis Line: 1-202.501.4462  Adv[facility]ates For Family Peace: 224-5716  Sexual Assault Program of [facility]: 231.149.8292 or 1-142.939.5980  [place] [facility] Women[facility] Program: 1-161-041-9577 Ext: 279       Calls answered Mon-Fri-8:00 am--4:30 pm    [place]:  Adv[facility]ates for Family Peace: 1-328.644.6783  [facility] first call for help: 1-217.393.7995  [facility] Crisis Center:  (131) 285-3753      [place] Area:  Warm Line: 1-168.370.1280       Calls answered Tuesday--Saturday 4:00 pm--10:00 pm  [place]  "Crisis Line - 849-738-8092  [facility] 487-822-0525    [place] Statewide:  [place] Crisis and Referral Services: 1-175.172.3321  National Suicide Prevention Lifeline: 8-888-529-TALK (0907)   - akv0jjdw- Text \"Life\" to 25604  First Call for Help: 1-  [facility]ELY Helpline- 6-681-SYHK-HELP      Pending Results     No orders found from 10/31/2018 to 11/3/2018.            Statement of Approval     Ordered          18 1051  I have reviewed and agree with all the recommendations and orders detailed in this document.  EFFECTIVE NOW     Approved and electronically signed by:  Gwendolyn Vazquez Mc, DARNELL CNP             Admission Information     Date & Time Provider Department Dept. Phone    2018 Víctor Sims, NP [facility] [phone]      Your Vitals Were     Blood Pressure Pulse Temperature Respirations Height Weight    129/65 92 97.1  F (36.2  C) (Tympanic) 18 1.854 m (6' 1\") 103.1 kg (227 lb 4.8 oz)    Pulse Oximetry BMI (Body Mass Index)                99% 29.99 kg/m2          MyChart Information     [employer] lets you send messages to your doctor, view your test results, renew your prescriptions, schedule appointments and more. To sign up, go to [email or web address] . Click on \"Log in\" on the left side of the screen, which will take you to the Welcome page. Then click on \"Sign up Now\" on the right side of the page.     You will be asked to enter the access code listed below, as well as some personal information. Please follow the directions to create your username and password.     Your access code is: [email or web address]  Expires: 11/15/2018  9:11 AM     Your access code will  in 90 days. If you need help or a new code, please call your [facility] clinic or [phone].        Care EveryWhere ID     This is your Care EveryWhere ID. This could be used by other organizations to access your [facility] medical records  [id number]        Equal Access to Services     QUIN STUART AH: Vilma hogan " indigo Soderrickali, waaxda luqadaha, qaybta kaalmada adelucilleda, marcia hennessyn erik pugh. So [facility] [phone].    ATENCIÓN: Si yun mcghee, tiene a tobar disposición servicios gratuitos de asistencia lingüística. Yolanda al [phone].    We comply with applicable federal civil rights laws and [place] laws. We do not discriminate on the basis of race, color, national origin, age, disability, sex, sexual orientation, or gender identity.               Review of your medicines      CONTINUE these medicines which may have CHANGED, or have new prescriptions. If we are uncertain of the size of tablets/capsules you have at home, strength may be listed as something that might have changed.        Dose / Directions    venlafaxine 150 MG 24 hr capsule   Commonly known as:  EFFEXOR-XR   This may have changed:  Another medication with the same name was removed. Continue taking this medication, and follow the directions you see here.        Dose:  150 mg   Start taking on:  11/6/2018   Take 1 capsule (150 mg) by mouth daily   Quantity:  30 capsule   Refills:  0         CONTINUE these medicines which have NOT CHANGED        Dose / Directions    prazosin 1 MG capsule   Commonly known as:  MINIPRESS        Dose:  1 mg   Take 1 capsule (1 mg) by mouth At Bedtime   Quantity:  30 capsule   Refills:  0            Where to get your medicines      These medications were sent to [facility] [address] [place] [address], [place] - [address] AT [facility] OF [facility], [facility] [address]  [address], [facility] [address]     Phone:  [phone]     prazosin 1 MG capsule    venlafaxine 150 MG 24 hr capsule                Protect others around you: Learn how to safely use, store and throw away your medicines at www.disposemymeds.org.             Medication List: This is a list of all your medications and when to take them. Check marks below indicate your daily home schedule. Keep this list as a reference.      Medications           Morning  Afternoon Evening Bedtime As Needed    prazosin 1 MG capsule   Commonly known as:  MINIPRESS   Take 1 capsule (1 mg) by mouth At Bedtime   Last time this was given:  1 mg on 11/4/2018  8:10 PM                                venlafaxine 150 MG 24 hr capsule   Commonly known as:  EFFEXOR-XR   Take 1 capsule (150 mg) by mouth daily   Start taking on:  11/6/2018   Last time this was given:  150 mg on 11/5/2018  8:11 AM                                          More Information        Prazosin Hydrochloride Oral capsule  What is this medicine?  PRAZOSIN (PRA purvi sin) is an antihypertensive. It works by relaxing the blood vessels. It is used to treat high blood pressure.  This medicine may be used for other purposes; ask your health care provider or pharmacist if you have questions.  What should I tell my health care provider before I take this medicine?  They need to know if you have any of the following conditions:    kidney disease    an unusual or allergic reaction to prazosin, other medicines, foods, dyes, or preservatives    pregnant or trying to get pregnant    breast-feeding  How should I use this medicine?  Take this medicine by mouth with a glass of water. Follow the directions on the prescription label. Take your doses at regular intervals. Do not take your medicine more often than directed. Do not stop taking except on the advice of your doctor or health care professional.  Talk to your pediatrician regarding the use of this medicine in children. Special care may be needed.  Overdosage: If you think you have taken too much of this medicine contact a poison control center or emergency room at once.  NOTE: This medicine is only for you. Do not share this medicine with others.  What if I miss a dose?  If you miss a dose, take it as soon as you can. If it is almost time for your next dose, take only that dose. Do not take double or extra doses.  What may interact with this medicine?    diuretics    medicines for  high blood pressure    sildenafil    tadalafil    vardenafil  This list may not describe all possible interactions. Give your health care provider a list of all the medicines, herbs, non-prescription drugs, or dietary supplements you use. Also tell them if you smoke, drink alcohol, or use illegal drugs. Some items may interact with your medicine.  What should I watch for while using this medicine?  Visit your doctor or health care professional for regular checks on your progress. Check your blood pressure regularly. Ask your doctor or health care professional what your blood pressure should be and when you should contact him or her.  Drowsiness and dizziness are more likely to occur after the first dose, after an increase in dose, or during hot weather or exercise. These effects can decrease once your body adjusts to this medicine. Do not drive, use machinery, or do anything that needs mental alertness until you know how this drug affects you. Do not stand or sit up quickly, especially if you are an older patient. This reduces the risk of dizzy or fainting spells. Alcohol can make you more drowsy and dizzy. Avoid alcoholic drinks.  Do not treat yourself for coughs, colds or allergies without asking your doctor or health care professional for advice. Some ingredients can increase your blood pressure.  Your mouth may get dry. Chewing sugarless gum or sucking hard candy, and drinking plenty of water may help. Contact your doctor if the problem does not go away or is severe.  For males, contact your doctor or health care professional right away if you have an erection that lasts longer than 4 hours or if it becomes painful. This may be a sign of a serious problem and must be treated right away to prevent permanent damage.  What side effects may I notice from receiving this medicine?  Side effects that you should report to your doctor or health care professional as soon as possible:    blurred vision    difficulty  breathing, shortness of breath    fainting spells, light headedness    fast or irregular heartbeat, palpitations or chest pain    numbness in hands or feet    prolonged painful erection of the penis    swelling of the legs or ankles    unusually weak or tired  Side effects that usually do not require medical attention (report to your doctor or health care professional if they continue or are bothersome):    constipation or diarrhea    headache    nausea    sexual difficulties    stomach pain  This list may not describe all possible side effects. Call your doctor for medical advice about side effects. You may report side effects to FDA at 9-349-WXE-6742.  Where should I keep my medicine?  Keep out of the reach of children.  Store at room temperature between 15 and 30 degrees C (59 and 86 degrees F). Protect from light. Keep container tightly closed. Throw away any unused medicine after the expiration date.  NOTE:This sheet is a summary. It may not cover all possible information. If you have questions about this medicine, talk to your doctor, pharmacist, or health care provider. Copyright  2016 Gold Standard                Venlafaxine extended-release capsules  Brand Name: Effexor XR  What is this medicine?  VENLAFAXINE(SIXTO la fax een) is used to treat depression, anxiety and panic disorder.  How should I use this medicine?  Take this medicine by mouth with a full glass of water. Follow the directions on the prescription label. Do not cut, crush, or chew this medicine. Take it with food. If needed, the capsule may be carefully opened and the entire contents sprinkled on a spoonful of cool applesauce. Swallow the applesauce/pellet mixture right away without chewing and follow with a glass of water to ensure complete swallowing of the pellets. Try to take your medicine at about the same time each day. Do not take your medicine more often than directed. Do not stop taking this medicine suddenly except upon the advice of  your doctor. Stopping this medicine too quickly may cause serious side effects or your condition may worsen.  A special MedGuide will be given to you by the pharmacist with each prescription and refill. Be sure to read this information carefully each time.  Talk to your pediatrician regarding the use of this medicine in children. Special care may be needed.  What side effects may I notice from receiving this medicine?  Side effects that you should report to your doctor or health care professional as soon as possible:    allergic reactions like skin rash, itching or hives, swelling of the face, lips, or tongue    anxious    breathing problems    confusion    changes in vision    chest pain    confusion    elevated mood, decreased need for sleep, racing thoughts, impulsive behavior    eye pain    fast, irregular heartbeat    feeling faint or lightheaded, falls    feeling agitated, angry, or irritable    hallucination, loss of contact with reality    high blood pressure    loss of balance or coordination    palpitations    redness, blistering, peeling or loosening of the skin, including inside the mouth    restlessness, pacing, inability to keep still    seizures    stiff muscles    suicidal thoughts or other mood changes    trouble passing urine or change in the amount of urine    trouble sleeping    unusual bleeding or bruising    unusually weak or tired    vomiting  Side effects that usually do not require medical attention (report to your doctor or health care professional if they continue or are bothersome):    change in sex drive or performance    change in appetite or weight    constipation    dizziness    dry mouth    headache    increased sweating    nausea    tired  What may interact with this medicine?  Do not take this medicine with any of the following medications:    certain medicines for fungal infections like fluconazole, itraconazole, ketoconazole, posaconazole,  voriconazole    cisapride    desvenlafaxine    dofetilide    dronedarone    duloxetine    levomilnacipran    linezolid    MAOIs like Carbex, Eldepryl, Marplan, Nardil, and Parnate    methylene blue (injected into a vein)    milnacipran    pimozide    thioridazine    ziprasidone  This medicine may also interact with the following medications:    amphetamines    aspirin and aspirin-like medicines    certain medicines for depression, anxiety, or psychotic disturbances    certain medicines for migraine headaches like almotriptan, eletriptan, frovatriptan, naratriptan, rizatriptan, sumatriptan, zolmitriptan    certain medicines for sleep    certain medicines that treat or prevent blood clots like dalteparin, enoxaparin, warfarin    cimetidine    clozapine    diuretics    fentanyl    furazolidone    indinavir    isoniazid    lithium    metoprolol    NSAIDS, medicines for pain and inflammation, like ibuprofen or naproxen    other medicines that prolong the QT interval (cause an abnormal heart rhythm)    procarbazine    rasagiline    supplements like Aubrie's wort, kava kava, valerian    tramadol    tryptophan  What if I miss a dose?  If you miss a dose, take it as soon as you can. If it is almost time for your next dose, take only that dose. Do not take double or extra doses.  Where should I keep my medicine?  Keep out of the reach of children.  Store at a controlled temperature between 20 and 25 degrees C (68 degrees and 77 degrees F), in a dry place. Throw away any unused medicine after the expiration date.  What should I tell my health care provider before I take this medicine?  They need to know if you have any of these conditions:    bleeding disorders    glaucoma    heart disease    high blood pressure    high cholesterol    kidney disease    liver disease    low levels of sodium in the blood    betty or bipolar disorder    seizures    suicidal thoughts, plans, or attempt; a previous suicide attempt by you or a  family    take medicines that treat or prevent blood clots    thyroid disease    an unusual or allergic reaction to venlafaxine, desvenlafaxine, other medicines, foods, dyes, or preservatives    pregnant or trying to get pregnant    breast-feeding  What should I watch for while using this medicine?  Tell your doctor if your symptoms do not get better or if they get worse. Visit your doctor or health care professional for regular checks on your progress. Because it may take several weeks to see the full effects of this medicine, it is important to continue your treatment as prescribed by your doctor.  Patients and their families should watch out for new or worsening thoughts of suicide or depression. Also watch out for sudden changes in feelings such as feeling anxious, agitated, panicky, irritable, hostile, aggressive, impulsive, severely restless, overly excited and hyperactive, or not being able to sleep. If this happens, especially at the beginning of treatment or after a change in dose, call your health care professional.  This medicine can cause an increase in blood pressure. Check with your doctor for instructions on monitoring your blood pressure while taking this medicine.  You may get drowsy or dizzy. Do not drive, use machinery, or do anything that needs mental alertness until you know how this medicine affects you. Do not stand or sit up quickly, especially if you are an older patient. This reduces the risk of dizzy or fainting spells. Alcohol may interfere with the effect of this medicine. Avoid alcoholic drinks.  Your mouth may get dry. Chewing sugarless gum, sucking hard candy and drinking plenty of water will help. Contact your doctor if the problem does not go away or is severe.  NOTE:This sheet is a summary. It may not cover all possible information. If you have questions about this medicine, talk to your doctor, pharmacist, or health care provider. Copyright  2018 Elsevier

## 2018-11-03 PROBLEM — F43.10 PTSD (POST-TRAUMATIC STRESS DISORDER): Status: ACTIVE | Noted: 2018-11-03

## 2018-11-03 PROBLEM — F33.1 MODERATE EPISODE OF RECURRENT MAJOR DEPRESSIVE DISORDER (H): Status: ACTIVE | Noted: 2018-11-02

## 2018-11-03 PROCEDURE — 25000132 ZZH RX MED GY IP 250 OP 250 PS 637: Performed by: NURSE PRACTITIONER

## 2018-11-03 PROCEDURE — 99223 1ST HOSP IP/OBS HIGH 75: CPT | Performed by: NURSE PRACTITIONER

## 2018-11-03 PROCEDURE — 12400000 ZZH R&B MH

## 2018-11-03 RX ORDER — VENLAFAXINE HYDROCHLORIDE 150 MG/1
150 CAPSULE, EXTENDED RELEASE ORAL DAILY
Status: DISCONTINUED | OUTPATIENT
Start: 2018-11-03 | End: 2018-11-05 | Stop reason: HOSPADM

## 2018-11-03 RX ORDER — NICOTINE 21 MG/24HR
1 PATCH, TRANSDERMAL 24 HOURS TRANSDERMAL DAILY
Status: DISCONTINUED | OUTPATIENT
Start: 2018-11-03 | End: 2018-11-05 | Stop reason: HOSPADM

## 2018-11-03 RX ORDER — PRAZOSIN HYDROCHLORIDE 1 MG/1
1 CAPSULE ORAL AT BEDTIME
Status: DISCONTINUED | OUTPATIENT
Start: 2018-11-03 | End: 2018-11-05 | Stop reason: HOSPADM

## 2018-11-03 RX ORDER — VENLAFAXINE HYDROCHLORIDE 150 MG/1
150 CAPSULE, EXTENDED RELEASE ORAL DAILY
Status: ON HOLD | COMMUNITY
End: 2018-11-05

## 2018-11-03 RX ORDER — PRAZOSIN HYDROCHLORIDE 1 MG/1
1 CAPSULE ORAL AT BEDTIME
Status: ON HOLD | COMMUNITY
End: 2018-11-05

## 2018-11-03 RX ADMIN — NICOTINE 1 PATCH: 21 PATCH, EXTENDED RELEASE TRANSDERMAL at 12:42

## 2018-11-03 RX ADMIN — PHENYTOIN 1 MG: 125 SUSPENSION ORAL at 21:20

## 2018-11-03 RX ADMIN — VENLAFAXINE HYDROCHLORIDE 150 MG: 150 CAPSULE, EXTENDED RELEASE ORAL at 12:02

## 2018-11-03 RX ADMIN — HYDROXYZINE HYDROCHLORIDE 50 MG: 25 TABLET ORAL at 08:43

## 2018-11-03 ASSESSMENT — ACTIVITIES OF DAILY LIVING (ADL)
DRESS: 0-->INDEPENDENT
RETIRED_COMMUNICATION: 0-->UNDERSTANDS/COMMUNICATES WITHOUT DIFFICULTY
ORAL_HYGIENE: INDEPENDENT
GROOMING: INDEPENDENT
COGNITION: 0 - NO COGNITION ISSUES REPORTED
DRESS: SCRUBS (BEHAVIORAL HEALTH)
SWALLOWING: 0-->SWALLOWS FOODS/LIQUIDS WITHOUT DIFFICULTY
LAUNDRY: UNABLE TO COMPLETE
TOILETING: 0-->INDEPENDENT
TRANSFERRING: 0-->INDEPENDENT
BATHING: 0-->INDEPENDENT
AMBULATION: 0-->INDEPENDENT
FALL_HISTORY_WITHIN_LAST_SIX_MONTHS: NO
RETIRED_EATING: 0-->INDEPENDENT

## 2018-11-03 NOTE — PLAN OF CARE
ADMISSION NOTE    Reason for admission suicidal ideation.  Safety concerns yes plan to shoot self with a gun.  Risk for or history of violence no know history of violence.   Full skin assessment: completed no skin issues noted    Patient arrived on unit from our ER accompanied by   and aid on 11/2/2018  10:54 PM.   Status on arrival: calm and cooperative.  Pt cooperative with changing into scrubs and tour of unit.  Pt agree to be safe while on unit.   BP (!) 144/105  Temp 98.8  F (37.1  C) (Tympanic)  Resp 18  SpO2 98%  Patient given tour of unit and Welcome to  unit papers given to patient, wanding completed, belongings inventoried.  Patient's legal status on arrival is voluntary. Appropriate legal rights discussed with and copy given to patient. Patient Bill of Rights discussed with and copy given to patient.       Isabelle Hawkins  11/2/2018  11:15 PM

## 2018-11-03 NOTE — PLAN OF CARE
Face to face end of shift report received from Yaneli WEINSTEIN RN. Rounding completed. Patient observed in [facility].     Elizabeth Estrada  11/3/2018  3:40 PM

## 2018-11-03 NOTE — ED NOTES
"33 y/o male presents with suicidal ideation. Patient states he has had thoughts more frequently over the last few weeks. Hx of suicidal ideation and PTSD, just completed PHP program. States his dose of Effexor was decreased from 150mg to 75mg within the last week. Patient states on Monday he had a well thought out plan of killing himself which involved putting his one year old child in their crib, texting a family member there was an emergency and then shooting himself in his camper. Patient states he went to his mother-in-laws instead. Patient states he removed all guns from his house since Monday. Patient states he sees a counselor once a week. Patient denies active plan at this time, but does state he has urges \"to jump out of the car or cut myself.\" Contracted for safety at this time. Changed in to paper scrubs, provided urine sample. SAMMI Bruno updated.   "

## 2018-11-03 NOTE — ED NOTES
[place]-SUICIDE SEVERITY RATING SCALE   Screen with Triage Points for Emergency Department      Ask questions that are bolded and underlined.   Past  month   Ask Questions 1 and 2 YES NO   1)  Have you wished you were dead or wished you could go to sleep and not wake up?  x    2)  Have you actually had any thoughts of killing yourself?  x    If YES to 2, ask questions 3, 4, 5, and 6.  If NO to 2, go directly to question 6.   3)  Have you been thinking about how you might do this?   E.g.  I thought about taking an overdose but I never made a specific plan as to when where or how I would actually do it .and I would never go through with it.   x    4)  Have you had these thoughts and had some intention of acting on them?   As opposed to  I have the thoughts but I definitely will not do anything about them.   x    5)  Have you started to work out or worked out the details of how to kill yourself? Do you intend to carry out this plan?  x    6)  Have you ever done anything, started to do anything, or prepared to do anything to end your life?  Examples: Collected pills, obtained a gun, gave away valuables, wrote a will or suicide note, took out pills but didn t swallow any, held a gun but changed your mind or it was grabbed from your hand, went to the roof but didn t jump; or actually took pills, tried to shoot yourself, cut yourself, tried to hang yourself, etc.    If YES, ask: Was this within the past three months?  Lifetime    x     Past 3 Months     x   Item 1:  Behavioral Health Referral at Discharge  Item 2:  Behavioral Health Referral at Discharge   Item 3:  Behavioral Health Consult (Psychiatric Nurse/) and consider Patient Safety Precautions  Item 4:  Immediate Notification of Physician and/or Behavioral Health and Patient Safety Precautions   Item 5:  Immediate Notification of Physician and/or Behavioral Health and Patient Safety Precautions  Item 6:  Over 3 months ago: Behavioral Health Consult  (Psychiatric Nurse/) and consider Patient Safety Precautions  OR  Item 6:  3 months ago or less: Immediate Notification of Physician and/or Behavioral Health and Patient Safety Precautions

## 2018-11-03 NOTE — PLAN OF CARE
Face to face end of shift report received from ALINE Diamond. Rounding completed. Patient observed up in unge coloring.     Yaneli Daugherty  11/3/2018  7:36 AM

## 2018-11-03 NOTE — H&P
"[facility]    Psychiatric Evaluation/History & Physical    Patient Name: Mason Garcia   YOB: 1985  Age: 32 year old  [id number]    Primary Physician: Ruben Valencia   Completed By: DARNELL Villarreal CNP     CC: \"suicidal\"         HPI:     Mason Garcia is a 32 year old single male who presented via girlfriend to  ED due to depression and suicidal ideation with a plan to kill himself by shotgun. Patient reportedly contemplated killing himself today but reached out to family for help. He reported decreased sleep with 14 month old child with whom he lives along with his girlfriend. Endorsed acute stress from a work accident this January 2018 where his hand got stuck in a  and he lost one of his fingers. Patient stated he is traumatized by this and continues to experience PTSD symptoms that affect his sleep.  Has stopped taking prazosin and decreased his Effexor with reported deterioration in wellness. Patient recently completed PHP at [facility] on 10/19/18.  He is not currently working at this time. Previously inpatient in August of 2018.  Previous history of ETOH but has been sober for one month. Patient was transferred to [facility] and admitted to Inpatient Behavioral Health for further assessment and stabilization.    During interview, patient reported decrease sleep (4 hours a night) and decreased medications contribute to symptoms of suicidal ideation.  Noted he stopped taking prazosin as may need to awaken to care for 14 month old. Does report efficacy of this medication and would like to restart. Stated girlfriend does not believe in medication and does not want him to take it. When asked about what other disagreements/arguments they have that could contribute to stress, he stated \"we never argue\".  Asked what coping skills are helpful and patient named grounding skills, mindfulness and would like to work more on meditation during " "hospitalization. Review of notes stated patient was planning to return to work in September but did not.  Currently rates depression \"8/10 [10 being worst]\" and anxiety as \"high\".  He does not appear to figit or display restlessness, presentation is calm when stating he is \"highly anxious\" so incongruent to mood. Review of chart notes characterological personality traits contributing to patient's report of wellness. Does not report sexual concerns today. Stated he has chronic pain in finger \"deal with it\" as \"if I medicated all day that would be a problem too\".     Patient was seen by this provider during PHP on 10/5/18 and noted with increase in Effexor to 150 mg he had increased time spent watching pornography and also increased masturbating. Patient stated he has not increased the frequency of sexual intercourse. He stated he did not have issues on 75 mg of Effexor. Had previously been on Zoloft twice, once very effective and stopped when mood euthymic. Increased hypersexuality may be component of undiagnosed bipolar disorder rather than unipolar depression. Behaviors persisted prior to start of antidepressants and staff note patient is bisexual.  Patient stated he has \"been doing well [with mood] last two days\". Stated he has periods of time where his mood is euthymic and does not feel he needs medications. Second time \"didn't work\". At that time a consideration of bipolar disorder was a rule out but due to presenting characterological traits, diagnosis was not changed. Will monitor during hospitalization.    SPECIFIC SYMPTOM HISTORY  Sleep:sleep disturbance due to PTSD .  Recent appetite change: No.  Recent weight change: No.  Special diet: No.  Other nutritional concerns: no.  Psychotic symptoms (subjective): No hallucinations.  delusions not reported or observed    Patient provides information for this assessment. Intake data, records from previous hospitalizations and records from the Emergency Department " were reviewed.          PMSPFH:     Past Medical History:  No past medical history on file.  Past Surgical History:  Past Surgical History:   Procedure Laterality Date     LAPAROSCOPIC APPENDECTOMY N/A 2/7/2017    Procedure: LAPAROSCOPIC APPENDECTOMY;  Surgeon: Chanel Pagan DO;  Location: [facility] OR     Past Psychiatric History:  Melia Rivera at [facility] for therapy weekly doing EMDR and Internal family systems therapy. Miriam Law CNP, at [facility] for medications. Previous hospitalization August 2018 at [facility] and one previous hospitalization during  activity (not related to  events). Some trauma history in childhood but does not go into this, believing it is resolved and a non-issue. Has completed PHP in   Previous psychotropic medication trials include: Effexor, Zoloft, Neurontin, and Hydroxyzine have been tried. Currently on Effexor. .  Substance Use History:  He states that he has had frequent alcohol use with intoxication 2-3 times per week.. He denies use of other substances of abuse. Patient does not endorse previous substance use disorder treatment.   Social History:  Social History     Social History     Marital status: Single     Spouse name: N/A     Number of children: 1     Years of education: N/A     Social History Main Topics     Smoking status: Current Every Day Smoker     Packs/day: 1.00     Years: 7.00     Types: Cigarettes     Smokeless tobacco: Former User     Alcohol use Yes      Comment: rare     Drug use: No     Sexual activity: Not on file     Other Topics Concern     Not on file     Social History Narrative     Born in [facility], grew up in [place] . Moved to [place] to help his aunt who had lung cancer. Previously residing in [facility] but did not like it secondary to the crime rate. Previously employed at the [employer] for about 4 months. Was also a  at [employer] in [place], worked at the [employer] for [employer], was in  [employer] at age 17 for 8 months and was discharged medically for inability to cope with depression and stress in relation to the death of a friend outside of the . Now employed at [employer], was to return to work in September 2018. Previously lived with uncle in [place]. Is now residing in [place] with his significant other, their daughter, and her two sons. Patient is not . Legal issues are denied.   Family History:   Family History   Problem Relation Age of Onset     Cardiovascular Maternal Grandmother      cardiovascular disease     Respiratory Maternal Grandmother      Lung transplant     Home Medications:   Prescriptions Prior to Admission   Medication Sig Dispense Refill Last Dose     prazosin (MINIPRESS) 1 MG capsule Take 1 mg by mouth At Bedtime        venlafaxine (EFFEXOR-XR) 150 MG 24 hr capsule Take 150 mg by mouth daily        venlafaxine (EFFEXOR-XR) 75 MG 24 hr capsule Take 75 mg by mouth daily    11/2/2018 at Unknown time     Medical History and ROS:  No current outpatient prescriptions on file.     Allergies   Allergen Reactions     Sudafed [Pseudoephedrine]             Physical Exam:    Constitutional: oriented to person, place and time. Appears well-developed and well-nourished.  HENT:   Head: Atraumatic  Mouth/Throat: Oropharynx clear and moist  Eyes: Conjunctivae and EOM normal. Pupils are equal, round, and reactive to light.  Neck: Normal range of motion. No JVD present or neck rigidity. No tracheal deviation present. No thyromegaly present.   Cardiovascular: Negative for chest pain and palpitations  Pulmonary/Chest: Effort and breath sounds normal  Abdomen: soft  Neurological: A/O x 3   Skin: Dry and intact. No open areas, rashes, moles of concern  Psychiatric: See HPI         Review of Systems:     Constitution: No weight loss, fever, night sweats  Skin: No rashes, pruritus or open wounds  Neuro: No headaches or seizure activity.  Psych:  See HPI  Eyes: No vision  "changes.  ENT: No problems chewing or swallowing.   Musculoskeletal: No muscle pain, joint pain or swelling   Respiratory: No cough or dyspnea  Cardiovascular:  No chest pain,  palpitations or fainting  Gastrointestinal:  No abdominal pain, nausea, vomiting or change in bowel habits            Psychiatric Examination:   /78  Pulse 93  Temp 96.7  F (35.9  C) (Tympanic)  Resp 16  Ht 1.854 m (6' 1\")  Wt 101.7 kg (224 lb 1.6 oz)  SpO2 97%  BMI 29.57 kg/m2  Appearance:  awake, alert  Attitude:  cooperative  Eye Contact:  good  Mood:  anxious and sad   Affect:  mood incongruent  Speech:  clear, coherent  Psychomotor Behavior:  no evidence of tardive dyskinesia, dystonia, or tics  Thought Process:  logical and goal oriented  Associations:  no loose associations  Thought Content:  no evidence of suicidal ideation or homicidal ideation  Insight:  fair  Judgment:  fair  Oriented to:  time, person, and place  Attention Span and Concentration:  intact  Recent and Remote Memory:  intact  Fund of Knowledge: appropriate  Muscle Strength and Tone: normal  Gait and Station: Normal          Labs:     Results for orders placed or performed during the hospital encounter of 11/02/18   CBC with platelets differential   Result Value Ref Range    WBC 12.5 (H) 4.0 - 11.0 10e9/L    RBC Count 5.68 4.4 - 5.9 10e12/L    Hemoglobin 17.4 13.3 - 17.7 g/dL    Hematocrit 49.3 40.0 - 53.0 %    MCV 87 78 - 100 fl    MCH 30.6 26.5 - 33.0 pg    MCHC 35.3 31.5 - 36.5 g/dL    RDW 12.9 10.0 - 15.0 %    Platelet Count 233 150 - 450 10e9/L    Diff Method Automated Method     % Neutrophils 61.8 %    % Lymphocytes 25.0 %    % Monocytes 8.9 %    % Eosinophils 3.2 %    % Basophils 0.5 %    % Immature Granulocytes 0.6 %    Nucleated RBCs 0 0 /100    Absolute Neutrophil 7.7 1.6 - 8.3 10e9/L    Absolute Lymphocytes 3.1 0.8 - 5.3 10e9/L    Absolute Monocytes 1.1 0.0 - 1.3 10e9/L    Absolute Eosinophils 0.4 0.0 - 0.7 10e9/L    Absolute Basophils 0.1 0.0 " - 0.2 10e9/L    Abs Immature Granulocytes 0.1 0 - 0.4 10e9/L    Absolute Nucleated RBC 0.0    Comprehensive metabolic panel   Result Value Ref Range    Sodium 138 133 - 144 mmol/L    Potassium 3.9 3.4 - 5.3 mmol/L    Chloride 106 94 - 109 mmol/L    Carbon Dioxide 24 20 - 32 mmol/L    Anion Gap 8 3 - 14 mmol/L    Glucose 94 70 - 99 mg/dL    Urea Nitrogen 16 7 - 30 mg/dL    Creatinine 1.00 0.66 - 1.25 mg/dL    GFR Estimate 86 >60 mL/min/1.7m2    GFR Estimate If Black >90 >60 mL/min/1.7m2    Calcium 8.5 8.5 - 10.1 mg/dL    Bilirubin Total 0.2 0.2 - 1.3 mg/dL    Albumin 3.9 3.4 - 5.0 g/dL    Protein Total 7.8 6.8 - 8.8 g/dL    Alkaline Phosphatase 89 40 - 150 U/L    ALT 67 0 - 70 U/L    AST 29 0 - 45 U/L   Alcohol ethyl   Result Value Ref Range    Ethanol g/dL <0.01 0.01 g/dL   UA reflex to Microscopic   Result Value Ref Range    Color Urine Yellow     Appearance Urine Clear     Glucose Urine Negative NEG^Negative mg/dL    Bilirubin Urine Negative NEG^Negative    Ketones Urine Negative NEG^Negative mg/dL    Specific Gravity Urine 1.013 1.003 - 1.035    Blood Urine Negative NEG^Negative    pH Urine 6.0 4.7 - 8.0 pH    Protein Albumin Urine Negative NEG^Negative mg/dL    Urobilinogen mg/dL Normal 0.0 - 2.0 mg/dL    Nitrite Urine Negative NEG^Negative    Leukocyte Esterase Urine Negative NEG^Negative    Source Midstream Urine    Drug Screen Urine (Range)   Result Value Ref Range    Amphetamine Qual Urine Negative NEG^Negative    Barbiturates Qual Urine Negative NEG^Negative    Benzodiazepine Qual Urine Negative NEG^Negative    Cannabinoids Qual Urine Negative NEG^Negative    Cocaine Qual Urine Negative NEG^Negative    Opiates Qualitative Urine Negative NEG^Negative    Methadone Qual Urine Negative NEG^Negative    PCP Qual Urine Negative NEG^Negative     Assessment/Impression: Patient Mason Garcia is a 32 year old male with a history of depression, panic, and PTSD who presented with increased SI.  He stated he had  decreased his medications due to disagreement with girlfriend not supporting their use and resurgence of depression. He had recently completed Adult Partial Hospitalization Program. Has had EMDR therapy for a trauma incurred at work in January of this year that resulted in the loss of a finger and damage to two other fingers. He is currently out of work secondary to PTSD symptoms that have resulted in frequently leaving work. Reports effectiveness of Effexor XR at 150 mg and prazosin 1 mg at bedtime to address his PTSD symptoms, so will start medications at those doses.     Educated regarding medication indications, risks, benefits, side effects, contraindications and possible interactions. Verbally expressed understanding.        DSM-V Diagnoses:   Major Depressive Disorder, recurrent, moderate  PTSD  Panic Disorder w/o agoraphobia  R/O Other Bipolar Disorder  Cluster B Personality Traits     Plan:  Admit to Unit: [address] [place] [address]  Attending: DARNELL Villarreal CNP  Patient is: Voluntary  Other routine labs were reviewed and not notable  Monitor for target symptoms: decreased suicidal ideation, decreased reports of depression  Provide a safe environment and therapeutic milieu.   Re-Start: Effexor increased to 150 mg and minipress 1 mg, monitor for efficacy    ELOS: 3-5 days for stabilization, titration of medications and decrease in suicidal ideations.    DARNELL Villarreal CNP

## 2018-11-03 NOTE — PLAN OF CARE
"Problem: Patient Care Overview  Goal: Individualization & Mutuality  Pt will be compliant with ordered medications and treatment team recommendations.  Pt will attend at least 50% of groups  Pt will sleep at least 6-8 hours per night   Outcome: No Change  Pt up in lounge at start of shift coloring at tables. Per charting, pt slept approx. 6 hours last night. Pt ate 100% of breakfast. Pt compliant with medications, assessment and admission process this am. Pt states \"high anxiety\" due to \"not knowing what is going to happen\". Prn atarax given per pt request at 0843. Pt endorses \"mild\" depression and states that it is always \"there\". When asked about SI, pt states \"eh, there but not there. Just fleeting thoughts, no plan anymore. Yesterday it wasn't just fleeting thoughts. I had a pretty detailed plan last week\". Pt states that he used to want to die in a car accident but after talking with a therapist and his family/friends he couldn't do that anymore because that would defeat the purpose because \"everyone would know it was suicide\". Pt states that since his L pinky finger got injured at work he's just \"spiraled after that\". He has constant pain (3/10) in that finger but doesn't usually take anything for it. Pt states he hasn't been able to go back to work since the accident due to increased anxiety and PTSD when he goes to work. Pt c/o \"lots of earwax\" in bilateral ears. Pt states uses crocheting as a coping mechanism and it helps with mobility in his L hand. Will continue to monitor.    Pt did participate in groups this shift. No complaints.    Problem: Suicide Risk (Adult)  Goal: Physical Safety  Patient will be free from self harm during hospital stay   Outcome: No Change  Pt has remained free from self harm this shift.      "

## 2018-11-03 NOTE — PLAN OF CARE
Problem: Patient Care Overview  Goal: Individualization & Mutuality  Pt will be compliant with ordered medications and treatment team recommendations.  Pt will attend at least 50% of groups  Pt will sleep at least 6-8 hours per night   Pt appears to be sleeping in bed with eyes closed, having regular respirations and position changes. 15 minutes and PRN safety checks completed with no noted complains. Will continue to monitor.         Problem: Suicide Risk (Adult)  Goal: Identify Related Risk Factors and Signs and Symptoms  Pt will verbalize decrease depression and suicide ideation by discharge.   Pt appears to be sleeping in bed with eyes closed, having regular respirations and position changes. 15 minutes and PRN safety checks completed with no noted complains. Will continue to monitor.       Goal: Physical Safety  Patient will be free from self harm during hospital stay   No self harm noted or reported so far this shift.

## 2018-11-03 NOTE — ED PROVIDER NOTES
History     Chief Complaint   Patient presents with     Psychiatric Evaluation     c/o suicidal ideation, notes medication dose decreased 2 days ago     The history is provided by the patient.     Mason Garcia is a 32 year old male who presented to the emergency department ambulatory for evaluation of suicidal ideation.  Mason was admitted to our facility couple months ago for similar issues.  He recently finished to the partial hospitalization program.  Concerning history of present illness as he had an overt plan of shooting himself with a shotgun in his camper.  Has protracted suicidal ideation.  Denies any drugs or alcohol abuse.    Problem List:    Patient Active Problem List    Diagnosis Date Noted     Depression with suicidal ideation 08/27/2018     Priority: Medium     Perforated appendicitis 02/07/2017     Priority: Medium        Past Medical History:    No past medical history on file.    Past Surgical History:    Past Surgical History:   Procedure Laterality Date     LAPAROSCOPIC APPENDECTOMY N/A 2/7/2017    Procedure: LAPAROSCOPIC APPENDECTOMY;  Surgeon: Chanel Pagan DO;  Location: [facility] OR       Family History:    Family History   Problem Relation Age of Onset     Cardiovascular Maternal Grandmother      cardiovascular disease     Respiratory Maternal Grandmother      Lung transplant       Social History:  Marital Status:  Single [1]  Social History   Substance Use Topics     Smoking status: Current Every Day Smoker     Packs/day: 1.00     Years: 7.00     Types: Cigarettes     Smokeless tobacco: Former User     Alcohol use Yes      Comment: rare        Medications:      venlafaxine (EFFEXOR-XR) 75 MG 24 hr capsule         Review of Systems   Constitutional: Negative for fever.   Respiratory: Negative for shortness of breath.    Psychiatric/Behavioral: Positive for decreased concentration, dysphoric mood, sleep disturbance and suicidal ideas. Negative for confusion, hallucinations and self-injury.  The patient is nervous/anxious.        Physical Exam   BP: (!) 144/105  Heart Rate: 97  Temp: 98.8  F (37.1  C)  Resp: 18  SpO2: 98 %      Physical Exam   Constitutional: He is oriented to person, place, and time. He appears well-developed and well-nourished.   Pulmonary/Chest: Effort normal.   Neurological: He is alert and oriented to person, place, and time.   Skin: Skin is warm and dry.   Psychiatric: He has a normal mood and affect.   Normal speech, normal eye contact, no evidence psychosis, flight of ideas, or delusions.   Nursing note and vitals reviewed.      ED Course     ED Course     Procedures               Critical Care time:  none               Results for orders placed or performed during the hospital encounter of 11/02/18 (from the past 24 hour(s))   UA reflex to Microscopic   Result Value Ref Range    Color Urine Yellow     Appearance Urine Clear     Glucose Urine Negative NEG^Negative mg/dL    Bilirubin Urine Negative NEG^Negative    Ketones Urine Negative NEG^Negative mg/dL    Specific Gravity Urine 1.013 1.003 - 1.035    Blood Urine Negative NEG^Negative    pH Urine 6.0 4.7 - 8.0 pH    Protein Albumin Urine Negative NEG^Negative mg/dL    Urobilinogen mg/dL Normal 0.0 - 2.0 mg/dL    Nitrite Urine Negative NEG^Negative    Leukocyte Esterase Urine Negative NEG^Negative    Source Midstream Urine    Drug Screen Urine (Range)   Result Value Ref Range    Amphetamine Qual Urine Negative NEG^Negative    Barbiturates Qual Urine Negative NEG^Negative    Benzodiazepine Qual Urine Negative NEG^Negative    Cannabinoids Qual Urine Negative NEG^Negative    Cocaine Qual Urine Negative NEG^Negative    Opiates Qualitative Urine Negative NEG^Negative    Methadone Qual Urine Negative NEG^Negative    PCP Qual Urine Negative NEG^Negative   CBC with platelets differential   Result Value Ref Range    WBC 12.5 (H) 4.0 - 11.0 10e9/L    RBC Count 5.68 4.4 - 5.9 10e12/L    Hemoglobin 17.4 13.3 - 17.7 g/dL    Hematocrit 49.3  40.0 - 53.0 %    MCV 87 78 - 100 fl    MCH 30.6 26.5 - 33.0 pg    MCHC 35.3 31.5 - 36.5 g/dL    RDW 12.9 10.0 - 15.0 %    Platelet Count 233 150 - 450 10e9/L    Diff Method Automated Method     % Neutrophils 61.8 %    % Lymphocytes 25.0 %    % Monocytes 8.9 %    % Eosinophils 3.2 %    % Basophils 0.5 %    % Immature Granulocytes 0.6 %    Nucleated RBCs 0 0 /100    Absolute Neutrophil 7.7 1.6 - 8.3 10e9/L    Absolute Lymphocytes 3.1 0.8 - 5.3 10e9/L    Absolute Monocytes 1.1 0.0 - 1.3 10e9/L    Absolute Eosinophils 0.4 0.0 - 0.7 10e9/L    Absolute Basophils 0.1 0.0 - 0.2 10e9/L    Abs Immature Granulocytes 0.1 0 - 0.4 10e9/L    Absolute Nucleated RBC 0.0    Comprehensive metabolic panel   Result Value Ref Range    Sodium 138 133 - 144 mmol/L    Potassium 3.9 3.4 - 5.3 mmol/L    Chloride 106 94 - 109 mmol/L    Carbon Dioxide 24 20 - 32 mmol/L    Anion Gap 8 3 - 14 mmol/L    Glucose 94 70 - 99 mg/dL    Urea Nitrogen 16 7 - 30 mg/dL    Creatinine 1.00 0.66 - 1.25 mg/dL    GFR Estimate 86 >60 mL/min/1.7m2    GFR Estimate If Black >90 >60 mL/min/1.7m2    Calcium 8.5 8.5 - 10.1 mg/dL    Bilirubin Total 0.2 0.2 - 1.3 mg/dL    Albumin 3.9 3.4 - 5.0 g/dL    Protein Total 7.8 6.8 - 8.8 g/dL    Alkaline Phosphatase 89 40 - 150 U/L    ALT 67 0 - 70 U/L    AST 29 0 - 45 U/L   Alcohol ethyl   Result Value Ref Range    Ethanol g/dL <0.01 0.01 g/dL       Medications   LORazepam (ATIVAN) tablet 1 mg (1 mg Oral Given 11/2/18 2057)       Assessments & Plan (with Medical Decision Making)   DEC assessment.  Recommend admission and I agree.  He does endorse a rather significant and focus will follow a plan of suicide.  Accepted to [facility] .     I have reviewed the nursing notes.    I have reviewed the findings, diagnosis, plan and need for follow up with the patient.       New Prescriptions    No medications on file       Final diagnoses:   Suicidal ideation       11/2/2018   [facility]     Megan Bonner PA-C  11/02/18  [id number]       Megan Bonner PA-C  11/02/18 [id number]

## 2018-11-03 NOTE — PROGRESS NOTES
11/02/18 [id number]   Patient Belongings   Did you bring any home meds/supplements to the hospital?  No   Patient Belongings cell phone/electronics;clothing;glasses;wallet;shoes;money (see comment)   Disposition of Belongings Kept with patient;Locker;Sent to security per site process   Belongings Search Yes   Clothing Search Yes   Second Staff Luke   General Info Comment black wire glasses and ring left with patient, blue boxers, white socks, grey sweatshirt, black niket shirt, black dickies, brown belt, black and1 shoes, black wallet, misc papers    List items sent to safe: black samsung phone in black case, [employer] bank receipt with account numbers, powered lift permit, emerald card mastercard, permit to carry [employer] [place], preferred [place] card, members co-op credit union card, deer archery card, fishing card, [place] dental card, medica card, [employer] bank debit card, select account debit card, [place] '[place]  All other belongings put in assigned cubby in belongings room.     I have reviewed my belongings list on admission and verify that it is correct.     Patient signature_______________________________    Second staff witness (if patient unable to sign) ______________________________       I have received all my belongings at discharge.    Patient signature________________________________    Isabelle  11/2/2018  11:54 PM

## 2018-11-03 NOTE — PLAN OF CARE
Face to face end of shift report received from WYATT Walton. Rounding completed. Patient observed awake resting in bed.     MARGARITA APODACA  11/2/2018  11:40 PM

## 2018-11-04 PROCEDURE — 25000132 ZZH RX MED GY IP 250 OP 250 PS 637: Performed by: NURSE PRACTITIONER

## 2018-11-04 PROCEDURE — 99232 SBSQ HOSP IP/OBS MODERATE 35: CPT | Performed by: NURSE PRACTITIONER

## 2018-11-04 PROCEDURE — 12400000 ZZH R&B MH

## 2018-11-04 RX ADMIN — NICOTINE POLACRILEX 2 MG: 2 GUM, CHEWING ORAL at 14:39

## 2018-11-04 RX ADMIN — HYDROXYZINE HYDROCHLORIDE 50 MG: 25 TABLET ORAL at 11:58

## 2018-11-04 RX ADMIN — VENLAFAXINE HYDROCHLORIDE 150 MG: 150 CAPSULE, EXTENDED RELEASE ORAL at 08:50

## 2018-11-04 RX ADMIN — NICOTINE POLACRILEX 2 MG: 2 GUM, CHEWING ORAL at 17:33

## 2018-11-04 RX ADMIN — NICOTINE 1 PATCH: 21 PATCH, EXTENDED RELEASE TRANSDERMAL at 08:50

## 2018-11-04 RX ADMIN — PHENYTOIN 1 MG: 125 SUSPENSION ORAL at 20:10

## 2018-11-04 RX ADMIN — HYDROXYZINE HYDROCHLORIDE 100 MG: 25 TABLET ORAL at 20:10

## 2018-11-04 ASSESSMENT — ACTIVITIES OF DAILY LIVING (ADL)
LAUNDRY: UNABLE TO COMPLETE
DRESS: SCRUBS (BEHAVIORAL HEALTH);INDEPENDENT
LAUNDRY: UNABLE TO COMPLETE
ORAL_HYGIENE: INDEPENDENT
GROOMING: INDEPENDENT
ORAL_HYGIENE: INDEPENDENT
DRESS: SCRUBS (BEHAVIORAL HEALTH)
GROOMING: INDEPENDENT

## 2018-11-04 NOTE — PLAN OF CARE
Face to face end of shift report received from WYATT Johnson. Rounding completed. Patient observed resting in bed.     MARGARITA APODACA  11/4/2018  12:13 AM

## 2018-11-04 NOTE — PROGRESS NOTES
"[facility]  Psychiatric Progress Note      Impression:   Patient Mason Garcia is a 32 year old male admitted on 11/2/2018 with suicidal ideation with a plan to kill himself by shotgun.    Patient reports sleeping poorly despite prazosin last night. Reports upsetting phone call with live in girlfriend regarding medication and mental health.  Patient stated his anxiety was increased after this but used coping skills to deal with it and then went to bed.  When asked which skills, he does not elaborate.  Denies side effects to medication increases. Does not report hypomanic symptoms when asked.  Stated he did \"well\" on previous dose and only decreased due to girlfriend's insistence that \"you were using me as a guinea pig\".  Educated patient on reasons for medication to be able to function in daily life along with genetic and environmental effects that contribute. Plans to return to live with girlfriend. Stated he did well in PHP and would like to do structured inpatient treatment for mental health but not available. Discussed need to use skills he learned in over the three weeks he was in PHP and to practice skills daily.  He verbalizes he is agreeable to do this.  Patient asked and this provider discussed duration of hospitalization due to medication restart, suicidal ideation and monitoring of mood. Appetite is good.       Educated regarding medication indications, risks, benefits, side effects, contraindications and possible interactions. Verbally expressed understanding.        Diagnoses:   Major Depressive Disorder, recurrent, moderate  PTSD  Panic Disorder w/O agoraphobia  R/O Other Bipolar Disorder  Cluster B Personality Traits    Attestation:  Patient has been seen and evaluated by me,  DARNELL Villarreal CNP          Interim History:   The patient's care was discussed with the treatment team and chart notes were reviewed.          Medications:     Current Facility-Administered Medications " "  Medication     acetaminophen (TYLENOL) tablet 650 mg     alum & mag hydroxide-simethicone (MYLANTA ES/MAALOX  ES) suspension 30 mL     bisacodyl (DULCOLAX) Suppository 10 mg     hydrOXYzine (ATARAX) tablet  mg     magnesium hydroxide (MILK OF MAGNESIA) suspension 30 mL     nicotine (NICODERM CQ) 21 MG/24HR 24 hr patch 1 patch     nicotine Patch in Place     nicotine patch REMOVAL     nicotine polacrilex (NICORETTE) gum 2-4 mg     OLANZapine (zyPREXA) tablet 10 mg    Or     OLANZapine (zyPREXA) injection 10 mg     prazosin (MINIPRESS) capsule 1 mg     traZODone (DESYREL) tablet 50 mg     venlafaxine (EFFEXOR-XR) 24 hr capsule 150 mg      10 point ROS positive for pain in amputated finger       Allergies:     Allergies   Allergen Reactions     Sudafed [Pseudoephedrine]           Psychiatric Examination:   /82  Pulse 101  Temp 97.3  F (36.3  C) (Tympanic)  Resp 18  Ht 1.854 m (6' 1\")  Wt 103.1 kg (227 lb 4.8 oz)  SpO2 97%  BMI 29.99 kg/m2  Weight is 227 lbs 4.8 oz  Body mass index is 29.99 kg/(m^2).    Appearance:  awake, alert, adequately groomed and dressed in hospital scrubs  Attitude:  cooperative  Eye Contact:  good  Mood:  anxious and sad   Affect:  mood incongruent and calm, pleasant and unremarkable  Speech:  clear, coherent  Psychomotor Behavior:  no evidence of tardive dyskinesia, dystonia, or tics  Thought Process:  linear and goal oriented  Associations:  no loose associations  Thought Content:  no evidence of suicidal ideation or homicidal ideation  Insight:  fair  Judgment:  fair  Oriented to:  time, person, and place  Attention Span and Concentration:  intact  Recent and Remote Memory:  intact  Fund of Knowledge: appropriate  Muscle Strength and Tone: normal  Gait and Station: Normal         Labs:   No results found for this or any previous visit (from the past 24 hour(s)).         Plan:   Continue Inpatient Hospitalization  Continue to provide a safe environment and therapeutic " milieu.  Continue medications    ELOS: 2-3 days for stabilization of mood and monitoring of increased medications.

## 2018-11-04 NOTE — PLAN OF CARE
"Problem: Patient Care Overview  Goal: Individualization & Mutuality  Pt will be compliant with ordered medications and treatment team recommendations.  Pt will attend at least 50% of groups  Pt will sleep at least 6-8 hours per night   Outcome: No Change  Patient denies HI, hallucinations.  Pt endorses fleeting thoughts of suicide \"but that's all they are thoughts.  Much better then when I came in and I was having urges.\".  Pt denies SI plan or intent.  He agrees to not harm self while on the unit.  He agrees to notify staff of increase or change in suicidal thoughts.  Pt also endorses anxiety and depression, which are at a manageable level at this time.  Patient is looking forward to a visit from his girlfriend tonight.  He is calm and cooperative with assessment and medications.  Patient attends groups.  He is in the lounge much of the shift coloring.  His affect is blunted and mood appears slightly anxious at this time.  He does c/o pain rated 2 out of 10 to left pinky, declines offered PRN.    2010:  PRN hydroxyzine 100 mg po administered for anxiety.  Patient appeared restless and unable to  one place for more than a few seconds.    Problem: Suicide Risk (Adult)  Goal: Strength-Based Wellness/Recovery  Patient will verbalize suicide prevention plan and coping skills by discharge.   Outcome: No Change  Patient says all he has to do is think about his daughter when he he has SI with an \"urge\" or plan as prevention.  Patient uses crocheting and coloring as coping skills.    Goal: Physical Safety  Patient will be free from self harm during hospital stay   Outcome: No Change  Patient had no noted self harm this shift.        "

## 2018-11-04 NOTE — PLAN OF CARE
Problem: Patient Care Overview  Goal: Individualization & Mutuality  Pt will be compliant with ordered medications and treatment team recommendations.  Pt will attend at least 50% of groups  Pt will sleep at least 6-8 hours per night   Pt appears to be sleeping in bed with eyes closed, having regular respirations and position changes. 15 minutes and PRN safety checks completed with no noted complains. Will continue to monitor.   Epic Downtime and daylight savings occurring during this shift with no administer medications or events during this time.    Problem: Suicide Risk (Adult)  Goal: Physical Safety  Patient will be free from self harm during hospital stay   No self harm noted or reported so far this shift.

## 2018-11-04 NOTE — PLAN OF CARE
Face to face end of shift report received from Sara WEINSTEIN RN. Rounding completed. Patient observed.     Leona Sutton  11/4/2018  7:50 AM

## 2018-11-04 NOTE — PLAN OF CARE
"Problem: Patient Care Overview  Goal: Individualization & Mutuality  Pt will be compliant with ordered medications and treatment team recommendations.  Pt will attend at least 50% of groups  Pt will sleep at least 6-8 hours per night   Outcome: No Change  Patient cooperative with medications and assessment this shift.  Pt has \"fleeting\" thoughts of suicide but states he will not harm himself while on the unit.  Patient states he attempts to keep himself busy to decrease his anxiety.  Declines offered PRN medication in attempt to self manage anxiety.  His affect is flat, mood does appear anxious.  Restarted Minipress tonight.  He was on this for a while when at home but stopped because \"I wouldn't wake up if my baby cried in the middle of the night and I need to be able to.\".  He states he will ry it again as it was helpful in the past.  Patient does attend groups.      Problem: Suicide Risk (Adult)  Goal: Strength-Based Wellness/Recovery  Patient will verbalize suicide prevention plan and coping skills by discharge.   Outcome: No Change  Patient states he attempts to keep himself busy to decrease his anxiety.  Declines offered PRN medication in attempt to self manage anxiety.  Goal: Physical Safety  Patient will be free from self harm during hospital stay   Outcome: No Change  Patient had no noted self harm this shift.        "

## 2018-11-04 NOTE — PLAN OF CARE
Problem: Patient Care Overview  Goal: Individualization & Mutuality  Pt will be compliant with ordered medications and treatment team recommendations.  Pt will attend at least 50% of groups  Pt will sleep at least 6-8 hours per night   Outcome: No Change  Patient denies SI, HI, kanika. He contracts for safety. He states that while he was laying in bed last night, he was having thoughts of harming himself, but did not have a plan. He endorses pain in his right pinky but declines anything for it. He admits to anxiety and depression, but states that they are tolerable. He did not sleep well last night.   1200: Patient has been social on the open unit. He did request and receive PRN atarax 50 mg for anxiety.   1445: Patient states that the atarax was somewhat helpful in relieving his anxiety. He also removed jenelle patch because it was irritating his skin.    Problem: Suicide Risk (Adult)  Goal: Identify Related Risk Factors and Signs and Symptoms  Pt will verbalize decrease depression and suicide ideation by discharge.   Outcome: Therapy, progress toward functional goals is gradual  Patient denies SI at this time. He contracts for safety. He admits to SI last night with no plan.  Goal: Strength-Based Wellness/Recovery  Patient will verbalize suicide prevention plan and coping skills by discharge.   Outcome: No Change  Patient does not verbalize coping skills or a suicide prevention plan  Goal: Physical Safety  Patient will be free from self harm during hospital stay   Outcome: No Change  Patient has not had any self-injurious behaviors. He contracts for safety.

## 2018-11-05 VITALS
HEART RATE: 92 BPM | DIASTOLIC BLOOD PRESSURE: 65 MMHG | WEIGHT: 227.3 LBS | OXYGEN SATURATION: 99 % | TEMPERATURE: 97.1 F | RESPIRATION RATE: 18 BRPM | BODY MASS INDEX: 30.12 KG/M2 | HEIGHT: 73 IN | SYSTOLIC BLOOD PRESSURE: 129 MMHG

## 2018-11-05 PROCEDURE — 25000132 ZZH RX MED GY IP 250 OP 250 PS 637: Performed by: NURSE PRACTITIONER

## 2018-11-05 PROCEDURE — 99239 HOSP IP/OBS DSCHRG MGMT >30: CPT | Performed by: NURSE PRACTITIONER

## 2018-11-05 RX ORDER — VENLAFAXINE HYDROCHLORIDE 150 MG/1
150 CAPSULE, EXTENDED RELEASE ORAL DAILY
Qty: 30 CAPSULE | Refills: 0 | Status: ON HOLD | OUTPATIENT
Start: 2018-11-06 | End: 2018-11-21

## 2018-11-05 RX ORDER — PRAZOSIN HYDROCHLORIDE 1 MG/1
1 CAPSULE ORAL AT BEDTIME
Qty: 30 CAPSULE | Refills: 0 | Status: ON HOLD | OUTPATIENT
Start: 2018-11-05 | End: 2019-01-22

## 2018-11-05 RX ADMIN — HYDROXYZINE HYDROCHLORIDE 50 MG: 25 TABLET ORAL at 08:11

## 2018-11-05 RX ADMIN — VENLAFAXINE HYDROCHLORIDE 150 MG: 150 CAPSULE, EXTENDED RELEASE ORAL at 08:11

## 2018-11-05 RX ADMIN — ACETAMINOPHEN 650 MG: 325 TABLET, FILM COATED ORAL at 09:32

## 2018-11-05 ASSESSMENT — ACTIVITIES OF DAILY LIVING (ADL): GROOMING: INDEPENDENT

## 2018-11-05 NOTE — PLAN OF CARE
Problem: Suicide Risk (Adult)  Goal: Identify Related Risk Factors and Signs and Symptoms  Pt will verbalize decrease depression and suicide ideation by discharge.   Outcome: Adequate for Discharge Date Met: 11/05/18  Pt safe from self harm and or injury    1327- discharge instructions verbally reviewed with pt and states understanding

## 2018-11-05 NOTE — PLAN OF CARE
Discharge Note    Patient Discharged to home on 11/5/2018 2:17 PM via Private Car accompanied by girlfriend.     Patient informed of discharge instructions in AVS. patient verbalizes understanding and denies having any questions pertaining to AVS. Patient stable at time of discharge. Patient denies SI, HI, and thoughts of self harm at time of discharge. All personal belongings returned to patient. Discharge prescriptions sent to [facility] via electronic communication. Psych evaluation, history and physical, AVS, and discharge summary faxed to next level of care.     Kellen Sorensen  11/5/2018  2:33 PM

## 2018-11-05 NOTE — PLAN OF CARE
Problem: Patient Care Overview  Goal: Team Discussion  Team Plan:   BEHAVIORAL TEAM DISCUSSION    Participants: July Barkley NP,Víctor Sims NP, Gwendolyn Vazquez NP, Jenn Carbone LSW,  Veda West LSW, Rosa Perkins RN, Norma Harris [facility], Maria G Carpenter OT  Progress: good   Continued Stay Criteria/Rationale: discharge home today   Medical/Physical: none known   Precautions:   Behavioral Orders   Procedures     Code 1 - Restrict to Unit     Routine Programming     As clinically indicated     Status 15     Every 15 minutes.     Plan: discharge home today   Rationale for change in precautions or plan: none
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
## 2018-11-05 NOTE — PLAN OF CARE
"Problem: Patient Care Overview  Goal: Individualization & Mutuality  Pt will be compliant with ordered medications and treatment team recommendations.  Pt will attend at least 50% of groups  Pt will sleep at least 6-8 hours per night   Outcome: Improving  0811- requested and received Atarax 50 mg for mild anxiety. States he feels he could go home today because \"I feel safe and I want to see my 14 months old daughter\". Admitted to writer suicidal thoughts are fleeting and come and go- does not stay and no urges remain. \"Friday the thoughts just wouldn't go away but I feel good today\". Denies depression but does feel somewhat anxious with thought of going home. Cooperative and pleasant    0932- states 5/10 aching pain to L hand from accident at work. Requested Ibuprofen \"because it works better but Tylenol is ok\". Notified Rosa charge nurse prior to treatment team meeting of preference. Tylenol 2 tabs given for 5/10 L hand pain. Less anxious. Meeting with OT at this time  "

## 2018-11-05 NOTE — PLAN OF CARE
Face to face end of shift report received from Sara SCHNEIDER. Rounding completed. Patient observed.     Kellen Sorensen  11/5/2018  7:45 AM

## 2018-11-05 NOTE — DISCHARGE SUMMARY
"[facility]  Psychiatric Discharge Summary    Mason Garcia MRN# [id number]   Age: 32 year old YOB: 1985     Date of Admission:  11/2/2018  Date of Discharge:  11/5/2018  Admitting Physician:  Giancarlo Lomas MD  Discharge Physician:  DARNELL Villarreal CNP         Event Leading to Hospitalization and Hospital Stay   Mason Garcia is a 32 year old single male who presented via girlfriend to [facility] ED due to depression and suicidal ideation with a plan to kill himself by shotgun. Patient reportedly contemplated killing himself today but reached out to family for help. He reported decreased sleep with 14 month old child with whom he lives along with his girlfriend. Endorsed acute stress from a work accident this January 2018 where his hand got stuck in a  and he lost one of his fingers. Patient stated he is traumatized by this and continues to experience PTSD symptoms that affect his sleep.  Has stopped taking prazosin and decreased his Effexor with reported deterioration in wellness. Patient recently completed PHP at [facility] on 10/19/18.  He is not currently working at this time. Previously inpatient in August of 2018.  Previous history of ETOH but has been sober for one month. Patient was transferred to [facility] and admitted to Inpatient Behavioral Health for further assessment and stabilization.     During initial interview, patient reported decrease sleep (4 hours a night) and self induced decreased medications contribute to symptoms of suicidal ideation.  Noted he stopped taking prazosin due to need to awaken to care for 14 month old. Does report efficacy of this medication and would like to restart. Stated girlfriend does not believe in medication and does not want him to take it. When asked about what other disagreements/arguments they have that could contribute to stress, he stated \"we never argue\".  Asked what coping skills are helpful and " "patient named grounding skills, mindfulness and would like to work more on meditation during hospitalization. Review of notes stated patient was planning to return to work in September but did not.  Currently rates depression \"8/10 [10 being worst]\" and anxiety as \"high\".  He did not appear to figit or display restlessness, presentation is calm when stated he was \"highly anxious\" so incongruent to mood. Review of chart notes characterological personality traits contributing to patient's report of wellness. Does not report sexual concerns today. Stated he has chronic pain in finger \"deal with it\" as \"if I medicated all day that would be a problem too\".      During hospitalization, patient reported some improvement in sleep. He stated the urges to self harm have gone but continues to have intermittent suicidal ideations, which he stated are always there.  He was restarted on Prazosin and Effexor XR was increased back to 150 mg once a day. Further interview did not endorse hypomanic symptoms.  Characterological traits present during interview that appear to contribute to patient's perception of wellness.  He has outpatient services he plans to continue to utilize. Discussed and encouraged DBT skills from recent three week Partial Hospitalization Program, which he verbalized using with minimal success. Positive verbal feedback was provided to patient to continue to practice skills daily. He was discharged home with girlfriend.  Medications were electronically sent to [facility] in [place], [place].       At time of discharge, there is no evidence that patient is in immediate danger of self or others.        Diagnoses:   Major Depressive Disorder, recurrent, moderate  PTSD  Panic Disorder W/O Agoraphobia  Cluster B Personality Traits          Labs:     Results for orders placed or performed during the hospital encounter of 11/02/18   CBC with platelets differential   Result Value Ref [facility]    WBC 12.5 (H) 4.0 - 11.0 10e9/L    " RBC Count 5.68 4.4 - 5.9 10e12/L    Hemoglobin 17.4 13.3 - 17.7 g/dL    Hematocrit 49.3 40.0 - 53.0 %    MCV 87 78 - 100 fl    MCH 30.6 26.5 - 33.0 pg    MCHC 35.3 31.5 - 36.5 g/dL    RDW 12.9 10.0 - 15.0 %    Platelet Count 233 150 - 450 10e9/L    Diff Method Automated Method     % Neutrophils 61.8 %    % Lymphocytes 25.0 %    % Monocytes 8.9 %    % Eosinophils 3.2 %    % Basophils 0.5 %    % Immature Granulocytes 0.6 %    Nucleated RBCs 0 0 /100    Absolute Neutrophil 7.7 1.6 - 8.3 10e9/L    Absolute Lymphocytes 3.1 0.8 - 5.3 10e9/L    Absolute Monocytes 1.1 0.0 - 1.3 10e9/L    Absolute Eosinophils 0.4 0.0 - 0.7 10e9/L    Absolute Basophils 0.1 0.0 - 0.2 10e9/L    Abs Immature Granulocytes 0.1 0 - 0.4 10e9/L    Absolute Nucleated RBC 0.0    Comprehensive metabolic panel   Result Value Ref Range    Sodium 138 133 - 144 mmol/L    Potassium 3.9 3.4 - 5.3 mmol/L    Chloride 106 94 - 109 mmol/L    Carbon Dioxide 24 20 - 32 mmol/L    Anion Gap 8 3 - 14 mmol/L    Glucose 94 70 - 99 mg/dL    Urea Nitrogen 16 7 - 30 mg/dL    Creatinine 1.00 0.66 - 1.25 mg/dL    GFR Estimate 86 >60 mL/min/1.7m2    GFR Estimate If Black >90 >60 mL/min/1.7m2    Calcium 8.5 8.5 - 10.1 mg/dL    Bilirubin Total 0.2 0.2 - 1.3 mg/dL    Albumin 3.9 3.4 - 5.0 g/dL    Protein Total 7.8 6.8 - 8.8 g/dL    Alkaline Phosphatase 89 40 - 150 U/L    ALT 67 0 - 70 U/L    AST 29 0 - 45 U/L   Alcohol ethyl   Result Value Ref [facility]    Ethanol g/dL <0.01 0.01 g/dL   UA reflex to Microscopic   Result Value Ref Range    Color Urine Yellow     Appearance Urine Clear     Glucose Urine Negative NEG^Negative mg/dL    Bilirubin Urine Negative NEG^Negative    Ketones Urine Negative NEG^Negative mg/dL    Specific Gravity Urine 1.013 1.003 - 1.035    Blood Urine Negative NEG^Negative    pH Urine 6.0 4.7 - 8.0 pH    Protein Albumin Urine Negative NEG^Negative mg/dL    Urobilinogen mg/dL Normal 0.0 - 2.0 mg/dL    Nitrite Urine Negative NEG^Negative    Leukocyte Esterase  Urine Negative NEG^Negative    Source Midstream Urine    Drug Screen Urine (Range)   Result Value Ref Range    Amphetamine Qual Urine Negative NEG^Negative    Barbiturates Qual Urine Negative NEG^Negative    Benzodiazepine Qual Urine Negative NEG^Negative    Cannabinoids Qual Urine Negative NEG^Negative    Cocaine Qual Urine Negative NEG^Negative    Opiates Qualitative Urine Negative NEG^Negative    Methadone Qual Urine Negative NEG^Negative    PCP Qual Urine Negative NEG^Negative          Discharge Medications:     Current Discharge Medication List      CONTINUE these medications which have CHANGED    Details   prazosin (MINIPRESS) 1 MG capsule Take 1 capsule (1 mg) by mouth At Bedtime  Qty: 30 capsule, Refills: 0    Associated Diagnoses: PTSD (post-traumatic stress disorder)      venlafaxine (EFFEXOR-XR) 150 MG 24 hr capsule Take 1 capsule (150 mg) by mouth daily  Qty: 30 capsule, Refills: 0    Associated Diagnoses: Moderate episode of recurrent major depressive disorder (H)         Justification for dual anti-psychotic use: Not applicable  Patient is not on two different antipsychotics at time of discharge.         Psychiatric Examination:   Appearance:  awake, alert, adequately groomed and dressed in hospital scrubs  Attitude:  cooperative  Eye Contact:  good  Mood:  sad   Affect:  mood incongruent and calm, pleasant and unremarkable  Speech:  clear, coherent  Psychomotor Behavior:  no evidence of tardive dyskinesia, dystonia, or tics  Thought Process:  linear and goal oriented  Associations:  no loose associations  Thought Content:  no evidence of suicidal ideation or homicidal ideation  Insight:  fair  Judgment:  fair  Oriented to:  time, person, and place  Attention Span and Concentration:  intact  Recent and Remote Memory:  intact  Fund of Knowledge: appropriate  Muscle Strength and Tone: normal  Gait and Station: Normal         Discharge Plan:     [facility]   Med management - DA- Veronica  - November 7th @ 10:00  [address] [place] [address]  Phone: [phone] Fax: [phone]    [facility]   Therapy - Melia - as arranged   [address] [place] [address]  [phone]    Fax: [phone]       Discharge Services Provided:   > 30 minutes spent on discharge services, including:  Final examination of patient.  Review and discussion of Hospital stay.  Instructions for continued outpatient care/goals.  Preparation of discharge records.  Preparation of medications refills and new prescriptions.  Recommend Crisis Placement over Inpatient Hospitalization if symptoms recur.    Attestation:  The patient has been seen and evaluated by me,  DARNELL Villarreal CNP

## 2018-11-05 NOTE — PLAN OF CARE
Problem: Patient Care Overview  Goal: Individualization & Mutuality  Pt will be compliant with ordered medications and treatment team recommendations.  Pt will attend at least 50% of groups  Pt will sleep at least 6-8 hours per night   Pt appears to be sleeping in bed with eyes closed, having regular respirations and position changes. 15 minutes and PRN safety checks completed with no noted complains. Will continue to monitor.     Problem: Suicide Risk (Adult)  Goal: Physical Safety  Patient will be free from self harm during hospital stay   No self harm noted or reported so far this shift.

## 2018-11-05 NOTE — PLAN OF CARE
Problem: Patient Care Overview  Goal: Individualization & Mutuality  Pt will be compliant with ordered medications and treatment team recommendations.  Pt will attend at least 50% of groups  Pt will sleep at least 6-8 hours per night   Outcome: Adequate for Discharge Date Met: 11/05/18  Pt denies suicidal plans. Looking forward to discharge home. No complaints or requests. Pt stable

## 2018-11-05 NOTE — DISCHARGE INSTRUCTIONS
Behavioral Discharge Planning and Instructions    Summary: Mason was admitted to  with increased suicidal ideation     Main Diagnosis: Major Depressive Disorder, recurrent, moderate, PTSD, Panic Disorder w/O agoraphobia, R/O Other Bipolar Disorder, Cluster B Personality Traits    Major Treatments, Procedures and Findings: Stabilize with medications, connect with community programs.    Symptoms to Report: feeling more aggressive, increased confusion, losing more sleep, mood getting worse or thoughts of suicide    Lifestyle Adjustment: Take all medications as prescribed, meet with doctor/ medication provider, out patient therapist, , and [facility] worker as scheduled. Abstain from alcohol or any unprescribed drugs.    Psychiatry Follow-up:       [facility]   Med management - FRANTZ Martin - November 7th @ 10:00  [address] [place] [address]  Phone: [phone] Fax: [phone]    [facility]   Therapy - Melia - as arranged   [address] [place] [address]  [phone]    Fax: [phone]    Resources:   Crisis Intervention: 446.490.2850 or 847-857-5199 (TTY: 159.172.2517).  Call anytime for help.  National [place] on [place]ness (www.[place].lou.org): 468.885.4170 or 853-779-2721.  Alcoholics Anonymous (www.alcoholics-anonymous.org): Check your phone book for your local chapter.  Suicide Awareness Voices of Education (SAVE) (www.save.org): 520-857-KRIA (3083)  National Suicide Prevention Line (www.mentalhealthmn.org): 577-721-ORFR (4230)  Mental Health Consumer/Survivor Network of [place] (www.mhcsn.net): 663.628.2367 or 415-034-9927  Mental Health Association of [place] (www.mentalhealth.org): 740.648.6125 or 660-209-5226    General Medication Instructions:   See your medication sheet(s) for instructions.   Take all medicines as directed.  Make no changes unless your doctor suggests them.   Go to all your doctor visits.  Be sure to have all your required lab tests. This way,  "your medicines can be refilled on time.  Do not use any drugs not prescribed by your doctor.  Avoid alcohol.    [facility] Area:  [facility], Crisis stabilization [facility]- [phone]  [facility] Crisis Line: 1-308.972.3449  Advocates For Family Peace: 593-9154  Sexual Assault Program of [facility]: 775.261.9446 or 1-504.668.8108  [place] [facility] Program: 1-533.770.7195 Ext: 279       Calls answered Mon-Fri-8:00 am--4:30 pm    [place]:  Advocates for Family Peace: 1-112.219.3644  [facility] first call for help: 9-769-754-0407  [facility] Crisis Center:  (711) 910-2379      [place] Area:  Warm Line: 1-843.829.6559       Calls answered Tuesday--Saturday 4:00 pm--10:00 pm  [place] Crisis Line - [phone]  [facility] Crisis Stabilization [phone]    [place] Statewide:  [place] Crisis and Referral Services: 7-518-949-1260  National Suicide Prevention Lifeline: 0-584-145-TALK (5691)   - iix5txcw- Text \"Life\" to 44052  First Call for Help: 2-1-1  [facility] Helpline- 6-032-OTNF-HELP    "

## 2018-11-15 ENCOUNTER — TELEPHONE (OUTPATIENT)
Dept: BEHAVIORAL HEALTH | Facility: CLINIC | Age: 33
End: 2018-11-15

## 2018-11-15 ENCOUNTER — HOSPITAL ENCOUNTER (INPATIENT)
Facility: HOSPITAL | Age: 33
LOS: 6 days | Discharge: HOME | DRG: 885 | End: 2018-11-21
Attending: PHYSICIAN ASSISTANT | Admitting: PSYCHIATRY & NEUROLOGY
Payer: OTHER MISCELLANEOUS

## 2018-11-15 DIAGNOSIS — F33.2 SEVERE EPISODE OF RECURRENT MAJOR DEPRESSIVE DISORDER, WITHOUT PSYCHOTIC FEATURES (H): Primary | ICD-10-CM

## 2018-11-15 DIAGNOSIS — R45.851 SUICIDAL IDEATION: ICD-10-CM

## 2018-11-15 DIAGNOSIS — F33.1 MODERATE EPISODE OF RECURRENT MAJOR DEPRESSIVE DISORDER (H): ICD-10-CM

## 2018-11-15 LAB
ALBUMIN SERPL-MCNC: 3.9 G/DL (ref 3.4–5)
ALBUMIN UR-MCNC: NEGATIVE MG/DL
ALP SERPL-CCNC: 93 U/L (ref 40–150)
ALT SERPL W P-5'-P-CCNC: 60 U/L (ref 0–70)
AMPHETAMINES UR QL SCN: NEGATIVE
ANION GAP SERPL CALCULATED.3IONS-SCNC: 9 MMOL/L (ref 3–14)
APAP SERPL-MCNC: <2 MG/L (ref 10–20)
APPEARANCE UR: CLEAR
AST SERPL W P-5'-P-CCNC: 49 U/L (ref 0–45)
BARBITURATES UR QL: NEGATIVE
BASOPHILS # BLD AUTO: 0.1 10E9/L (ref 0–0.2)
BASOPHILS NFR BLD AUTO: 0.4 %
BENZODIAZ UR QL: NEGATIVE
BILIRUB SERPL-MCNC: 0.4 MG/DL (ref 0.2–1.3)
BILIRUB UR QL STRIP: NEGATIVE
BUN SERPL-MCNC: 15 MG/DL (ref 7–30)
CALCIUM SERPL-MCNC: 8.2 MG/DL (ref 8.5–10.1)
CANNABINOIDS UR QL SCN: NEGATIVE
CHLORIDE SERPL-SCNC: 105 MMOL/L (ref 94–109)
CO2 SERPL-SCNC: 25 MMOL/L (ref 20–32)
COCAINE UR QL: NEGATIVE
COLOR UR AUTO: YELLOW
CREAT SERPL-MCNC: 0.91 MG/DL (ref 0.66–1.25)
DIFFERENTIAL METHOD BLD: ABNORMAL
EOSINOPHIL # BLD AUTO: 0.3 10E9/L (ref 0–0.7)
EOSINOPHIL NFR BLD AUTO: 2.3 %
ERYTHROCYTE [DISTWIDTH] IN BLOOD BY AUTOMATED COUNT: 13 % (ref 10–15)
ETHANOL SERPL-MCNC: <0.01 G/DL
GFR SERPL CREATININE-BSD FRML MDRD: >90 ML/MIN/1.7M2
GLUCOSE SERPL-MCNC: 102 MG/DL (ref 70–99)
GLUCOSE UR STRIP-MCNC: NEGATIVE MG/DL
HCT VFR BLD AUTO: 47.8 % (ref 40–53)
HGB BLD-MCNC: 16.5 G/DL (ref 13.3–17.7)
HGB UR QL STRIP: NEGATIVE
IMM GRANULOCYTES # BLD: 0.1 10E9/L (ref 0–0.4)
IMM GRANULOCYTES NFR BLD: 0.3 %
KETONES UR STRIP-MCNC: NEGATIVE MG/DL
LEUKOCYTE ESTERASE UR QL STRIP: NEGATIVE
LYMPHOCYTES # BLD AUTO: 2.8 10E9/L (ref 0.8–5.3)
LYMPHOCYTES NFR BLD AUTO: 19.3 %
MCH RBC QN AUTO: 30.3 PG (ref 26.5–33)
MCHC RBC AUTO-ENTMCNC: 34.5 G/DL (ref 31.5–36.5)
MCV RBC AUTO: 88 FL (ref 78–100)
METHADONE UR QL SCN: NEGATIVE
MONOCYTES # BLD AUTO: 1.3 10E9/L (ref 0–1.3)
MONOCYTES NFR BLD AUTO: 8.6 %
NEUTROPHILS # BLD AUTO: 10.1 10E9/L (ref 1.6–8.3)
NEUTROPHILS NFR BLD AUTO: 69.1 %
NITRATE UR QL: NEGATIVE
NRBC # BLD AUTO: 0 10*3/UL
NRBC BLD AUTO-RTO: 0 /100
OPIATES UR QL SCN: NEGATIVE
PCP UR QL SCN: NEGATIVE
PH UR STRIP: 5.5 PH (ref 4.7–8)
PLATELET # BLD AUTO: 220 10E9/L (ref 150–450)
POTASSIUM SERPL-SCNC: 4.1 MMOL/L (ref 3.4–5.3)
PROT SERPL-MCNC: 7.6 G/DL (ref 6.8–8.8)
RBC # BLD AUTO: 5.44 10E12/L (ref 4.4–5.9)
SALICYLATES SERPL-MCNC: 4 MG/DL
SODIUM SERPL-SCNC: 139 MMOL/L (ref 133–144)
SOURCE: NORMAL
SP GR UR STRIP: 1.02 (ref 1–1.03)
TSH SERPL DL<=0.005 MIU/L-ACNC: 0.81 MU/L (ref 0.4–4)
UROBILINOGEN UR STRIP-MCNC: NORMAL MG/DL (ref 0–2)
WBC # BLD AUTO: 14.6 10E9/L (ref 4–11)

## 2018-11-15 PROCEDURE — 80307 DRUG TEST PRSMV CHEM ANLYZR: CPT | Performed by: PHYSICIAN ASSISTANT

## 2018-11-15 PROCEDURE — 25000132 ZZH RX MED GY IP 250 OP 250 PS 637: Performed by: PHYSICIAN ASSISTANT

## 2018-11-15 PROCEDURE — 36415 COLL VENOUS BLD VENIPUNCTURE: CPT | Performed by: PHYSICIAN ASSISTANT

## 2018-11-15 PROCEDURE — 80053 COMPREHEN METABOLIC PANEL: CPT | Performed by: PHYSICIAN ASSISTANT

## 2018-11-15 PROCEDURE — 80320 DRUG SCREEN QUANTALCOHOLS: CPT | Performed by: PHYSICIAN ASSISTANT

## 2018-11-15 PROCEDURE — 99285 EMERGENCY DEPT VISIT HI MDM: CPT

## 2018-11-15 PROCEDURE — 81003 URINALYSIS AUTO W/O SCOPE: CPT | Performed by: PHYSICIAN ASSISTANT

## 2018-11-15 PROCEDURE — 12400000 ZZH R&B MH

## 2018-11-15 PROCEDURE — 99284 EMERGENCY DEPT VISIT MOD MDM: CPT | Performed by: PHYSICIAN ASSISTANT

## 2018-11-15 PROCEDURE — 80329 ANALGESICS NON-OPIOID 1 OR 2: CPT | Performed by: PHYSICIAN ASSISTANT

## 2018-11-15 PROCEDURE — 84443 ASSAY THYROID STIM HORMONE: CPT | Performed by: PHYSICIAN ASSISTANT

## 2018-11-15 PROCEDURE — 85025 COMPLETE CBC W/AUTO DIFF WBC: CPT | Performed by: PHYSICIAN ASSISTANT

## 2018-11-15 RX ORDER — HYDROXYZINE HYDROCHLORIDE 25 MG/1
50 TABLET, FILM COATED ORAL ONCE
Status: COMPLETED | OUTPATIENT
Start: 2018-11-15 | End: 2018-11-15

## 2018-11-15 RX ORDER — TRAZODONE HYDROCHLORIDE 50 MG/1
50 TABLET, FILM COATED ORAL
Status: DISCONTINUED | OUTPATIENT
Start: 2018-11-15 | End: 2018-11-21 | Stop reason: HOSPADM

## 2018-11-15 RX ORDER — ACETAMINOPHEN 325 MG/1
650 TABLET ORAL EVERY 4 HOURS PRN
Status: DISCONTINUED | OUTPATIENT
Start: 2018-11-15 | End: 2018-11-21 | Stop reason: HOSPADM

## 2018-11-15 RX ORDER — BISACODYL 10 MG
10 SUPPOSITORY, RECTAL RECTAL DAILY PRN
Status: DISCONTINUED | OUTPATIENT
Start: 2018-11-15 | End: 2018-11-21 | Stop reason: HOSPADM

## 2018-11-15 RX ORDER — HYDROXYZINE HYDROCHLORIDE 25 MG/1
25-50 TABLET, FILM COATED ORAL EVERY 4 HOURS PRN
Status: DISCONTINUED | OUTPATIENT
Start: 2018-11-15 | End: 2018-11-20 | Stop reason: SINTOL

## 2018-11-15 RX ORDER — ALUMINA, MAGNESIA, AND SIMETHICONE 2400; 2400; 240 MG/30ML; MG/30ML; MG/30ML
30 SUSPENSION ORAL EVERY 4 HOURS PRN
Status: DISCONTINUED | OUTPATIENT
Start: 2018-11-15 | End: 2018-11-21 | Stop reason: HOSPADM

## 2018-11-15 RX ADMIN — HYDROXYZINE HYDROCHLORIDE 50 MG: 25 TABLET ORAL at 13:23

## 2018-11-15 ASSESSMENT — ACTIVITIES OF DAILY LIVING (ADL)
RETIRED_COMMUNICATION: 0-->UNDERSTANDS/COMMUNICATES WITHOUT DIFFICULTY
SWALLOWING: 0-->SWALLOWS FOODS/LIQUIDS WITHOUT DIFFICULTY
COGNITION: 0 - NO COGNITION ISSUES REPORTED
AMBULATION: 0-->INDEPENDENT
FALL_HISTORY_WITHIN_LAST_SIX_MONTHS: NO
DRESS: 0-->INDEPENDENT
TRANSFERRING: 0-->INDEPENDENT
TOILETING: 0-->INDEPENDENT
RETIRED_EATING: 0-->INDEPENDENT
BATHING: 0-->INDEPENDENT

## 2018-11-15 NOTE — ED NOTES
"Pt brought in by [place] [employer], he as at clinic in [place] seeing , for a f/u to \"a workman's comp injury, which happened in Jan 2018.\" Pt reports that he was at home this am by himself, he began having thoughts of going into the bathtub and cutting his wrists, and just having it be the end.\"  Does have history of wanting to harm himself in the past, pt does contract for safety with this writer. Pt states he has dealt with depression his entire life, but start having increased issues with his mental health after his accident at work which caused him to lose digits of his left hand. States he's been have PTSD since accident. See assessments. Staff remains with pt 1:1 for pt safety.   "

## 2018-11-15 NOTE — IP AVS SNAPSHOT
[facility]    [address] [place] [address]    Phone:  [phone]    Fax:  [phone]                                       After Visit Summary   11/15/2018    Mason Garcia    MRN: [id number]           After Visit Summary Signature Page     I have received my discharge instructions, and my questions have been answered. I have discussed any challenges I see with this plan with the nurse or doctor.    ..........................................................................................................................................  Patient/Patient Representative Signature      ..........................................................................................................................................  Patient Representative Print Name and Relationship to Patient    ..................................................               ................................................  Date                                   Time    ..........................................................................................................................................  Reviewed by Signature/Title    ...................................................              ..............................................  Date                                               Time          22EPIC Rev 08/18

## 2018-11-15 NOTE — TELEPHONE ENCOUNTER
S:  Pt seen in the [facility] ED due to S.I.  With plans.    B:  Info per Tamica Scott PA :  Pt brought himself to the ED due to increased depression and S.I.  He states he was thinking of cutting his wrists and getting into a hot bath this A.M.   He also has been thinking of driving off a bridge.  Pt was just DC'd from the 5 th floor on 11/5.  He states he has been med compliant.  He reports his fiance is not supportive of him taking his meds.  Pt reports PTSD from a work accident.  utox is negative.  He has been medically cleared.  Pt is cooperative and seeking help.  Please see chart for further info.    A:  Needing hospitalization for safety and stabilization.    R:  Admit to 5S     Accepted by July       Vol        -   5 S, Shashi , informed 2:00  -  ED, Caleb, informed  2:05

## 2018-11-15 NOTE — PLAN OF CARE
Face to face end of shift report received from Deisy ROJAS RN. Rounding completed. Patient observed in group.     Eddie Bob  11/15/2018  3:58 PM

## 2018-11-15 NOTE — ED PROVIDER NOTES
History     Chief Complaint   Patient presents with     Psychiatric Evaluation     HPI  Mason Garcia is a 32 year old male who presents with suicidal ideation since this morning with plan to cut his wrists and lay in a warm bathtub or drive his car off a bridge. He has had two prior admissions to our behavioral health floor in the last year for SI. He has been taking his medications as prescribed and sees a counselor weekly. He mentions his fiance does not approve of him taking anti-depressants and would prefer he stops. He has a 14 month old daughter at home as well. He has been struggling with PTSD, anxiety, depression and SI since a work related injury to his hand 1.5 years ago. He was sent here from Dr. Valencia's office today.     Problem List:    Patient Active Problem List    Diagnosis Date Noted     PTSD (post-traumatic stress disorder) 11/03/2018     Priority: Medium     Moderate episode of recurrent major depressive disorder (H) 11/02/2018     Priority: Medium     Depression with suicidal ideation 08/27/2018     Priority: Medium     Perforated appendicitis 02/07/2017     Priority: Medium        Past Medical History:    No past medical history on file.    Past Surgical History:    Past Surgical History:   Procedure Laterality Date     LAPAROSCOPIC APPENDECTOMY N/A 2/7/2017    Procedure: LAPAROSCOPIC APPENDECTOMY;  Surgeon: Chanel Pagan DO;  Location: [facility]       Family History:    Family History   Problem Relation Age of Onset     Cardiovascular Maternal Grandmother      cardiovascular disease     Respiratory Maternal Grandmother      Lung transplant       Social History:  Marital Status:  Single [1]  Social History   Substance Use Topics     Smoking status: Current Every Day Smoker     Packs/day: 1.00     Years: 7.00     Types: Cigarettes     Smokeless tobacco: Former User     Alcohol use Yes      Comment: rare        Medications:      No current outpatient prescriptions on file.      Review of Systems  "  All other systems reviewed and are negative.      Physical Exam   BP: 142/83  Heart Rate: 99  Temp: 98.8  F (37.1  C)  Resp: 16  Height: 185.4 cm (6' 1\")  Weight: 103 kg (227 lb)  SpO2: 93 %      Physical Exam   Constitutional: He is oriented to person, place, and time. He appears well-developed and well-nourished.  Non-toxic appearance. He does not have a sickly appearance. He does not appear ill. No distress.   HENT:   Head: Normocephalic and atraumatic.   Nose: Mucosal edema present.   Mouth/Throat: Oropharynx is clear and moist. No oropharyngeal exudate.   Eyes: Conjunctivae are normal. Pupils are equal, round, and reactive to light. Right eye exhibits no discharge. Left eye exhibits no discharge. No scleral icterus.   Neck: Normal range of motion. Neck supple.   Cardiovascular: Normal rate, regular rhythm, normal heart sounds and intact distal pulses.  Exam reveals no gallop and no friction rub.    No murmur heard.  Pulmonary/Chest: Effort normal and breath sounds normal. No respiratory distress. He has no wheezes. He has no rales.   Abdominal: Soft. Bowel sounds are normal. He exhibits no distension and no mass. There is no tenderness. There is no rebound and no guarding.   Musculoskeletal: Normal range of motion. He exhibits no edema.   Lymphadenopathy:     He has no cervical adenopathy.   Neurological: He is alert and oriented to person, place, and time. No cranial nerve deficit. Coordination normal.   Skin: Skin is warm and dry. No rash noted. He is not diaphoretic. No erythema. No pallor.   Psychiatric: His behavior is normal. Judgment normal. Thought content is not paranoid and not delusional. He exhibits a depressed mood. He expresses suicidal ideation. He expresses no homicidal ideation. He expresses suicidal plans. He expresses no homicidal plans.   Nursing note and vitals reviewed.      ED Course     ED Course     Procedures              Results for orders placed or performed during the hospital " encounter of 11/15/18 (from the past 24 hour(s))   UA reflex to Microscopic and Culture   Result Value Ref [facility]    Color Urine Yellow     Appearance Urine Clear     Glucose Urine Negative NEG^Negative mg/dL    Bilirubin Urine Negative NEG^Negative    Ketones Urine Negative NEG^Negative mg/dL    Specific Gravity Urine 1.016 1.003 - 1.035    Blood Urine Negative NEG^Negative    pH Urine 5.5 4.7 - 8.0 pH    Protein Albumin Urine Negative NEG^Negative mg/dL    Urobilinogen mg/dL Normal 0.0 - 2.0 mg/dL    Nitrite Urine Negative NEG^Negative    Leukocyte Esterase Urine Negative NEG^Negative    Source Midstream Urine    Drug Screen Urine (Range)   Result Value Ref [facility]    Amphetamine Qual Urine Negative NEG^Negative    Barbiturates Qual Urine Negative NEG^Negative    Benzodiazepine Qual Urine Negative NEG^Negative    Cannabinoids Qual Urine Negative NEG^Negative    Cocaine Qual Urine Negative NEG^Negative    Opiates Qualitative Urine Negative NEG^Negative    Methadone Qual Urine Negative NEG^Negative    PCP Qual Urine Negative NEG^Negative   Acetaminophen level   Result Value Ref [facility]    Acetaminophen Level <2 mg/L   Alcohol ethyl   Result Value Ref [facility]    Ethanol g/dL <0.01 0.01 g/dL   CBC with platelets differential   Result Value Ref [facility]    WBC 14.6 (H) 4.0 - 11.0 10e9/L    RBC Count 5.44 4.4 - 5.9 10e12/L    Hemoglobin 16.5 13.3 - 17.7 g/dL    Hematocrit 47.8 40.0 - 53.0 %    MCV 88 78 - 100 fl    MCH 30.3 26.5 - 33.0 pg    MCHC 34.5 31.5 - 36.5 g/dL    RDW 13.0 10.0 - 15.0 %    Platelet Count 220 150 - 450 10e9/L    Diff Method Automated Method     % Neutrophils 69.1 %    % Lymphocytes 19.3 %    % Monocytes 8.6 %    % Eosinophils 2.3 %    % Basophils 0.4 %    % Immature Granulocytes 0.3 %    Nucleated RBCs 0 0 /100    Absolute Neutrophil 10.1 (H) 1.6 - 8.3 10e9/L    Absolute Lymphocytes 2.8 0.8 - 5.3 10e9/L    Absolute Monocytes 1.3 0.0 - 1.3 10e9/L    Absolute Eosinophils 0.3 0.0 - 0.7 10e9/L    Absolute  Basophils 0.1 0.0 - 0.2 10e9/L    Abs Immature Granulocytes 0.1 0 - 0.4 10e9/L    Absolute Nucleated RBC 0.0    Comprehensive metabolic panel   Result Value Ref [facility]    Sodium 139 133 - 144 mmol/L    Potassium 4.1 3.4 - 5.3 mmol/L    Chloride 105 94 - 109 mmol/L    Carbon Dioxide 25 20 - 32 mmol/L    Anion Gap 9 3 - 14 mmol/L    Glucose 102 (H) 70 - 99 mg/dL    Urea Nitrogen 15 7 - 30 mg/dL    Creatinine 0.91 0.66 - 1.25 mg/dL    GFR Estimate >90 >60 mL/min/1.7m2    GFR Estimate If Black >90 >60 mL/min/1.7m2    Calcium 8.2 (L) 8.5 - 10.1 mg/dL    Bilirubin Total 0.4 0.2 - 1.3 mg/dL    Albumin 3.9 3.4 - 5.0 g/dL    Protein Total 7.6 6.8 - 8.8 g/dL    Alkaline Phosphatase 93 40 - 150 U/L    ALT 60 0 - 70 U/L    AST 49 (H) 0 - 45 U/L   TSH   Result Value Ref [facility]    TSH 0.81 0.40 - 4.00 mU/L   Salicylate level   Result Value Ref [facility]    Salicylate Level 4 mg/dL       Medications   hydrOXYzine (ATARAX) tablet 50 mg (50 mg Oral Given 11/15/18 1323)       Assessments & Plan (with Medical Decision Making)   Mason is cooperative, voluntary for admission. He is medically cleared. He was kindly accepted to our behavioral health floor for SI with multiple plans. He was transferred upstairs in stable condition.     I have reviewed the nursing notes.    I have reviewed the findings, diagnosis, plan and need for follow up with the patient.    Current Discharge Medication List          Final diagnoses:   Suicidal ideation       11/15/2018   [facility]     Tamica Scott PA-C  11/15/18 0182

## 2018-11-15 NOTE — PLAN OF CARE
"ADMISSION NOTE    Reason for admission suicidal ideation.  Safety concerns none at this time.  Risk for or history of violence none.   Skin- Pt does have some acne on his face, is missing the tips of his pinky and ring finger on his L) hand.     Patient arrived on unit from ER accompanied by staff and security on 11/15/2018  2:44 PM.   Status on arrival: Pleasant and cooperative  /88  Temp 98.1  F (36.7  C) (Tympanic)  Resp 18  Ht 1.854 m (6' 1\")  Wt 101.3 kg (223 lb 4.8 oz)  SpO2 95%  BMI 29.46 kg/m2  Patient given tour of unit and Welcome to  unit papers given to patient, wanding completed, belongings inventoried, and admission assessment initiated.   Patient's legal status on arrival is voluntary. Appropriate legal rights discussed with and copy given to patient. Patient Bill of Rights discussed with and copy given to patient.   Patient denies SI, HI, and thoughts of self harm and contracts for safety while on unit.      Deisy De Souza  11/15/2018  2:56 PM          "

## 2018-11-16 PROCEDURE — 12400000 ZZH R&B MH

## 2018-11-16 PROCEDURE — 99223 1ST HOSP IP/OBS HIGH 75: CPT | Performed by: NURSE PRACTITIONER

## 2018-11-16 PROCEDURE — 25000132 ZZH RX MED GY IP 250 OP 250 PS 637: Performed by: NURSE PRACTITIONER

## 2018-11-16 RX ORDER — VENLAFAXINE HYDROCHLORIDE 150 MG/1
150 CAPSULE, EXTENDED RELEASE ORAL DAILY
Status: DISCONTINUED | OUTPATIENT
Start: 2018-11-16 | End: 2018-11-16

## 2018-11-16 RX ORDER — PRAZOSIN HYDROCHLORIDE 1 MG/1
1 CAPSULE ORAL AT BEDTIME
Status: DISCONTINUED | OUTPATIENT
Start: 2018-11-16 | End: 2018-11-21 | Stop reason: HOSPADM

## 2018-11-16 RX ADMIN — HYDROXYZINE HYDROCHLORIDE 50 MG: 25 TABLET ORAL at 12:46

## 2018-11-16 RX ADMIN — HYDROXYZINE HYDROCHLORIDE 25 MG: 25 TABLET ORAL at 18:00

## 2018-11-16 RX ADMIN — HYDROXYZINE HYDROCHLORIDE 25 MG: 25 TABLET ORAL at 07:44

## 2018-11-16 RX ADMIN — VENLAFAXINE HYDROCHLORIDE 187.5 MG: 150 CAPSULE, EXTENDED RELEASE ORAL at 10:59

## 2018-11-16 RX ADMIN — Medication 1 MG: at 20:09

## 2018-11-16 RX ADMIN — PHENYTOIN 1 MG: 125 SUSPENSION ORAL at 20:09

## 2018-11-16 ASSESSMENT — ACTIVITIES OF DAILY LIVING (ADL)
ORAL_HYGIENE: INDEPENDENT
GROOMING: INDEPENDENT
DRESS: INDEPENDENT;SCRUBS (BEHAVIORAL HEALTH)
LAUNDRY: UNABLE TO COMPLETE
DRESS: SCRUBS (BEHAVIORAL HEALTH);INDEPENDENT
ORAL_HYGIENE: INDEPENDENT
LAUNDRY: UNABLE TO COMPLETE
GROOMING: INDEPENDENT

## 2018-11-16 NOTE — PLAN OF CARE
Problem: Patient Care Overview  Goal: Individualization & Mutuality  Pt will follow recommendations of treatment team.  Pt will take medications as prescribed.  Pt will attend 50 % of groups.  Pt will sleep 6-8 hours each night.   Outcome: No Change  Patient endorsed both anxiety and depression and denied HI and hallucinations. Patient did endorse SI but said he felt safe here and contracted for safety. Patient ate well and then laid down after dinner and did not get up for the rest of the night. Patient is calm and cooperative.. Patient denied pain. Did attend one group but did participate in unit milieu. Patient was agreeable to admit questions. He did not request any PRNs.

## 2018-11-16 NOTE — PLAN OF CARE
Face to face end of shift report received from WYATT Morgan. Rounding completed. Patient observed resting in bed.     MARGARITA APODACA  11/16/2018  12:49 AM

## 2018-11-16 NOTE — PLAN OF CARE
Problem: Patient Care Overview  Goal: Team Discussion  Team Plan:   BEHAVIORAL TEAM DISCUSSION    Participants: July Barkley NP,Víctor Sims NP, Gwendolyn Vazquez NP,  Jenn BUSTILLOSW,  Patricia Varghese RN, Rosa SCHNEIDER. LuzM arina Thayer OT, Maria G Carpenter OT.   Progress: New patient.  Continued Stay Criteria/Rationale: Hx of PTSD. Depression with SI. Increase effexor and Start melatonin.   Medical/Physical: Missing distal phalanges of digits 4 and 5.   Precautions:   Behavioral Orders   Procedures     Code 1 - Restrict to Unit     Routine Programming     As clinically indicated     Self Injury Precaution     Status 15     Every 15 minutes.     Plan: Recommending an IRTS or potential for filing a commitment.   Rationale for change in precautions or plan: none    Current Facility-Administered Medications:      acetaminophen (TYLENOL) tablet 650 mg, 650 mg, Oral, Q4H PRN, July Barkley, NP     alum & mag hydroxide-simethicone (MYLANTA ES/MAALOX  ES) suspension 30 mL, 30 mL, Oral, Q4H PRN, July Barkley, NP     bisacodyl (DULCOLAX) Suppository 10 mg, 10 mg, Rectal, Daily PRN, July Barkley, NP     hydrOXYzine (ATARAX) tablet 25-50 mg, 25-50 mg, Oral, Q4H PRN, July Barkley, NP, 25 mg at 11/16/18 0744     magnesium hydroxide (MILK OF MAGNESIA) suspension 30 mL, 30 mL, Oral, At Bedtime PRN, July Barkley, NP     nicotine polacrilex (NICORETTE) gum 2-4 mg, 2-4 mg, Buccal, Q1H PRN, July Barkley, NP     traZODone (DESYREL) tablet 50 mg, 50 mg, Oral, At Bedtime PRN, July Barkley, NP   Patient Active Problem List   Diagnosis     Perforated appendicitis     Depression with suicidal ideation     Moderate episode of recurrent major depressive disorder (H)     PTSD (post-traumatic stress disorder)

## 2018-11-16 NOTE — PLAN OF CARE
Problem: Suicide Risk (Adult)  Goal: Physical Safety  Patient will agree to safety plan with staff on the unit.  Patient will be free of suicidal ideation or intent by discharge.   No self harm noted or reported so far this shift.    Problem: Patient Care Overview  Goal: Individualization & Mutuality  Pt will follow recommendations of treatment team.  Pt will take medications as prescribed.  Pt will attend 50 % of groups.  Pt will sleep 6-8 hours each night.   Pt appears to be sleeping in bed with eyes closed, having regular respirations and position changes. 15 minutes and PRN safety checks completed with no noted complains. Will continue to monitor.

## 2018-11-16 NOTE — PLAN OF CARE
Face to face end of shift report received from WYATT Echeverria. Rounding completed. Patient observed ambulating in lounge area.     Chayo Ornelas  11/16/2018  3:45 PM
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
## 2018-11-16 NOTE — PLAN OF CARE
"Problem: Suicide Risk (Adult)  Goal: Physical Safety  Patient will agree to safety plan with staff on the unit.  Patient will be free of suicidal ideation or intent by discharge.   Outcome: No Change  Pt endorses chronic SI thoughts but denies SI on first assessment. Denies SIB. He contracts for safety on the unit. Pt states he feels safe here.    Problem: Patient Care Overview  Goal: Individualization & Mutuality  Pt will follow recommendations of treatment team.  Pt will take medications as prescribed.  Pt will attend 50 % of groups.  Pt will sleep 6-8 hours each night.   Outcome: No Change  Patient is cooperative with this writer during nursing assessment. He does appear a little anxious during our conversation. He is out in the lounge coloring a picture. He states he slept poorly last night after going to bed early. Per documentation, pt slept about 6 hours. Patient denies suicidal ideation currently but does report having chronic suicidal thoughts that \"come and go.\" He states he feels safe here and contracts for safety. Patient denies SIB, HI, pain, or hallucinations. Pt does endorse depression and anxiety. Pt reports high depression and states he \"needs a med adjustment.\" Pt encouraged to talk to provider regarding this. Pt states he has \"some anxiety\" and request PRN. PRN atarax 25 mg requested and received PO at 0744 for anxiety. Pt is able to make needs known. He denies bowel or bladder issues. Pt attending groups and states his goal is to attend all available groups today. Pt ate 100% of breakfast.   1246: Pt requested and received PRN atarax 50 mg PO for anxiety.  "

## 2018-11-16 NOTE — PROGRESS NOTES
11/15/18 2058   Patient Belongings   Did you bring any home meds/supplements to the hospital?  No   Patient Belongings other (see comments)  (Brown Hat, Receipts, brown shoes, brown jacket, white socks, brown belt, blue shorts, jeans, camo shirt,)   Disposition of Belongings Other (see comment)  (PT cubby in belongings room)   Belongings Search Yes   Clothing Search Yes   Second Staff Jay CARTWRIGHT  (Jay CARTWRIGHT)   List items sent to safe: N/A no Valuables  All other belongings put in assigned cubby in belongings room.   Clothing: Shirt(s): 1    I have reviewed my belongings list on admission and verify that it is correct.     Patient signature_______________________________    Second staff witness (if patient unable to sign) ______________________________       I have received all my belongings at discharge.    Patient signature________________________________    Luke  11/15/2018  9:12 PM

## 2018-11-16 NOTE — PLAN OF CARE
Problem: Patient Care Overview  Goal: Discharge Needs Assessment  Outcome: No Change  Faxed case management referral to [facility] this afternoon.

## 2018-11-16 NOTE — PLAN OF CARE
Face to face end of shift report received from Sara WEINSTEIN RN. Rounding completed. Patient observed.     Jacki Alexander  11/16/2018  7:51 AM

## 2018-11-16 NOTE — PLAN OF CARE
Problem: Suicide Risk (Adult)  Goal: Physical Safety  Patient will agree to safety plan with staff on the unit.  Patient will be free of suicidal ideation or intent by discharge.     Pt remains free from injury et self harm. Absent active SI, chronic thoughts only few times a day,  denies hallucinations.      Problem: Patient Care Overview  Goal: Individualization & Mutuality  Pt will follow recommendations of treatment team.  Pt will take medications as prescribed.  Pt will attend 50 % of groups.  Pt will sleep 6-8 hours each night.      Pt ambulating halls/lounge area upon arrival, affect is depress, blunted, activity is somewhat anxious et withdrawn. Adequate food et fluid intake. Appropriate behavior with staff et peers. Participates in majority of groups. Family into visit. voices frustrations re: possible filing status vs. IRHTs program. PRN hydroxyzine given for moderate anxiety @ 1800.     2011: Tiera'  voicing concerns to this writer et charge nurse re: medication, stating increased depression et anxiety with effexor increase, would like to taper off, et try something else, education provided re: commitment process vs. IRHTs program. Tiera's goal would for patient to be home with outpatient services et would take a leave of absence.

## 2018-11-17 PROBLEM — F33.2 SEVERE EPISODE OF RECURRENT MAJOR DEPRESSIVE DISORDER, WITHOUT PSYCHOTIC FEATURES (H): Status: ACTIVE | Noted: 2018-11-02

## 2018-11-17 PROCEDURE — 25000132 ZZH RX MED GY IP 250 OP 250 PS 637: Performed by: NURSE PRACTITIONER

## 2018-11-17 PROCEDURE — 12400000 ZZH R&B MH

## 2018-11-17 PROCEDURE — 99232 SBSQ HOSP IP/OBS MODERATE 35: CPT | Performed by: NURSE PRACTITIONER

## 2018-11-17 RX ORDER — LITHIUM CARBONATE 300 MG/1
300 TABLET, FILM COATED, EXTENDED RELEASE ORAL EVERY 12 HOURS SCHEDULED
Status: DISCONTINUED | OUTPATIENT
Start: 2018-11-17 | End: 2018-11-21 | Stop reason: HOSPADM

## 2018-11-17 RX ADMIN — LITHIUM CARBONATE 300 MG: 300 TABLET, EXTENDED RELEASE ORAL at 20:26

## 2018-11-17 RX ADMIN — HYDROXYZINE HYDROCHLORIDE 25 MG: 25 TABLET ORAL at 17:31

## 2018-11-17 RX ADMIN — Medication 1 MG: at 20:27

## 2018-11-17 RX ADMIN — VENLAFAXINE HYDROCHLORIDE 187.5 MG: 150 CAPSULE, EXTENDED RELEASE ORAL at 08:59

## 2018-11-17 RX ADMIN — PHENYTOIN 1 MG: 125 SUSPENSION ORAL at 20:27

## 2018-11-17 RX ADMIN — HYDROXYZINE HYDROCHLORIDE 50 MG: 25 TABLET ORAL at 09:00

## 2018-11-17 ASSESSMENT — ACTIVITIES OF DAILY LIVING (ADL)
GROOMING: INDEPENDENT
ORAL_HYGIENE: INDEPENDENT
LAUNDRY: UNABLE TO COMPLETE
DRESS: INDEPENDENT;SCRUBS (BEHAVIORAL HEALTH)

## 2018-11-17 NOTE — PLAN OF CARE
Problem: Suicide Risk (Adult)  Goal: Physical Safety  Patient will agree to safety plan with staff on the unit.  Patient will be free of suicidal ideation or intent by discharge.   Outcome: No Change  Pt appears to be sleeping. Has remained safe.    Problem: Patient Care Overview  Goal: Individualization & Mutuality  Pt will follow recommendations of treatment team.  Pt will take medications as prescribed.  Pt will attend 50 % of groups.  Pt will sleep 6-8 hours each night.   Outcome: No Change  Pt appears to be sleeping comfortably with regular respirations. Pt slept approx. 6.5 hours. Pt up x1 in hallway.

## 2018-11-17 NOTE — PLAN OF CARE
Face to face end of shift report received from TIFFANY Ornelas. Rounding completed. Patient observed lying in bed.     Yaneli Daugherty  11/16/2018  11:43 PM

## 2018-11-17 NOTE — PLAN OF CARE
Problem: Suicide Risk (Adult)  Goal: Physical Safety  Patient will agree to safety plan with staff on the unit.  Patient will be free of suicidal ideation or intent by discharge.   Outcome: No Change  Patient endorsed high anxiety and some depression. He said that at times he has fleeting thoughts of SI but he feels safe here. Patient denied HI and hallucinations. He is clean and appropriately dressed. Patient is hard to talk to at times. He seems uncomfortable and he looks around and make inconsistent eye contact. Patient reported he was upset about a conversation he had with his fiance. He reported she said maybe he should go away to an IRTS program. He explained she verbalized she felt safer but he took it to mean she didn't want to be with him anymore. He was stressed about this. Patient requested 50mg hydroxizine at 9AM.

## 2018-11-17 NOTE — PLAN OF CARE
Face to face end of shift report received from Yaneli WEINSTEIN RN. Rounding completed. Patient observed in the lounge.     Eddie Bob  11/17/2018  3:01 PM

## 2018-11-17 NOTE — PROGRESS NOTES
[facility]  Psychiatric Progress Note      Impression:   Patient Mason Garcia is a 32 year old male admitted on 11/15/2018 with suicidal thoughts and plans. This is his third admission since August 2018 and had completed PHP outpatient in October 2018.     Patient interviewed and requests to talk about information given yesterday, specific to Civil Commitment. Provided education and if patient willing will go to IRTS that is recommendation, if not, would file paperwork for Commitment due to frequency of inpatient hospitalizations and failure to cope in outpatient environment despite completion of Intense Partial Hospitalization Program and regular outpatient therapy. Stated his fiance continues to not support medication management and would like a call from provider. Discussed that he is capable of conveying this information also, patient does not respond to this statement. He is observed to be having appropriate boundaries with staff. Discussed starting lithium due to persistent suicidal ideation and depressive symptoms. Provided written information on medication and agreeable to start.     Educated regarding medication indications, risks, benefits, side effects, contraindications and possible interactions. Verbally expressed understanding.          Diagnoses:   Major Depressive Disorder, recurrent, severe without psychosis  H/O PTSD  H/O Panic Disorder w/o agoraphobia  Cluster B Personality Traits    Attestation:  Patient has been seen and evaluated by me,  DARNELL Villarreal CNP          Interim History:   The patient's care was discussed with the treatment team and chart notes were reviewed.          Medications:     Current Facility-Administered Medications   Medication     acetaminophen (TYLENOL) tablet 650 mg     alum & mag hydroxide-simethicone (MYLANTA ES/MAALOX  ES) suspension 30 mL     bisacodyl (DULCOLAX) Suppository 10 mg     hydrOXYzine (ATARAX) tablet 25-50 mg     lithium  "(ESKALITH/LITHOBID) CR tablet 300 mg     magnesium hydroxide (MILK OF MAGNESIA) suspension 30 mL     melatonin tablet 1 mg     nicotine polacrilex (NICORETTE) gum 2-4 mg     prazosin (MINIPRESS) capsule 1 mg     traZODone (DESYREL) tablet 50 mg     venlafaxine (EFFEXOR-XR) 24 hr capsule 187.5 mg      10 point ROS negative other than HPI       Allergies:     Allergies   Allergen Reactions     Sudafed [Pseudoephedrine]           Psychiatric Examination:   /78  Pulse 83  Temp 96.6  F (35.9  C) (Tympanic)  Resp 16  Ht 1.854 m (6' 1\")  Wt 101.3 kg (223 lb 4.8 oz)  SpO2 97%  BMI 29.46 kg/m2  Weight is 223 lbs 4.8 oz  Body mass index is 29.46 kg/(m^2).    Appearance:  awake, alert  Attitude:  cooperative  Eye Contact:  good  Mood:  sad  and depressed  Affect:  mood congruent  Speech:  clear, coherent  Psychomotor Behavior:  no evidence of tardive dyskinesia, dystonia, or tics  Thought Process:  goal oriented  Associations:  no loose associations  Thought Content:  passive suicidal ideation present  Insight:  limited  Judgment:  limited  Oriented to:  time, person, and place  Attention Span and Concentration:  fair  Recent and Remote Memory:  intact  Fund of Knowledge: appropriate  Muscle Strength and Tone: normal  Gait and Station: Normal         Labs:   No results found for this or any previous visit (from the past 24 hour(s)).         Plan:   Continue Inpatient Hospitalization  Continue to provide a safe environment and therapeutic milieu.  Continue medications  Start lithium 300 mg bid, obtain level in 5 days    ELOS: >5 days due to discharge to [facility]  "

## 2018-11-17 NOTE — PLAN OF CARE
Face to face end of shift report received from WYATT Morgan. Rounding completed. Patient observed ambulating in hallway.     Chayo Ornelas  11/17/2018  3:51 PM

## 2018-11-18 PROCEDURE — 25000132 ZZH RX MED GY IP 250 OP 250 PS 637: Performed by: NURSE PRACTITIONER

## 2018-11-18 PROCEDURE — 99232 SBSQ HOSP IP/OBS MODERATE 35: CPT | Performed by: NURSE PRACTITIONER

## 2018-11-18 PROCEDURE — 12400000 ZZH R&B MH

## 2018-11-18 RX ADMIN — HYDROXYZINE HYDROCHLORIDE 50 MG: 25 TABLET ORAL at 14:46

## 2018-11-18 RX ADMIN — LITHIUM CARBONATE 300 MG: 300 TABLET, EXTENDED RELEASE ORAL at 20:24

## 2018-11-18 RX ADMIN — PHENYTOIN 1 MG: 125 SUSPENSION ORAL at 20:24

## 2018-11-18 RX ADMIN — LITHIUM CARBONATE 300 MG: 300 TABLET, EXTENDED RELEASE ORAL at 08:28

## 2018-11-18 RX ADMIN — Medication 1 MG: at 20:24

## 2018-11-18 RX ADMIN — VENLAFAXINE HYDROCHLORIDE 187.5 MG: 150 CAPSULE, EXTENDED RELEASE ORAL at 08:27

## 2018-11-18 ASSESSMENT — ACTIVITIES OF DAILY LIVING (ADL)
ORAL_HYGIENE: INDEPENDENT
LAUNDRY: UNABLE TO COMPLETE
DRESS: SCRUBS (BEHAVIORAL HEALTH);INDEPENDENT
GROOMING: INDEPENDENT
ORAL_HYGIENE: INDEPENDENT
LAUNDRY: UNABLE TO COMPLETE
DRESS: SCRUBS (BEHAVIORAL HEALTH);INDEPENDENT
GROOMING: INDEPENDENT

## 2018-11-18 NOTE — PLAN OF CARE
Problem: Suicide Risk (Adult)  Goal: Physical Safety  Patient will agree to safety plan with staff on the unit.  Patient will be free of suicidal ideation or intent by discharge.   Outcome: No Change  Pt appears to be sleeping. Pt has remained safe this shift.    Problem: Patient Care Overview  Goal: Individualization & Mutuality  Pt will follow recommendations of treatment team.  Pt will take medications as prescribed.  Pt will attend 50 % of groups.  Pt will sleep 6-8 hours each night.   Outcome: No Change  Pt appears to be sleeping comfortably this shift with regular respirations. Pt slept approx. 5 hours this shift.

## 2018-11-18 NOTE — PLAN OF CARE
Face to face end of shift report received from WYATT Juarez. Rounding completed. Patient observed in bed with lung expansion bilaterally.      Ann Castle  11/18/2018  3:40 PM

## 2018-11-18 NOTE — PLAN OF CARE
"Problem: Suicide Risk (Adult)  Goal: Strength-Based Wellness/Recovery  Patient will attend at least 50% of unit programming per day.  Patient will be medication compliant throughout hospital stay.     Pt ambulating in hallway upon arrival, mood is sad, depressed. Activity is somewhat .restless et withdrawn. Compliant with nursing assessment, participating in majority of groups. Pt becoming more comfortable on the unit, affect noted to improve via out the shift, visiting with peers. Fiance' into visit voiced frustrations re: phone call from provider. Pt appeared to tolerate visit well. C/o high anxiety re: hospitalization, IRHTs et possible commitment.  \"I should be able to go home, I miss my kids.\" PRN hydroxyzine 25 mg given @ 1731, which patient states somewhat effective relief.     2122: Pt ate snack, coloring with books that catrachito' brought in, compliant with medication administration. Returned to room after snack. 1st dose of Lithium given.       Goal: Physical Safety  Patient will agree to safety plan with staff on the unit.  Patient will be free of suicidal ideation or intent by discharge.   Fleeting suicidal thoughts that come et go. Denies active SI. Contracts for safety on the unit et is in agreement to update staff if thoughts were to develop.       "

## 2018-11-18 NOTE — PLAN OF CARE
Face to face end of shift report received from WYATT Groves. Rounding completed. Patient observed in day room drawing.      Ann Castle  11/18/2018  7:50 AM

## 2018-11-18 NOTE — PLAN OF CARE
"Problem: Suicide Risk (Adult)  Goal: Strength-Based Wellness/Recovery  Patient will attend at least 50% of unit programming per day.  Patient will be medication compliant throughout hospital stay.     Pt attended groups and was med compliant.   Goal: Physical Safety  Patient will agree to safety plan with staff on the unit.  Patient will be free of suicidal ideation or intent by discharge.   Pt agrees to safety plan and said that he hasn't had thoughts today about SI.  He stated he doesn't want to harm himself, \"I just get the urge to do it.\" Pt denies SI.  Pt stated that he had a really good day and he has started to feel \"normal.\"  He had a good visit with his wife.      Problem: Patient Care Overview  Goal: Individualization & Mutuality  Pt will follow recommendations of treatment team.  Pt will take medications as prescribed.  Pt will attend 50 % of groups.  Pt will sleep 6-8 hours each night.   Pt was cooperative with nursing assessment and medications.  He said that each day he is here, he feels better.  He's not sure if it's because he is getting more comfortable here.  He is scared about what is going to happen with the talk about commitment.  He colored this morning, watched tv, and visited with peers.    Goal: Team Discussion  Team Plan:   BEHAVIORAL TEAM DISCUSSION    Participants:   Progress:   Continued Stay Criteria/Rationale:   Medical/Physical:   Precautions:   Behavioral Orders   Procedures     Code 1 - Restrict to Unit     Routine Programming     As clinically indicated     Self Injury Precaution     Status 15     Every 15 minutes.     Plan:   Rationale for change in precautions or plan:         "

## 2018-11-18 NOTE — PLAN OF CARE
Face to face end of shift report received from TIFFANY Ornelas. Rounding completed. Patient observed lying in bed.     Yaneli Daugherty  11/17/2018  11:45 PM

## 2018-11-18 NOTE — PROGRESS NOTES
"[facility]  Psychiatric Progress Note      Impression:   Patient Mason Garcia is a 32 year old male admitted on 11/15/2018 with suicidal thoughts and plans. This is his third admission since August 2018 and had completed PHP outpatient in October 2018.     Patient interviewed and stated he is willing to \"stay a few days\" when told recommendation is still to go to an IRTS.  He would like to have a 'second chance' and increase outpatient services along with having fiance work less and spend more time with him. Discussed need for him to use coping skills and have safety plan \"I Do\" he stated. Discussed having Crisis beds at [facility] as part of his safety plan to which he stated \"it's too far from home\".   When asked yesterday, patient has had period of about five months of living on his own in past 13 years, relying on significant other.  Today he reported wanting to join the [facility] [employer] but did not \"no one was up to go with me\".  Dependency issues present and encouraged to take initiative in self care to which he had no response. Discussed Effexor as has had dose adjusted several times up and down by both himself and providers in past several months.  Does report apathy some days and \"productive\" other days.  Plan to discontinue Effexor XR by taper and continue lithium for mood stability. Limited insight into side effects/benefits to medication, instead relying on others to tell him.  Agreeable to continue this medication plan.  Stated he is sure he is back as stay was too short last time, this comment displays limited insight into his mental health and presentation of characterological traits that contribute to patient's perception of wellness. Stated \"I'm doing really well today\".  Discussed bringing other discharge options up in team meeting and plan to do medication changes. Continued hospitalization is needed at this time.     Called and talked with Jordyn winston, today by phone for " "17 minutes (10:16-10:33). Both her and patient requested this provider call for collateral information and discussion of medications. She reports patient having more apathy \"not caring\" and \"spacing out\" since on Effexor, this occurring less on decreased dose. Stated change in behaviors and mood noted by friends and family.  Reports patient having panic attacks and calling her at work, taking about 10 minutes to \"calm him down\".  Describes supportive home environment.  When discussed candidly that patient reports not feeling supported at home with medications, she responded that she would be if there were noted benefits but Effexor \"he's just not himself\" and \"did better with coping before\". Discussed lithium for mood stability and decreased suicidal ideations, with which she is in agreement.  Reports her plan to cut down at work so she can be home with him more. Would like discharge plan to be back home with increased services, possibly [facility] and increased therapy sessions (sees therapist at [facility] weekly currently). Patient also being referred to [facility] Case Management and can attend [facility] aftercare groups. Stated would bring this up at teaming and return call to patient after that tomorrow. Did discuss reasons for recommendation of IRTS and possibility of Commitment proceedings. She was informed that daughter cannot visit per unit policies due to age and safety issues. She stated verbal understanding.     Educated regarding medication indications, risks, benefits, side effects, contraindications and possible interactions. Verbally expressed understanding.          Diagnoses:   Major Depressive Disorder, recurrent, severe without psychosis  H/O PTSD  H/O Panic Disorder w/o agoraphobia  Cluster B Personality Traits    Attestation:  Patient has been seen and evaluated by me,  DARNELL Villarreal CNP          Interim History:   The patient's care was discussed with the treatment team and chart notes were reviewed.   " "       Medications:     Current Facility-Administered Medications   Medication     acetaminophen (TYLENOL) tablet 650 mg     alum & mag hydroxide-simethicone (MYLANTA ES/MAALOX  ES) suspension 30 mL     bisacodyl (DULCOLAX) Suppository 10 mg     hydrOXYzine (ATARAX) tablet 25-50 mg     lithium (ESKALITH/LITHOBID) CR tablet 300 mg     magnesium hydroxide (MILK OF MAGNESIA) suspension 30 mL     melatonin tablet 1 mg     nicotine polacrilex (NICORETTE) gum 2-4 mg     prazosin (MINIPRESS) capsule 1 mg     traZODone (DESYREL) tablet 50 mg     [START ON 11/19/2018] venlafaxine (EFFEXOR-XR) 24 hr capsule 112.5 mg      10 point ROS negative other than HPI       Allergies:     Allergies   Allergen Reactions     Sudafed [Pseudoephedrine]           Psychiatric Examination:   /80  Pulse 79  Temp 96.8  F (36  C) (Tympanic)  Resp 14  Ht 1.854 m (6' 1\")  Wt 103.9 kg (229 lb)  SpO2 99%  BMI 30.21 kg/m2  Weight is 229 lbs 0 oz  Body mass index is 30.21 kg/(m^2).    Appearance:  awake, alert  Attitude:  cooperative  Eye Contact:  good  Mood:  sad  and depressed  Affect:  mood congruent  Speech:  clear, coherent  Psychomotor Behavior:  no evidence of tardive dyskinesia, dystonia, or tics  Thought Process:  goal oriented  Associations:  no loose associations  Thought Content:  passive suicidal ideation present  Insight:  limited  Judgment:  limited  Oriented to:  time, person, and place  Attention Span and Concentration:  fair  Recent and Remote Memory:  intact  Fund of Knowledge: appropriate  Muscle Strength and Tone: normal  Gait and Station: Normal         Labs:   No results found for this or any previous visit (from the past 24 hour(s)).         Plan:   Continue Inpatient Hospitalization  Continue to provide a safe environment and therapeutic milieu.  Decrease Effexor XR to discontinue due to lack of efficacy, apathy, and mood fluctuations.   Continue lithium titration to therapeutic level, lab draw on " 11/22/18  Consider adjunct of Lamictal if depression persists once lithium at therapeutic dose    ELOS: >5 days due to discharge to IRTS, consideration of civil commitment proceedings due to frequency of hospitalization.

## 2018-11-19 PROCEDURE — 12400000 ZZH R&B MH

## 2018-11-19 PROCEDURE — 25000132 ZZH RX MED GY IP 250 OP 250 PS 637: Performed by: NURSE PRACTITIONER

## 2018-11-19 PROCEDURE — 99232 SBSQ HOSP IP/OBS MODERATE 35: CPT | Performed by: NURSE PRACTITIONER

## 2018-11-19 RX ORDER — VENLAFAXINE HYDROCHLORIDE 37.5 MG/1
37.5 CAPSULE, EXTENDED RELEASE ORAL
Status: DISCONTINUED | OUTPATIENT
Start: 2018-11-22 | End: 2018-11-21 | Stop reason: HOSPADM

## 2018-11-19 RX ADMIN — LITHIUM CARBONATE 300 MG: 300 TABLET, EXTENDED RELEASE ORAL at 08:32

## 2018-11-19 RX ADMIN — HYDROXYZINE HYDROCHLORIDE 50 MG: 25 TABLET ORAL at 20:57

## 2018-11-19 RX ADMIN — HYDROXYZINE HYDROCHLORIDE 50 MG: 25 TABLET ORAL at 13:33

## 2018-11-19 RX ADMIN — VENLAFAXINE HYDROCHLORIDE 112.5 MG: 37.5 CAPSULE, EXTENDED RELEASE ORAL at 08:32

## 2018-11-19 RX ADMIN — LITHIUM CARBONATE 300 MG: 300 TABLET, EXTENDED RELEASE ORAL at 20:57

## 2018-11-19 RX ADMIN — PHENYTOIN 1 MG: 125 SUSPENSION ORAL at 20:57

## 2018-11-19 RX ADMIN — Medication 1 MG: at 20:57

## 2018-11-19 ASSESSMENT — ACTIVITIES OF DAILY LIVING (ADL)
DRESS: INDEPENDENT
DRESS: INDEPENDENT;SCRUBS (BEHAVIORAL HEALTH)
LAUNDRY: UNABLE TO COMPLETE
ORAL_HYGIENE: INDEPENDENT
GROOMING: INDEPENDENT
ORAL_HYGIENE: INDEPENDENT
GROOMING: INDEPENDENT

## 2018-11-19 NOTE — PLAN OF CARE
Face to face end of shift report received from Paige SCHNEIDER. Rounding completed. Patient observed in dayroom and introduced to nursing for the shift.    Allan Amato  11/19/2018  7:55 AM

## 2018-11-19 NOTE — PROGRESS NOTES
Face to face end of shift report received from bentley perez rn at 2300 report.. Rounding completed. Patient observed.     Cate Fuller  11/19/2018  12:56 AM

## 2018-11-19 NOTE — PLAN OF CARE
Face to face end of shift report received from Allan WEINSTEIN RN. Rounding completed. Patient observed.     Krissy Coyne  11/19/2018  4:10 PM

## 2018-11-19 NOTE — PLAN OF CARE
Problem: Suicide Risk (Adult)  Goal: Strength-Based Wellness/Recovery  Patient will attend at least 50% of unit programming per day.  Patient will be medication compliant throughout hospital stay.     0015 in bed with easy respirations, presenting sleeping.[id number] patient up for a short moment not needing anything and then back to bed. No needs. Pleasant,     Problem: Patient Care Overview  Goal: Individualization & Mutuality  Pt will follow recommendations of treatment team.  Pt will take medications as prescribed.  Pt will attend 50 % of groups.  Pt will sleep 6-8 hours each night.   0015 in bed with easy respirations, presenting sleeping.

## 2018-11-19 NOTE — PLAN OF CARE
"Problem: Patient Care Overview  Goal: Discharge Needs Assessment  Outcome: Improving  Received call from Julissa Perez with [facility] who states she will be meeting with pt initially and his case will then be passed off to a different - Julissa states she will plan to meet with pt next week.    Spoke with pt this afternoon- He states he is feeling frustrated due to being in the hospital for his birthday and Thanksgiving. States the weekend was \"fine\" and he feels better knowing a petition for commitment will not be filed and that he does not have to go through the court process. He says doesn't feel he needs to be hospitalized any longer and wants to go home and see his child. Pt states he has been attending the group programming but states there are only 2 groups during the weekend and \"it's not enough.\" Talked with pt about follow up services- Informed him Julissa Perez from the [facility] will be meeting with him next week, his therapist will need to send his most recent DA to a facility of his choice for [facility] services, his therapy and med man apt have been arranged, and informed pt staff will add the aftercare time and day into his after visit summary.       "

## 2018-11-19 NOTE — PLAN OF CARE
Problem: Patient Care Overview  Goal: Team Discussion  Team Plan:   BEHAVIORAL TEAM DISCUSSION    Participants: July Barkley NP,Víctor Sims NP, Gwendolyn Vazquez NP, Jenn Carbone LSW,  Veda West LSW,  Patricia Varghese RN, Ann Ramirez RN, Luz Marina Thayer OT, Maria G Carpenter OT, Alexandra Patel RN  Progress: Fair- denies SI   Continued Stay Criteria/Rationale: Decrease Effexor XR to discontinue due to lack of efficacy, apathy, and mood fluctuations.   Continue lithium titration to therapeutic level, lab draw on 11/22/18  Consider adjunct of Lamictal if depression persists once lithium at therapeutic dose  Medical/Physical: See below   Precautions:   Behavioral Orders   Procedures     Code 1 - Restrict to Unit     Routine Programming     As clinically indicated     Self Injury Precaution     Status 15     Every 15 minutes.     Plan: Stabilize and discharge home with increased services   Rationale for change in precautions or plan: None    Patient Active Problem List   Diagnosis     Perforated appendicitis     Depression with suicidal ideation     Severe episode of recurrent major depressive disorder, without psychotic features (H)     PTSD (post-traumatic stress disorder)         Current Facility-Administered Medications:      acetaminophen (TYLENOL) tablet 650 mg, 650 mg, Oral, Q4H PRN, Alaspa, July L, NP     alum & mag hydroxide-simethicone (MYLANTA ES/MAALOX  ES) suspension 30 mL, 30 mL, Oral, Q4H PRN, Alaspa, July L, NP     bisacodyl (DULCOLAX) Suppository 10 mg, 10 mg, Rectal, Daily PRN, Alaspa, July L, NP     hydrOXYzine (ATARAX) tablet 25-50 mg, 25-50 mg, Oral, Q4H PRN, Alaspa, July L, NP, 50 mg at 11/18/18 1446     lithium (ESKALITH/LITHOBID) CR tablet 300 mg, 300 mg, Oral, Q12H GIAN, Gwendolyn Vazquez Mc, APRN CNP, 300 mg at 11/19/18 0832     magnesium hydroxide (MILK OF MAGNESIA) suspension 30 mL, 30 mL, Oral, At Bedtime PRN, Alaspa, July L, NP     melatonin tablet 1 mg, 1 mg, Oral, At Bedtime,  Gwendolyn Vazquez Mc, APRN CNP, 1 mg at 11/18/18 2024     nicotine polacrilex (NICORETTE) gum 2-4 mg, 2-4 mg, Buccal, Q1H PRN, July Barkley NP     prazosin (MINIPRESS) capsule 1 mg, 1 mg, Oral, At Bedtime, Gwendolyn Vazquez Mc, APRN CNP, 1 mg at 11/18/18 2024     traZODone (DESYREL) tablet 50 mg, 50 mg, Oral, At Bedtime PRN, July Barkley, NP     venlafaxine (EFFEXOR-XR) 24 hr capsule 112.5 mg, 112.5 mg, Oral, Daily, Gwendolyn Vazquez Mc, APRN CNP, 112.5 mg at 11/19/18 0832

## 2018-11-19 NOTE — PLAN OF CARE
Problem: Suicide Risk (Adult)  Goal: Strength-Based Wellness/Recovery  Patient will attend at least 50% of unit programming per day.  Patient will be medication compliant throughout hospital stay.     Outcome: Improving  Pt attended all of the available groups and spends time with peers in the dayroom.   Goal: Physical Safety  Patient will agree to safety plan with staff on the unit.  Patient will be free of suicidal ideation or intent by discharge.   Outcome: Improving  Pt reports that he feels safe on the unit denying SI or thoughts to harm himself    Problem: Patient Care Overview  Goal: Individualization & Mutuality  Pt will follow recommendations of treatment team.  Pt will take medications as prescribed.  Pt will attend 50 % of groups.  Pt will sleep 6-8 hours each night.   Outcome: Improving  Pt has been up and active all shift attending all of the unit programming. He shared with me that he is struggling because he wanted to be out of the hospital before his birthday and Thanksgiving. He is also missing his new born baby.  I discussed the tapering of his Effexor and the need to be monitored. I also gave him teaching on the Lithium he recently started. He denied having any suicidal thoughts though feels depressed and powerless.  Pt talked with me about recovering from his work injury and regaining his functionality. Pt is planning to follow though with increased outpatient care. He is working on his relapse prevention/safety plan.  1333 Pt requested Atarax for his anxiety.  Pt also told me that he spent some time in his room crying due to missing his new born.

## 2018-11-19 NOTE — PROGRESS NOTES
[facility]  Psychiatric Progress Note      Impression:   Patient Mason Garcia is a 32 year old male admitted on 11/15/2018 with suicidal thoughts and plans. This is his third admission since August 2018 and had completed PHP outpatient in October 2018.     Patient interviewed and stated he completed WRAP paperwork and wrote a note on his plan for the day as he would like to be discharged. Reviewed WRAP plan briefly, discussed follow up with therapist, plan to join [facility] and recommendations of [facility] and [facility] Case Management.  Discussed need for hospitalization for medication changes.  He stated he just needs to see his daughter. Provided positive verbal support and can have pictures but overall a safety issue.  Encouraged him to practice 2-3 DBT skills daily that are effective for him. Plans to attend groups.  Reports he had completed a Safety Plan with therapist but was not readily available prior to admission when he felt need to come to hospital.  Did discuss crisis beds at [facility] in [place].  He stated he was hoping to be discharged for a therapy appointment today and that tomorrow is his birthday.      After Team Meeting, approached patient who was with RN to discuss discharge plan and medication changes including lithium level. Patient voiced disappointment but verbalized understanding.     Called and talked with Jordyn winston, today by phone for 8 minutes (10:54-11:02). Informed her of plan to discharge home with increased services including [facility] recommendation, [facility] Case Management.  Discussed displaying Safety Plan in visible spot, she stated she was not aware he had one.  Dicussed patient's voiced plan about joining [facility] and attending aftercare groups for [facility].  She reported plan to bring pictures of daughter for him to have. Stated she is in agreement with hospitalization during medication changes.     Educated regarding medication indications,  "risks, benefits, side effects, contraindications and possible interactions. Verbally expressed understanding.          Diagnoses:   Major Depressive Disorder, recurrent, severe without psychosis  H/O PTSD  H/O Panic Disorder w/o agoraphobia  Cluster B Personality Traits    Attestation:  Patient has been seen and evaluated by me,  DARNELL Villarreal CNP          Interim History:   The patient's care was discussed with the treatment team and chart notes were reviewed.          Medications:     Current Facility-Administered Medications   Medication     acetaminophen (TYLENOL) tablet 650 mg     alum & mag hydroxide-simethicone (MYLANTA ES/MAALOX  ES) suspension 30 mL     bisacodyl (DULCOLAX) Suppository 10 mg     hydrOXYzine (ATARAX) tablet 25-50 mg     lithium (ESKALITH/LITHOBID) CR tablet 300 mg     magnesium hydroxide (MILK OF MAGNESIA) suspension 30 mL     melatonin tablet 1 mg     nicotine polacrilex (NICORETTE) gum 2-4 mg     prazosin (MINIPRESS) capsule 1 mg     traZODone (DESYREL) tablet 50 mg     venlafaxine (EFFEXOR-XR) 24 hr capsule 112.5 mg     [START ON 11/22/2018] venlafaxine (EFFEXOR-XR) 24 hr capsule 37.5 mg      10 point ROS negative other than HPI       Allergies:     Allergies   Allergen Reactions     Sudafed [Pseudoephedrine]           Psychiatric Examination:   /92  Pulse 83  Temp 96.8  F (36  C) (Tympanic)  Resp 14  Ht 1.854 m (6' 1\")  Wt 103.9 kg (229 lb)  SpO2 98%  BMI 30.21 kg/m2  Weight is 229 lbs 0 oz  Body mass index is 30.21 kg/(m^2).    Appearance:  awake, alert  Attitude:  cooperative  Eye Contact:  good  Mood:  sad  and depressed  Affect:  mood congruent  Speech:  clear, coherent  Psychomotor Behavior:  no evidence of tardive dyskinesia, dystonia, or tics  Thought Process:  goal oriented  Associations:  no loose associations  Thought Content:  passive suicidal ideation present  Insight:  limited  Judgment:  limited  Oriented to:  time, person, and place  Attention Span and " Concentration:  fair  Recent and Remote Memory:  intact  Fund of Knowledge: appropriate  Muscle Strength and Tone: normal  Gait and Station: Normal         Labs:   No results found for this or any previous visit (from the past 24 hour(s)).         Plan:   Continue Inpatient Hospitalization  Continue to provide a safe environment and therapeutic milieu.  Decrease Effexor XR to discontinue due to lack of efficacy, apathy, and mood fluctuations.   Continue lithium titration to therapeutic level, lab draw on 11/22/18  Consider adjunct of Lamictal if depression persists once lithium at therapeutic dose    ELOS: >5 days due to discharge to IRTS, consideration of civil commitment proceedings due to frequency of hospitalization.

## 2018-11-20 PROCEDURE — 99232 SBSQ HOSP IP/OBS MODERATE 35: CPT | Performed by: NURSE PRACTITIONER

## 2018-11-20 PROCEDURE — 25000132 ZZH RX MED GY IP 250 OP 250 PS 637: Performed by: NURSE PRACTITIONER

## 2018-11-20 PROCEDURE — 12400000 ZZH R&B MH

## 2018-11-20 RX ADMIN — LITHIUM CARBONATE 300 MG: 300 TABLET, EXTENDED RELEASE ORAL at 08:47

## 2018-11-20 RX ADMIN — VENLAFAXINE HYDROCHLORIDE 112.5 MG: 37.5 CAPSULE, EXTENDED RELEASE ORAL at 08:47

## 2018-11-20 RX ADMIN — LITHIUM CARBONATE 300 MG: 300 TABLET, EXTENDED RELEASE ORAL at 20:28

## 2018-11-20 RX ADMIN — PHENYTOIN 1 MG: 125 SUSPENSION ORAL at 20:28

## 2018-11-20 RX ADMIN — Medication 1 MG: at 20:28

## 2018-11-20 ASSESSMENT — ACTIVITIES OF DAILY LIVING (ADL)
DRESS: INDEPENDENT
ORAL_HYGIENE: INDEPENDENT
GROOMING: INDEPENDENT

## 2018-11-20 NOTE — PLAN OF CARE
Problem: Suicide Risk (Adult)  Goal: Strength-Based Wellness/Recovery  Patient will attend at least 50% of unit programming per day.  Patient will be medication compliant throughout hospital stay.     0000  In bed with easy respirations, presenting sleeping. 0537 patient continues to sleep at this time.  Problem: Patient Care Overview  Goal: Individualization & Mutuality  Pt will follow recommendations of treatment team.  Pt will sleep 6-8 hours each night.    0000 in bed with easy respirations, presenting sleeping

## 2018-11-20 NOTE — PROGRESS NOTES
"[facility]  Psychiatric Progress Note      Impression:   Patient Mason Garcia is a 32 year old male admitted on 11/15/2018 with suicidal thoughts and plans. This is his third admission since August 2018 and had completed PHP outpatient in October 2018.     Patient interviewed and stated he feels emotions today and in fact \"I cried\" and stated he had not done that in a long time but felt therapeutic.  Discussed timeline for discharge and will continue to monitor taper of Effexor XR which he denies side effects of GI discomfort but does endorse emotional lability today.  Stated he has been thinking that his fiance may have been being honest with him and not unsupportive of his mental health.  Stated he is situationally depressed as he is in hospital and today is his birthday.  Requests to be home on Thanksgiving, stated he misses his daughter.  Reports he has therapy scheduled for Friday with fiance.  Has started to draw pictures \"couldn't believe I could do that\".  Encouraged him to continue to learn new coping skills along with practice of 2-3 DBT skills daily that are effective for him. Has attended groups.  Sleeping well and appetite is good.       Educated regarding medication indications, risks, benefits, side effects, contraindications and possible interactions. Verbally expressed understanding.          Diagnoses:   Major Depressive Disorder, recurrent, severe without psychosis  H/O PTSD  H/O Panic Disorder W/O agoraphobia  Cluster B Personality Traits    Attestation:  Patient has been seen and evaluated by me,  DARNELL Villarreal CNP          Interim History:   The patient's care was discussed with the treatment team and chart notes were reviewed.          Medications:     Current Facility-Administered Medications   Medication     acetaminophen (TYLENOL) tablet 650 mg     alum & mag hydroxide-simethicone (MYLANTA ES/MAALOX  ES) suspension 30 mL     bisacodyl (DULCOLAX) Suppository 10 mg     " "lithium (ESKALITH/LITHOBID) CR tablet 300 mg     magnesium hydroxide (MILK OF MAGNESIA) suspension 30 mL     melatonin tablet 1 mg     nicotine polacrilex (NICORETTE) gum 2-4 mg     prazosin (MINIPRESS) capsule 1 mg     traZODone (DESYREL) tablet 50 mg     venlafaxine (EFFEXOR-XR) 24 hr capsule 112.5 mg     [START ON 11/22/2018] venlafaxine (EFFEXOR-XR) 24 hr capsule 37.5 mg      10 point ROS negative other than HPI       Allergies:     Allergies   Allergen Reactions     Sudafed [Pseudoephedrine]           Psychiatric Examination:   /89  Pulse 81  Temp 96.6  F (35.9  C) (Tympanic)  Resp 16  Ht 1.854 m (6' 1\")  Wt 103.9 kg (229 lb)  SpO2 98%  BMI 30.21 kg/m2  Weight is 229 lbs 0 oz  Body mass index is 30.21 kg/(m^2).    Appearance:  awake, alert and dressed in hospital scrubs  Attitude:  cooperative  Eye Contact:  good  Mood:  sad  and better  Affect:  mood congruent  Speech:  clear, coherent  Psychomotor Behavior:  no evidence of tardive dyskinesia, dystonia, or tics  Thought Process:  goal oriented  Associations:  no loose associations  Thought Content:  passive suicidal ideation present  Insight:  limited  Judgment:  limited  Oriented to:  time, person, and place  Attention Span and Concentration:  fair  Recent and Remote Memory:  intact  Fund of Knowledge: appropriate  Muscle Strength and Tone: normal  Gait and Station: Normal         Labs:   No results found for this or any previous visit (from the past 24 hour(s)).         Plan:   Continue Inpatient Hospitalization  Continue to provide a safe environment and therapeutic milieu.  Continue taper of  Effexor XR to discontinue due to lack of efficacy, apathy, and mood fluctuations.   Continue lithium titration to therapeutic level, lab draw on 11/22/18 in am  Consider adjunct of Lamictal if depression persists once lithium at therapeutic dose    Patient is voluntary, would consider discharge 11/22-11/23 as tolerating medication changes well and may " need lithium adjustment following results of lab draw    ELOS: 1-2 days due to medication management, lab level and frequency of hospitalization.

## 2018-11-20 NOTE — PLAN OF CARE
"Problem: Suicide Risk (Adult)  Goal: Strength-Based Wellness/Recovery  Patient will attend at least 50% of unit programming per day.  Patient will be medication compliant throughout hospital stay.     Outcome: Therapy, progress toward functional goals as expected  He is encouraged to participate unit programming, and he is compliant with medications prescribed.    Problem: Patient Care Overview  Goal: Individualization & Mutuality  Pt will follow recommendations of treatment team.  Pt will sleep 6-8 hours each night.    Outcome: Therapy, progress toward functional goals as expected  Pt denies SI, HI, Hallucinations, at this time. When asked if he were depressed he stated \" not depressed, sad \"  He wishes he could discharge today, he states I am feeling better, and I miss my daughter.  He states that his anxiety is managed, and it is because he is here.  He reports chronic pain in hand. Rates pain at 3/10 baseline. He again asks if he could discharge.  Writer informed him that this would be up to the provider.  Pt tearful this AM he was sobbing on floor. Writer talked with him about getting stable on medications before discharge. He states,\" I feel better, I am crying because I miss my family, its my birthday, and it is the Holidays. And I really miss my daughter\" talked with him about needing to make sure he is stable with his mental health in order to be helpful for his family. He needs to take care of himself in order to care care of his family.  He agreed with that. He states I am better, I have a safety plan in place, I have a therapist, med provider. I have many people of support.\" encouraged him to discuss his wishes with provider. He is encouraged to participate in unit programming,  He states he is eating and sleeping ok.      "

## 2018-11-20 NOTE — PLAN OF CARE
"Problem: Suicide Risk (Adult)  Goal: Strength-Based Wellness/Recovery  Patient will attend at least 50% of unit programming per day.  Patient will be medication compliant throughout hospital stay.     Outcome: Improving  Patient is medication compliant and attending groups.  Goal: Physical Safety  Patient will agree to safety plan with staff on the unit.  Patient will be free of suicidal ideation or intent by discharge.   Outcome: Improving  Patient denies suicidal ideation or intent today.  Agrees to inform staff if thoughts return.    Problem: Patient Care Overview  Goal: Individualization & Mutuality  Pt will follow recommendations of treatment team.  Pt will sleep 6-8 hours each night.    Outcome: Therapy, progress towards functional goals is fair  Patient is medication compliant and in agreement with medication changes being made.  Denies any unwanted side effects and none observed.  Patient does state that his mood has improved. Patient states he was upset that he would not be home for his birthday tomorrow but states \"at least I won't have to go to that IRTS.\"  Patient also made the statement \"I'm faking it to make it\" when discussing his current mood.  Patient was encouraged to be honest about his feelings as that is what will help him best in the long run.  Patient had a visitor this evening and stated visit went well.    2145:  Patient requested and given Hydroxyzine 50 mg po with HS scheduled medications to decrease anxiety so he can sleep better.  "

## 2018-11-20 NOTE — PLAN OF CARE
Problem: Patient Care Overview  Goal: Team Discussion  Team Plan:   BEHAVIORAL TEAM DISCUSSION    Participants: July Barkley NP,Víctor Sims NP, Gwendolyn Vazquez NP,  Jenn Carbone LSW,  Veda West LSW,  Patricia Varghese RN, Ann Ramirez RN, Maria G Carpenter OT, Mariah Hansen OT, Day Patel RN  Progress: Fair   Continued Stay Criteria/Rationale: Decrease Effexor XR to discontinue due to lack of efficacy, apathy, and mood fluctuations.   Continue lithium titration to therapeutic level, lab draw on 11/22/18  Consider adjunct of Lamictal if depression persists once lithium at therapeutic dose  Medical/Physical: See below   Precautions:   Behavioral Orders   Procedures     Code 1 - Restrict to Unit     Routine Programming     As clinically indicated     Self Injury Precaution     Status 15     Every 15 minutes.     Plan: Stabilize and discharge home with increased services   Rationale for change in precautions or plan: None     Patient Active Problem List   Diagnosis     Perforated appendicitis     Depression with suicidal ideation     Severe episode of recurrent major depressive disorder, without psychotic features (H)     PTSD (post-traumatic stress disorder)         Current Facility-Administered Medications:      acetaminophen (TYLENOL) tablet 650 mg, 650 mg, Oral, Q4H PRN, July Barkley L, NP     alum & mag hydroxide-simethicone (MYLANTA ES/MAALOX  ES) suspension 30 mL, 30 mL, Oral, Q4H PRN, July Barkley, NP     bisacodyl (DULCOLAX) Suppository 10 mg, 10 mg, Rectal, Daily PRN, Dereje Barkleyista L, NP     hydrOXYzine (ATARAX) tablet 25-50 mg, 25-50 mg, Oral, Q4H PRN, AlaspJuly guillen, NP, 50 mg at 11/19/18 2057     lithium (ESKALITH/LITHOBID) CR tablet 300 mg, 300 mg, Oral, Q12H GIAN, Gwendolyn Vazquez Mc, APRN CNP, 300 mg at 11/20/18 0847     magnesium hydroxide (MILK OF MAGNESIA) suspension 30 mL, 30 mL, Oral, At Bedtime PRN, AlaspDereje guillenJuly L, NP     melatonin tablet 1 mg, 1 mg, Oral, At Bedtime, Gwendolyn Vazquez Mc,  DARNELL CNP, 1 mg at 11/19/18 2057     nicotine polacrilex (NICORETTE) gum 2-4 mg, 2-4 mg, Buccal, Q1H PRN, July Barkley NP     prazosin (MINIPRESS) capsule 1 mg, 1 mg, Oral, At Bedtime, Gwendolyn Vazquez Mc, APRN CNP, 1 mg at 11/19/18 2057     traZODone (DESYREL) tablet 50 mg, 50 mg, Oral, At Bedtime PRN, July Barkley, NP     venlafaxine (EFFEXOR-XR) 24 hr capsule 112.5 mg, 112.5 mg, Oral, Daily, Gwendolyn Vazquez Mc, APRN CNP, 112.5 mg at 11/20/18 0847     [START ON 11/22/2018] venlafaxine (EFFEXOR-XR) 24 hr capsule 37.5 mg, 37.5 mg, Oral, Daily with breakfast, Gwendolyn Vazquez Mc, APRN CNP

## 2018-11-20 NOTE — PLAN OF CARE
Face to face end of shift report received from Paige Ventura. Rounding completed. Patient observed in lounge (tearful).     Ellie Pearl  11/20/2018  7:53 AM

## 2018-11-20 NOTE — PROGRESS NOTES
Face to face end of shift report received from almita javier rn at 2300 report.. Rounding completed. Patient observed.     Cate Fuller  11/20/2018  1:47 AM

## 2018-11-20 NOTE — PLAN OF CARE
"Problem: Suicide Risk (Adult)  Goal: Strength-Based Wellness/Recovery  Patient will attend at least 50% of unit programming per day.  Patient will be medication compliant throughout hospital stay.     Outcome: Therapy, progress towards functional goals is fair  Patient is attending and participating in most groups.  Patient is medication compliant.  Goal: Physical Safety  Patient will agree to safety plan with staff on the unit.  Patient will be free of suicidal ideation or intent by discharge.   Outcome: Therapy, progress towards functional goals is fair  Patient denies suicidal ideation or intent; agrees to safety plan.    Problem: Patient Care Overview  Goal: Individualization & Mutuality  Pt will follow recommendations of treatment team.  Pt will sleep 6-8 hours each night.    Outcome: Therapy, progress towards functional goals is fair  Patient was in group at the start of this shift.  Patient did state that he \"had a rough morning\" and \"had to cry it out\" but now feels better.  Patient states he thinks the Lithium is helping him to \"feel emotion again\".  Patient denies pain or unwanted side effects.  Patient can make his needs known to staff.    1915:  Patient had visitors this shift.  Affect remains bright during interaction.      "

## 2018-11-20 NOTE — PLAN OF CARE
Face to face end of shift report received from Ellie MONACO RN. Rounding completed. Patient observed.     Krissy Coyne  11/20/2018  4:11 PM

## 2018-11-21 VITALS
OXYGEN SATURATION: 98 % | RESPIRATION RATE: 16 BRPM | HEART RATE: 90 BPM | SYSTOLIC BLOOD PRESSURE: 118 MMHG | DIASTOLIC BLOOD PRESSURE: 76 MMHG | BODY MASS INDEX: 30.35 KG/M2 | WEIGHT: 229 LBS | HEIGHT: 73 IN | TEMPERATURE: 97 F

## 2018-11-21 PROCEDURE — 25000132 ZZH RX MED GY IP 250 OP 250 PS 637: Performed by: NURSE PRACTITIONER

## 2018-11-21 PROCEDURE — 99239 HOSP IP/OBS DSCHRG MGMT >30: CPT | Performed by: NURSE PRACTITIONER

## 2018-11-21 RX ORDER — LITHIUM CARBONATE 300 MG/1
300 TABLET, FILM COATED, EXTENDED RELEASE ORAL EVERY 12 HOURS
Qty: 60 TABLET | Refills: 0 | Status: SHIPPED | OUTPATIENT
Start: 2018-11-21 | End: 2021-03-04

## 2018-11-21 RX ORDER — VENLAFAXINE HYDROCHLORIDE 75 MG/1
75 CAPSULE, EXTENDED RELEASE ORAL DAILY
Qty: 30 CAPSULE | Refills: 0 | Status: ON HOLD | COMMUNITY
Start: 2018-11-21 | End: 2019-01-22

## 2018-11-21 RX ADMIN — TRAZODONE HYDROCHLORIDE 50 MG: 50 TABLET ORAL at 01:52

## 2018-11-21 RX ADMIN — VENLAFAXINE HYDROCHLORIDE 112.5 MG: 37.5 CAPSULE, EXTENDED RELEASE ORAL at 08:27

## 2018-11-21 RX ADMIN — LITHIUM CARBONATE 300 MG: 300 TABLET, EXTENDED RELEASE ORAL at 08:27

## 2018-11-21 NOTE — PLAN OF CARE
"Problem: Patient Care Overview  Goal: Discharge Needs Assessment  Outcome: Improving  Spoke with pt this morning- he states he is doing well and feels he is ready for discharge. States the Lithium is helping because he is able to feel emotion- says \"I'm happy that I'm mad about being here. I was flat for so long it's nice to feel something again.\" Denies SI and state the only time he has thought about feeling suicidal is when a nurse asks him. States he will always have suicidal thoughts but would never act on them or have urges. States he can see his house from the window which makes him want to go home even more. Informed pt the NP will be meeting with him to discuss plans. Talked with pt about follow up services and to contact his CM Julissa next week if he doesn't hear from her.       "

## 2018-11-21 NOTE — DISCHARGE SUMMARY
"Psychiatric Discharge Summary    Mason Garcia MRN# [id number]   Age: 33 year old YOB: 1985     Date of Admission:  11/15/2018  Date of Discharge:  11/21/2018  Admitting Physician:  Giancarlo Lomas MD  Discharge Physician:  Radha Cortes CNP          Event Leading to Hospitalization and Hospital Stay   Admission: Mason Garcia is a 32 year old single male who presented via himself to [facility] ED from PCP visit where he expressed self harm thoughts.  At ED he reported increased depression and suicidal ideation with plan of cutting his wrists and getting into a hot bath or driving off a bridge. Patient reports he has been taking medications. He also reports stressor of live in [facility] is not supportive of him taking his medications.  This is noted on previous admissions also.  He continues to state PTSD from a work accident last year in which he lost the top of his two fingers. Was to return to work this past Fall but unable to due to this trauma.  Reported PTSD symptoms that affect his sleep, was helped by minipress.  History of  ETOH, lab is negative in Ed. Patient was voluntarily admitted to Inpatient Behavioral Health for further assessment and stabilization.      During interview, patient reported decrease sleep (5 hours a night)  With occasional three hour naps.  Feels tired during the day. Stated his 15 month old daughter awakens at night, when pressed for details stated she awakens at 5:30-6:00 and will occasionally go back to sleep. Racing thoughts are prominent.  Stated he continues to have PTSD symptoms but does not give specific examples of flashbacks or re experienced memories. Stated girlfriend does not believe in medication and does not want him to take it. Reported last phone call to her last night \"She told me: 'don't have them increase your med's.\"  Patient has limited to no insight into his mental health being unsupported in his home environment, even when confronted by " "this writer that living alone may be more beneficial.  Asked what coping skills are helpful, and vaguely stated \"three C's\".  Discussed with patient increasing Effexor XR along with augmentation with mood stabilizer. He is agreeable to this. Stated he was \"surprised\" when this provider discussed next level of care to be IRTS due to frequent hospitalizations, despite step down PHP and individual outpatient therapy.     Hospital stay: Mason was admitted to the behavioral health unit as a voluntary patient. Effexor XR was initially increased to treat depressed mood, though decision was then made to decrease and taper him off of this due to lack of efficacy, apathy, and increased mood fluctuations since this was started. He currently remains on 75 mg of Effexor XR daily, can continue to work with his outpatient provider for further dosage adjustments. Lithium was started for persistent suicidal thoughts and depressive symptoms. He tolerated this well, denied any side effects. Was sleeping well with use of Melatonin at bedtime. He was visible in the milieu and had been attending group programming. He reported an improvement in depression, decreased mood lability, and denied any further suicidal thoughts. Consideration was given to filing a petition for commitment, as this was his second hospitalization this month. He had recently completed PHP and has still be struggling to maintain on an outpatient basis. However, he was in agreement to increasing his outpatient services and to formulate a crisis plan that he would share with his fiance. Tiera was also in agreement to him returning home with increased outpatient supports. He has a therapy appointment on Monday and a medication management appointment a week later. He was set up with a  through [facility] and plans to work on getting set up with an [facility] worker when he sees his therapist on Monday. He plans to attend the aftercare PHP groups weekly and " start going to the [employer] group at his [employer]. He is requesting to discharge today to be home for Dioni tomorrow. He is a voluntary patient, there is no criteria to place him on a 72 hour hold at this point. He is denying suicidal ideation. He will discharge home later this evening with his fiance.           At time of discharge, there is no evidence that patient is in immediate danger of self or others.        DIagnoses:   Major Depressive Disorder, recurrent, severe without psychosis  H/o PTSD  H/o Panic Disorder without agoraphobia  Cluster B Personality Traits         Labs:     Results for orders placed or performed during the hospital encounter of 11/15/18   Acetaminophen level   Result Value Ref Range    Acetaminophen Level <2 mg/L   Alcohol ethyl   Result Value Ref Range    Ethanol g/dL <0.01 0.01 g/dL   CBC with platelets differential   Result Value Ref Range    WBC 14.6 (H) 4.0 - 11.0 10e9/L    RBC Count 5.44 4.4 - 5.9 10e12/L    Hemoglobin 16.5 13.3 - 17.7 g/dL    Hematocrit 47.8 40.0 - 53.0 %    MCV 88 78 - 100 fl    MCH 30.3 26.5 - 33.0 pg    MCHC 34.5 31.5 - 36.5 g/dL    RDW 13.0 10.0 - 15.0 %    Platelet Count 220 150 - 450 10e9/L    Diff Method Automated Method     % Neutrophils 69.1 %    % Lymphocytes 19.3 %    % Monocytes 8.6 %    % Eosinophils 2.3 %    % Basophils 0.4 %    % Immature Granulocytes 0.3 %    Nucleated RBCs 0 0 /100    Absolute Neutrophil 10.1 (H) 1.6 - 8.3 10e9/L    Absolute Lymphocytes 2.8 0.8 - 5.3 10e9/L    Absolute Monocytes 1.3 0.0 - 1.3 10e9/L    Absolute Eosinophils 0.3 0.0 - 0.7 10e9/L    Absolute Basophils 0.1 0.0 - 0.2 10e9/L    Abs Immature Granulocytes 0.1 0 - 0.4 10e9/L    Absolute Nucleated RBC 0.0    Comprehensive metabolic panel   Result Value Ref Range    Sodium 139 133 - 144 mmol/L    Potassium 4.1 3.4 - 5.3 mmol/L    Chloride 105 94 - 109 mmol/L    Carbon Dioxide 25 20 - 32 mmol/L    Anion Gap 9 3 - 14 mmol/L    Glucose 102 (H) 70 - 99 mg/dL    Urea Nitrogen  15 7 - 30 mg/dL    Creatinine 0.91 0.66 - 1.25 mg/dL    GFR Estimate >90 >60 mL/min/1.7m2    GFR Estimate If Black >90 >60 mL/min/1.7m2    Calcium 8.2 (L) 8.5 - 10.1 mg/dL    Bilirubin Total 0.4 0.2 - 1.3 mg/dL    Albumin 3.9 3.4 - 5.0 g/dL    Protein Total 7.6 6.8 - 8.8 g/dL    Alkaline Phosphatase 93 40 - 150 U/L    ALT 60 0 - 70 U/L    AST 49 (H) 0 - 45 U/L   UA reflex to Microscopic and Culture   Result Value Ref Range    Color Urine Yellow     Appearance Urine Clear     Glucose Urine Negative NEG^Negative mg/dL    Bilirubin Urine Negative NEG^Negative    Ketones Urine Negative NEG^Negative mg/dL    Specific Gravity Urine 1.016 1.003 - 1.035    Blood Urine Negative NEG^Negative    pH Urine 5.5 4.7 - 8.0 pH    Protein Albumin Urine Negative NEG^Negative mg/dL    Urobilinogen mg/dL Normal 0.0 - 2.0 mg/dL    Nitrite Urine Negative NEG^Negative    Leukocyte Esterase Urine Negative NEG^Negative    Source Midstream Urine    TSH   Result Value Ref Range    TSH 0.81 0.40 - 4.00 mU/L   Salicylate level   Result Value Ref Range    Salicylate Level 4 mg/dL   Drug Screen Urine (Range)   Result Value Ref Range    Amphetamine Qual Urine Negative NEG^Negative    Barbiturates Qual Urine Negative NEG^Negative    Benzodiazepine Qual Urine Negative NEG^Negative    Cannabinoids Qual Urine Negative NEG^Negative    Cocaine Qual Urine Negative NEG^Negative    Opiates Qualitative Urine Negative NEG^Negative    Methadone Qual Urine Negative NEG^Negative    PCP Qual Urine Negative NEG^Negative              Discharge Medications:     Discharge Medication List as of 11/21/2018  1:44 PM      START taking these medications    Details   lithium (ESKALITH/LITHOBID) 300 MG CR tablet Take 1 tablet (300 mg) by mouth every 12 hours, Disp-60 tablet, R-0, E-Prescribe      melatonin 1 MG TABS tablet Take 1 tablet (1 mg) by mouth At Bedtime, Disp-30 tablet, Historical         CONTINUE these medications which have NOT CHANGED    Details   HYDROXYZINE  HCL PO Take by mouth every 4 hours as needed for itching, Historical      prazosin (MINIPRESS) 1 MG capsule Take 1 capsule (1 mg) by mouth At Bedtime, Disp-30 capsule, R-0, E-Prescribe      venlafaxine (EFFEXOR-XR) 75 MG 24 hr capsule Take 1 capsule (75 mg) by mouth daily, Disp-30 capsule, R-0, Historical             Justification for dual anti-psychotic use: not applicable       Psychiatric Examination:   Appearance:  awake, alert, adequately groomed and appeared as age stated  Attitude:  cooperative, pleasant  Eye Contact:  good  Mood:  better  Affect:  appropriate and in normal range  Speech:  clear, coherent  Psychomotor Behavior:  no evidence of tardive dyskinesia, dystonia, or tics  Thought Process:  logical, linear and goal oriented  Associations:  no loose associations  Thought Content:  no evidence of suicidal ideation or homicidal ideation and no evidence of psychotic thought  Insight:  good  Judgment:  intact  Oriented to:  time, person, and place  Attention Span and Concentration:  intact  Recent and Remote Memory:  intact  Fund of Knowledge: appropriate for education  Muscle Strength and Tone: normal  Gait and Station: Normal  Perception: no perceptual disturbances noted       Discharge Plan:   Psychiatry Follow-up:       [facility]  Medication Management- Miriam Caballero- Dec 4th @ 3pm   [address] [place] [address] [phone]  [phone]      [facility]   Therapy - Melia - November 26th @ 4pm, Please talk with your therapist about sending your most recent DA for [facility] worker   [address] [place] [address]  Phone: [phone]: [phone]  Fax: [phone]     [facility]  Case Management- Julissa Ana [phone]- As arranged   [address] [place] [address]  Phone:  [phone]   Fax - [phone]        [facility]  Aftercare groups- Tuesdays @ 3pm  5th Floor [address], [place]  [address]  Phone: [phone]. [phone]  Fax: [phone]     24-hour Crisis Line [phone]     Options for [facility] Services:     [facility]  [address] [facility]  [place], [place] [address] [phone]  [phone]     [address] [address]  [phone]  Fax: [phone]       Attestation:  The patient has been seen and evaluated by me,  Radha Cortes NP         Discharge Services Provided:    40 minutes spent on discharge services, including:  Final examination of patient.  Review and discussion of Hospital stay.  Instructions for continued outpatient care/goals.  Preparation of discharge records.  Preparation of medications refills and new prescriptions.

## 2018-11-21 NOTE — PLAN OF CARE
Discharge Note    Patient Discharged to home on 11/21/2018 2:58 PM via Private Car accompanied by catrachito.     Patient informed of discharge instructions in AVS. patient verbalizes understanding and denies having any questions pertaining to AVS. Patient stable at time of discharge. Patient denies SI, HI, and thoughts of self harm at time of discharge. All personal belongings returned to patient. Discharge prescriptions sent to [facility] via electronic communication. Psych evaluation, history and physical, AVS, and discharge summary faxed to next level of care- unit .     Ellie Pearl  11/21/2018  2:58 PM

## 2018-11-21 NOTE — PLAN OF CARE
Face to face end of shift report received from Paige Ventura. Rounding completed. Patient observed in [facility].     Ellie Pearl  11/21/2018  7:42 AM

## 2018-11-21 NOTE — PLAN OF CARE
Problem: Patient Care Overview  Goal: Discharge Needs Assessment  Outcome: Adequate for Discharge Date Met: 11/21/18  Pt is discharging at the recommendation of the treatment team. Pt is discharging to home transported by girlfriend. Pt denies having any thoughts of hurting themself or anyone else. Pt denies anxiety or depression. Pt has follow up with [facility], [facility], and [facility]. Discharge instructions, including; demographic sheet, psychiatric evaluation, discharge summary, and AVS were faxed to these next level of care providers.

## 2018-11-21 NOTE — PLAN OF CARE
Problem: Patient Care Overview  Goal: Team Discussion  Team Plan:   BEHAVIORAL TEAM DISCUSSION    Participants: Ade Turner NP, Maria G Ely NP,  Radha Cortes NP, Jenn Carbone LSW,  Veda West LSW, Ellie Pearl RN, Luz Marina Thayer OT, Maria G Carpenter OT, Mariah Hansen OT  Progress: Fair  Continued Stay Criteria/Rationale: Continue taper of  Effexor XR to discontinue due to lack of efficacy, apathy, and mood fluctuations. Continue lithium titration to therapeutic level, lab draw on 11/22/18 in am. Consider adjunct of Lamictal if depression persists once lithium at therapeutic dose.    Medical/Physical: history of perforated appendix.   Precautions:   Behavioral Orders   Procedures     Code 1 - Restrict to Unit     Routine Programming     As clinically indicated     Self Injury Precaution     Status 15     Every 15 minutes.     Plan: Stabilize and discharge home with increased services. Follow up appointments in place. Possible discharge today.   Rationale for change in precautions or plan: none    Current Facility-Administered Medications:      acetaminophen (TYLENOL) tablet 650 mg, 650 mg, Oral, Q4H PRN, Dereje Barkleyista L, NP     alum & mag hydroxide-simethicone (MYLANTA ES/MAALOX  ES) suspension 30 mL, 30 mL, Oral, Q4H PRN, AlaspaDerejeJuly L, NP     bisacodyl (DULCOLAX) Suppository 10 mg, 10 mg, Rectal, Daily PRN, AlaspaDerejeJuly L, NP     lithium (ESKALITH/LITHOBID) CR tablet 300 mg, 300 mg, Oral, Q12H GIAN, Gwendolyn Vazquez Mc, APRN CNP, 300 mg at 11/21/18 0827     magnesium hydroxide (MILK OF MAGNESIA) suspension 30 mL, 30 mL, Oral, At Bedtime PRN, Alaspa July L, NP     melatonin tablet 1 mg, 1 mg, Oral, At Bedtime, Gwendolyn Vazquez Mc, APRN CNP, 1 mg at 11/20/18 2028     nicotine polacrilex (NICORETTE) gum 2-4 mg, 2-4 mg, Buccal, Q1H PRN, Alaspa, July L, NP     prazosin (MINIPRESS) capsule 1 mg, 1 mg, Oral, At Bedtime, Gwendolyn Vazquez Mc, APRN CNP, 1 mg at 11/20/18 2028     traZODone (DESYREL) tablet 50  mg, 50 mg, Oral, At Bedtime PRN, July Barkley, NP, 50 mg at 11/21/18 0152     [START ON 11/22/2018] venlafaxine (EFFEXOR-XR) 24 hr capsule 37.5 mg, 37.5 mg, Oral, Daily with breakfast, Gwendolyn Vazquez Mc, APRN CNP   Patient Active Problem List   Diagnosis     Perforated appendicitis     Depression with suicidal ideation     Severe episode of recurrent major depressive disorder, without psychotic features (H)     PTSD (post-traumatic stress disorder)

## 2018-11-21 NOTE — PLAN OF CARE
Problem: Suicide Risk (Adult)  Goal: Strength-Based Wellness/Recovery  Patient will attend at least 50% of unit programming per day.  Patient will be medication compliant throughout hospital stay.     0030 in bed with easy respirations, presenting sleeping.0150 patient awake complaining of insomnia requested med and accepted trazodone 50 mg for this at 0152. Declined food. vitaled prior to med.0315 patient presents sleeping.0519 patient had said earlier that the trazodone just made his mouth dry. Did not feel he slept well. Did sleep some on afternoon shift.  Also.     Problem: Patient Care Overview  Goal: Individualization & Mutuality  Pt will follow recommendations of treatment team.  Pt will sleep 6-8 hours each night.    0030 in bed with easy respirations, presenting sleeping.

## 2018-11-21 NOTE — DISCHARGE INSTRUCTIONS
Behavioral Discharge Planning and Instructions     Summary: Mason was admitted to  with increased suicidal ideation      Main Diagnosis: Major Depressive Disorder, recurrent, moderate, PTSD, Panic Disorder w/O agoraphobia, R/O Other Bipolar Disorder, Cluster B Personality Traits     Major Treatments, Procedures and Findings: Stabilize with medications, connect with community programs.     Symptoms to Report: feeling more aggressive, increased confusion, losing more sleep, mood getting worse or thoughts of suicide     Lifestyle Adjustment: Take all medications as prescribed, meet with doctor/ medication provider, out patient therapist, , and [facility] worker as scheduled. Abstain from alcohol or any unprescribed drugs.     Psychiatry Follow-up:      [facility]  Medication Management- Miriam Caballero- Dec 4th @ 3pm   [address] [place] [id number] [phone]  [phone]     [facility]   Therapy - Melia - November 26th @ 4pm, Please talk with your therapist about sending your most recent DA for ARM worker   [address] [place] [address]  Phone: [phone]: [phone]  Fax: [phone]    [facility]  [place] Management- Julissa Perez [phone]- As arranged   [address] [facility] [address], [place] [address]  Phone:  [phone]   Fax - [phone]      [facility]  Aftercare groups- Tuesdays @ 3pm  5th Floor Room [address]  [address] [facility] [address], [place] [address]  Phone: [phone]. [phone]  Fax: [phone]    24-hour Crisis Line [phone]    Options for [facility] Services:    [facility]  [address] [place] [address]  [phone]  [phone]    [address] [address]  [phone]  Fax: [phone]       Resources:   Crisis Intervention: 789.677.8173 or 840-782-7496 (TTY: 830.452.3988).  Call anytime for help.  National [place] on Mental Illness (www.[place].lou.org): 122.687.7708 or  "464.155.4072.  Alcoholics Anonymous (www.alcoholics-anonymous.org): Check your phone book for your local chapter.  Suicide Awareness Voices of Education (SAVE) (www.save.org): 648-413-OYPE (7094)  National Suicide Prevention Line (www.mentalhealthmn.org): 377-038-EHZW (3111)  Mental Health Consumer/Survivor Network of [place] (www.mhcsn.net): 652.933.2676 or 544-466-9822  Mental Health Association of [place] (www.mentalhealth.org): 657.139.9949 or 645-098-5171     General Medication Instructions:   See your medication sheet(s) for instructions.   Take all medicines as directed.  Make no changes unless your doctor suggests them.   Go to all your doctor visits.  Be sure to have all your required lab tests. This way, your medicines can be refilled on time.  Do not use any drugs not prescribed by your doctor.  Avoid alcohol.     Range Area:  [facility], Crisis stabilization Lists of hospitals in the [place]- 946.186.8277  [facility] Crisis Line: 1-608.363.5039  Advocates For Family Peace: 763-3052  Sexual Assault Program of [facility]: 324.760.6911 or 1-819.393.9665  [place] Battered Women's Program: 1-513.776.4169 Ext: 279       Calls answered Mon-Fri-8:00 am--4:30 pm     [place]:  Advocates for Family Peace: 1-301.369.1763  [facility] first call for help: 4-332-994-9364  [facility] Crisis Center:  (536) [phone]        [place] Area:  Warm Line: 1-748.796.7573       Calls answered Tuesday--Saturday 4:00 pm--10:00 pm  [place] Crisis Line - [phone]  [place] [facility]abil[facility] 100-530-3528     [place] Statewide:  [place] Crisis and Referral Services: 5-021-347-5290  National Suicide Prevention Lifeline: 8-544-724-TALK (0878)   - tzj0onwm- Text \"Life\" to 45559  First Call for Help: 2-1-1  ARELY Helpline- 2-667-UZQY-HELP       "

## 2018-11-21 NOTE — PROGRESS NOTES
Face to face end of shift report received from almita Sellers rn at 2300 report.. Rounding completed. Patient observed.     Cate Fuller  11/21/2018  1:08 AM

## 2018-11-21 NOTE — PLAN OF CARE
"Problem: Suicide Risk (Adult)  Goal: Strength-Based Wellness/Recovery  Patient will attend at least 50% of unit programming per day.  Patient will be medication compliant throughout hospital stay.     Outcome: Therapy, progress toward functional goals as expected  Denies SI at this time. He is actively participating in unit programming.     Problem: Patient Care Overview  Goal: Individualization & Mutuality  Pt will follow recommendations of treatment team.  Pt will sleep 6-8 hours each night.    Outcome: Therapy, progress toward functional goals is gradual  Pt denies SI HI hallucinations, and depression at this time. He admits to some anxiety \" a little\" 2/10 only because im here and want to go home.  He then inquired about his lith level lab draw, states I am supposed to have one today.  Writer looked at the order and informed him that the lab draw is scheduled for tomorrow.  He stated \" I thought it was today, I was hoping to discharge tonight.  Writer encouraged him to discuss his wishes with the provider. Pt emotions less labile today. No tearyness today.  He did report that he becomes anxious when he looks out his window, because he can see his house from here, and this makes him sad, informed him that he needs to really focus on his coping skills to manage his mental health symptoms.encouraged him to not fixate on what he can not have because he is here, and encouraged that he let this situation motivate him to work on learning new skills, so he does not end up back in hospital.  He starred at this writer and stated ya I guess so. .       "

## 2019-01-20 ENCOUNTER — TRANSFERRED RECORDS (OUTPATIENT)
Dept: HEALTH INFORMATION MANAGEMENT | Facility: CLINIC | Age: 34
End: 2019-01-20

## 2019-01-20 ENCOUNTER — HOSPITAL ENCOUNTER (INPATIENT)
Facility: HOSPITAL | Age: 34
LOS: 2 days | Discharge: HOME | DRG: 885 | End: 2019-01-22
Attending: EMERGENCY MEDICINE | Admitting: PSYCHIATRY & NEUROLOGY
Payer: OTHER MISCELLANEOUS

## 2019-01-20 DIAGNOSIS — R45.851 DEPRESSION WITH SUICIDAL IDEATION: Primary | ICD-10-CM

## 2019-01-20 DIAGNOSIS — F43.10 PTSD (POST-TRAUMATIC STRESS DISORDER): ICD-10-CM

## 2019-01-20 DIAGNOSIS — R45.851 SUICIDAL IDEATION: ICD-10-CM

## 2019-01-20 DIAGNOSIS — F32.A DEPRESSION WITH SUICIDAL IDEATION: Primary | ICD-10-CM

## 2019-01-20 PROBLEM — R45.89 SUICIDAL BEHAVIOR: Status: ACTIVE | Noted: 2019-01-20

## 2019-01-20 LAB
AMPHETAMINES UR QL SCN: NEGATIVE
ANION GAP SERPL CALCULATED.3IONS-SCNC: 6 MMOL/L (ref 3–14)
APAP SERPL-MCNC: <2 MG/L (ref 10–20)
BARBITURATES UR QL: NEGATIVE
BENZODIAZ UR QL: NEGATIVE
BUN SERPL-MCNC: 15 MG/DL (ref 7–30)
CALCIUM SERPL-MCNC: 8.6 MG/DL (ref 8.5–10.1)
CANNABINOIDS UR QL SCN: NEGATIVE
CHLORIDE SERPL-SCNC: 108 MMOL/L (ref 94–109)
CO2 SERPL-SCNC: 24 MMOL/L (ref 20–32)
COCAINE UR QL: NEGATIVE
CREAT SERPL-MCNC: 1.03 MG/DL (ref 0.66–1.25)
ETHANOL SERPL-MCNC: <0.01 G/DL
GFR SERPL CREATININE-BSD FRML MDRD: >90 ML/MIN/{1.73_M2}
GLUCOSE SERPL-MCNC: 90 MG/DL (ref 70–99)
LITHIUM SERPL-SCNC: 0.33 MMOL/L (ref 0.6–1.2)
METHADONE UR QL SCN: NEGATIVE
OPIATES UR QL SCN: NEGATIVE
PCP UR QL SCN: NEGATIVE
POTASSIUM SERPL-SCNC: 3.9 MMOL/L (ref 3.4–5.3)
SALICYLATES SERPL-MCNC: 5 MG/DL
SODIUM SERPL-SCNC: 138 MMOL/L (ref 133–144)
TSH SERPL DL<=0.005 MIU/L-ACNC: 1.46 MU/L (ref 0.4–4)

## 2019-01-20 PROCEDURE — 80307 DRUG TEST PRSMV CHEM ANLYZR: CPT | Performed by: EMERGENCY MEDICINE

## 2019-01-20 PROCEDURE — 80320 DRUG SCREEN QUANTALCOHOLS: CPT | Performed by: EMERGENCY MEDICINE

## 2019-01-20 PROCEDURE — 80329 ANALGESICS NON-OPIOID 1 OR 2: CPT | Performed by: EMERGENCY MEDICINE

## 2019-01-20 PROCEDURE — 25000132 ZZH RX MED GY IP 250 OP 250 PS 637: Performed by: NURSE PRACTITIONER

## 2019-01-20 PROCEDURE — 80178 ASSAY OF LITHIUM: CPT | Performed by: EMERGENCY MEDICINE

## 2019-01-20 PROCEDURE — 99285 EMERGENCY DEPT VISIT HI MDM: CPT

## 2019-01-20 PROCEDURE — 36415 COLL VENOUS BLD VENIPUNCTURE: CPT | Performed by: EMERGENCY MEDICINE

## 2019-01-20 PROCEDURE — 99285 EMERGENCY DEPT VISIT HI MDM: CPT | Mod: Z6 | Performed by: EMERGENCY MEDICINE

## 2019-01-20 PROCEDURE — 80048 BASIC METABOLIC PNL TOTAL CA: CPT | Performed by: EMERGENCY MEDICINE

## 2019-01-20 PROCEDURE — 84443 ASSAY THYROID STIM HORMONE: CPT | Performed by: EMERGENCY MEDICINE

## 2019-01-20 PROCEDURE — 93005 ELECTROCARDIOGRAM TRACING: CPT

## 2019-01-20 PROCEDURE — 12400000 ZZH R&B MH

## 2019-01-20 RX ORDER — PRAZOSIN HYDROCHLORIDE 1 MG/1
1 CAPSULE ORAL AT BEDTIME
Status: DISCONTINUED | OUTPATIENT
Start: 2019-01-20 | End: 2019-01-21

## 2019-01-20 RX ORDER — OLANZAPINE 10 MG/1
10 TABLET ORAL
Status: DISCONTINUED | OUTPATIENT
Start: 2019-01-20 | End: 2019-01-22 | Stop reason: HOSPADM

## 2019-01-20 RX ORDER — OLANZAPINE 10 MG/2ML
10 INJECTION, POWDER, FOR SOLUTION INTRAMUSCULAR
Status: DISCONTINUED | OUTPATIENT
Start: 2019-01-20 | End: 2019-01-22 | Stop reason: HOSPADM

## 2019-01-20 RX ORDER — LANOLIN ALCOHOL/MO/W.PET/CERES
3 CREAM (GRAM) TOPICAL AT BEDTIME
Status: DISCONTINUED | OUTPATIENT
Start: 2019-01-20 | End: 2019-01-22 | Stop reason: HOSPADM

## 2019-01-20 RX ORDER — ACETAMINOPHEN 325 MG/1
650 TABLET ORAL EVERY 4 HOURS PRN
Status: DISCONTINUED | OUTPATIENT
Start: 2019-01-20 | End: 2019-01-22 | Stop reason: HOSPADM

## 2019-01-20 RX ORDER — HYDROXYZINE HYDROCHLORIDE 25 MG/1
25-50 TABLET, FILM COATED ORAL EVERY 4 HOURS PRN
Status: DISCONTINUED | OUTPATIENT
Start: 2019-01-20 | End: 2019-01-22 | Stop reason: HOSPADM

## 2019-01-20 RX ORDER — TRAZODONE HYDROCHLORIDE 50 MG/1
50 TABLET, FILM COATED ORAL
Status: DISCONTINUED | OUTPATIENT
Start: 2019-01-20 | End: 2019-01-22 | Stop reason: HOSPADM

## 2019-01-20 RX ORDER — BISACODYL 10 MG
10 SUPPOSITORY, RECTAL RECTAL DAILY PRN
Status: DISCONTINUED | OUTPATIENT
Start: 2019-01-20 | End: 2019-01-22 | Stop reason: HOSPADM

## 2019-01-20 RX ORDER — LITHIUM CARBONATE 300 MG/1
300 TABLET, FILM COATED, EXTENDED RELEASE ORAL EVERY 12 HOURS
Status: DISCONTINUED | OUTPATIENT
Start: 2019-01-20 | End: 2019-01-22 | Stop reason: HOSPADM

## 2019-01-20 RX ORDER — ALUMINA, MAGNESIA, AND SIMETHICONE 2400; 2400; 240 MG/30ML; MG/30ML; MG/30ML
30 SUSPENSION ORAL EVERY 4 HOURS PRN
Status: DISCONTINUED | OUTPATIENT
Start: 2019-01-20 | End: 2019-01-22 | Stop reason: HOSPADM

## 2019-01-20 RX ADMIN — MELATONIN 3 MG: 3 TAB ORAL at 20:50

## 2019-01-20 RX ADMIN — LITHIUM CARBONATE 300 MG: 300 TABLET, EXTENDED RELEASE ORAL at 20:50

## 2019-01-20 RX ADMIN — PRAZOSIN HYDROCHLORIDE 1 MG: 1 CAPSULE ORAL at 20:50

## 2019-01-20 ASSESSMENT — ACTIVITIES OF DAILY LIVING (ADL)
DRESS: 0-->INDEPENDENT
TOILETING: 0-->INDEPENDENT
COGNITION: 0 - NO COGNITION ISSUES REPORTED
RETIRED_COMMUNICATION: 0-->UNDERSTANDS/COMMUNICATES WITHOUT DIFFICULTY
WHICH_OF_THE_ABOVE_FUNCTIONAL_RISKS_HAD_A_RECENT_ONSET_OR_CHANGE?: FALL HISTORY
AMBULATION: 0-->INDEPENDENT
RETIRED_EATING: 0-->INDEPENDENT
NUMBER_OF_TIMES_PATIENT_HAS_FALLEN_WITHIN_LAST_SIX_MONTHS: 1
FALL_HISTORY_WITHIN_LAST_SIX_MONTHS: YES
TRANSFERRING: 0-->INDEPENDENT
BATHING: 0-->INDEPENDENT
SWALLOWING: 0-->SWALLOWS FOODS/LIQUIDS WITHOUT DIFFICULTY

## 2019-01-20 ASSESSMENT — ENCOUNTER SYMPTOMS
HALLUCINATIONS: 0
CONFUSION: 0
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
## 2019-01-21 PROCEDURE — 99222 1ST HOSP IP/OBS MODERATE 55: CPT | Performed by: NURSE PRACTITIONER

## 2019-01-21 PROCEDURE — 25000132 ZZH RX MED GY IP 250 OP 250 PS 637: Performed by: NURSE PRACTITIONER

## 2019-01-21 PROCEDURE — 12400000 ZZH R&B MH

## 2019-01-21 RX ORDER — CITALOPRAM HYDROBROMIDE 10 MG/1
10 TABLET ORAL DAILY
Status: DISCONTINUED | OUTPATIENT
Start: 2019-01-21 | End: 2019-01-22 | Stop reason: HOSPADM

## 2019-01-21 RX ORDER — PRAZOSIN HYDROCHLORIDE 1 MG/1
2 CAPSULE ORAL AT BEDTIME
Status: DISCONTINUED | OUTPATIENT
Start: 2019-01-21 | End: 2019-01-22 | Stop reason: HOSPADM

## 2019-01-21 RX ORDER — VENLAFAXINE HYDROCHLORIDE 37.5 MG/1
37.5 CAPSULE, EXTENDED RELEASE ORAL
Status: COMPLETED | OUTPATIENT
Start: 2019-01-21 | End: 2019-01-22

## 2019-01-21 RX ADMIN — LITHIUM CARBONATE 300 MG: 300 TABLET, EXTENDED RELEASE ORAL at 08:08

## 2019-01-21 RX ADMIN — PRAZOSIN HYDROCHLORIDE 2 MG: 1 CAPSULE ORAL at 20:40

## 2019-01-21 RX ADMIN — MELATONIN 3 MG: 3 TAB ORAL at 20:40

## 2019-01-21 RX ADMIN — VENLAFAXINE HYDROCHLORIDE 37.5 MG: 37.5 CAPSULE, EXTENDED RELEASE ORAL at 12:13

## 2019-01-21 RX ADMIN — HYDROXYZINE HYDROCHLORIDE 50 MG: 25 TABLET ORAL at 14:59

## 2019-01-21 RX ADMIN — CITALOPRAM HYDROBROMIDE 10 MG: 10 TABLET ORAL at 10:03

## 2019-01-21 RX ADMIN — LITHIUM CARBONATE 300 MG: 300 TABLET, EXTENDED RELEASE ORAL at 20:40

## 2019-01-21 NOTE — PLAN OF CARE
BEHAVIORAL TEAM DISCUSSION    Participants: Maria G Ely NP, Ondina Espino LICSW, Jenn Carbone LSW,  Veda West LSW, Mayela Chopra LGSW, Krissy Coyne RN, Oscar Lee RN, Norma [facility], Maria G Carpenter OT, Mariah Hansen OT  Progress: improved sleep, denies SI, ongoing depression  Continued Stay Criteria/Rationale: decrease effexor and start Celexa, increase minipress  Medical/Physical: see problem list below  Precautions:   Behavioral Orders   Procedures    Code 1 - Restrict to Unit    Routine Programming     As clinically indicated    Status 15     Every 15 minutes.     Plan: on wait list for PTSD day treatment, continue to stabilize  Rationale for change in precautions or plan: None    Patient Active Problem List   Diagnosis    Perforated appendicitis    Depression with suicidal ideation    Severe episode of recurrent major depressive disorder, without psychotic features (H)    PTSD (post-traumatic stress disorder)    Suicidal behavior       Current Facility-Administered Medications:     acetaminophen (TYLENOL) tablet 650 mg, 650 mg, Oral, Q4H PRN, Ade Turner NP    alum & mag hydroxide-simethicone (MYLANTA ES/MAALOX  ES) suspension 30 mL, 30 mL, Oral, Q4H PRN, Ade Turner NP    bisacodyl (DULCOLAX) Suppository 10 mg, 10 mg, Rectal, Daily PRN, Ade Turner NP    citalopram (celeXA) tablet 10 mg, 10 mg, Oral, Daily, Maria G Ely, APRN CNP    hydrOXYzine (ATARAX) tablet 25-50 mg, 25-50 mg, Oral, Q4H PRN, Ade Turner NP    lithium ER (LITHOBID) CR tablet 300 mg, 300 mg, Oral, Q12H, Ade Turner NP, 300 mg at 01/21/19 0808    magnesium hydroxide (MILK OF MAGNESIA) suspension 30 mL, 30 mL, Oral, At Bedtime PRN, Ade Turner NP    melatonin tablet 3 mg, 3 mg, Oral, At Bedtime, Ade Turner NP, 3 mg at 01/20/19 2050    nicotine polacrilex (NICORETTE) gum 4-8 mg, 4-8 mg, Buccal, Q1H PRN,  Ade Turner, NP    OLANZapine (zyPREXA) tablet 10 mg, 10 mg, Oral, Q2H PRN **OR** OLANZapine (zyPREXA) injection 10 mg, 10 mg, Intramuscular, Q2H PRN, Ade Turner, NP    prazosin (MINIPRESS) capsule 2 mg, 2 mg, Oral, At Bedtime, Maria G Ely APRN CNP    traZODone (DESYREL) tablet 50 mg, 50 mg, Oral, At Bedtime PRN, Ade Turner, NP    venlafaxine (EFFEXOR-XR) 24 hr capsule 37.5 mg, 37.5 mg, Oral, Daily with breakfast, Maria G Ely APRN CNP
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
## 2019-01-21 NOTE — PROGRESS NOTES
01/20/19 1853   Patient Belongings   Did you bring any home meds/supplements to the hospital?  No   Patient Belongings locker   Patient Belongings Put in Hospital Secure Location (Security or Locker, etc.) clothing;shoes   Belongings Search Yes   Clothing Search Yes   Second Staff Jonny RN   Comment Black t-shirt. striped underwear, red sweatpants, hooded camo sweatshirt, pair of white socks, black shoes, camo hat   List items sent to safe: N/A    All other belongings put in assigned cubby in belongings room.       I have reviewed my belongings list on admission and verify that it is correct.     Patient signature_______________________________    Second staff witness (if patient unable to sign) ______________________________       I have received all my belongings at discharge.    Patient signature________________________________    Louisa   1/20/2019  6:55 PM

## 2019-01-21 NOTE — PLAN OF CARE
Face to face end of shift report received from Hailey MONACO RN. Rounding completed. Patient observed sleeping.  Nya Grossman  1/21/2019  12:05 AM

## 2019-01-21 NOTE — PLAN OF CARE
Adult Inpatient Plan of Care  Patient-Specific Goal (Individualization)  Description  Patient will be medication compliant while hospitalized.   Patient will participate in >50% of group programming while hospitalized.   Patient will sleep >6 hours per night.    1/21/2019 0608 - No Change by Nya Grossman, RN  Note  Pt has been in bed with eyes closed and regular respirations observed all night. Will continue to monitor.

## 2019-01-21 NOTE — PLAN OF CARE
"Patient has been cooperative and makes needs known. He endorsed both anxiety and depression. He denied SI/HI but said he felt \"impulsive. Patient said he had SI yesterday and known today but then came and asked for something for anxiety and thoughts of SI at 15:00. He received 50mg of Atarax at this time. Patient said he didn't feel safe but he contracted for safety. He said thoughts will pop into his head and keep floating around even if he doesn't want them there. Patient was offered to move his room out of the [facility]ICU and declined. He stated he likes the quiet of the MHICU and feels safe there.   Adult Inpatient Plan of Care  Patient-Specific Goal (Individualization)  Description  Patient will be medication compliant while hospitalized.   Patient will participate in >50% of group programming while hospitalized.   Patient will sleep >6 hours per night.    Wean as appropriate; must comply with unit rules and contraband check prior to returning to MHICU.  Patient has room in MHICU as only room available on admission   1/21/2019 1454 - No Change by Eddie Bob, RN     Suicide Risk  Absence of Self-Harm  Description  Patient will deny suicidal ideation and remain free from self harm while hospitalized.    1/21/2019 1454 - No Change by Eddie Bob, RN     "
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
## 2019-01-22 VITALS
RESPIRATION RATE: 18 BRPM | SYSTOLIC BLOOD PRESSURE: 121 MMHG | DIASTOLIC BLOOD PRESSURE: 75 MMHG | HEART RATE: 77 BPM | BODY MASS INDEX: 29.03 KG/M2 | OXYGEN SATURATION: 97 % | WEIGHT: 220 LBS | TEMPERATURE: 98.1 F

## 2019-01-22 PROCEDURE — 25000132 ZZH RX MED GY IP 250 OP 250 PS 637: Performed by: NURSE PRACTITIONER

## 2019-01-22 PROCEDURE — 99238 HOSP IP/OBS DSCHRG MGMT 30/<: CPT | Performed by: NURSE PRACTITIONER

## 2019-01-22 RX ORDER — PRAZOSIN HYDROCHLORIDE 2 MG/1
2 CAPSULE ORAL AT BEDTIME
Qty: 30 CAPSULE | Refills: 0 | Status: SHIPPED | OUTPATIENT
Start: 2019-01-22 | End: 2021-03-04

## 2019-01-22 RX ORDER — LANOLIN ALCOHOL/MO/W.PET/CERES
3 CREAM (GRAM) TOPICAL AT BEDTIME
Qty: 30 TABLET | Refills: 0 | Status: SHIPPED | OUTPATIENT
Start: 2019-01-22 | End: 2019-02-21

## 2019-01-22 RX ORDER — CITALOPRAM HYDROBROMIDE 10 MG/1
10 TABLET ORAL DAILY
Qty: 30 TABLET | Refills: 0 | Status: SHIPPED | OUTPATIENT
Start: 2019-01-23 | End: 2019-04-24

## 2019-01-22 RX ADMIN — VENLAFAXINE HYDROCHLORIDE 37.5 MG: 37.5 CAPSULE, EXTENDED RELEASE ORAL at 08:07

## 2019-01-22 RX ADMIN — LITHIUM CARBONATE 300 MG: 300 TABLET, EXTENDED RELEASE ORAL at 08:07

## 2019-01-22 RX ADMIN — CITALOPRAM HYDROBROMIDE 10 MG: 10 TABLET ORAL at 08:07

## 2019-01-22 ASSESSMENT — ACTIVITIES OF DAILY LIVING (ADL)
DRESS: SCRUBS (BEHAVIORAL HEALTH);INDEPENDENT
HYGIENE/GROOMING: INDEPENDENT
ORAL_HYGIENE: INDEPENDENT
LAUNDRY: UNABLE TO COMPLETE

## 2019-01-22 NOTE — PLAN OF CARE
Face to face end of shift report received from WYATT Lee. Rounding completed. Patient observed laying in bed, eyes closed with normal non labored respirations.     An Raygoza  1/22/2019  7:49 AM

## 2019-01-22 NOTE — DISCHARGE INSTRUCTIONS
Behavioral Discharge Planning and Instructions    Summary: Mason came into the ED with increased depression and SI.    Main Diagnosis: Major Depressive Disorder, recurrent, moderate, PTSD, Panic Disorder w/o agoraphobia, R/O Other Bipolar Disorder, Cluster B Personality Traits    Major Treatments, Procedures and Findings: Stabilize with medications, connect with community programs.    Symptoms to Report: feeling more aggressive, increased confusion, losing more sleep, mood getting worse or thoughts of suicide    Lifestyle Adjustment: Take all medications as prescribed, meet with doctor/ medication provider, out patient therapist as scheduled. Abstain from alcohol or any unprescribed drugs.    Psychiatry Follow-up:   [facility] - follow up with therapist Melia as scheduled  [address] [place], [place] [address] [phone]  Fax: [phone]    [facility] - follow up with med provider as scheduled  [address] [place], [place] [address] [phone]  [phone]      Resources:   Crisis Intervention: 100.887.3219 or 132-772-5720 (TTY: 591.581.9218).  Call anytime for help.  National [place] on Mental Illness (www.mn.lou.org): 391.353.8453 or 753-610-1335.  Alcoholics Anonymous (www.alcoholics-anonymous.org): Check your phone book for your local chapter.  Suicide Awareness Voices of Education (SAVE) (www.save.org): 066-504-RFRK (0283)  National Suicide Prevention Line (www.mentalhealthmn.org): 988-596-HDEC (3206)  Mental Health Consumer/Survivor Network of [place] (www.mhcsn.net): 971.643.7222 or 413-705-3747  Mental Health Association of [place] (www.mentalhealth.org): 711.451.2094 or 482-170-1107    [place] Area:  [facility], Crisis stabilization [facility] [phone]  [facility] Crisis Line: 1-891.916.2353  Advocates For Family Peace: 873-3680  Sexual Assault Program [facility]: 555.141.9655 or 1-122.850.3272  [place] Battered Women's Program: 5-992-675-8599 Ext: 279       Calls  "answered Mon-Fri-8:00 am--4:30 pm    [place]:  Advocates for Family Peace: 1-228.987.9177  [facility] first call for help: 1-929-172-1314  [facility]:  (551) 142-7229      [place] Area:  Warm Line: 1-620.747.6485       Calls answered Tuesday--Saturday 4:00 pm--10:00 pm  [place] Crisis Line - 480.979.4704  [place] [facility] 330-401-0456    [place] Statewide:  [place] Crisis and Referral Services: 9-873-426-6944  National Suicide Prevention Lifeline: 1-358-095-TALK (4830)   - tww0fbej- Text \"Life\" to 50839  First Call for Help: 2-1-1  ARELY Helpline- 1-399-EWLG-HELP    General Medication Instructions:   See your medication sheet(s) for instructions.   Take all medicines as directed.  Make no changes unless your doctor suggests them.   Go to all your doctor visits.  Be sure to have all your required lab tests. This way, your medicines can be refilled on time.  Do not use any drugs not prescribed by your doctor.  Avoid alcohol.    "

## 2019-01-22 NOTE — PLAN OF CARE
Adult Inpatient Plan of Care  Patient-Specific Goal (Individualization)  Description  Patient will be medication compliant while hospitalized.   Patient will participate in >50% of group programming while hospitalized.   Patient will sleep >6 hours per night.    Wean as appropriate; must comply with unit rules and contraband check prior to returning to MHICU.  Patient has room in MHICU as only room available on admission   1/22/2019 0726 by Rosa Perkins, RN  Note  Patient laying in bed with eyes closed, non-labored breathing and position changes noted all shift.

## 2019-01-22 NOTE — PLAN OF CARE
Face to face end of shift report received from Eddie RN Rounding completed. Patient observed.     Roxanna Gunn  1/21/2019  400PM

## 2019-01-22 NOTE — PLAN OF CARE
Talked with patient extensively about follow up services - he wants an inpatient PTSD program - explained that this does not exist in [place] - he states he is fine going out of state - again explained that we will not be placing him at that level of service from this hospitalization - he states workmans comp will pay for everything.  He is suppose to go to an intensive PHP type program focused on PTSD in the [facility] - the program is through [facility] in [place] but have a program in the [facility] - he now decided he doesn't want to do this because he doesn't want to be by himself in the [facility].  Discussed options for our area - IRTS - he does not want IRTS programs - states his therapist said this was not appropriate for him and he would not get anything out of it as he really needs intensive PTSD treatment - again suggested he follow up with his therapist who is helping him get into this program in the [facility] - he states he doesn't really want to go - again asking for inpatient PTSD treatment - again told him he will not get that from here as it does not exist in [place].  Also reviewed med changes as we can help with that and he doesn't think his meds are helping - he states his girlfriend Googled the med changes last night that he had here and decided they are not a good combination for him so he is unsure if he should take them - encouraged him to talk with his provider about this but also questioned why he came in if he is not wanting to trust the providers on medications changes.

## 2019-01-22 NOTE — PLAN OF CARE
Patient to discharge home today - he reports feeling much better - was agreeable to the med changes, is no longer suicidal and wants to go home.  He already has community supports and appointments set up so will follow up with his outpatient team.  At the time of discharge he was no longer suicidal.  Discharge instructions, including; demographic sheet, psychiatric evaluation, discharge summary, and AVS were faxed to these next level of care providers per hospital staff.

## 2019-01-22 NOTE — PLAN OF CARE
Face to face end of shift report received from Roxanna RÍOS RN. Rounding completed. Patient observed laying in bed with eyes closed, right sided, non-labored breathing.     Rosa Perkins  1/22/2019  1:10 AM

## 2019-01-22 NOTE — PLAN OF CARE
Discharge Note    Patient Discharged to home on 1/22/2019 11:00 AM via Private Car accompanied by staff.     Patient informed of discharge instructions in AVS. patient verbalizes understanding and denies having any questions pertaining to AVS. Patient stable at time of discharge. Patient denies SI, HI, and thoughts of self harm at time of discharge. All personal belongings returned to patient. Discharge prescriptions sent to [facility] via electronic communication. Psych evaluation, history and physical, AVS, and discharge summary faxed to next level of care- patient has aftercare appointments already made.     An Raygoza  1/22/2019  11:05 AM
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
## 2019-04-24 ENCOUNTER — APPOINTMENT (OUTPATIENT)
Dept: GENERAL RADIOLOGY | Facility: HOSPITAL | Age: 34
End: 2019-04-24
Attending: NURSE PRACTITIONER
Payer: OTHER MISCELLANEOUS

## 2019-04-24 ENCOUNTER — HOSPITAL ENCOUNTER (EMERGENCY)
Facility: HOSPITAL | Age: 34
Discharge: HOME | End: 2019-04-24
Attending: NURSE PRACTITIONER | Admitting: NURSE PRACTITIONER
Payer: OTHER MISCELLANEOUS

## 2019-04-24 VITALS
OXYGEN SATURATION: 97 % | DIASTOLIC BLOOD PRESSURE: 80 MMHG | SYSTOLIC BLOOD PRESSURE: 145 MMHG | TEMPERATURE: 97.5 F | RESPIRATION RATE: 16 BRPM

## 2019-04-24 DIAGNOSIS — L03.012: ICD-10-CM

## 2019-04-24 LAB
BASOPHILS # BLD AUTO: 0.1 10E9/L (ref 0–0.2)
BASOPHILS NFR BLD AUTO: 0.4 %
CRP SERPL-MCNC: 3.6 MG/L (ref 0–8)
DIFFERENTIAL METHOD BLD: ABNORMAL
EOSINOPHIL # BLD AUTO: 0.4 10E9/L (ref 0–0.7)
EOSINOPHIL NFR BLD AUTO: 3.2 %
ERYTHROCYTE [DISTWIDTH] IN BLOOD BY AUTOMATED COUNT: 12.9 % (ref 10–15)
ERYTHROCYTE [SEDIMENTATION RATE] IN BLOOD BY WESTERGREN METHOD: 4 MM/H (ref 0–15)
HCT VFR BLD AUTO: 50.2 % (ref 40–53)
HGB BLD-MCNC: 17.6 G/DL (ref 13.3–17.7)
IMM GRANULOCYTES # BLD: 0.1 10E9/L (ref 0–0.4)
IMM GRANULOCYTES NFR BLD: 0.5 %
LYMPHOCYTES # BLD AUTO: 3.6 10E9/L (ref 0.8–5.3)
LYMPHOCYTES NFR BLD AUTO: 28.4 %
MCH RBC QN AUTO: 30 PG (ref 26.5–33)
MCHC RBC AUTO-ENTMCNC: 35.1 G/DL (ref 31.5–36.5)
MCV RBC AUTO: 86 FL (ref 78–100)
MONOCYTES # BLD AUTO: 1 10E9/L (ref 0–1.3)
MONOCYTES NFR BLD AUTO: 8 %
NEUTROPHILS # BLD AUTO: 7.5 10E9/L (ref 1.6–8.3)
NEUTROPHILS NFR BLD AUTO: 59.5 %
NRBC # BLD AUTO: 0 10*3/UL
NRBC BLD AUTO-RTO: 0 /100
PLATELET # BLD AUTO: 216 10E9/L (ref 150–450)
RBC # BLD AUTO: 5.86 10E12/L (ref 4.4–5.9)
URATE SERPL-MCNC: 6.7 MG/DL (ref 3.5–7.2)
WBC # BLD AUTO: 12.6 10E9/L (ref 4–11)

## 2019-04-24 PROCEDURE — 73130 X-RAY EXAM OF HAND: CPT | Mod: TC,LT

## 2019-04-24 PROCEDURE — 86140 C-REACTIVE PROTEIN: CPT | Performed by: NURSE PRACTITIONER

## 2019-04-24 PROCEDURE — 99213 OFFICE O/P EST LOW 20 MIN: CPT | Performed by: NURSE PRACTITIONER

## 2019-04-24 PROCEDURE — 36415 COLL VENOUS BLD VENIPUNCTURE: CPT | Performed by: NURSE PRACTITIONER

## 2019-04-24 PROCEDURE — 84550 ASSAY OF BLOOD/URIC ACID: CPT | Performed by: NURSE PRACTITIONER

## 2019-04-24 PROCEDURE — 85025 COMPLETE CBC W/AUTO DIFF WBC: CPT | Performed by: NURSE PRACTITIONER

## 2019-04-24 PROCEDURE — 85652 RBC SED RATE AUTOMATED: CPT | Performed by: NURSE PRACTITIONER

## 2019-04-24 PROCEDURE — G0463 HOSPITAL OUTPT CLINIC VISIT: HCPCS

## 2019-04-24 RX ORDER — DOXYCYCLINE 100 MG/1
100 CAPSULE ORAL 2 TIMES DAILY
Qty: 14 CAPSULE | Refills: 0 | Status: SHIPPED | OUTPATIENT
Start: 2019-04-24 | End: 2019-05-01

## 2019-04-24 ASSESSMENT — ENCOUNTER SYMPTOMS
TROUBLE SWALLOWING: 0
CHILLS: 0
WEAKNESS: 0
APPETITE CHANGE: 1
PSYCHIATRIC NEGATIVE: 1
COUGH: 0
DYSURIA: 0
ACTIVITY CHANGE: 1
FEVER: 0
NAUSEA: 1
NUMBNESS: 1

## 2019-04-24 NOTE — DISCHARGE INSTRUCTIONS
Take antibiotics as ordered.   Eat a yogurt daily while taking antibiotics.   Take tylenol and/or ibuprofen for pain. Follow dosing on package.   Use a warm washcloth to left ring and pinky finger for 20 minutes 4 times a day. Protect skin.   Follow up with surgeon in next week for re-check.   Follow up with PCP with any increase in symptoms or concerns.   Return to urgent care or emergency department with any increase in symptoms or concerns.

## 2019-04-24 NOTE — ED NOTES
"Patient presents with worsening left hand pain.  States he had a work related injury January 2018 and his most recent surgery was January of 2019.  States for the last few days he has noticed that his hand has been hurting more than usual, he hasn't felt well, has been having increase anxiety, PTSD symptoms and sleeping poorly.  Patient also reports that on his last xray (before his surgery in January there was a note that there might be an \"infection\" in the hand) has not had any treatment for this or further evaluation.  Unsure if he has been running fevers or not.  Has been seeing counseling and was seen yesterday.  Also notes that he has some tingling at the tip of his 4th and 5th finger which is new.  "

## 2019-04-24 NOTE — ED AVS SNAPSHOT
[facility]  [address] [address]  Phone:  [phone]                                    Mason Garcia   MRN: [id number]    Department:  [facility]   Date of Visit:  4/24/2019           After Visit Summary Signature Page    I have received my discharge instructions, and my questions have been answered. I have discussed any challenges I see with this plan with the nurse or doctor.    ..........................................................................................................................................  Patient/Patient Representative Signature      ..........................................................................................................................................  Patient Representative Print Name and Relationship to Patient    ..................................................               ................................................  Date                                   Time    ..........................................................................................................................................  Reviewed by Signature/Title    ...................................................              ..............................................  Date                                               Time          22EPIC Rev 08/18

## 2019-04-24 NOTE — ED NOTES
Discharge instructions reviewed with patient.  Patient verbalized understanding and has no further questions at this time.  Patient will follow up with his Surgeon.  Patient is aware he has an RX waiting for him at the pharmacy.  Home.

## 2019-04-24 NOTE — ED TRIAGE NOTES
Patient arrives by self for evaluation of left hand pain. Reports a work injury 1 year ago where his hand got caught in a , multiple surgeries since. Rating pain 5/10, states it typically is around a 3/10. Has not taken pain medications today.

## 2019-04-24 NOTE — ED PROVIDER NOTES
"  History     Chief Complaint   Patient presents with     Hand Pain     The history is provided by the patient. No  was used.     Mason Garcia is a 33 year old male who presents with finger pain. Denies recent injury or trauma. 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 He noted an increase in pain and swelling to left ring finger a couple days ago.  No interventions for symptoms. Denies fever, chills, or night sweats. Drinking well. Decreased appetite. Bowel and bladder are working well.     He has anxiety with finger pain as \"I don't want to lose anymore of my finger.\" He is concerned for infection. Left ring and little finger tips are sensitive to touch that is different for him.     He's had multiple surgeries on his left hand from accident. His last surgery was in January 2019. He had \"bone and nail removed\" from left ring finger.     He follows with Dr. Zack Jones, plastic surgeon in [place]. His surgeries have been at [facility].       Allergies:  Allergies   Allergen Reactions     Sudafed [Pseudoephedrine]        Problem List:    Patient Active Problem List    Diagnosis Date Noted     Suicidal behavior 01/20/2019     Priority: Medium     PTSD (post-traumatic stress disorder) 11/03/2018     Priority: Medium     Severe episode of recurrent major depressive disorder, without psychotic features (H) 11/02/2018     Priority: Medium     Depression with suicidal ideation 08/27/2018     Priority: Medium     Perforated appendicitis 02/07/2017     Priority: Medium        Past Medical History:    History reviewed. No pertinent past medical history.    Past Surgical History:    Past Surgical History:   Procedure Laterality Date     LAPAROSCOPIC APPENDECTOMY N/A 2/7/2017    Procedure: LAPAROSCOPIC APPENDECTOMY;  Surgeon: Chanel Pagan DO;  Location: [facility]       Family History:    Family History   Problem " Relation Age of Onset     Cardiovascular Maternal Grandmother         cardiovascular disease     Respiratory Maternal Grandmother         Lung transplant       Social History:  Marital Status:  Single [1]  Social History     Tobacco Use     Smoking status: Current Every Day Smoker     Packs/day: 1.00     Years: 7.00     Pack years: 7.00     Types: Cigarettes     Smokeless tobacco: Former User   Substance Use Topics     Alcohol use: Yes     Comment: rare     Drug use: No        Medications:      doxycycline hyclate (VIBRAMYCIN) 100 MG capsule   lithium (ESKALITH/LITHOBID) 300 MG CR tablet   prazosin (MINIPRESS) 2 MG capsule         Review of Systems   Constitutional: Positive for activity change and appetite change. Negative for chills and fever.   HENT: Negative for trouble swallowing.    Respiratory: Negative for cough.    Gastrointestinal: Positive for nausea.        Nausea from pain in finger.    Genitourinary: Negative for dysuria.   Musculoskeletal:        Left ring and left pinky finger pain.    Skin: Negative for rash.   Neurological: Positive for numbness. Negative for weakness.        Numbness to tips of left ring and pinky finger.    Psychiatric/Behavioral: Negative.        Physical Exam   BP: 145/80  Heart Rate: 83  Temp: 97.5  F (36.4  C)  SpO2: 97 %      Physical Exam   Constitutional: He is oriented to person, place, and time. He appears well-developed and well-nourished. No distress.   HENT:   Head: Normocephalic.   Mouth/Throat: Oropharynx is clear and moist.   Neck: Normal range of motion. Neck supple.   Cardiovascular: Normal rate and intact distal pulses.   Pulmonary/Chest: Effort normal. No respiratory distress.   Abdominal: Soft. He exhibits no distension.   Musculoskeletal: Normal range of motion. He exhibits edema and tenderness. He exhibits no deformity.   CMS and ROM intact to left upper extremity. Left radial pulse +2. Extension and flexion intact to all digits on left hand. Tenderness to  tip of left ring and left pinky finger. Swelling to right ring DIP region. Erythema linear streak from base of left ring finger to center of palm. No open areas or drainage from left hand.    Neurological: He is alert and oriented to person, place, and time. He exhibits normal muscle tone.   Skin: Skin is warm and dry. Capillary refill takes less than 2 seconds. No rash noted. He is not diaphoretic. No erythema.   Psychiatric: He has a normal mood and affect. His behavior is normal.   Nursing note and vitals reviewed.      ED Course     Procedures    I personally reviewed the x-rays and there is NO fracture or dislocation. Radiology review pending and nurse will notify patient if there is any change in the treatment plan.    Results for orders placed or performed during the hospital encounter of 04/24/19   XR Hand Left G/E 3 Views    Narrative    PROCEDURE:  XR HAND LT G/E 3 VW    HISTORY: Hand pain.    COMPARISON:  March 3, 2018    TECHNIQUE:  3 views of the left tibia were obtained.    FINDINGS:  There is been amputation of the fifth finger beyond the  proximal half of the proximal phalanx. This been amputation of the  terminal phalanx of the fourth finger. There is increasing deformity   seen in the proximal interphalangeal joint of the fourth finger as  compared to previous examination in March 2018. The remainder the hand  appears intact..       Impression    IMPRESSION: Increasing deformity of the proximal phalangeal joint of  the fourth finger on a arthritic basis as compared to previous  examination      MARCELLE DILLON MD   CBC with platelets differential   Result Value Ref Range    WBC 12.6 (H) 4.0 - 11.0 10e9/L    RBC Count 5.86 4.4 - 5.9 10e12/L    Hemoglobin 17.6 13.3 - 17.7 g/dL    Hematocrit 50.2 40.0 - 53.0 %    MCV 86 78 - 100 fl    MCH 30.0 26.5 - 33.0 pg    MCHC 35.1 31.5 - 36.5 g/dL    RDW 12.9 10.0 - 15.0 %    Platelet Count 216 150 - 450 10e9/L    Diff Method Automated Method     % Neutrophils  59.5 %    % Lymphocytes 28.4 %    % Monocytes 8.0 %    % Eosinophils 3.2 %    % Basophils 0.4 %    % Immature Granulocytes 0.5 %    Nucleated RBCs 0 0 /100    Absolute Neutrophil 7.5 1.6 - 8.3 10e9/L    Absolute Lymphocytes 3.6 0.8 - 5.3 10e9/L    Absolute Monocytes 1.0 0.0 - 1.3 10e9/L    Absolute Eosinophils 0.4 0.0 - 0.7 10e9/L    Absolute Basophils 0.1 0.0 - 0.2 10e9/L    Abs Immature Granulocytes 0.1 0 - 0.4 10e9/L    Absolute Nucleated RBC 0.0    CRP inflammation   Result Value Ref Range    CRP Inflammation 3.6 0.0 - 8.0 mg/L   Uric acid   Result Value Ref Range    Uric Acid 6.7 3.5 - 7.2 mg/dL   Erythrocyte sedimentation rate auto   Result Value Ref Range    Sed Rate 4 0 - 15 mm/h     Deferred analgesic.     Assessments & Plan (with Medical Decision Making)     Consulted with Dr. Douglas who is working in the ER today. He recommended a sed rate and if elevated MRI of left hand to rule out osteomyelitis.     WBC elevated without shift of ANC.     Normal sed rate and CRP. Osteomyelitis would be unlikely. Arthritis is a new finding on XRAY from previous XRAY.     He was strongly encouraged to follow up with surgeon that he follows with on his hand injury. He verbalized understanding.     Discussed plan of care. He verbalized understanding. All questions answered.     I have reviewed the nursing notes.    I have reviewed the findings, diagnosis, plan and need for follow up with the patient.  Discharged in stable condition.        Medication List      Started    doxycycline hyclate 100 MG capsule  Commonly known as:  VIBRAMYCIN  100 mg, Oral, 2 TIMES DAILY            Final diagnoses:   Cellulitis of ring finger of left hand     Take antibiotics as ordered.   Eat a yogurt daily while taking antibiotics.   Take tylenol and/or ibuprofen for pain. Follow dosing on package.   Use a warm washcloth to left ring and pinky finger for 20 minutes 4 times a day. Protect skin.   Follow up with surgeon in next week for  re-check.   Follow up with PCP with any increase in symptoms or concerns.   Return to urgent care or emergency department with any increase in symptoms or concerns.     LASHELL Norton  4/24/2019  1:08 PM  [facility]       Nya Rahman NP  04/24/19 1532       Nya Rahman NP  04/24/19 1535

## 2020-06-17 ENCOUNTER — APPOINTMENT (OUTPATIENT)
Dept: GENERAL RADIOLOGY | Facility: HOSPITAL | Age: 35
End: 2020-06-17
Attending: PHYSICIAN ASSISTANT

## 2020-06-17 ENCOUNTER — HOSPITAL ENCOUNTER (EMERGENCY)
Facility: HOSPITAL | Age: 35
Discharge: HOME | End: 2020-06-17
Attending: PHYSICIAN ASSISTANT | Admitting: PHYSICIAN ASSISTANT

## 2020-06-17 VITALS
SYSTOLIC BLOOD PRESSURE: 123 MMHG | RESPIRATION RATE: 16 BRPM | DIASTOLIC BLOOD PRESSURE: 84 MMHG | HEART RATE: 77 BPM | BODY MASS INDEX: 29.8 KG/M2 | OXYGEN SATURATION: 98 % | WEIGHT: 220 LBS | HEIGHT: 72 IN | TEMPERATURE: 98.3 F

## 2020-06-17 DIAGNOSIS — M79.642 PAIN OF LEFT HAND: ICD-10-CM

## 2020-06-17 DIAGNOSIS — R07.9 CHEST PAIN OF UNKNOWN ETIOLOGY: ICD-10-CM

## 2020-06-17 LAB
ALBUMIN SERPL-MCNC: 4 G/DL (ref 3.4–5)
ALP SERPL-CCNC: 94 U/L (ref 40–150)
ALT SERPL W P-5'-P-CCNC: 42 U/L (ref 0–70)
ANION GAP SERPL CALCULATED.3IONS-SCNC: 6 MMOL/L (ref 3–14)
AST SERPL W P-5'-P-CCNC: 20 U/L (ref 0–45)
BASOPHILS # BLD AUTO: 0.1 10E9/L (ref 0–0.2)
BASOPHILS NFR BLD AUTO: 0.4 %
BILIRUB SERPL-MCNC: 0.5 MG/DL (ref 0.2–1.3)
BUN SERPL-MCNC: 15 MG/DL (ref 7–30)
CALCIUM SERPL-MCNC: 8.8 MG/DL (ref 8.5–10.1)
CHLORIDE SERPL-SCNC: 107 MMOL/L (ref 94–109)
CO2 SERPL-SCNC: 27 MMOL/L (ref 20–32)
CREAT SERPL-MCNC: 1 MG/DL (ref 0.66–1.25)
DIFFERENTIAL METHOD BLD: ABNORMAL
EOSINOPHIL # BLD AUTO: 0.2 10E9/L (ref 0–0.7)
EOSINOPHIL NFR BLD AUTO: 1.7 %
ERYTHROCYTE [DISTWIDTH] IN BLOOD BY AUTOMATED COUNT: 12.7 % (ref 10–15)
GFR SERPL CREATININE-BSD FRML MDRD: >90 ML/MIN/{1.73_M2}
GLUCOSE SERPL-MCNC: 111 MG/DL (ref 70–99)
HCT VFR BLD AUTO: 51.5 % (ref 40–53)
HGB BLD-MCNC: 17.7 G/DL (ref 13.3–17.7)
IMM GRANULOCYTES # BLD: 0 10E9/L (ref 0–0.4)
IMM GRANULOCYTES NFR BLD: 0.3 %
LYMPHOCYTES # BLD AUTO: 2.4 10E9/L (ref 0.8–5.3)
LYMPHOCYTES NFR BLD AUTO: 20.3 %
MCH RBC QN AUTO: 30.2 PG (ref 26.5–33)
MCHC RBC AUTO-ENTMCNC: 34.4 G/DL (ref 31.5–36.5)
MCV RBC AUTO: 88 FL (ref 78–100)
MONOCYTES # BLD AUTO: 0.7 10E9/L (ref 0–1.3)
MONOCYTES NFR BLD AUTO: 5.8 %
NEUTROPHILS # BLD AUTO: 8.5 10E9/L (ref 1.6–8.3)
NEUTROPHILS NFR BLD AUTO: 71.5 %
NRBC # BLD AUTO: 0 10*3/UL
NRBC BLD AUTO-RTO: 0 /100
PLATELET # BLD AUTO: 209 10E9/L (ref 150–450)
POTASSIUM SERPL-SCNC: 4 MMOL/L (ref 3.4–5.3)
PROT SERPL-MCNC: 7.8 G/DL (ref 6.8–8.8)
RBC # BLD AUTO: 5.87 10E12/L (ref 4.4–5.9)
SODIUM SERPL-SCNC: 140 MMOL/L (ref 133–144)
TROPONIN I SERPL-MCNC: <0.015 UG/L (ref 0–0.04)
WBC # BLD AUTO: 11.8 10E9/L (ref 4–11)

## 2020-06-17 PROCEDURE — 93010 ELECTROCARDIOGRAM REPORT: CPT | Performed by: INTERNAL MEDICINE

## 2020-06-17 PROCEDURE — 93005 ELECTROCARDIOGRAM TRACING: CPT

## 2020-06-17 PROCEDURE — 25000132 ZZH RX MED GY IP 250 OP 250 PS 637: Performed by: PHYSICIAN ASSISTANT

## 2020-06-17 PROCEDURE — 71045 X-RAY EXAM CHEST 1 VIEW: CPT | Mod: TC

## 2020-06-17 PROCEDURE — 85025 COMPLETE CBC W/AUTO DIFF WBC: CPT | Performed by: PHYSICIAN ASSISTANT

## 2020-06-17 PROCEDURE — 99284 EMERGENCY DEPT VISIT MOD MDM: CPT | Mod: Z6 | Performed by: PHYSICIAN ASSISTANT

## 2020-06-17 PROCEDURE — 99285 EMERGENCY DEPT VISIT HI MDM: CPT | Mod: 25

## 2020-06-17 PROCEDURE — 36415 COLL VENOUS BLD VENIPUNCTURE: CPT | Performed by: PHYSICIAN ASSISTANT

## 2020-06-17 PROCEDURE — 84484 ASSAY OF TROPONIN QUANT: CPT | Performed by: PHYSICIAN ASSISTANT

## 2020-06-17 PROCEDURE — 80053 COMPREHEN METABOLIC PANEL: CPT | Performed by: PHYSICIAN ASSISTANT

## 2020-06-17 RX ORDER — ASPIRIN 81 MG/1
324 TABLET, CHEWABLE ORAL ONCE
Status: COMPLETED | OUTPATIENT
Start: 2020-06-17 | End: 2020-06-17

## 2020-06-17 RX ADMIN — ASPIRIN 81 MG 324 MG: 81 TABLET ORAL at 12:11

## 2020-06-17 ASSESSMENT — ENCOUNTER SYMPTOMS
DIAPHORESIS: 0
BRUISES/BLEEDS EASILY: 0
FEVER: 0
NAUSEA: 0
SHORTNESS OF BREATH: 0
DIZZINESS: 0
CHILLS: 0
CHEST TIGHTNESS: 1
WEAKNESS: 0
VOMITING: 0
FATIGUE: 0
ACTIVITY CHANGE: 0
LIGHT-HEADEDNESS: 0
ABDOMINAL PAIN: 0
COUGH: 0

## 2020-06-17 ASSESSMENT — MIFFLIN-ST. JEOR: SCORE: 1975.91

## 2020-06-17 NOTE — ED AVS SNAPSHOT
[facility]  [address] [place] [address]  Phone:  [phone]                                    Mason Garcia   MRN: [id number]    Department:  [facility]   Date of Visit:  6/17/2020           After Visit Summary Signature Page    I have received my discharge instructions, and my questions have been answered. I have discussed any challenges I see with this plan with the nurse or doctor.    ..........................................................................................................................................  Patient/Patient Representative Signature      ..........................................................................................................................................  Patient Representative Print Name and Relationship to Patient    ..................................................               ................................................  Date                                   Time    ..........................................................................................................................................  Reviewed by Signature/Title    ...................................................              ..............................................  Date                                               Time          22EPIC Rev 08/18

## 2020-06-17 NOTE — DISCHARGE INSTRUCTIONS
Your work-up today was unrevealing for an emergent cause of your chest pain.  Please follow-up in the clinic in the next week for recheck.  The object of examination of your left hand revealed no appreciable cause of your increasing pain.  This can be managed with over-the-counter pain medications.  Please return here for any worsening symptoms, new symptoms, or other concerns.

## 2020-06-17 NOTE — ED NOTES
Discharge instructions reviewed with patient. Pt stated that he doesn't have a PCP, I offered to have our case management to help facilitate a PCP, pt declined and stated he will call and get one himself.  Pt stated pain continues to be 5/10 same as when he got here. Pt offers no questions or concern. Copy of AVS in hand on discharge

## 2020-06-17 NOTE — ED PROVIDER NOTES
"  History     Chief Complaint   Patient presents with     Chest Pain     Intermittent X 3 days. Left-sided \"tightness\" and non-radiating without associated symptoms     Hand Pain     \"burning\" x 3 days, left hand     The history is provided by the patient.     Mason Garcia is a 34 year old male who presented to the emergency department ambulatory for evaluation of hand pain as well as chest pain.  The patient reports he is been having intermittent left-sided chest discomfort for the last 2 weeks at least.  Describes it as a tightness without radiation.  Focally in the left anterior chest.  Denies any increasing pain or pressures with exertion, manipulation, position, coughing, or deep breath.  No current pain at this time.  Also presented mostly for increasing left fourth and fifth digit discomfort.  He does have a history of digital amputation.  Was on chronic pain medications including gabapentin until approximately 2 years ago.  No trauma to the area.  No fevers or chills.  No redness.  Cardiac risk factor is smoking.  Patient denies any history of cancer, recent travel, recent surgery, or unilateral leg swelling.    Allergies:  Allergies   Allergen Reactions     Sudafed [Pseudoephedrine]        Problem List:    Patient Active Problem List    Diagnosis Date Noted     Suicidal behavior 01/20/2019     Priority: Medium     PTSD (post-traumatic stress disorder) 11/03/2018     Priority: Medium     Severe episode of recurrent major depressive disorder, without psychotic features (H) 11/02/2018     Priority: Medium     Depression with suicidal ideation 08/27/2018     Priority: Medium     Perforated appendicitis 02/07/2017     Priority: Medium        Past Medical History:    No past medical history on file.    Past Surgical History:    Past Surgical History:   Procedure Laterality Date     LAPAROSCOPIC APPENDECTOMY N/A 2/7/2017    Procedure: LAPAROSCOPIC APPENDECTOMY;  Surgeon: Chanel Pagan DO;  Location: [facility] OR "       Family History:    Family History   Problem Relation Age of Onset     Cardiovascular Maternal Grandmother         cardiovascular disease     Respiratory Maternal Grandmother         Lung transplant       Social History:  Marital Status:  Single [1]  Social History     Tobacco Use     Smoking status: Current Every Day Smoker     Packs/day: 1.00     Years: 7.00     Pack years: 7.00     Types: Cigarettes     Smokeless tobacco: Former User   Substance Use Topics     Alcohol use: Yes     Comment: rare     Drug use: No        Medications:    lithium (ESKALITH/LITHOBID) 300 MG CR tablet  prazosin (MINIPRESS) 2 MG capsule          Review of Systems   Constitutional: Negative for activity change, chills, diaphoresis, fatigue and fever.   Respiratory: Positive for chest tightness. Negative for cough and shortness of breath.    Cardiovascular: Positive for chest pain. Negative for leg swelling.   Gastrointestinal: Negative for abdominal pain, nausea and vomiting.   Genitourinary: Negative.    Musculoskeletal:        Left hand pain   Skin: Negative.    Neurological: Negative for dizziness, weakness and light-headedness.   Hematological: Does not bruise/bleed easily.       Physical Exam   BP: 137/93  Pulse: 86  Temp: 98.9  F (37.2  C)  Resp: 16  Height: 182.9 cm (6')  Weight: 99.8 kg (220 lb)  SpO2: 97 %      Physical Exam  Vitals signs and nursing note reviewed.   Constitutional:       General: He is not in acute distress.     Appearance: Normal appearance. He is normal weight. He is not ill-appearing, toxic-appearing or diaphoretic.      Comments: Pleasant and talkative 34-year-old male found seated semireclined on the exam bed in no distress.   Eyes:      Extraocular Movements: Extraocular movements intact.      Conjunctiva/sclera: Conjunctivae normal.      Pupils: Pupils are equal, round, and reactive to light.   Neck:      Musculoskeletal: Normal range of motion and neck supple.   Cardiovascular:      Rate and Rhythm:  Normal rate and regular rhythm.      Pulses: Normal pulses.   Pulmonary:      Effort: Pulmonary effort is normal.      Breath sounds: Normal breath sounds.   Chest:      Chest wall: No tenderness.   Abdominal:      General: There is no distension.      Palpations: Abdomen is soft.      Tenderness: There is no abdominal tenderness. There is no guarding.   Musculoskeletal:      Comments: Examination of the left hand reveals the previous remote amputation of digits 4 and 5.  There is no evidence of erythema, increasing pain with passive or active range of motion, abscess, examination of the palmar surface of the hand is unremarkable.  Wrist is unremarkable.   Skin:     General: Skin is warm and dry.      Capillary Refill: Capillary refill takes less than 2 seconds.   Neurological:      General: No focal deficit present.      Mental Status: He is alert and oriented to person, place, and time.   Psychiatric:         Mood and Affect: Mood normal.         ED Course        Procedures  EKG shows a normal sinus rhythm at a rate of 83.  Normal GA interval.  Normal QRS duration.  Normal QTC.  Normal axis.  Normal P wave duration.  There are no concerning ST segments.  There are no concerning T waves.  There is no evidence of ectopy, preexcitation, or ischemia.             Critical Care time:  none               Results for orders placed or performed during the hospital encounter of 06/17/20 (from the past 24 hour(s))   CBC with platelets differential   Result Value Ref Range    WBC 11.8 (H) 4.0 - 11.0 10e9/L    RBC Count 5.87 4.4 - 5.9 10e12/L    Hemoglobin 17.7 13.3 - 17.7 g/dL    Hematocrit 51.5 40.0 - 53.0 %    MCV 88 78 - 100 fl    MCH 30.2 26.5 - 33.0 pg    MCHC 34.4 31.5 - 36.5 g/dL    RDW 12.7 10.0 - 15.0 %    Platelet Count 209 150 - 450 10e9/L    Diff Method Automated Method     % Neutrophils 71.5 %    % Lymphocytes 20.3 %    % Monocytes 5.8 %    % Eosinophils 1.7 %    % Basophils 0.4 %    % Immature Granulocytes 0.3 %     Nucleated RBCs 0 0 /100    Absolute Neutrophil 8.5 (H) 1.6 - 8.3 10e9/L    Absolute Lymphocytes 2.4 0.8 - 5.3 10e9/L    Absolute Monocytes 0.7 0.0 - 1.3 10e9/L    Absolute Eosinophils 0.2 0.0 - 0.7 10e9/L    Absolute Basophils 0.1 0.0 - 0.2 10e9/L    Abs Immature Granulocytes 0.0 0 - 0.4 10e9/L    Absolute Nucleated RBC 0.0    Comprehensive metabolic panel   Result Value Ref Range    Sodium 140 133 - 144 mmol/L    Potassium 4.0 3.4 - 5.3 mmol/L    Chloride 107 94 - 109 mmol/L    Carbon Dioxide 27 20 - 32 mmol/L    Anion Gap 6 3 - 14 mmol/L    Glucose 111 (H) 70 - 99 mg/dL    Urea Nitrogen 15 7 - 30 mg/dL    Creatinine 1.00 0.66 - 1.25 mg/dL    GFR Estimate >90 >60 mL/min/[1.73_m2]    GFR Estimate If Black >90 >60 mL/min/[1.73_m2]    Calcium 8.8 8.5 - 10.1 mg/dL    Bilirubin Total 0.5 0.2 - 1.3 mg/dL    Albumin 4.0 3.4 - 5.0 g/dL    Protein Total 7.8 6.8 - 8.8 g/dL    Alkaline Phosphatase 94 40 - 150 U/L    ALT 42 0 - 70 U/L    AST 20 0 - 45 U/L   Troponin I   Result Value Ref Range    Troponin I ES <0.015 0.000 - 0.045 ug/L   XR Chest Port 1 View    Narrative    PROCEDURE:  XR CHEST PORT 1 VW    HISTORY: left sided chest pain. .    COMPARISON:  2/25/2017    FINDINGS:    The cardiomediastinal contours are stable.  No focal consolidation, effusion or pneumothorax.      Impression    IMPRESSION:  Stable chest.      KELSEY FARLEY MD       Medications   aspirin (ASA) chewable tablet 324 mg (324 mg Oral Given 6/17/20 1211)       HEART Score  Background  Calculates the overall risk of adverse event in patient's presenting with chest pain.  Based on 5 criteria (each assigned 0-2 points) including suspiciousness of history, EKG, age, risk factors and troponin.    Data  34 year old male  has Perforated appendicitis; Depression with suicidal ideation; Severe episode of recurrent major depressive disorder, without psychotic features (H); PTSD (post-traumatic stress disorder); and Suicidal behavior on their problem  list.   reports that he has been smoking cigarettes. He has a 7.00 pack-year smoking history. He has quit using smokeless tobacco.  family history includes Cardiovascular in his maternal grandmother; Respiratory in his maternal grandmother.  Lab Results   Component Value Date    TROPI  04/20/2015     <0.015  The 99th percentile for upper reference range is 0.045 ug/L.  Troponin values in   the range of 0.045 - 0.120 ug/L may be associated with risks of adverse   clinical events.       Criteria   0-2 points for each of 5 items (maximum of 10 points):  Score 0- History slightly suspicious for coronary syndrome  Score 0- EKG Normal  Score 0- Age <45 years old  Score 1- One to 2 risk factors for atherosclerotic disease  Score 0- Within normal limits for troponin levels  Interpretation  Risk of adverse outcome  Heart Score: 1  Total Score 0-3- Adverse Outcome Risk 2.5% - Supports early discharge with appropriate follow-up    PERC Rule for risk stratifying PE to low risk (calculator)  Background  Risk stratifies patients to low risk of PE if all 8 criteria are present including age <50, heart rate <100, O2 Sat >94%, no unilateral leg edema, no hemoptysis, no recent surgery or trauma, no prior VTE, and no hormone use.  Data  34 year old  has Perforated appendicitis; Depression with suicidal ideation; Severe episode of recurrent major depressive disorder, without psychotic features (H); PTSD (post-traumatic stress disorder); and Suicidal behavior on their problem list.  @[email or web address]@   has a past surgical history that includes Laparoscopic appendectomy (N/A, 2/7/2017).  Pulse: 86  SpO2: 97 %  Criteria   Of  8 possible items (all criteria must be present):  NEGATIVE  Age <50 years  Heart rate <100 bpm  Oxygen Saturation >94%  No unilateral leg swelling  No hemoptysis  No surgery or trauma within 4 weeks  No prior DVT or PE  No hormone use (oral, transdermal and intravaginal estrogens)  Interpretation  All eight criteria are met  AND low clinical PE suspicion: No further evaluation for PE required          Assessments & Plan (with Medical Decision Making)   Essentially unremarkable objective examination.  Laboratory evaluation as above.  Troponin is undetectable.  EKG is normal.  Chest x-ray is unrevealing.  At this point I can find no reasonable or compelling medical indication for further work-up or intervention.  He needs a follow-up in the clinic in the next week for recheck.  Examination of his hand revealed no indication of why he is experiencing worsening pain.  Likely acute on chronic pain.  Chest pain HPI, exam, and work-up is not consistent with acute coronary syndrome, pulmonary embolism, pneumothorax, thoracic aortic dissection, pneumonia, pericarditis, or other intrathoracic catastrophe.  This can be followed up as an outpatient as well.  He was advised to return to the emergency department for any new symptoms, worsening symptoms, or other questions or concerns whatsoever.  The patient voiced complete understanding of the work-up and plan and was agreeable.    This document was prepared using a combination of typing and voice generated software.  While every attempt was made for accuracy, spelling and grammatical errors may exist.    I have reviewed the nursing notes.    I have reviewed the findings, diagnosis, plan and need for follow up with the patient.       New Prescriptions    No medications on file       Final diagnoses:   Chest pain of unknown etiology   Pain of left hand       6/17/2020   [facility]     Megan Bonner PA-C  06/17/20 [id number]

## 2021-02-12 ENCOUNTER — HOSPITAL ENCOUNTER (EMERGENCY)
Facility: HOSPITAL | Age: 36
Discharge: HOME | End: 2021-02-12
Attending: NURSE PRACTITIONER | Admitting: NURSE PRACTITIONER
Payer: COMMERCIAL

## 2021-02-12 VITALS
HEART RATE: 74 BPM | TEMPERATURE: 97.5 F | SYSTOLIC BLOOD PRESSURE: 135 MMHG | OXYGEN SATURATION: 99 % | RESPIRATION RATE: 18 BRPM | DIASTOLIC BLOOD PRESSURE: 93 MMHG

## 2021-02-12 DIAGNOSIS — R25.1 TREMOR OF LEFT HAND: Primary | ICD-10-CM

## 2021-02-12 DIAGNOSIS — F17.210 CIGARETTE SMOKER: ICD-10-CM

## 2021-02-12 DIAGNOSIS — E83.51 HYPOCALCEMIA: ICD-10-CM

## 2021-02-12 LAB
ANION GAP SERPL CALCULATED.3IONS-SCNC: 3 MMOL/L (ref 3–14)
BASOPHILS # BLD AUTO: 0.1 10E9/L (ref 0–0.2)
BASOPHILS NFR BLD AUTO: 0.5 %
BUN SERPL-MCNC: 16 MG/DL (ref 7–30)
CALCIUM SERPL-MCNC: 8 MG/DL (ref 8.5–10.1)
CHLORIDE SERPL-SCNC: 106 MMOL/L (ref 94–109)
CO2 SERPL-SCNC: 28 MMOL/L (ref 20–32)
CREAT SERPL-MCNC: 0.87 MG/DL (ref 0.66–1.25)
DIFFERENTIAL METHOD BLD: NORMAL
EOSINOPHIL # BLD AUTO: 0.3 10E9/L (ref 0–0.7)
EOSINOPHIL NFR BLD AUTO: 2.4 %
ERYTHROCYTE [DISTWIDTH] IN BLOOD BY AUTOMATED COUNT: 12.5 % (ref 10–15)
GFR SERPL CREATININE-BSD FRML MDRD: >90 ML/MIN/{1.73_M2}
GLUCOSE SERPL-MCNC: 122 MG/DL (ref 70–99)
HCT VFR BLD AUTO: 47.6 % (ref 40–53)
HGB BLD-MCNC: 17 G/DL (ref 13.3–17.7)
IMM GRANULOCYTES # BLD: 0 10E9/L (ref 0–0.4)
IMM GRANULOCYTES NFR BLD: 0.4 %
LYMPHOCYTES # BLD AUTO: 3 10E9/L (ref 0.8–5.3)
LYMPHOCYTES NFR BLD AUTO: 29.3 %
MAGNESIUM SERPL-MCNC: 2.1 MG/DL (ref 1.6–2.3)
MCH RBC QN AUTO: 31.4 PG (ref 26.5–33)
MCHC RBC AUTO-ENTMCNC: 35.7 G/DL (ref 31.5–36.5)
MCV RBC AUTO: 88 FL (ref 78–100)
MONOCYTES # BLD AUTO: 0.8 10E9/L (ref 0–1.3)
MONOCYTES NFR BLD AUTO: 7.3 %
NEUTROPHILS # BLD AUTO: 6.2 10E9/L (ref 1.6–8.3)
NEUTROPHILS NFR BLD AUTO: 60.1 %
NRBC # BLD AUTO: 0 10*3/UL
NRBC BLD AUTO-RTO: 0 /100
PLATELET # BLD AUTO: 218 10E9/L (ref 150–450)
POTASSIUM SERPL-SCNC: 3.8 MMOL/L (ref 3.4–5.3)
RBC # BLD AUTO: 5.41 10E12/L (ref 4.4–5.9)
SODIUM SERPL-SCNC: 137 MMOL/L (ref 133–144)
TROPONIN I SERPL-MCNC: <0.015 UG/L (ref 0–0.04)
WBC # BLD AUTO: 10.3 10E9/L (ref 4–11)

## 2021-02-12 PROCEDURE — 80048 BASIC METABOLIC PNL TOTAL CA: CPT | Performed by: NURSE PRACTITIONER

## 2021-02-12 PROCEDURE — 83735 ASSAY OF MAGNESIUM: CPT | Performed by: NURSE PRACTITIONER

## 2021-02-12 PROCEDURE — 93005 ELECTROCARDIOGRAM TRACING: CPT

## 2021-02-12 PROCEDURE — 85025 COMPLETE CBC W/AUTO DIFF WBC: CPT | Performed by: NURSE PRACTITIONER

## 2021-02-12 PROCEDURE — 36415 COLL VENOUS BLD VENIPUNCTURE: CPT | Performed by: NURSE PRACTITIONER

## 2021-02-12 PROCEDURE — G0463 HOSPITAL OUTPT CLINIC VISIT: HCPCS | Mod: 25

## 2021-02-12 PROCEDURE — 99214 OFFICE O/P EST MOD 30 MIN: CPT | Performed by: NURSE PRACTITIONER

## 2021-02-12 PROCEDURE — 84484 ASSAY OF TROPONIN QUANT: CPT | Performed by: NURSE PRACTITIONER

## 2021-02-12 ASSESSMENT — ENCOUNTER SYMPTOMS: TREMORS: 1

## 2021-02-12 NOTE — DISCHARGE INSTRUCTIONS
Start taking calcium and vitamin D supplements.  Take Tylenol ibuprofen as needed for pain.    Appointment with the primary care doctor next week for reevaluation.    Return to emergency department for worsening or concerning symptoms.

## 2021-02-12 NOTE — ED PROVIDER NOTES
History     Chief Complaint   Patient presents with     Shaking     left hand     HPI  Mason Garcia is a 35 year old male who presents to urgent care for concerns of left hand shaking.  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  He has had has been shaking ever since.  No injury.  No new medications.  The patient notes he did take some ibuprofen with minimal effectiveness to his shaking.  History of prior injury to this hand and did have some shaking at that time but he was going to physical therapy.  Denies any chronic medical problems.  He states he does not take any medications.  No alcohol use.  No illegal drug use.  Denies chest pain, shortness of breath, numbness or tingling.  He has no other associated symptoms with this.    Allergies:  Allergies   Allergen Reactions     Sudafed [Pseudoephedrine]        Problem List:    Patient Active Problem List    Diagnosis Date Noted     Suicidal behavior 01/20/2019     Priority: Medium     PTSD (post-traumatic stress disorder) 11/03/2018     Priority: Medium     Severe episode of recurrent major depressive disorder, without psychotic features (H) 11/02/2018     Priority: Medium     Depression with suicidal ideation 08/27/2018     Priority: Medium     Perforated appendicitis 02/07/2017     Priority: Medium        Past Medical History:    History reviewed. No pertinent past medical history.    Past Surgical History:    Past Surgical History:   Procedure Laterality Date     LAPAROSCOPIC APPENDECTOMY N/A 2/7/2017    Procedure: LAPAROSCOPIC APPENDECTOMY;  Surgeon: Chanel Pagan DO;  Location: [facility]       Family History:    Family History   Problem Relation Age of Onset     Cardiovascular Maternal Grandmother         cardiovascular disease     Respiratory Maternal Grandmother         Lung transplant       Social History:  Marital Status:  Single [1]  Social History     Tobacco Use     Smoking status:  Current Every Day Smoker     Packs/day: 1.00     Years: 7.00     Pack years: 7.00     Types: Cigarettes     Smokeless tobacco: Former User   Substance Use Topics     Alcohol use: Yes     Comment: rare     Drug use: No        Medications:         lithium (ESKALITH/LITHOBID) 300 MG CR tablet       prazosin (MINIPRESS) 2 MG capsule          Review of Systems   Neurological: Positive for tremors.   All other systems reviewed and are negative.      Physical Exam   BP: 135/93  Pulse: 74  Temp: 97.5  F (36.4  C)  Resp: 18  SpO2: 99 %      Physical Exam  Vitals signs and nursing note reviewed.   Constitutional:       Appearance: Normal appearance. He is not ill-appearing or toxic-appearing.   HENT:      Head: Normocephalic and atraumatic.      Nose: Nose normal.      Mouth/Throat:      Mouth: Mucous membranes are moist.   Eyes:      Extraocular Movements: Extraocular movements intact.      Pupils: Pupils are equal, round, and reactive to light.   Neck:      Musculoskeletal: Neck supple.   Cardiovascular:      Rate and Rhythm: Normal rate and regular rhythm.      Heart sounds: Normal heart sounds.   Pulmonary:      Effort: Pulmonary effort is normal. No respiratory distress.      Breath sounds: Normal breath sounds.   Musculoskeletal: Normal range of motion.         General: No swelling, tenderness, deformity or signs of injury.   Skin:     General: Skin is warm and dry.      Capillary Refill: Capillary refill takes less than 2 seconds.      Findings: No bruising or erythema.   Neurological:      General: No focal deficit present.      Mental Status: He is alert and oriented to person, place, and time.      GCS: GCS eye subscore is 4. GCS verbal subscore is 5. GCS motor subscore is 6.      Cranial Nerves: Cranial nerves are intact. No facial asymmetry.      Sensory: Sensation is intact.      Motor: Tremor present. No weakness, seizure activity or pronator drift.      Coordination: Coordination is intact.      Gait: Gait is  intact.         ED Course   1520: Discussed this patient's HPI with Dr. Velázquez in adjacent emergency department.      1525: Dr. Velázquez at bedside assessing patient (see his note).    Procedures               EKG Interpretation:      Interpreted by Shelby Faith CNP  Time reviewed: 1540  Symptoms at time of EKG: left hand tremor   Rhythm: normal sinus   Rate: normal  Axis: normal  Ectopy: none  Conduction: normal  ST Segments/ T Waves: No ST-T wave changes  Q Waves: none  Comparison to prior: Unchanged    Clinical Impression: normal EKG          Results for orders placed or performed during the hospital encounter of 02/12/21 (from the past 24 hour(s))   CBC with platelets differential   Result Value Ref Range    WBC 10.3 4.0 - 11.0 10e9/L    RBC Count 5.41 4.4 - 5.9 10e12/L    Hemoglobin 17.0 13.3 - 17.7 g/dL    Hematocrit 47.6 40.0 - 53.0 %    MCV 88 78 - 100 fl    MCH 31.4 26.5 - 33.0 pg    MCHC 35.7 31.5 - 36.5 g/dL    RDW 12.5 10.0 - 15.0 %    Platelet Count 218 150 - 450 10e9/L    Diff Method Automated Method     % Neutrophils 60.1 %    % Lymphocytes 29.3 %    % Monocytes 7.3 %    % Eosinophils 2.4 %    % Basophils 0.5 %    % Immature Granulocytes 0.4 %    Nucleated RBCs 0 0 /100    Absolute Neutrophil 6.2 1.6 - 8.3 10e9/L    Absolute Lymphocytes 3.0 0.8 - 5.3 10e9/L    Absolute Monocytes 0.8 0.0 - 1.3 10e9/L    Absolute Eosinophils 0.3 0.0 - 0.7 10e9/L    Absolute Basophils 0.1 0.0 - 0.2 10e9/L    Abs Immature Granulocytes 0.0 0 - 0.4 10e9/L    Absolute Nucleated RBC 0.0    Basic metabolic panel   Result Value Ref Range    Sodium 137 133 - 144 mmol/L    Potassium 3.8 3.4 - 5.3 mmol/L    Chloride 106 94 - 109 mmol/L    Carbon Dioxide 28 20 - 32 mmol/L    Anion Gap 3 3 - 14 mmol/L    Glucose 122 (H) 70 - 99 mg/dL    Urea Nitrogen 16 7 - 30 mg/dL    Creatinine 0.87 0.66 - 1.25 mg/dL    GFR Estimate >90 >60 mL/min/[1.73_m2]    GFR Estimate If Black >90 >60 mL/min/[1.73_m2]    Calcium 8.0 (L) 8.5 - 10.1 mg/dL    Troponin I   Result Value Ref Range    Troponin I ES <0.015 0.000 - 0.045 ug/L       Medications - No data to display    Assessments & Plan (with Medical Decision Making)     I have reviewed the nursing notes.    I have reviewed the findings, diagnosis, plan and need for follow up with the patient.    35-year-old male that presented to urgent care for concerns of left hand shaking that started about 3 hours prior to this arrival.  No known injury.  No facial symmetry.  No pronator drift.  Strong and equal bilateral hand .  Heart rate and rhythm regular.  Bilateral lung sounds CTA.  Patient denies any alcohol or drug use.  He is not on any medications.  History of traumatic injury to left hand about 3 years ago.  No new injury today.  Discussed this patient's HPI and physical exam findings with Dr. Velázquez, (ER physician) who also assessed patient in urgent care today.  Basic blood work done along with an EKG.  EKG negative for acute ST or T wave changes.  No leukocytosis.  Negative troponin.  Calcium mildly decreased at 8.0.  Rest of electrolytes are within normal limits.    Unsure the exact etiology of patient's left hand tremor.  Could possibly be due to low calcium.  At this time recommended patient take vitamin D/calcium supplements.  Patient does not have a PCP.   referral placed to help patient schedule an appointment for next week for reevaluation.  Discussed with patient to return immediately to emergency department for worsening symptoms or development of new concerning symptoms.  Patient verbalized understanding is agreement with this plan of care.    This document was prepared using a combination of typing and voice generated software.  While every attempt was made for accuracy, spelling and grammatical errors may exist.    New Prescriptions    No medications on file       Final diagnoses:   Tremor of left hand   Hypocalcemia       2/12/2021   [facility]     Mpofu, Prudence,  CNP  02/12/21 2056

## 2021-02-12 NOTE — ED TRIAGE NOTES
Pt said his left hand started shaking a couple of hours. The pain has gone up and his mobility is going down. Pt said its happened before 2-3 years ago when he was having physical therapy on it. Said he had an accident a while ago and his hand went into a production . 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

## 2021-02-12 NOTE — ED PROVIDER NOTES
35-year-old male seen and evaluated in urgent care with Prudence Mpofu for shaking about the left upper extremity which is now resolved. Exam is not revealing for an etiology and history would not suggest a clear etiology either. The patient has not taken any medications of any type lately per his report. No history of similar. No other associated symptoms. After long history and exam is reassuring, plan for EKG, labs and troponin and reassessment. Patient agrees with plan. Kindly see [place] note for full history and physical examination documentation elements.    Yemi Velázquez MD on 2/12/2021 at 4:00 PM      Addendum: Patient reassessed and has not had recurrence of symptoms.  Hypocalcemia noted which may be associated with symptoms.  Plan to discharge home to follow-up primary care treating with calcium and vitamin D.  Patient agrees with plan.    Yemi Velázquez MD on 2/12/2021 at 4:36 PM           Yemi Velázquez MD  02/12/21 1640

## 2021-02-12 NOTE — ED TRIAGE NOTES
Patient states he was at work and his left hand started shaking and hasn't stopped. States it started just after noon. He does state this happened years ago after her hurt that hand once while at physical therapy, but doesn't remember if or what they thought it was too. 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

## 2021-02-15 ENCOUNTER — TELEPHONE (OUTPATIENT)
Dept: CASE MANAGEMENT | Facility: HOSPITAL | Age: 36
End: 2021-02-15

## 2021-02-15 NOTE — TELEPHONE ENCOUNTER
Care Transitions focused note:      Called patient to inquire if he needed help establishing care and he said he had already called the [facility] this morning as that is who his insurance is through.

## 2021-03-04 ENCOUNTER — HOSPITAL ENCOUNTER (INPATIENT)
Facility: HOSPITAL | Age: 36
LOS: 1 days | Discharge: HOME | DRG: 885 | End: 2021-03-05
Attending: PHYSICIAN ASSISTANT | Admitting: PSYCHIATRY & NEUROLOGY
Payer: COMMERCIAL

## 2021-03-04 DIAGNOSIS — R45.851 SUICIDAL IDEATION: ICD-10-CM

## 2021-03-04 PROBLEM — S67.22XS: Status: ACTIVE | Noted: 2018-10-16

## 2021-03-04 PROBLEM — M79.642 PAIN IN LEFT HAND: Status: ACTIVE | Noted: 2021-02-25

## 2021-03-04 PROBLEM — F33.42 RECURRENT MAJOR DEPRESSIVE DISORDER, IN FULL REMISSION (H): Status: ACTIVE | Noted: 2017-01-10

## 2021-03-04 PROBLEM — E66.3 OVERWEIGHT (BMI 25.0-29.9): Status: ACTIVE | Noted: 2018-08-16

## 2021-03-04 PROBLEM — S68.119A AMPUTATION OF FINGER, LEFT: Status: ACTIVE | Noted: 2021-02-25

## 2021-03-04 PROBLEM — S62.92XD: Status: ACTIVE | Noted: 2018-01-19

## 2021-03-04 PROBLEM — R29.898 WEAKNESS OF LEFT HAND: Status: ACTIVE | Noted: 2021-02-25

## 2021-03-04 PROBLEM — Z98.890 HISTORY OF OPEN REDUCTION AND INTERNAL FIXATION (ORIF) PROCEDURE: Status: ACTIVE | Noted: 2021-02-25

## 2021-03-04 PROBLEM — D36.12 NEUROMA OF HAND: Status: ACTIVE | Noted: 2018-08-03

## 2021-03-04 PROBLEM — S68.119S: Status: ACTIVE | Noted: 2018-08-03
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
## 2021-03-04 LAB
ALBUMIN SERPL-MCNC: 4.1 G/DL (ref 3.4–5)
ALBUMIN UR-MCNC: NEGATIVE MG/DL
ALP SERPL-CCNC: 97 U/L (ref 40–150)
ALT SERPL W P-5'-P-CCNC: 50 U/L (ref 0–70)
AMPHETAMINES UR QL: NOT DETECTED NG/ML
ANION GAP SERPL CALCULATED.3IONS-SCNC: 4 MMOL/L (ref 3–14)
APPEARANCE UR: CLEAR
AST SERPL W P-5'-P-CCNC: 23 U/L (ref 0–45)
BARBITURATES UR QL SCN: NOT DETECTED NG/ML
BASOPHILS # BLD AUTO: 0.1 10E9/L (ref 0–0.2)
BASOPHILS NFR BLD AUTO: 0.3 %
BENZODIAZ UR QL SCN: NOT DETECTED NG/ML
BILIRUB SERPL-MCNC: 0.4 MG/DL (ref 0.2–1.3)
BILIRUB UR QL STRIP: NEGATIVE
BUN SERPL-MCNC: 10 MG/DL (ref 7–30)
BUPRENORPHINE UR QL: NOT DETECTED NG/ML
CALCIUM SERPL-MCNC: 8.9 MG/DL (ref 8.5–10.1)
CANNABINOIDS UR QL: NOT DETECTED NG/ML
CHLORIDE SERPL-SCNC: 106 MMOL/L (ref 94–109)
CO2 SERPL-SCNC: 28 MMOL/L (ref 20–32)
COCAINE UR QL SCN: NOT DETECTED NG/ML
COLOR UR AUTO: NORMAL
CREAT SERPL-MCNC: 0.85 MG/DL (ref 0.66–1.25)
D-METHAMPHET UR QL: NOT DETECTED NG/ML
DIFFERENTIAL METHOD BLD: ABNORMAL
EOSINOPHIL # BLD AUTO: 0.2 10E9/L (ref 0–0.7)
EOSINOPHIL NFR BLD AUTO: 1 %
ERYTHROCYTE [DISTWIDTH] IN BLOOD BY AUTOMATED COUNT: 12.5 % (ref 10–15)
ETHANOL SERPL-MCNC: <0.01 G/DL
GFR SERPL CREATININE-BSD FRML MDRD: >90 ML/MIN/{1.73_M2}
GLUCOSE SERPL-MCNC: 131 MG/DL (ref 70–99)
GLUCOSE UR STRIP-MCNC: NEGATIVE MG/DL
HCT VFR BLD AUTO: 50.7 % (ref 40–53)
HGB BLD-MCNC: 18.1 G/DL (ref 13.3–17.7)
HGB UR QL STRIP: NEGATIVE
IMM GRANULOCYTES # BLD: 0.1 10E9/L (ref 0–0.4)
IMM GRANULOCYTES NFR BLD: 0.6 %
KETONES UR STRIP-MCNC: NEGATIVE MG/DL
LABORATORY COMMENT REPORT: NORMAL
LEUKOCYTE ESTERASE UR QL STRIP: NEGATIVE
LYMPHOCYTES # BLD AUTO: 2.7 10E9/L (ref 0.8–5.3)
LYMPHOCYTES NFR BLD AUTO: 18.3 %
MCH RBC QN AUTO: 31 PG (ref 26.5–33)
MCHC RBC AUTO-ENTMCNC: 35.7 G/DL (ref 31.5–36.5)
MCV RBC AUTO: 87 FL (ref 78–100)
METHADONE UR QL SCN: NOT DETECTED NG/ML
MONOCYTES # BLD AUTO: 0.9 10E9/L (ref 0–1.3)
MONOCYTES NFR BLD AUTO: 6.4 %
NEUTROPHILS # BLD AUTO: 10.7 10E9/L (ref 1.6–8.3)
NEUTROPHILS NFR BLD AUTO: 73.4 %
NITRATE UR QL: NEGATIVE
NRBC # BLD AUTO: 0 10*3/UL
NRBC BLD AUTO-RTO: 0 /100
OPIATES UR QL SCN: NOT DETECTED NG/ML
OXYCODONE UR QL SCN: NOT DETECTED NG/ML
PCP UR QL SCN: NOT DETECTED NG/ML
PH UR STRIP: 6 PH (ref 4.7–8)
PLATELET # BLD AUTO: 226 10E9/L (ref 150–450)
POTASSIUM SERPL-SCNC: 3.6 MMOL/L (ref 3.4–5.3)
PROPOXYPH UR QL: NOT DETECTED NG/ML
PROT SERPL-MCNC: 7.8 G/DL (ref 6.8–8.8)
RBC # BLD AUTO: 5.84 10E12/L (ref 4.4–5.9)
SARS-COV-2 RNA RESP QL NAA+PROBE: NEGATIVE
SODIUM SERPL-SCNC: 138 MMOL/L (ref 133–144)
SOURCE: NORMAL
SP GR UR STRIP: 1.01 (ref 1–1.03)
SPECIMEN SOURCE: NORMAL
TRICYCLICS UR QL SCN: NOT DETECTED NG/ML
UROBILINOGEN UR STRIP-MCNC: NORMAL MG/DL (ref 0–2)
WBC # BLD AUTO: 14.6 10E9/L (ref 4–11)

## 2021-03-04 PROCEDURE — U0003 INFECTIOUS AGENT DETECTION BY NUCLEIC ACID (DNA OR RNA); SEVERE ACUTE RESPIRATORY SYNDROME CORONAVIRUS 2 (SARS-COV-2) (CORONAVIRUS DISEASE [COVID-19]), AMPLIFIED PROBE TECHNIQUE, MAKING USE OF HIGH THROUGHPUT TECHNOLOGIES AS DESCRIBED BY CMS-2020-01-R: HCPCS | Performed by: PHYSICIAN ASSISTANT

## 2021-03-04 PROCEDURE — 82077 ASSAY SPEC XCP UR&BREATH IA: CPT | Performed by: PHYSICIAN ASSISTANT

## 2021-03-04 PROCEDURE — 99284 EMERGENCY DEPT VISIT MOD MDM: CPT | Performed by: PHYSICIAN ASSISTANT

## 2021-03-04 PROCEDURE — 124N000001 HC R&B MH

## 2021-03-04 PROCEDURE — 99222 1ST HOSP IP/OBS MODERATE 55: CPT | Performed by: NURSE PRACTITIONER

## 2021-03-04 PROCEDURE — 99285 EMERGENCY DEPT VISIT HI MDM: CPT

## 2021-03-04 PROCEDURE — C9803 HOPD COVID-19 SPEC COLLECT: HCPCS

## 2021-03-04 PROCEDURE — 85025 COMPLETE CBC W/AUTO DIFF WBC: CPT | Performed by: PHYSICIAN ASSISTANT

## 2021-03-04 PROCEDURE — U0005 INFEC AGEN DETEC AMPLI PROBE: HCPCS | Performed by: PHYSICIAN ASSISTANT

## 2021-03-04 PROCEDURE — 250N000013 HC RX MED GY IP 250 OP 250 PS 637: Performed by: PHYSICIAN ASSISTANT

## 2021-03-04 PROCEDURE — 99221 1ST HOSP IP/OBS SF/LOW 40: CPT | Performed by: NURSE PRACTITIONER

## 2021-03-04 PROCEDURE — 81003 URINALYSIS AUTO W/O SCOPE: CPT | Performed by: PHYSICIAN ASSISTANT

## 2021-03-04 PROCEDURE — 250N000013 HC RX MED GY IP 250 OP 250 PS 637: Performed by: NURSE PRACTITIONER

## 2021-03-04 PROCEDURE — 80053 COMPREHEN METABOLIC PANEL: CPT | Performed by: PHYSICIAN ASSISTANT

## 2021-03-04 PROCEDURE — 36415 COLL VENOUS BLD VENIPUNCTURE: CPT | Performed by: PHYSICIAN ASSISTANT

## 2021-03-04 PROCEDURE — 80306 DRUG TEST PRSMV INSTRMNT: CPT | Performed by: PHYSICIAN ASSISTANT

## 2021-03-04 RX ORDER — ACETAMINOPHEN 325 MG/1
650 TABLET ORAL EVERY 4 HOURS PRN
Status: DISCONTINUED | OUTPATIENT
Start: 2021-03-04 | End: 2021-03-05 | Stop reason: HOSPADM

## 2021-03-04 RX ORDER — LANOLIN ALCOHOL/MO/W.PET/CERES
3-6 CREAM (GRAM) TOPICAL
Status: DISCONTINUED | OUTPATIENT
Start: 2021-03-04 | End: 2021-03-05 | Stop reason: HOSPADM

## 2021-03-04 RX ORDER — LORAZEPAM 1 MG/1
1 TABLET ORAL ONCE
Status: COMPLETED | OUTPATIENT
Start: 2021-03-04 | End: 2021-03-04

## 2021-03-04 RX ORDER — POLYETHYLENE GLYCOL 3350 17 G/17G
17 POWDER, FOR SOLUTION ORAL DAILY PRN
Status: DISCONTINUED | OUTPATIENT
Start: 2021-03-04 | End: 2021-03-05 | Stop reason: HOSPADM

## 2021-03-04 RX ORDER — MAGNESIUM HYDROXIDE/ALUMINUM HYDROXICE/SIMETHICONE 120; 1200; 1200 MG/30ML; MG/30ML; MG/30ML
30 SUSPENSION ORAL EVERY 4 HOURS PRN
Status: DISCONTINUED | OUTPATIENT
Start: 2021-03-04 | End: 2021-03-05 | Stop reason: HOSPADM

## 2021-03-04 RX ORDER — HYDROXYZINE HYDROCHLORIDE 25 MG/1
50-100 TABLET, FILM COATED ORAL EVERY 4 HOURS PRN
Status: DISCONTINUED | OUTPATIENT
Start: 2021-03-04 | End: 2021-03-05 | Stop reason: HOSPADM

## 2021-03-04 RX ADMIN — LORAZEPAM 1 MG: 1 TABLET ORAL at 11:53

## 2021-03-04 RX ADMIN — HYDROXYZINE HYDROCHLORIDE 50 MG: 25 TABLET, FILM COATED ORAL at 20:25

## 2021-03-04 RX ADMIN — Medication 6 MG: at 20:25

## 2021-03-04 ASSESSMENT — ENCOUNTER SYMPTOMS
SHORTNESS OF BREATH: 0
CONFUSION: 0
HALLUCINATIONS: 0
SLEEP DISTURBANCE: 1
AGITATION: 0
FEVER: 0
DECREASED CONCENTRATION: 0
NERVOUS/ANXIOUS: 1
DYSPHORIC MOOD: 1

## 2021-03-04 ASSESSMENT — ACTIVITIES OF DAILY LIVING (ADL)
DIFFICULTY_COMMUNICATING: NO
DOING_ERRANDS_INDEPENDENTLY_DIFFICULTY: NO
DRESSING/BATHING_DIFFICULTY: NO
WEAR_GLASSES_OR_BLIND: NO
FALL_HISTORY_WITHIN_LAST_SIX_MONTHS: NO
TOILETING_ISSUES: NO
WALKING_OR_CLIMBING_STAIRS_DIFFICULTY: NO
DIFFICULTY_EATING/SWALLOWING: NO
ADL_ASSESSMENT: WDL
CONCENTRATING,_REMEMBERING_OR_MAKING_DECISIONS_DIFFICULTY: NO

## 2021-03-04 NOTE — ED NOTES
Care Transitions focused note:      Chart reviewed, spoke to nsg and provider.  Pt presenting with suicidal thoughts with plan of crashing his car.  Pt works [employer] in [employer] as an .  PCP is Dr Londono and he has an appt on March 22nd at 2pm.      Has seen Desire Juarez at [facility] here in [place] but has not seen her for a year.  Is having anxiety right now.  Stated in Jan of 2018 he worked at [employer] and had a hand injury.  Said the last month things have just gotten worse for him and he would like to get back in touch with his therapist.  Has been to PHP in the past as well and found this helpful.       At first patient stated he really did not feel he needed in patient however he became increasingly anxious and feels that he needs to go to in patient.    Call placed to Desire Juarez [phone] and message left to see if we could get him an appt.    Provider will contact behavioral health provider for admission.  Labs pending.      Will follow as needed.    KEVIN Smith

## 2021-03-04 NOTE — PROGRESS NOTES
03/04/21 1422   Patient Belongings   Did you bring any home meds/supplements to the hospital?  No   Patient Belongings sent to security per site process;locker;remains with patient;other (see comments)  (CONTRABAND- silver pocket knife)   Patient Belongings Remaining with Patient shoes;ring   Patient Belongings Put in Hospital Secure Location (Security or Locker, etc.) cash/credit card;wallet;watch;money (see comment);keys;clothing   Belongings Search Yes   Clothing Search Yes   Second Staff marylin   Comment blue jeans, belt, shoes, ring, socks, black tshirt, [facility]cc grey sweatshirt, orange hat, blue lighter, 2 packs of cigs (one unopened), blue face mask. SAFE- [employer] black watch, carkeys on [facility] lanyard w/ 4 other keys, black wallet containing $500 cash, [place]fer[place] card, [place] start [place], [place] id, [place], [place] card, [place] bank card, further card, $50 visa gift card, [employer] insurence card x2, [employer] club rewards card, black bear card. CONTRABAND- silver pocket knife   List items sent to safe: [employer] black watch, [employer]keys on [facility] lanyard w/ 4 other keys, black wallet containing $500 cash, perferred card, [place] start [place], [facility] id, [place] license, [place] card, [place] bank card, further card, $50 visa gift card, [employer] insurence card x2, [employer] club rewards card, black bear card.     All other belongings put in assigned cubby in belongings room.   blue jeans, belt, shoes, ring, socks, black tshirt, [facility] grey sweatshirt, orange hat, blue lighter, 2 packs of cigs (one unopened), blue face mask.     CONTRABAND- silver pocket knife    I have reviewed my belongings list on admission and verify that it is correct.     Patient signature_______________________________    Second staff witness (if patient unable to sign) ______________________________       I have received all my belongings at discharge.    Patient signature________________________________    Lena GARG  3/4/2021  3:11 PM

## 2021-03-04 NOTE — ED NOTES
Call from Desire Juarez and she is no longer going to be a therapist at [facility] and is not seeing new patients in her new practice.    Pt will need to establish with a new therapist.    KEVIN Smith

## 2021-03-04 NOTE — ED NOTES
Pt comes into ED by himself due to feeling like he needs help with coping mechanisms. States that he has a history of PTSD after a work injury from a couple years ago. States at that time he was up to floor 5 a couple times, but he was been doing well the last couple years. Pt states that he has a new job and a busy family of 5 that is stressful. States he is having some issues coping and needs to having some help. He states that he is not suicidal at the moment and feels that he would be safe to return home with help. Does state he has a plan to hurt himself via car accident but states he doesn't currently want to act on this plan. States he hasn't seen a  for about a year. Does not take any medications for his mental illness. Denies any drug or alcohol use.

## 2021-03-04 NOTE — ED NOTES
Pt shaking and states that he thinks he would be safe at home but would like to go home. PA updated. 1 to 1 reinitiated.

## 2021-03-04 NOTE — H&P
"Psychiatric Eval/H&P  Patient Name: Mason Garcia   YOB: 1985  Age: 35 year old  [id number]    Primary Physician: No Ref-Primary, Physician   Completed By: Víctor Sims NP     CC:  SI    HPI  Mason Garcia is a 35 year old male admitted via [facility] ED after presenting with reports of increasing depression and fleeting suicidal ideation. Has not been taking any medications or following with any outpatient services. Recent stressors include new job and busy family.     Mason indicated that over the last few months, his stress levels have been building. He has attempted to get his fiance to go away with him, just the two of them, for some down time, but she has been putting it off. This has upset him but he has been afraid to discuss this with her. In addition, his hand \"hurts everyday,\" which is the one he injured at his previous job, his fiance had some sort of infection last month that she \"almost \" from, and his father has stage IV cancer and has not been tolerating treatment well. Mason also attended [employer] recently to get a degree in IT, which is in such demand, that he did not even complete school prior to being offered a job at a place in [place]. He reported that \"outwardly things are going well,\" but that there is also a lot of stress with the newness of everything. He denied any intent to harm himself and struggled with description of symptoms, repeatedly saying he's \"stressed.\" He was not interested in medications, initially stating that \"Naila will get pissed,\" but later explaining that meds made him feel worse, and that the last time there was talk about him staying in the hospital for \"a long time, like months,\" in order to adjust medications. He does not want to be doing this. He would like to work on stress management skills, coping techniques and utilize the alpha stimulation while he is here. He was also agreeable to being set up with outpatient therapy again.     " "  [facility]     Past Psychiatric History:   Last hospitalization 1/21-1/22/2019 at this facility. Was also here in August of 2018. He did previously see Melia Rivera at [facility] for therapy, was switched to Michelle Paul, but has not gone in about a year. EMDR was started but he did not continue. Did complete PHP in October of 2018. Previous medications have included Effexor, Zoloft, Neurontin, and Hydroxyzine. Currently on no medications.      Social History:  Born in [facility] and grew up in [place]. Moved to [place] to help aunt with lung cancer. Has been employed at [employer], [employer], [employer], and [employer]. Resides with his fiance, who he refers to as his wife,  their daughter and her two sons from a previous marriage. He is an [employer]. No legal issues.      Chemical Use History:   Denied     Family Psychiatric History:   Mother has \"schizo\" type illness.        MSE/PSYCH  PSYCHIATRIC EXAM  /84   Pulse 86   Temp 98.9  F (37.2  C) (Tympanic)   Resp 18   SpO2 97%   Appearance:  awake, alert  Attitude:  cooperative  Eye Contact:  good  Mood:  anxious and depressed  Affect:  mood congruent  Speech:  clear, coherent  Psychomotor Behavior:  no evidence of tardive dyskinesia, dystonia, or tics  Thought Process:  logical, linear and goal oriented  Associations:  no loose associations  Thought Content:  no evidence of suicidal ideation or homicidal ideation and no evidence of psychotic thought  Insight:  good  Judgment:  intact  Oriented to:  time, person, and place  Attention Span and Concentration:  intact  Recent and Remote Memory:  intact  Fund of Knowledge: appropriate  Muscle Strength and Tone: normal  Gait and Station: Normal            Medical Review of Systems:   Medical History and ROS  Prior to Admission medications    Not on File     Allergies   Allergen Reactions     Sudafed [Pseudoephedrine]      No past medical history on file.  Past Surgical " History:   Procedure Laterality Date     LAPAROSCOPIC APPENDECTOMY N/A 2/7/2017    Procedure: LAPAROSCOPIC APPENDECTOMY;  Surgeon: Chanel Pagan DO;  Location: [facility] OR         Physical Exam    Constitutional: appears well-developed and well-nourished.   HENT:   Head: Normocephalic and atraumatic.   Right Ear: External ear normal.   Left Ear: External ear normal.   Nose: Nose normal.   Mouth/Throat: External oral area intact   Eyes: Conjunctivae and EOM are normal.   Cardiovascular: Normal rate  Pulmonary/Chest: Effort normal. No respiratory distress.     Skin: Dry, intact    Review of Systems:  Constitution: No weight loss, fever, night sweats  Skin: No rashes, pruritus or open wounds  Neuro: No headaches or seizure activity.  Psych:  See HPI  Eyes: No vision changes.  ENT: No problems chewing or swallowing.   Musculoskeletal: No muscle pain, joint pain or swelling   Respiratory: No cough or dyspnea  Cardiovascular:  No chest pain,  palpitations or fainting  Gastrointestinal:  No abdominal pain, nausea, vomiting or change in bowel habits       Labs:   Results for orders placed or performed during the hospital encounter of 03/04/21   CBC with platelets differential     Status: Abnormal   Result Value Ref Range    WBC 14.6 (H) 4.0 - 11.0 10e9/L    RBC Count 5.84 4.4 - 5.9 10e12/L    Hemoglobin 18.1 (H) 13.3 - 17.7 g/dL    Hematocrit 50.7 40.0 - 53.0 %    MCV 87 78 - 100 fl    MCH 31.0 26.5 - 33.0 pg    MCHC 35.7 31.5 - 36.5 g/dL    RDW 12.5 10.0 - 15.0 %    Platelet Count 226 150 - 450 10e9/L    Diff Method Automated Method     % Neutrophils 73.4 %    % Lymphocytes 18.3 %    % Monocytes 6.4 %    % Eosinophils 1.0 %    % Basophils 0.3 %    % Immature Granulocytes 0.6 %    Nucleated RBCs 0 0 /100    Absolute Neutrophil 10.7 (H) 1.6 - 8.3 10e9/L    Absolute Lymphocytes 2.7 0.8 - 5.3 10e9/L    Absolute Monocytes 0.9 0.0 - 1.3 10e9/L    Absolute Eosinophils 0.2 0.0 - 0.7 10e9/L    Absolute Basophils 0.1 0.0 - 0.2 10e9/L     Abs Immature Granulocytes 0.1 0 - 0.4 10e9/L    Absolute Nucleated RBC 0.0    Comprehensive metabolic panel     Status: Abnormal   Result Value Ref Range    Sodium 138 133 - 144 mmol/L    Potassium 3.6 3.4 - 5.3 mmol/L    Chloride 106 94 - 109 mmol/L    Carbon Dioxide 28 20 - 32 mmol/L    Anion Gap 4 3 - 14 mmol/L    Glucose 131 (H) 70 - 99 mg/dL    Urea Nitrogen 10 7 - 30 mg/dL    Creatinine 0.85 0.66 - 1.25 mg/dL    GFR Estimate >90 >60 mL/min/[1.73_m2]    GFR Estimate If Black >90 >60 mL/min/[1.73_m2]    Calcium 8.9 8.5 - 10.1 mg/dL    Bilirubin Total 0.4 0.2 - 1.3 mg/dL    Albumin 4.1 3.4 - 5.0 g/dL    Protein Total 7.8 6.8 - 8.8 g/dL    Alkaline Phosphatase 97 40 - 150 U/L    ALT 50 0 - 70 U/L    AST 23 0 - 45 U/L   Alcohol ethyl     Status: None   Result Value Ref Range    Ethanol g/dL <0.01 0.01 g/dL   UA reflex to Microscopic     Status: None   Result Value Ref Range    Color Urine Light Yellow     Appearance Urine Clear     Glucose Urine Negative NEG^Negative mg/dL    Bilirubin Urine Negative NEG^Negative    Ketones Urine Negative NEG^Negative mg/dL    Specific Gravity Urine 1.009 1.003 - 1.035    Blood Urine Negative NEG^Negative    pH Urine 6.0 4.7 - 8.0 pH    Protein Albumin Urine Negative NEG^Negative mg/dL    Urobilinogen mg/dL Normal 0.0 - 2.0 mg/dL    Nitrite Urine Negative NEG^Negative    Leukocyte Esterase Urine Negative NEG^Negative    Source Midstream Urine    Urine Drugs of Abuse Screen Panel 13     Status: None   Result Value Ref Range    Cannabinoids (74-wcd-4-carboxy-9-THC) Not Detected NDET^Not Detected ng/mL    Phencyclidine (Phencyclidine) Not Detected NDET^Not Detected ng/mL    Cocaine (Benzoylecgonine) Not Detected NDET^Not Detected ng/mL    Methamphetamine (d-Methamphetamine) Not Detected NDET^Not Detected ng/mL    Opiates (Morphine) Not Detected NDET^Not Detected ng/mL    Amphetamine (d-Amphetamine) Not Detected NDET^Not Detected ng/mL    Benzodiazepines (Nordiazepam) Not Detected  NDET^Not Detected ng/mL    Tricyclic Antidepressants (Desipramine) Not Detected NDET^Not Detected ng/mL    Methadone (Methadone) Not Detected NDET^Not Detected ng/mL    Barbiturates (Butalbital) Not Detected NDET^Not Detected ng/mL    Oxycodone (Oxycodone) Not Detected NDET^Not Detected ng/mL    Propoxyphene (Norpropoxyphene) Not Detected NDET^Not Detected ng/mL    Buprenorphine (Buprenorphine) Not Detected NDET^Not Detected ng/mL   Asymptomatic SARS-CoV-2 COVID-19 Virus (Coronavirus) by PCR     Status: None    Specimen: Nasopharyngeal   Result Value Ref Range    SARS-CoV-2 Virus Specimen Source Nasopharyngeal     SARS-CoV-2 PCR Result NEGATIVE     SARS-CoV-2 PCR Comment       Testing was performed using the Xpert Xpress SARS-CoV-2 Assay on the Cepheid Gene-Xpert   Instrument Systems. Additional information about this Emergency Use Authorization (EUA)   assay can be found via the Lab Guide.         Assessment/Impression: This is a 35 year old yo male struggling with increased stressors or family obligations and new employment. Compounding the issue seems to be lack of time away from the children and with his wife. He has difficulty speaking to her about these things and has been bottling them up. He believes that his stress has gotten the better of him. This has been building over the last couple months, and over the last several days he has been having fleeting suicidal thoughts. He is not really interested in medications, not finding them beneficial in the past, and worried about his wife's disapproval, but would like to work on some stress management and coping skills as well as utilize alpha stimulation for anxiety. In addition, he would be agreeable to trying outpatient therapy again.     DX:  Major Depression, Recurrent, Moderate    Plan:  Admit to Unit: 5 [facility]  Attending: DARNELL Bowie, CNP  Patient is: Voluntary  Monitor for improvement in mood and SI  Provide a safe environment and therapeutic  milieu.   OT orders for stress management and coping skills.    Anticipated length of stay: 3-5 days       DARNELL Bowie, CNP

## 2021-03-04 NOTE — ED TRIAGE NOTES
Pt states he has been feeling depressed with suicidal thoughts and a plan. States came here for help.
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
## 2021-03-04 NOTE — ED PROVIDER NOTES
History     Chief Complaint   Patient presents with     Psychiatric Evaluation     The history is provided by the patient.     Mason Garcia is a 35 year old male who presented to the emergency department ambulatory for evaluation of worsening depression, stress, and fleeting suicidal thoughts.  The patient was admitted to our facility in 2019 for similar issues and was started on lithium as well as Minipress.  He has not been on these medications for greater than 2 years.  Denies any drugs or alcohol.  Reports increased stress from his job.  Has primary care scheduled later this month.  He does not have any current therapy.  Try to go to arrange mental health walk-in, but the door was locked.    Allergies:  Allergies   Allergen Reactions     Sudafed [Pseudoephedrine]        Problem List:    Patient Active Problem List    Diagnosis Date Noted     Suicidal ideation 03/04/2021     Priority: Medium     Suicidal behavior 01/20/2019     Priority: Medium     PTSD (post-traumatic stress disorder) 11/03/2018     Priority: Medium     Severe episode of recurrent major depressive disorder, without psychotic features (H) 11/02/2018     Priority: Medium     Depression with suicidal ideation 08/27/2018     Priority: Medium     Perforated appendicitis 02/07/2017     Priority: Medium        Past Medical History:    No past medical history on file.    Past Surgical History:    Past Surgical History:   Procedure Laterality Date     LAPAROSCOPIC APPENDECTOMY N/A 2/7/2017    Procedure: LAPAROSCOPIC APPENDECTOMY;  Surgeon: Chanel Pagan DO;  Location: [facility] OR       Family History:    Family History   Problem Relation Age of Onset     Cardiovascular Maternal Grandmother         cardiovascular disease     Respiratory Maternal Grandmother         Lung transplant       Social History:  Marital Status:  Single [1]  Social History     Tobacco Use     Smoking status: Current Every Day Smoker     Packs/day: 1.00     Years: 7.00     Pack  years: 7.00     Types: Cigarettes     Smokeless tobacco: Former User   Substance Use Topics     Alcohol use: Yes     Comment: rare     Drug use: No        Medications:    No current outpatient medications on file.        Review of Systems   Constitutional: Negative for fever.   Respiratory: Negative for shortness of breath.    Skin: Negative.    Psychiatric/Behavioral: Positive for dysphoric mood, sleep disturbance and suicidal ideas. Negative for agitation, behavioral problems, confusion, decreased concentration, hallucinations and self-injury. The patient is nervous/anxious.         Patient reports fleeting suicidal thoughts but also tells me that he feels safe and comfortable being discharged with a plan.       Physical Exam   BP: 150/95  Pulse: 105  Temp: 98.3  F (36.8  C)  Resp: 16  SpO2: 99 %      Physical Exam  Vitals signs and nursing note reviewed.   Constitutional:       General: He is not in acute distress.     Appearance: Normal appearance. He is normal weight. He is not ill-appearing or toxic-appearing.   Pulmonary:      Effort: Pulmonary effort is normal.   Skin:     General: Skin is warm and dry.      Capillary Refill: Capillary refill takes less than 2 seconds.   Neurological:      General: No focal deficit present.      Mental Status: He is alert and oriented to person, place, and time.   Psychiatric:         Mood and Affect: Mood normal.      Comments: Flat affect.  Good eye contact.  Normal conversation.  No evidence of delusions, psychosis, or flight of ideas.         ED Course        Procedures               Critical Care time:  none               Results for orders placed or performed during the hospital encounter of 03/04/21 (from the past 24 hour(s))   CBC with platelets differential   Result Value Ref Range    WBC 14.6 (H) 4.0 - 11.0 10e9/L    RBC Count 5.84 4.4 - 5.9 10e12/L    Hemoglobin 18.1 (H) 13.3 - 17.7 g/dL    Hematocrit 50.7 40.0 - 53.0 %    MCV 87 78 - 100 fl    MCH 31.0 26.5 -  33.0 pg    MCHC 35.7 31.5 - 36.5 g/dL    RDW 12.5 10.0 - 15.0 %    Platelet Count 226 150 - 450 10e9/L    Diff Method Automated Method     % Neutrophils 73.4 %    % Lymphocytes 18.3 %    % Monocytes 6.4 %    % Eosinophils 1.0 %    % Basophils 0.3 %    % Immature Granulocytes 0.6 %    Nucleated RBCs 0 0 /100    Absolute Neutrophil 10.7 (H) 1.6 - 8.3 10e9/L    Absolute Lymphocytes 2.7 0.8 - 5.3 10e9/L    Absolute Monocytes 0.9 0.0 - 1.3 10e9/L    Absolute Eosinophils 0.2 0.0 - 0.7 10e9/L    Absolute Basophils 0.1 0.0 - 0.2 10e9/L    Abs Immature Granulocytes 0.1 0 - 0.4 10e9/L    Absolute Nucleated RBC 0.0        Medications   LORazepam (ATIVAN) tablet 1 mg (1 mg Oral Given 3/4/21 1153)       Assessments & Plan (with Medical Decision Making)   Voluntary admission to [facility].      I have reviewed the nursing notes.    I have reviewed the findings, diagnosis, plan and need for follow up with the patient.       New Prescriptions    No medications on file       Final diagnoses:   Suicidal ideation       3/4/2021   [facility]     Megan Bonner PA-C  03/04/21 3124
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
## 2021-03-05 ENCOUNTER — APPOINTMENT (OUTPATIENT)
Dept: OCCUPATIONAL THERAPY | Facility: HOSPITAL | Age: 36
DRG: 885 | End: 2021-03-05
Attending: NURSE PRACTITIONER
Payer: COMMERCIAL

## 2021-03-05 VITALS
RESPIRATION RATE: 16 BRPM | DIASTOLIC BLOOD PRESSURE: 74 MMHG | HEART RATE: 72 BPM | OXYGEN SATURATION: 96 % | TEMPERATURE: 98.1 F | SYSTOLIC BLOOD PRESSURE: 126 MMHG

## 2021-03-05 PROCEDURE — 99238 HOSP IP/OBS DSCHRG MGMT 30/<: CPT | Performed by: NURSE PRACTITIONER

## 2021-03-05 PROCEDURE — 97530 THERAPEUTIC ACTIVITIES: CPT | Mod: GO

## 2021-03-05 PROCEDURE — 97165 OT EVAL LOW COMPLEX 30 MIN: CPT | Mod: GO

## 2021-03-05 ASSESSMENT — ACTIVITIES OF DAILY LIVING (ADL)
DRESS: SCRUBS (BEHAVIORAL HEALTH);INDEPENDENT
LAUNDRY: UNABLE TO COMPLETE
ORAL_HYGIENE: INDEPENDENT
HYGIENE/GROOMING: INDEPENDENT

## 2021-03-05 NOTE — DISCHARGE SUMMARY
"     Psychiatric Discharge Summary    Mason Garcia MRN# [id number]   Age: 35 year old YOB: 1985     Date of Admission:  3/4/2021  Date of Discharge:  3/5/2021  Admitting Physician:  Giancarlo Lomas MD  Discharge Provider:  Víctor Sims NP          Event Leading to Hospitalization and Hospital Stay   HPI  Mason Garcia is a 35 year old male admitted via [facility] ED after presenting with reports of increasing depression and fleeting suicidal ideation. Has not been taking any medications or following with any outpatient services. Recent stressors include new job and busy family.      Mason indicated that over the last few months, his stress levels have been building. He has attempted to get his fiance to go away with him, just the two of them, for some down time, but she has been putting it off. This has upset him but he has been afraid to discuss this with her. In addition, his hand \"hurts everyday,\" which is the one he injured at his previous job, his fiance had some sort of infection last month that she \"almost \" from, and his father has stage IV cancer and has not been tolerating treatment well. Mason also attended [employer] recently to get a degree in IT, which is in such demand, that he did not even complete school prior to being offered a job at a place in [place]. He reported that \"outwardly things are going well,\" but that there is also a lot of stress with the newness of everything. He denied any intent to harm himself and struggled with description of symptoms, repeatedly saying he's \"stressed.\" He was not interested in medications, initially stating that \"Naila will get pissed,\" but later explaining that meds made him feel worse, and that the last time there was talk about him staying in the hospital for \"a long time, like months,\" in order to adjust medications. He does not want to be doing this. He would like to work on stress management skills, coping techniques and utilize " the alpha stimulation while he is here. He was also agreeable to being set up with outpatient therapy again.          Stay:  Admitted to unit voluntarily. Provided a safe environment and therapeutic milieu. Encouraged participation in unit activities. Monitored for improvement in mood and presence of SI. OT ordered for stress management and coping skill education. Patient was not interested in medications. We did meet today and discuss this again, but ultimately he decided that medications were not the route he wanted to take. Later in the day he decided that he just wanted to go home. He was admitted voluntarily, and there is no evidence that he is a danger to himself or others, so agreed to arrange for discharge.       Treatment Team Progress Note:   The patient's care was discussed with the treatment team and chart notes were reviewed.    Plan:  Patient is discharging at the recommendation of the treatment team.     At time of discharge, there is no evidence that patient is in immediate danger of self or others.        Diagnoses:     Major Depression, Recurrent, Moderate         Labs:     Results for orders placed or performed during the hospital encounter of 03/04/21   CBC with platelets differential     Status: Abnormal   Result Value Ref Range    WBC 14.6 (H) 4.0 - 11.0 10e9/L    RBC Count 5.84 4.4 - 5.9 10e12/L    Hemoglobin 18.1 (H) 13.3 - 17.7 g/dL    Hematocrit 50.7 40.0 - 53.0 %    MCV 87 78 - 100 fl    MCH 31.0 26.5 - 33.0 pg    MCHC 35.7 31.5 - 36.5 g/dL    RDW 12.5 10.0 - 15.0 %    Platelet Count 226 150 - 450 10e9/L    Diff Method Automated Method     % Neutrophils 73.4 %    % Lymphocytes 18.3 %    % Monocytes 6.4 %    % Eosinophils 1.0 %    % Basophils 0.3 %    % Immature Granulocytes 0.6 %    Nucleated RBCs 0 0 /100    Absolute Neutrophil 10.7 (H) 1.6 - 8.3 10e9/L    Absolute Lymphocytes 2.7 0.8 - 5.3 10e9/L    Absolute Monocytes 0.9 0.0 - 1.3 10e9/L    Absolute Eosinophils 0.2 0.0 - 0.7 10e9/L     Absolute Basophils 0.1 0.0 - 0.2 10e9/L    Abs Immature Granulocytes 0.1 0 - 0.4 10e9/L    Absolute Nucleated RBC 0.0    Comprehensive metabolic panel     Status: Abnormal   Result Value Ref Range    Sodium 138 133 - 144 mmol/L    Potassium 3.6 3.4 - 5.3 mmol/L    Chloride 106 94 - 109 mmol/L    Carbon Dioxide 28 20 - 32 mmol/L    Anion Gap 4 3 - 14 mmol/L    Glucose 131 (H) 70 - 99 mg/dL    Urea Nitrogen 10 7 - 30 mg/dL    Creatinine 0.85 0.66 - 1.25 mg/dL    GFR Estimate >90 >60 mL/min/[1.73_m2]    GFR Estimate If Black >90 >60 mL/min/[1.73_m2]    Calcium 8.9 8.5 - 10.1 mg/dL    Bilirubin Total 0.4 0.2 - 1.3 mg/dL    Albumin 4.1 3.4 - 5.0 g/dL    Protein Total 7.8 6.8 - 8.8 g/dL    Alkaline Phosphatase 97 40 - 150 U/L    ALT 50 0 - 70 U/L    AST 23 0 - 45 U/L   Alcohol ethyl     Status: None   Result Value Ref Range    Ethanol g/dL <0.01 0.01 g/dL   UA reflex to Microscopic     Status: None   Result Value Ref Range    Color Urine Light Yellow     Appearance Urine Clear     Glucose Urine Negative NEG^Negative mg/dL    Bilirubin Urine Negative NEG^Negative    Ketones Urine Negative NEG^Negative mg/dL    Specific Gravity Urine 1.009 1.003 - 1.035    Blood Urine Negative NEG^Negative    pH Urine 6.0 4.7 - 8.0 pH    Protein Albumin Urine Negative NEG^Negative mg/dL    Urobilinogen mg/dL Normal 0.0 - 2.0 mg/dL    Nitrite Urine Negative NEG^Negative    Leukocyte Esterase Urine Negative NEG^Negative    Source Midstream Urine    Urine Drugs of Abuse Screen Panel 13     Status: None   Result Value Ref Range    Cannabinoids (60-wsw-9-carboxy-9-THC) Not Detected NDET^Not Detected ng/mL    Phencyclidine (Phencyclidine) Not Detected NDET^Not Detected ng/mL    Cocaine (Benzoylecgonine) Not Detected NDET^Not Detected ng/mL    Methamphetamine (d-Methamphetamine) Not Detected NDET^Not Detected ng/mL    Opiates (Morphine) Not Detected NDET^Not Detected ng/mL    Amphetamine (d-Amphetamine) Not Detected NDET^Not Detected ng/mL     Benzodiazepines (Nordiazepam) Not Detected NDET^Not Detected ng/mL    Tricyclic Antidepressants (Desipramine) Not Detected NDET^Not Detected ng/mL    Methadone (Methadone) Not Detected NDET^Not Detected ng/mL    Barbiturates (Butalbital) Not Detected NDET^Not Detected ng/mL    Oxycodone (Oxycodone) Not Detected NDET^Not Detected ng/mL    Propoxyphene (Norpropoxyphene) Not Detected NDET^Not Detected ng/mL    Buprenorphine (Buprenorphine) Not Detected NDET^Not Detected ng/mL   Asymptomatic SARS-CoV-2 COVID-19 Virus (Coronavirus) by PCR     Status: None    Specimen: Nasopharyngeal   Result Value Ref Range    SARS-CoV-2 Virus Specimen Source Nasopharyngeal     SARS-CoV-2 PCR Result NEGATIVE     SARS-CoV-2 PCR Comment       Testing was performed using the Xpert Xpress SARS-CoV-2 Assay on the Cepheid Gene-Xpert   Instrument Systems. Additional information about this Emergency Use Authorization (EUA)   assay can be found via the Lab Guide.            Discharge Medications:   There are no discharge medications for this patient.         Psychiatric Examination:   Appearance:  awake, alert  Attitude:  cooperative  Eye Contact:  good  Mood:  anxious and depressed  Affect:  mood congruent  Speech:  clear, coherent  Psychomotor Behavior:  no evidence of tardive dyskinesia, dystonia, or tics  Thought Process:  logical, linear and goal oriented  Associations:  no loose associations  Thought Content:  no evidence of suicidal ideation or homicidal ideation and no evidence of psychotic thought  Insight:  good  Judgment:  intact  Oriented to:  time, person, and place  Attention Span and Concentration:  intact  Recent and Remote Memory:  intact  Fund of Knowledge: appropriate  Muscle Strength and Tone: normal  Gait and Station: Normal         Discharge Plan:   Discharge home. Patient will arrange transportation.     Health Care Follow-up:      [facility]   [address] [facility] [address]  Mobile:  [phone]  Phone: [phone]   Fax: [phone]     Attestation:  The patient has been seen and evaluated by me,  Víctor Sims NP         Discharge Services Provided:    20  minutes spent on discharge services, including:  Final examination of patient.  Review and discussion of Hospital stay.  Instructions for continued outpatient care/goals.  Preparation of discharge records.  Preparation of medications refills and new prescriptions.  Preparation of Applicable referral forms.

## 2021-03-05 NOTE — PLAN OF CARE
Behavioral Health Occupational Therapy Eval      Name: Mason Garcia MRN# [id number]   Age: 35 year old YOB: 1985     Date of Consultation: March 5, 2021  Primary care provider: No Ref-Primary, Physician    Referring Physician: Víctor Sims NP  Orders: Eval and Treat  Medical Diagnosis: Major Depression, Recurrent, Moderate  Onset of Illness/Injury: Admit date 3/4/21    Prior Level of Function: Patient works, drives, indep with ADLs and completes household tasks.   Resides with his fiance, 2 step sons and daughter. Reports mental health worsening leading to admit to the hospital. Patient notes having a therapist but did quit going. Had completed the PHP program but notes that he hasn't been using any of the coping skill learned there as of late.     Current Level of Function: Patient is open to getting a therapist setup again. He states that he is not sure if he has time to complete self care. Went over his schedule M-F and identified some areas that could be modified to allow for self care. Patient notes that having a scheduled supper time could also help with the afternoon stressors/family time. Patient also identified as needing to communicate with significant other and reviewed I statements with him.     Frequency in HZ:0.5  Time. 40  Current in microamperes: 1.5  Rating of symptoms prior to treatment (indicate symptom I.e pain, insomnia, anxiety or depression): depression/anxiety, not rated  Rating of symptoms after treatment (indicate symptom I.e pain, insomnia, anxiety or depression) : depression/anxiety, not rated.   Skin was cleaned prior to applying electrodes and assessed prior and post treatment. No metal was in contact with the electrode probes or clips.   Irritation of the skin post treatment was noted: no       Patient/Family Goal: Patient wanting to reintroduce coping skills to build resiliency for day to day stressors.       Past Medical History:   No past medical history on  file.    Past Surgical History:  Past Surgical History:   Procedure Laterality Date     LAPAROSCOPIC APPENDECTOMY N/A 2/7/2017    Procedure: LAPAROSCOPIC APPENDECTOMY;  Surgeon: Chanel Pagan DO;  Location: [facility] OR       Medications:   Current Facility-Administered Medications   Medication     acetaminophen (TYLENOL) tablet 650 mg     alum & mag hydroxide-simethicone (MAALOX) suspension 30 mL     hydrOXYzine (ATARAX) tablet  mg     melatonin tablet 3-6 mg     nicotine (NICORETTE) gum 2-4 mg     polyethylene glycol (MIRALAX) Packet 17 g       Reason for OT Referral:  Mental Health History: Has a history of inpatient psychiatric hospitalizations. Pt was last hospitalization on the  unit in 2019 from 1/20--1/22, in 2018- 11/15-11/21, 11/2-11/5,   Signs/Symptoms of compliant: Notes that he was feeling overwhelmed and aattempted to utilize a walk in  provider but they were closed and thus he came to the hospital.   Aggravating factors/Current Life Stressors: Patient notes that he was feeling overwhelmed by dealing with daily stressors.   Current Services: None currently.     Personal Information:  Family Structure: Has a fiance and daughter. 2 'step sons'.   Living Arrangement: Lives in own home  Finances: employed.   Medication Management: NA  [facility]    Physical Presentation:   Mobility: WFL  Strength/ROM: WFL  Comfort/Pain: Pain in hand that has distal digits missing. Patient reports that he is seeing OP OT for this.       Self Care:   Nutrition: I do okay  Sleep Pattern: I do okay  Exercise: I do okay  Spiritual Practice: I do okay  Leisure: I have difficulty  Coping Skills: I have difficulty    Cognition:  Orientation:  time, place and person  Memory: Intact  Attention: Normal   Motivation: Diminished  Speech/Language: Normal  Mood: Neutral  Affect: Flat      Goals:   Patient wanting to reintroduce coping skills to build resiliency for day to day stressors.       Planned Interventions: IADL  of scheduling, coping skills, Alpha stim.     Clinical Impressions:  Criteria for Skilled Therapeutic Intervention Met: yes  OT Diagnosis: limited coping skill implementation leading to hospitalization.   Influenced by the following impairments: External stressors.   Functional limitations due to impairment: Difficulty with self regulation leading to hospitalization.   Clinical presentation: Evolving/Changing  Clinical presentation rationale: Patient in the hospital seeking help and is open to referrals for a new therapist.   Clinical Decision making (complexity): Low Complexity  Predicted Duration of Therapy Intervention (days/wks): 1 x wk x 1 wk and 2 x wk x 1 wk.   Risks and Benefits of therapy have been explained: Yes  Patient, Family & other staff in agreement with plan of care: Yes  Comments: OT to continue per plan of care.     Total Evaluation Time: 28 plus 18 treatment.

## 2021-03-05 NOTE — PLAN OF CARE
Problem: Behavioral Health Plan of Care  Goal: Patient-Specific Goal (Individualization)  Description: Patient will sleep 6-8 hours per night  Patient will attend 50% of groups  Patient will eat at least 50% of meals  Patient will participate in treatment plan  Outcome: Improving  Note: Report received from Terry june. Pt observed sleeping in supine position with regular and unlabored respirations.    Pt has been in bed with eyes closed and regular respirations. 15 minute and PRN checks all night. No complaints offered. Will continue to monitor.    Pt slept approx 7 hours this NOC shift    Face to face end of shift report communicated to oncoming RN.    March 5, 2021  2:06 AM          Problem: Suicide Risk  Goal: Absence of Self-Harm  Description: Patient will be free from self harm or injury during hospitalization.   Outcome: Improving  Note: Unable to assess due to pt sleeping. Pt has remained free of self-harm.

## 2021-03-05 NOTE — PROGRESS NOTES
Discharge Note    Patient Discharged to home on 3/5/2021 4:02 PM via Private Car accompanied by staff to security office for belongings then out of the building.     Patient informed of discharge instructions in AVS. patient verbalizes understanding and denies having any questions pertaining to AVS. Patient stable at time of discharge. Patient denies SI, HI, and thoughts of self harm at time of discharge. All personal belongings returned to patient. No discharge prescriptions sent. Psych evaluation, history and physical, AVS, and discharge summary available to next level of care.     Memo Roper RN  3/5/2021  4:02 PM

## 2021-03-05 NOTE — DISCHARGE INSTRUCTIONS
Behavioral Discharge Planning and Instructions    Summary: You were admitted on 3/4/2021  due to Suicidal Ideations.  You were treated by the treatment team and discharged on 3/5/2021 from 5N to Home    Main Diagnosis: Major Depression, Recurrent, Moderate    Health Care Follow-up:      PCP- Memo Bry- March 22nd @ 2:00  [address] [place] [address]   Phone: [phone]  F: [phone]    [facility]   [address] [facility] [address]  Mobile: [phone]  Phone: [phone]   Fax: [phone]    Counseling in [place]:  Sam Ambrose           [phone]  [facility]    Faviola Romero, [facility]      [phone]  [facility]     Letitia Hodgson, [facility] (young kids)    [phone]   Rosa Henry, [facility] (older kids & adults)  [phone]   David Yan [facility]      [phone]  [facility]       [phone]   Doron Huitron MS, LP   Norma Day MA, [facility]   Zenaida Vaughan MS psychotherapist   Johnna Estes MS, [facility]   Julissa Johnson, [facility], counselor   Yodit Huitron [facility], counselor  Rm        [phone]  Hector Reyes, counseling  Dr Lindy Killian, psychiatry  Evelin Sangeetha, counseling  Jae Awad, counseling  Che Mackey, counseling  Rubi Guzman, psychiatry   [facility]       [phone]   Zhanna Tomlin MA, LMFT, RPFS   Sara Tolentino MSW, [facility]          [phone]  [facility]      [phone]   Yodit Damon MS, [facility]          [phone]        Camacho Obando MSW, [facility] , CCHAPARRITA Paul MSW, [facility]                                                    Bruce Zhang [facility]      Hetal Weston MS, [facility]                [phone]      Milagros Carlos PsyD     Mary GardneryD, owner      Melissa Burger MPAS, ANDREAS Barney PhD   Yanet Coffey MSW, [facility]        [phone]   Lilo Aldridge [facility]   Kem Villafana APRN, CNP,     Evne Vickie MSW, LGSW (adolescents)   Jackie Grinnel APRN, CNP (Hib via telehealth; adolescents)   Janneth JENNINGS CNP (Hib via telehealth)   Som Cruz MA, LPC, BCIA (Hib via telehealth)   Gosia Walker [facility] MSW, LGSW   Libra Tejada LICSW   Logan Ramos DNP, APRN, CNP   Lakeisha Skelton RN, BSN   Maria Ines Silvestre RN-BC, BSN (Hib via telehealth)  [facility]         [phone]   Zahra Lezama, MSN, [facility]           [phone]   Giancarlo Sims MA, [facility]   Maria G Zuñiga MS RN [facility] NP   Yodit Moreno, [facility]         [phone]  [facility]       [phone]   [facility]   Veronica Yoo (to be [facility])   Gino Mariscal (to be [facility])      Counseling in [place]:  [facility]       [phone]   mental health & CD counseling  Som Moser       [phone]  [facility]       [phone]  [facility]        [phone]   Zhanna Allen  [facility]       [facility]              [phone]   can do weekends and evenings   DeLrudy Castillo, MSW, LICSW, LCSW, CCTP    Andrea Balderrama MA, LMFT, [facility]       evenings, weekends  [phone]      Counseling in [place]:  [facility]         [phone]   Zahra Lezama, MSN, [facility]   Marleen Duvall MA, [facility]  [facility]      [phone]  [facility]       [phone]   Ruk[facility]ana Romero [facility]  [facility]       Harriet Valentin       [phone]  [facility]    [address] [facility] [address]     [place]      [phone]  [facility]     Jae Domínguez MS Ed, [facility]    [phone]    (kids) denotes providers who also see kids    Attend all scheduled appointments with your outpatient providers. Call at least 24 hours in advance if you need to reschedule an  "appointment to ensure continued access to your outpatient providers.     Major Treatments, Procedures and Findings:  You were provided with: a psychiatric assessment, assessed for medical stability, medication evaluation and/or management and group therapy    Symptoms to Report: feeling more aggressive, increased confusion, losing more sleep, mood getting worse or thoughts of suicide    Early warning signs can include: increased depression or anxiety sleep disturbances increased thoughts or behaviors of suicide or self-harm  increased unusual thinking, such as paranoia or hearing voices    Safety and Wellness:  Take all medicines as directed.  Make no changes unless your doctor suggests them.      Follow treatment recommendations.  Refrain from alcohol and non-prescribed drugs.  Ask your support system to help you reduce your access to items that could harm yourself or others. Items could include:  Firearms  Medicines (both prescribed and over-the-counter)  Knives and other sharp objects  Ropes and like materials  If there is a concern for safety, call 911. If there is a concern for safety, call 911.    Resources:   Crisis Intervention: 231.696.5344 or 259-364-6187 (TTY: 647.208.7878).  Call anytime for help.  National [place] on Mental Illness (www.mn.lou.org): 454.546.8682 or 245-851-9754.  Alcoholics Anonymous (www.alcoholics-anonymous.org): Check your phone book for your local chapter.  Suicide Awareness Voices of Education (SAVE) (www.save.org): 355-942-DLWZ (9683)  National Suicide Prevention Line (www.mentalhealthmn.org): 671-948-IWVZ (2700)  Mental Health Consumer/Survivor Network of [place] (www.mhcsn.net): 409.697.8751 or 946-300-6031  Mental Health Association of [place] (www.mentalhealth.org): 797.486.2889 or 996-089-6113  Self- Management and Recovery Training., [employer].com-- Toll free: 842.448.2908  www.[employer]  Text 4 Life: txt \"LIFE\" to 78834 for immediate support and crisis intervention  Crisis text " "line: Text \"[place]\" to 278916. Free, confidential, 24/7.  [place] Area:  [facility], Crisis stabilization housing- 891.559.8615  [facility] Crisis Line: 1-756.564.2065  Advocates For Family Peace: 623-4434  Sexual Assault Program [facility] [place]: 370.119.3218 or 1-326.939.7332  [facility] [place] Battered Women's Program: 1-480.470.2573 Ext: 279       Calls answered Mon-Fri-8:00 am--4:30 pm    [place]:  Advocates for Family Peace: 0-759-258-5953  [facility] - 6-251-140-1895  [facility] first call for help: 2-455-438-7450  [facility]:  (623) 578-1652    [place] Area:  Warm Line: 1-171.416.4216       Calls answered Tuesday--Saturday 4:00 pm--10:00 pm  [place] Crisis Line - 363.643.2014  [place] [facility] 565-499-7767    [place] Statewide:  [place] Crisis and Referral Services: 1-468.921.7911  National Suicide Prevention Lifeline: 0-882-038-TALK (7347)   - nlt7qemc- Text \"Life\" to 41192  First Call for Help: 2-1-1  ARELY Helpline- 1-721-IKUF-HELP   Crisis Text Line: Text  [place]  to 729574  Crisis Intervention: 654.196.6167 or 515-241-3216. Call anytime for help.     General Medication Instructions:   See your medication sheet(s) for instructions.   Take all medicines as directed.  Make no changes unless your doctor suggests them.   Go to all your doctor visits.  Be sure to have all your required lab tests. This way, your medicines can be refilled on time.  Do not use any drugs not prescribed by your doctor.  Avoid alcohol.    Advance Directives:   Scanned document on file with Driver? No scanned doc  Is document scanned? Pt states no documents  Honoring Choices Your Rights Handout: Informed and given  Was more information offered? Pt declined    The Treatment team has appreciated the opportunity to work with you. If you have any questions or concerns about your recent admission, you can contact the unit which can receive your call 24 hours a day, 7 days a week. They will be able " to get in touch with a Provider if needed. The unit number is [phone] .
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
## 2021-03-05 NOTE — PROGRESS NOTES
03/05/21 1400   Signing Clinician's Name / Credentials   Signing clinician's name / credentials Luz Marina CANTOR OTR/L   Quick Adds   Rehab Discipline OT   Therapeutic Activity   Minutes of Treatment 18 minutes   Symptoms Noted During/After Treatment None   Treatment Detail Went over his schedule M-F and identified some areas that could be modified to allow for self care. Patient notes that having a scheduled supper time could also help with the afternoon stressors/family time. Patient also identified as needing to communicate with significant other and reviewed I statements with him.    Additional Documentation   OT Plan OT to see next week as appropriate to re-integrate coping skills.    Total Session Time   Total Session Time (minutes) 18 minutes   Discharge Planner OT   Patient plan for discharge: Return to home when stable.   Current status: Agreeable to meeting with OT. Worked on above skill to aid with minimizing patient identified barriers.   Barriers to return to prior living situation: Could benefit from further stabilization.   Recommendations for discharge: OP therapy and utilize coping skills.   Rationale for recommendations: Patient request and clinical judgement.        Entered by: Luz Marina Thayer 03/05/2021 2:46 PM

## 2021-03-05 NOTE — PLAN OF CARE
"  Problem: Behavioral Health Plan of Care  Goal: Patient-Specific Goal (Individualization)  Description: Patient will sleep 6-8 hours per night  Patient will attend 50% of groups  Patient will eat at least 50% of meals  Patient will participate in treatment plan  Outcome: No Change  Note: Shift Summery:  Patient is in the lounge at start of shift.  He called his employer to notify them of his hospitalization.  His affect is flat.  He is utilizing Alpha Stim for anxiety.  He is attending groups.    Patient denies SI.  He states he has minimal anxiety.  \"I'm doing better today.  I would like to leave.  I think I just needed a break.\"    1439: Patient signed 12 hour intent to leave.  NP aware and will discharge patient today.  Patient's car is at the [employer] entrance parking lot.         Problem: Suicide Risk  Goal: Absence of Self-Harm  Description: Patient will be free from self harm or injury during hospitalization.   Outcome: Improving  Note: Patient had no noted self harm this shift.       "

## 2021-03-05 NOTE — PLAN OF CARE
Pt is discharging at the recommendation of the treatment team. Pt is discharging to home transported by self. Pt denies having any thoughts of hurting themself or anyone else.  Pt has follow up with PCP and will arrange therapy to fit work schedule. Therapy resources located in AVS. Discharge instructions, including; demographic sheet, psychiatric evaluation, discharge summary, and AVS were faxed to these next level of care providers.

## 2021-03-05 NOTE — PLAN OF CARE
"Problem: Behavioral Health Plan of Care  Goal: Patient-Specific Goal (Individualization)  Description: Patient will sleep 6-8 hours per night  Patient will attend 50% of groups  Patient will eat at least 50% of meals  Patient will participate in treatment plan  Outcome: Improving  He is up on the unit and is social with others. He has depression, high anxiety and had been having suicidal thinking. He talks of \"making up an excuse to leave work, I went to [facility] and the door was locked, so I came here\". He talks of feeling relieved that he is here and is addressing his anxiety and feelings of being overwhelmed. He denies feeling suicidal at this time. He would like to talk with the provider about possibly restarting medications that he had been on \"a while ago\".  He talks of his wife not liking medications and that makes it hard for him to make the decision to go on them or not. \"I need to make this decision on my own\".  He is up and maintaining appropriate boundaries. He is offered a PRN for his anxiety and he is choosing to wait until bed time.     Problem: Suicide Risk  Goal: Absence of Self-Harm  Description: Patient will be free from self harm or injury during hospitalization.   Outcome: Improving  He denies active suicidal thinking. He is agreeable to safe behavior on the unit.     "

## 2021-03-05 NOTE — PROGRESS NOTES
[place]      Impression:     Mason Garcia is a 35 year old male admitted via [facility] ED after presenting with reports of increasing depression and fleeting suicidal ideation. Has not been taking any medications or following with any outpatient services. Recent stressors include new job and busy family.     Mason indicated that he wasn't \"feeling well\" this morning and \"just buried myself in my blankets and hid.\" Stated that it took him an hour to build up courage to speak with his wife last night, but he did finally call and stated, \"it was pointless to worry because she was supportive as usual.\" We talked about the fact that last night he told his nurse he may want to consider resuming medications that he was previously on. He went on to talk about all of the horribly side effects he had, including increased SI, frequently repeating himself, feeling foggy and \"out of it,\" and again, that his wife would be \"very upset\" with him. Questioned why he would want to restart the same medications if he had such a poor experience, to which he responded, \"ya, I really don't want meds. Let's shelf that for now.\" Will work with OT and continue to attend groups. Stated he feels safe here. Slept well last night. Ongoing endorsement of depression, anxiety, feeling \"stressed\" and fleeting SI.          Diagnoses:     Major Depression, Recurrent, Moderate    Attestation:  Patient has been seen and evaluated by me,  Víctor Sims NP          Interim History:   The patient's care was discussed with the treatment team and chart notes were reviewed.    BEHAVIORAL TEAM DISCUSSION    Progress: Minimal. Did sleep well last night. Feels relieved to be here. Was able to talk to wife finally.   Continued Stay Criteria/Rationale: Ongoing thoughts of suicide.   Medical/Physical: Nothing acute  Precautions:   Falls precaution?: No  Behavioral Orders   Procedures     Code 1 - Restrict to " Unit     Routine Programming     As clinically indicated     Status 15     Every 15 minutes.       Plan:   Work with OT  Attend groups  Utilize Alpha stimulation  PRNs available for safety and comfort.     Rationale for change in precautions or plan:   No changes today      Participants: Víctor Sims, APRN, CNP, Jenn Carbone LSW, Julio Hurtado LSW, Louisa Coughlin, MSW, Luz Marina Thayer OT, Maria G Carpenter OT, Nursing          Medications:   I have reviewed this patient's current medications    Hospital Medications as of 3/5/2021       Dose Frequency Start End    acetaminophen (TYLENOL) tablet 650 mg 650 mg EVERY 4 HOURS PRN 3/4/2021     Admin Instructions: Do not use if the patient has significant liver disease. MAX acetaminophen = 4000 mg/24 hrs.  MAX acetaminophen LESS than 3000 mg/24 hrs for patients GREATER than or EQUAL to 65 years old.<BR>Maximum acetaminophen dose from all sources = 75 mg/kg/day not to exceed 4 grams/day.    Class: E-Prescribe    Route: Oral    alum & mag hydroxide-simethicone (MAALOX) suspension 30 mL 30 mL EVERY 4 HOURS PRN 3/4/2021     Admin Instructions: Shake well.    Class: E-Prescribe    Route: Oral    hydrOXYzine (ATARAX) tablet  mg  mg EVERY 4 HOURS PRN 3/4/2021     Class: E-Prescribe    Route: Oral    LORazepam (ATIVAN) tablet 1 mg (Completed) 1 mg ONCE 3/4/2021 3/4/2021    Class: E-Prescribe    Notes to Pharmacy: DO NOT USE THIS FIELD FOR ADMIN INSTRUCTIONS; INFORMATION DOES NOT SHOW ON MAR. USE THE FIELD ABOVE MARKED ADMIN INSTRUCTIONS    Route: Oral    melatonin tablet 3-6 mg 3-6 mg AT BEDTIME PRN 3/4/2021     Class: E-Prescribe    Route: Oral    nicotine (NICORETTE) gum 2-4 mg 2-4 mg EVERY 1 HOUR PRN 3/4/2021     Admin Instructions: Chew until tingling, then place between cheek and gum.  <BR>Repeat.  <BR>Do not swallow.  <BR>Not to exceed 48 mg in a 24 hour time period.    Class: E-Prescribe    Route: Buccal    polyethylene glycol (MIRALAX) Packet 17 g 17 g DAILY  PRN 3/4/2021     Admin Instructions: 1 Packet = 17 grams. Mix each gram with at least 1/2 ounce (15 mL) of water - <BR>8 ounces for 17 g dose, 4 ounces for 8.5 g dose, 2 ounces for 4 g dose.<BR>Follow with the same volume of water.<BR>Hold for loose stools.<BR>    Class: E-Prescribe    Route: Oral               10 point ROS negative        Allergies:     Allergies   Allergen Reactions     Sudafed [Pseudoephedrine]             Psychiatric Examination:   /74   Pulse 72   Temp 98.1  F (36.7  C) (Temporal)   Resp 16   SpO2 96%   Weight is 0 lbs 0 oz  There is no height or weight on file to calculate BMI.    Appearance:  awake, alert  Attitude:  cooperative  Eye Contact:  intense  Mood:  anxious and depressed  Affect:  intensity is blunted  Speech:  clear, coherent  Psychomotor Behavior:  no evidence of tardive dyskinesia, dystonia, or tics  Thought Process:  logical, linear and goal oriented  Associations:  no loose associations  Thought Content:  no evidence of psychotic thought and passive suicidal ideation present  Insight:  good  Judgment:  intact  Oriented to:  time, person, and place  Attention Span and Concentration:  intact  Recent and Remote Memory:  intact  Fund of Knowledge: appropriate  Muscle Strength and Tone: normal  Gait and Station: Normal           Labs:     Results for orders placed or performed during the hospital encounter of 03/04/21 (from the past 24 hour(s))   CBC with platelets differential   Result Value Ref Range    WBC 14.6 (H) 4.0 - 11.0 10e9/L    RBC Count 5.84 4.4 - 5.9 10e12/L    Hemoglobin 18.1 (H) 13.3 - 17.7 g/dL    Hematocrit 50.7 40.0 - 53.0 %    MCV 87 78 - 100 fl    MCH 31.0 26.5 - 33.0 pg    MCHC 35.7 31.5 - 36.5 g/dL    RDW 12.5 10.0 - 15.0 %    Platelet Count 226 150 - 450 10e9/L    Diff Method Automated Method     % Neutrophils 73.4 %    % Lymphocytes 18.3 %    % Monocytes 6.4 %    % Eosinophils 1.0 %    % Basophils 0.3 %    % Immature Granulocytes 0.6 %    Nucleated  RBCs 0 0 /100    Absolute Neutrophil 10.7 (H) 1.6 - 8.3 10e9/L    Absolute Lymphocytes 2.7 0.8 - 5.3 10e9/L    Absolute Monocytes 0.9 0.0 - 1.3 10e9/L    Absolute Eosinophils 0.2 0.0 - 0.7 10e9/L    Absolute Basophils 0.1 0.0 - 0.2 10e9/L    Abs Immature Granulocytes 0.1 0 - 0.4 10e9/L    Absolute Nucleated RBC 0.0    Comprehensive metabolic panel   Result Value Ref Range    Sodium 138 133 - 144 mmol/L    Potassium 3.6 3.4 - 5.3 mmol/L    Chloride 106 94 - 109 mmol/L    Carbon Dioxide 28 20 - 32 mmol/L    Anion Gap 4 3 - 14 mmol/L    Glucose 131 (H) 70 - 99 mg/dL    Urea Nitrogen 10 7 - 30 mg/dL    Creatinine 0.85 0.66 - 1.25 mg/dL    GFR Estimate >90 >60 mL/min/[1.73_m2]    GFR Estimate If Black >90 >60 mL/min/[1.73_m2]    Calcium 8.9 8.5 - 10.1 mg/dL    Bilirubin Total 0.4 0.2 - 1.3 mg/dL    Albumin 4.1 3.4 - 5.0 g/dL    Protein Total 7.8 6.8 - 8.8 g/dL    Alkaline Phosphatase 97 40 - 150 U/L    ALT 50 0 - 70 U/L    AST 23 0 - 45 U/L   Alcohol ethyl   Result Value Ref Range    Ethanol g/dL <0.01 0.01 g/dL   UA reflex to Microscopic   Result Value Ref Range    Color Urine Light Yellow     Appearance Urine Clear     Glucose Urine Negative NEG^Negative mg/dL    Bilirubin Urine Negative NEG^Negative    Ketones Urine Negative NEG^Negative mg/dL    Specific Gravity Urine 1.009 1.003 - 1.035    Blood Urine Negative NEG^Negative    pH Urine 6.0 4.7 - 8.0 pH    Protein Albumin Urine Negative NEG^Negative mg/dL    Urobilinogen mg/dL Normal 0.0 - 2.0 mg/dL    Nitrite Urine Negative NEG^Negative    Leukocyte Esterase Urine Negative NEG^Negative    Source Midstream Urine    Urine Drugs of Abuse Screen Panel 13   Result Value Ref Range    Cannabinoids (88-usc-5-carboxy-9-THC) Not Detected NDET^Not Detected ng/mL    Phencyclidine (Phencyclidine) Not Detected NDET^Not Detected ng/mL    Cocaine (Benzoylecgonine) Not Detected NDET^Not Detected ng/mL    Methamphetamine (d-Methamphetamine) Not Detected NDET^Not Detected ng/mL     Opiates (Morphine) Not Detected NDET^Not Detected ng/mL    Amphetamine (d-Amphetamine) Not Detected NDET^Not Detected ng/mL    Benzodiazepines (Nordiazepam) Not Detected NDET^Not Detected ng/mL    Tricyclic Antidepressants (Desipramine) Not Detected NDET^Not Detected ng/mL    Methadone (Methadone) Not Detected NDET^Not Detected ng/mL    Barbiturates (Butalbital) Not Detected NDET^Not Detected ng/mL    Oxycodone (Oxycodone) Not Detected NDET^Not Detected ng/mL    Propoxyphene (Norpropoxyphene) Not Detected NDET^Not Detected ng/mL    Buprenorphine (Buprenorphine) Not Detected NDET^Not Detected ng/mL   Asymptomatic SARS-CoV-2 COVID-19 Virus (Coronavirus) by PCR    Specimen: Nasopharyngeal   Result Value Ref Range    SARS-CoV-2 Virus Specimen Source Nasopharyngeal     SARS-CoV-2 PCR Result NEGATIVE     SARS-CoV-2 PCR Comment       Testing was performed using the Xpert Xpress SARS-CoV-2 Assay on the Cepheid Gene-Xpert   Instrument Systems. Additional information about this Emergency Use Authorization (EUA)   assay can be found via the Lab Guide.       No labs today

## 2021-03-08 NOTE — PROGRESS NOTES
Occupational Therapy Discharge Summary    Reason for therapy discharge:    Discharged to home.    Progress towards therapy goal(s). See goals on Care Plan in [facility] electronic health record for goal details.  Goals partially met.  Barriers to achieving goals:   discharge from facility.    Therapy recommendation(s):    Re-establish with therapy.

## 2021-05-03 ENCOUNTER — HOSPITAL ENCOUNTER (EMERGENCY)
Facility: HOSPITAL | Age: 36
Discharge: HOME | End: 2021-05-03
Attending: EMERGENCY MEDICINE | Admitting: EMERGENCY MEDICINE
Payer: COMMERCIAL

## 2021-05-03 VITALS
OXYGEN SATURATION: 98 % | HEART RATE: 76 BPM | TEMPERATURE: 98.5 F | SYSTOLIC BLOOD PRESSURE: 136 MMHG | RESPIRATION RATE: 16 BRPM | DIASTOLIC BLOOD PRESSURE: 82 MMHG

## 2021-05-03 DIAGNOSIS — T78.40XA ALLERGIC REACTION, INITIAL ENCOUNTER: ICD-10-CM

## 2021-05-03 LAB
ANION GAP SERPL CALCULATED.3IONS-SCNC: 4 MMOL/L (ref 3–14)
BASOPHILS # BLD AUTO: 0 10E9/L (ref 0–0.2)
BASOPHILS NFR BLD AUTO: 0.3 %
BUN SERPL-MCNC: 15 MG/DL (ref 7–30)
CALCIUM SERPL-MCNC: 8.7 MG/DL (ref 8.5–10.1)
CHLORIDE SERPL-SCNC: 106 MMOL/L (ref 94–109)
CO2 SERPL-SCNC: 29 MMOL/L (ref 20–32)
CREAT SERPL-MCNC: 1 MG/DL (ref 0.66–1.25)
DIFFERENTIAL METHOD BLD: NORMAL
EOSINOPHIL # BLD AUTO: 0.1 10E9/L (ref 0–0.7)
EOSINOPHIL NFR BLD AUTO: 1 %
ERYTHROCYTE [DISTWIDTH] IN BLOOD BY AUTOMATED COUNT: 12 % (ref 10–15)
GFR SERPL CREATININE-BSD FRML MDRD: >90 ML/MIN/{1.73_M2}
GLUCOSE SERPL-MCNC: 92 MG/DL (ref 70–99)
HCT VFR BLD AUTO: 47.7 % (ref 40–53)
HGB BLD-MCNC: 16.9 G/DL (ref 13.3–17.7)
IMM GRANULOCYTES # BLD: 0 10E9/L (ref 0–0.4)
IMM GRANULOCYTES NFR BLD: 0.6 %
LACTATE BLD-SCNC: 1.5 MMOL/L (ref 0.7–2)
LYMPHOCYTES # BLD AUTO: 2.2 10E9/L (ref 0.8–5.3)
LYMPHOCYTES NFR BLD AUTO: 31.9 %
MCH RBC QN AUTO: 30.6 PG (ref 26.5–33)
MCHC RBC AUTO-ENTMCNC: 35.4 G/DL (ref 31.5–36.5)
MCV RBC AUTO: 86 FL (ref 78–100)
MONOCYTES # BLD AUTO: 0.7 10E9/L (ref 0–1.3)
MONOCYTES NFR BLD AUTO: 9.7 %
NEUTROPHILS # BLD AUTO: 3.8 10E9/L (ref 1.6–8.3)
NEUTROPHILS NFR BLD AUTO: 56.5 %
NRBC # BLD AUTO: 0 10*3/UL
NRBC BLD AUTO-RTO: 0 /100
PLATELET # BLD AUTO: 190 10E9/L (ref 150–450)
POTASSIUM SERPL-SCNC: 3.8 MMOL/L (ref 3.4–5.3)
RBC # BLD AUTO: 5.52 10E12/L (ref 4.4–5.9)
SODIUM SERPL-SCNC: 139 MMOL/L (ref 133–144)
WBC # BLD AUTO: 6.8 10E9/L (ref 4–11)

## 2021-05-03 PROCEDURE — 258N000003 HC RX IP 258 OP 636: Performed by: EMERGENCY MEDICINE

## 2021-05-03 PROCEDURE — 80048 BASIC METABOLIC PNL TOTAL CA: CPT | Performed by: EMERGENCY MEDICINE

## 2021-05-03 PROCEDURE — 99284 EMERGENCY DEPT VISIT MOD MDM: CPT | Mod: 25

## 2021-05-03 PROCEDURE — 36415 COLL VENOUS BLD VENIPUNCTURE: CPT

## 2021-05-03 PROCEDURE — 86160 COMPLEMENT ANTIGEN: CPT | Performed by: EMERGENCY MEDICINE

## 2021-05-03 PROCEDURE — 99284 EMERGENCY DEPT VISIT MOD MDM: CPT | Performed by: EMERGENCY MEDICINE

## 2021-05-03 PROCEDURE — 96375 TX/PRO/DX INJ NEW DRUG ADDON: CPT

## 2021-05-03 PROCEDURE — 83605 ASSAY OF LACTIC ACID: CPT | Performed by: EMERGENCY MEDICINE

## 2021-05-03 PROCEDURE — 250N000009 HC RX 250: Performed by: EMERGENCY MEDICINE

## 2021-05-03 PROCEDURE — 250N000011 HC RX IP 250 OP 636: Performed by: EMERGENCY MEDICINE

## 2021-05-03 PROCEDURE — 85025 COMPLETE CBC W/AUTO DIFF WBC: CPT | Performed by: EMERGENCY MEDICINE

## 2021-05-03 PROCEDURE — 96361 HYDRATE IV INFUSION ADD-ON: CPT

## 2021-05-03 PROCEDURE — 96374 THER/PROPH/DIAG INJ IV PUSH: CPT

## 2021-05-03 RX ORDER — DIPHENHYDRAMINE HYDROCHLORIDE 50 MG/ML
50 INJECTION INTRAMUSCULAR; INTRAVENOUS ONCE
Status: COMPLETED | OUTPATIENT
Start: 2021-05-03 | End: 2021-05-03

## 2021-05-03 RX ORDER — EPINEPHRINE 0.3 MG/.3ML
0.3 INJECTION SUBCUTANEOUS
Qty: 1 EACH | Refills: 3 | Status: SHIPPED | OUTPATIENT
Start: 2021-05-03

## 2021-05-03 RX ORDER — PREDNISONE 20 MG/1
TABLET ORAL
Qty: 10 TABLET | Refills: 0 | Status: SHIPPED | OUTPATIENT
Start: 2021-05-03 | End: 2021-05-17

## 2021-05-03 RX ORDER — DEXAMETHASONE SODIUM PHOSPHATE 10 MG/ML
10 INJECTION, SOLUTION INTRAMUSCULAR; INTRAVENOUS ONCE
Status: COMPLETED | OUTPATIENT
Start: 2021-05-03 | End: 2021-05-03

## 2021-05-03 RX ADMIN — DEXAMETHASONE SODIUM PHOSPHATE 10 MG: 10 INJECTION, SOLUTION INTRAMUSCULAR; INTRAVENOUS at 07:57

## 2021-05-03 RX ADMIN — SODIUM CHLORIDE 1000 ML: 9 INJECTION, SOLUTION INTRAVENOUS at 07:53

## 2021-05-03 RX ADMIN — FAMOTIDINE 20 MG: 10 INJECTION INTRAVENOUS at 07:55

## 2021-05-03 RX ADMIN — DIPHENHYDRAMINE HYDROCHLORIDE 50 MG: 50 INJECTION, SOLUTION INTRAMUSCULAR; INTRAVENOUS at 08:00

## 2021-05-03 ASSESSMENT — ENCOUNTER SYMPTOMS
SHORTNESS OF BREATH: 0
COUGH: 0
FEVER: 0
CHILLS: 0

## 2021-05-03 NOTE — ED TRIAGE NOTES
Pt reports swollen throat and not feeling well.  Symptoms started when he woke up today.  Unknown fever.

## 2021-05-03 NOTE — ED PROVIDER NOTES
History     Chief Complaint   Patient presents with     swollen throat     Sick     HPI  Mason Garcia is a 35 year old male who is here with swelling in back of his throat.  States he woke up feeling that way.  Denies pain, change in medications or taking any medications for that matter, denies new food new cleaning products, denies new anything that he is aware of in his home.  Feels little nauseated, has not vomited.  Some trouble swallowing.  No rash, no sensation of feeling lightheaded, no headache, no pain with speaking, no fever, no other symptoms.  When he went to bed he felt fine.  Allergies:  Allergies   Allergen Reactions     Sudafed [Pseudoephedrine]        Problem List:    Patient Active Problem List    Diagnosis Date Noted     Suicidal ideation 03/04/2021     Priority: Medium     Amputation of finger, left 02/25/2021     Priority: Medium     History of open reduction and internal fixation (ORIF) procedure 02/25/2021     Priority: Medium     Pain in left hand 02/25/2021     Priority: Medium     Weakness of left hand 02/25/2021     Priority: Medium     Suicidal behavior 01/20/2019     Priority: Medium     PTSD (post-traumatic stress disorder) 11/03/2018     Priority: Medium     Severe episode of recurrent major depressive disorder, without psychotic features (H) 11/02/2018     Priority: Medium     Crushing injury of left hand, sequela 10/16/2018     Priority: Medium     Depression with suicidal ideation 08/27/2018     Priority: Medium     Overweight (BMI 25.0-29.9) 08/16/2018     Priority: Medium     Amputation finger, sequela (H) 08/03/2018     Priority: Medium     Neuroma of hand 08/03/2018     Priority: Medium     Open fracture of left hand with routine healing 01/19/2018     Priority: Medium     Work related injury 01/19/2018     Priority: Medium     Perforated appendicitis 02/07/2017     Priority: Medium     Recurrent major depressive disorder, in full remission (H) 01/10/2017     Priority:  Medium        Past Medical History:    No past medical history on file.    Past Surgical History:    Past Surgical History:   Procedure Laterality Date     LAPAROSCOPIC APPENDECTOMY N/A 2/7/2017    Procedure: LAPAROSCOPIC APPENDECTOMY;  Surgeon: Chanel Pagan DO;  Location: [facility]       Family History:    Family History   Problem Relation Age of Onset     Cardiovascular Maternal Grandmother         cardiovascular disease     Respiratory Maternal Grandmother         Lung transplant       Social History:  Marital Status:  Single [1]  Social History     Tobacco Use     Smoking status: Current Every Day Smoker     Packs/day: 1.00     Years: 7.00     Pack years: 7.00     Types: Cigarettes     Smokeless tobacco: Former User   Substance Use Topics     Alcohol use: Yes     Comment: rare     Drug use: No        Medications:    EPINEPHrine (ANY BX GENERIC EQUIV) 0.3 MG/0.3ML injection 2-pack  predniSONE (DELTASONE) 20 MG tablet          Review of Systems   Constitutional: Negative for chills and fever.   Respiratory: Negative for cough and shortness of breath.    All other systems reviewed and are negative.      Physical Exam   BP: 154/99  Pulse: 81  Temp: 98.5  F (36.9  C)  Resp: 16  SpO2: 98 %      Physical Exam  Constitutional:       General: He is not in acute distress.     Appearance: Normal appearance.   HENT:      Head: Normocephalic and atraumatic.      Right Ear: External ear normal.      Left Ear: External ear normal.      Nose: Nose normal. No rhinorrhea.      Mouth/Throat:      Comments: Uvula swollen, soft tissue surrounding uvula swollen on both sides, no swelling to lips or tongue  Eyes:      Conjunctiva/sclera: Conjunctivae normal.   Cardiovascular:      Rate and Rhythm: Normal rate and regular rhythm.      Pulses: Normal pulses.   Pulmonary:      Effort: Pulmonary effort is normal. No respiratory distress.      Breath sounds: Normal breath sounds. No stridor. No wheezing.   Abdominal:      General: There is  no distension.      Palpations: Abdomen is soft.      Tenderness: There is no abdominal tenderness.   Musculoskeletal:         General: No deformity or signs of injury.   Skin:     General: Skin is warm and dry.      Capillary Refill: Capillary refill takes less than 2 seconds.   Neurological:      General: No focal deficit present.      Mental Status: He is alert. Mental status is at baseline.   Psychiatric:         Mood and Affect: Mood normal.         Behavior: Behavior normal.         ED Course     ED Course as of May 03 1031   Mon May 03, 2021   0849 Symptoms about the same, back of throat appears stable.        Procedures               Critical Care time:  none               Results for orders placed or performed during the hospital encounter of 05/03/21 (from the past 24 hour(s))   CBC with platelets differential   Result Value Ref Range    WBC 6.8 4.0 - 11.0 10e9/L    RBC Count 5.52 4.4 - 5.9 10e12/L    Hemoglobin 16.9 13.3 - 17.7 g/dL    Hematocrit 47.7 40.0 - 53.0 %    MCV 86 78 - 100 fl    MCH 30.6 26.5 - 33.0 pg    MCHC 35.4 31.5 - 36.5 g/dL    RDW 12.0 10.0 - 15.0 %    Platelet Count 190 150 - 450 10e9/L    Diff Method Automated Method     % Neutrophils 56.5 %    % Lymphocytes 31.9 %    % Monocytes 9.7 %    % Eosinophils 1.0 %    % Basophils 0.3 %    % Immature Granulocytes 0.6 %    Nucleated RBCs 0 0 /100    Absolute Neutrophil 3.8 1.6 - 8.3 10e9/L    Absolute Lymphocytes 2.2 0.8 - 5.3 10e9/L    Absolute Monocytes 0.7 0.0 - 1.3 10e9/L    Absolute Eosinophils 0.1 0.0 - 0.7 10e9/L    Absolute Basophils 0.0 0.0 - 0.2 10e9/L    Abs Immature Granulocytes 0.0 0 - 0.4 10e9/L    Absolute Nucleated RBC 0.0    Basic metabolic panel   Result Value Ref Range    Sodium 139 133 - 144 mmol/L    Potassium 3.8 3.4 - 5.3 mmol/L    Chloride 106 94 - 109 mmol/L    Carbon Dioxide 29 20 - 32 mmol/L    Anion Gap 4 3 - 14 mmol/L    Glucose 92 70 - 99 mg/dL    Urea Nitrogen 15 7 - 30 mg/dL    Creatinine 1.00 0.66 - 1.25 mg/dL     GFR Estimate >90 >60 mL/min/[1.73_m2]    GFR Estimate If Black >90 >60 mL/min/[1.73_m2]    Calcium 8.7 8.5 - 10.1 mg/dL   Lactic acid whole blood   Result Value Ref Range    Lactic Acid 1.5 0.7 - 2.0 mmol/L       Medications   famotidine (PEPCID) injection 20 mg (20 mg Intravenous Given 5/3/21 0755)   dexamethasone PF (DECADRON) injection 10 mg (10 mg Intravenous Given 5/3/21 0757)   diphenhydrAMINE (BENADRYL) injection 50 mg (50 mg Intravenous Given 5/3/21 0800)   0.9% sodium chloride BOLUS (1,000 mLs Intravenous New Bag 5/3/21 0753)       Assessments & Plan (with Medical Decision Making)     I have reviewed the nursing notes.    I have reviewed the findings, diagnosis, plan and need for follow up with the patient.   35-year-old male here with allergic reaction, while technically anaphylaxis as he has respiratory and GI symptoms therefore to work systems, appears stable, discussed option of giving epinephrine if he feels any worse, agrees to tell me so while being observed.  Will give steroids H1 and H2 blocker, reassess.  Ultimately suspect will discharge home with prednisone and EpiPen.  No family history of angioedema however will send off some basic labs for that as he denies any new potential allergens in his home.    REASSESSMENT    Swelling appears to have gone down, and a minimal is stable, patient can follow-up with Dr. Londono, I sent a message indicating some labs may need to be followed up on regarding her anterior angioedema.  We will send patient home with prednisone and EpiPen..    New Prescriptions    EPINEPHRINE (ANY BX GENERIC EQUIV) 0.3 MG/0.3ML INJECTION 2-PACK    Inject 0.3 mLs (0.3 mg) into the muscle once as needed for anaphylaxis    PREDNISONE (DELTASONE) 20 MG TABLET    Take two tablets (= 40mg) each day for 5 (five) days       Final diagnoses:   Allergic reaction, initial encounter       5/3/2021   [facility]     Benitez Hammer MD  05/03/21 [id number]       Benitez Hammer,  MD  05/03/21 1033

## 2021-05-04 LAB — C4 SERPL-MCNC: 21 MG/DL (ref 13–39)

## 2021-05-06 NOTE — TELEPHONE ENCOUNTER
Prednisone 20 mg      Last Written Prescription Date:  5/03/21  Last Fill Quantity: 10,   # refills: 0  Last Office Visit: 5/03/21  Future Office visit:  Not scheduled     Routing refill request to provider for review/approval because:  Drug not on the [facility], [facility] or [facility] refill protocol or controlled substance

## 2021-05-07 RX ORDER — PREDNISONE 20 MG/1
TABLET ORAL
Qty: 10 TABLET | Refills: 0 | OUTPATIENT
Start: 2021-05-07

## 2021-05-17 ENCOUNTER — HOSPITAL ENCOUNTER (EMERGENCY)
Facility: HOSPITAL | Age: 36
Discharge: HOME | End: 2021-05-17
Attending: NURSE PRACTITIONER | Admitting: NURSE PRACTITIONER
Payer: OTHER MISCELLANEOUS

## 2021-05-17 ENCOUNTER — APPOINTMENT (OUTPATIENT)
Dept: GENERAL RADIOLOGY | Facility: HOSPITAL | Age: 36
End: 2021-05-17
Attending: NURSE PRACTITIONER
Payer: OTHER MISCELLANEOUS

## 2021-05-17 VITALS
DIASTOLIC BLOOD PRESSURE: 91 MMHG | RESPIRATION RATE: 18 BRPM | HEART RATE: 81 BPM | OXYGEN SATURATION: 99 % | TEMPERATURE: 98 F | SYSTOLIC BLOOD PRESSURE: 145 MMHG

## 2021-05-17 DIAGNOSIS — M79.642 PAIN OF LEFT HAND: Primary | ICD-10-CM

## 2021-05-17 PROCEDURE — G0463 HOSPITAL OUTPT CLINIC VISIT: HCPCS

## 2021-05-17 PROCEDURE — 99213 OFFICE O/P EST LOW 20 MIN: CPT | Performed by: NURSE PRACTITIONER

## 2021-05-17 PROCEDURE — 73130 X-RAY EXAM OF HAND: CPT | Mod: LT

## 2021-05-17 RX ORDER — PREDNISONE 20 MG/1
TABLET ORAL
Qty: 10 TABLET | Refills: 0 | Status: SHIPPED | OUTPATIENT
Start: 2021-05-17 | End: 2021-12-09

## 2021-05-17 NOTE — ED PROVIDER NOTES
History     Chief Complaint   Patient presents with     Hand Pain     HPI  Mason Garcia is a 35 year old male who presents to urgent care today (ambulatory) with complaints of left hand pain.  Pain 6/10, has not taken anything for discomfort, declines pain medication in urgent care.  Denies fever, chills, nausea, vomiting, SOB or chest pain.  No recent illness.  Left hand feels tight, especially at fourth digit.  Works in IT department, does a lot of typing.  Currently in PT for left hand.  Patient to follow up appointment with [facility] hand surgeon.  No other concerns.       Allergies:  Allergies   Allergen Reactions     Sudafed [Pseudoephedrine]        Problem List:    Patient Active Problem List    Diagnosis Date Noted     Suicidal ideation 03/04/2021     Priority: Medium     Amputation of finger, left 02/25/2021     Priority: Medium     History of open reduction and internal fixation (ORIF) procedure 02/25/2021     Priority: Medium     Pain in left hand 02/25/2021     Priority: Medium     Weakness of left hand 02/25/2021     Priority: Medium     Suicidal behavior 01/20/2019     Priority: Medium     PTSD (post-traumatic stress disorder) 11/03/2018     Priority: Medium     Severe episode of recurrent major depressive disorder, without psychotic features (H) 11/02/2018     Priority: Medium     Crushing injury of left hand, sequela 10/16/2018     Priority: Medium     Depression with suicidal ideation 08/27/2018     Priority: Medium     Overweight (BMI 25.0-29.9) 08/16/2018     Priority: Medium     Amputation finger, sequela (H) 08/03/2018     Priority: Medium     Neuroma of hand 08/03/2018     Priority: Medium     Open fracture of left hand with routine healing 01/19/2018     Priority: Medium     Work related injury 01/19/2018     Priority: Medium     Perforated appendicitis 02/07/2017     Priority: Medium     Recurrent major depressive disorder, in full remission (H) 01/10/2017     Priority: Medium        Past  Medical History:    No past medical history on file.    Past Surgical History:    Past Surgical History:   Procedure Laterality Date     LAPAROSCOPIC APPENDECTOMY N/A 2/7/2017    Procedure: LAPAROSCOPIC APPENDECTOMY;  Surgeon: Chanel Pagan DO;  Location: [facility]       Family History:    Family History   Problem Relation Age of Onset     Cardiovascular Maternal Grandmother         cardiovascular disease     Respiratory Maternal Grandmother         Lung transplant       Social History:  Marital Status:  Single [1]  Social History     Tobacco Use     Smoking status: Current Every Day Smoker     Packs/day: 1.00     Years: 7.00     Pack years: 7.00     Types: Cigarettes     Smokeless tobacco: Former User   Substance Use Topics     Alcohol use: Yes     Comment: rare     Drug use: No        Medications:    predniSONE (DELTASONE) 20 MG tablet  EPINEPHrine (ANY BX GENERIC EQUIV) 0.3 MG/0.3ML injection 2-pack      Review of Systems   Constitutional: Negative for chills and fever.   Respiratory: Negative for shortness of breath.    Cardiovascular: Negative for chest pain.   Gastrointestinal: Negative for diarrhea, nausea and vomiting.   Musculoskeletal: Positive for arthralgias and myalgias. Negative for gait problem.   Skin: Negative for wound.   Psychiatric/Behavioral: Negative.      Physical Exam   BP: 145/91  Pulse: 81  Temp: 98  F (36.7  C)  Resp: 18  SpO2: 99 %    Physical Exam  Vitals signs and nursing note reviewed.   Constitutional:       General: He is not in acute distress.     Appearance: He is not ill-appearing.   Cardiovascular:      Rate and Rhythm: Normal rate and regular rhythm.      Pulses: Normal pulses.      Heart sounds: Normal heart sounds.   Pulmonary:      Effort: Pulmonary effort is normal.      Breath sounds: Normal breath sounds.   Musculoskeletal:      Left wrist: Normal.      Left hand: He exhibits tenderness. He exhibits normal range of motion and normal capillary refill.      Comments: Left  Wrist/Hand Neurovascular status: Sensation is intact in the radial, ulnar and median nerve distributions supplying the distal hand/fingers. Mickey test is normal. Distal pulses 2+.      Skin:     General: Skin is warm and dry.      Capillary Refill: Capillary refill takes less than 2 seconds.   Neurological:      Mental Status: He is alert.   Psychiatric:         Mood and Affect: Mood normal.       ED Course     No results found for this or any previous visit (from the past 24 hour(s)).    Medications - No data to display    Assessments & Plan (with Medical Decision Making)     I have reviewed the nursing notes.    I have reviewed the findings, diagnosis, plan and need for follow up with the patient.  (M79.982) Pain of left hand  (primary encounter diagnosis)  Plan:  Arrived today due to left hand pain.  Patient has no evidence of acute or subacute bony abnormality.  Old amputation injury involving fourth and fifth digits.  No signs of infection.  Patient has appointment scheduled with [facility].  Will order prednisone for complaints of pain and slight aura of digits.  Patient to follow-up with primary care provider or return to urgent care-ED with any worsening in condition or additional concerns    Discharge Medication List as of 5/17/2021  7:13 PM        Final diagnoses:   Pain of left hand     5/17/2021   [facility]     Liseth De Souza NP  05/20/21 1256

## 2021-05-17 NOTE — ED TRIAGE NOTES
Pt presents with left hand pain. Pain started around 1630pm when driving home from work. Pt states he has not taken anything for the pain.

## 2021-05-17 NOTE — ED TRIAGE NOTES
Patient presents with left sided hand pain, states accident years ago and today that hand has started causing him more pain. Denies doing anything to it and did not take anything for it.

## 2021-05-18 NOTE — DISCHARGE INSTRUCTIONS
Ibuprofen/Tylenol for discomfort.  Prednisone as needed if ibuprofen does not help relief discomfort.   Return to urgent care/ED with any worsening in condition or additional concerns.

## 2021-05-20 ASSESSMENT — ENCOUNTER SYMPTOMS
ARTHRALGIAS: 1
SHORTNESS OF BREATH: 0
NAUSEA: 0
WOUND: 0
MYALGIAS: 1
FEVER: 0
CHILLS: 0
DIARRHEA: 0
VOMITING: 0
PSYCHIATRIC NEGATIVE: 1

## 2021-06-21 NOTE — PLAN OF CARE
"Weaning out of [facility]. Ate breakfast now sitting amongst peers, keeps to self.  Denies being suicidal, states, \"I'm impulsive with it, if I see a knife, I pick it up and have the thoughts of killing myself\". \"I really wouldn't do it I have a 17 month old daughter that needs me\".   Pleasant and cooperative with nursing assessment.  " Consent (Near Eyelid Margin)/Introductory Paragraph: The rationale for Mohs was explained to the patient and consent was obtained. The risks, benefits and alternatives to therapy were discussed in detail. Specifically, the risks of ectropion or eyelid deformity, infection, scarring, bleeding, prolonged wound healing, incomplete removal, allergy to anesthesia, nerve injury and recurrence were addressed. Prior to the procedure, the treatment site was clearly identified and confirmed by the patient. All components of Universal Protocol/PAUSE Rule completed.

## 2021-12-09 ENCOUNTER — HOSPITAL ENCOUNTER (EMERGENCY)
Facility: HOSPITAL | Age: 36
Discharge: HOME | End: 2021-12-09
Attending: NURSE PRACTITIONER | Admitting: NURSE PRACTITIONER
Payer: COMMERCIAL

## 2021-12-09 VITALS
SYSTOLIC BLOOD PRESSURE: 128 MMHG | TEMPERATURE: 98.8 F | OXYGEN SATURATION: 98 % | DIASTOLIC BLOOD PRESSURE: 95 MMHG | RESPIRATION RATE: 16 BRPM | HEART RATE: 87 BPM

## 2021-12-09 DIAGNOSIS — L03.012 CELLULITIS OF LEFT RING FINGER: ICD-10-CM

## 2021-12-09 PROCEDURE — G0463 HOSPITAL OUTPT CLINIC VISIT: HCPCS

## 2021-12-09 PROCEDURE — 99213 OFFICE O/P EST LOW 20 MIN: CPT | Performed by: NURSE PRACTITIONER

## 2021-12-09 RX ORDER — CEPHALEXIN 500 MG/1
500 CAPSULE ORAL 3 TIMES DAILY
Qty: 30 CAPSULE | Refills: 0 | Status: SHIPPED | OUTPATIENT
Start: 2021-12-09 | End: 2021-12-19

## 2021-12-09 ASSESSMENT — ENCOUNTER SYMPTOMS
RESPIRATORY NEGATIVE: 1
NEUROLOGICAL NEGATIVE: 1
CARDIOVASCULAR NEGATIVE: 1
CHILLS: 0
FEVER: 0
NAUSEA: 1

## 2021-12-10 NOTE — ED PROVIDER NOTES
History     Chief Complaint   Patient presents with     Hand Pain     HPI  Mason Garcia is a 36 year old male who presents for left ring finger pain and stiffness. He had an accident in 2018 where he lost his left 5th distal and middle and his left 4th finger tip. He reports that a few days ago he started to notice pain in the tip of his left ring finger, where there is redness and a small cyst like bump. His ring finger has been stuff and painful to move, which is also starting to happen slightly in the ring finger. He denies fever or chills.      Allergies:  Allergies   Allergen Reactions     Sudafed [Pseudoephedrine]        Problem List:    Patient Active Problem List    Diagnosis Date Noted     Suicidal ideation 03/04/2021     Priority: Medium     Amputation of finger, left 02/25/2021     Priority: Medium     History of open reduction and internal fixation (ORIF) procedure 02/25/2021     Priority: Medium     Pain in left hand 02/25/2021     Priority: Medium     Weakness of left hand 02/25/2021     Priority: Medium     Suicidal behavior 01/20/2019     Priority: Medium     PTSD (post-traumatic stress disorder) 11/03/2018     Priority: Medium     Severe episode of recurrent major depressive disorder, without psychotic features (H) 11/02/2018     Priority: Medium     Crushing injury of left hand, sequela 10/16/2018     Priority: Medium     Depression with suicidal ideation 08/27/2018     Priority: Medium     Overweight (BMI 25.0-29.9) 08/16/2018     Priority: Medium     Amputation finger, sequela (H) 08/03/2018     Priority: Medium     Neuroma of hand 08/03/2018     Priority: Medium     Open fracture of left hand with routine healing 01/19/2018     Priority: Medium     Work related injury 01/19/2018     Priority: Medium     Perforated appendicitis 02/07/2017     Priority: Medium     Recurrent major depressive disorder, in full remission (H) 01/10/2017     Priority: Medium        Past Medical History:    History  reviewed. No pertinent past medical history.    Past Surgical History:    Past Surgical History:   Procedure Laterality Date     LAPAROSCOPIC APPENDECTOMY N/A 2/7/2017    Procedure: LAPAROSCOPIC APPENDECTOMY;  Surgeon: Chanel Pagan DO;  Location: [facility]       Family History:    Family History   Problem Relation Age of Onset     Cardiovascular Maternal Grandmother         cardiovascular disease     Respiratory Maternal Grandmother         Lung transplant       Social History:  Marital Status:  Single [1]  Social History     Tobacco Use     Smoking status: Current Every Day Smoker     Packs/day: 1.00     Years: 7.00     Pack years: 7.00     Types: Cigarettes     Smokeless tobacco: Former User   Substance Use Topics     Alcohol use: Yes     Comment: rare     Drug use: No        Medications:    cephALEXin (KEFLEX) 500 MG capsule  EPINEPHrine (ANY BX GENERIC EQUIV) 0.3 MG/0.3ML injection 2-pack          Review of Systems   Constitutional: Negative for chills and fever.   HENT: Negative.    Respiratory: Negative.    Cardiovascular: Negative.    Gastrointestinal: Positive for nausea.   Musculoskeletal:        Pain, redness in left 4th digit    Skin:        Slightly red and warm 4th left digit    Neurological: Negative.        Physical Exam   BP: 128/95  Pulse: 87  Temp: 98.8  F (37.1  C)  Resp: 16  SpO2: 98 %      Physical Exam  Constitutional:       General: He is not in acute distress.     Appearance: He is not toxic-appearing.   Cardiovascular:      Rate and Rhythm: Normal rate and regular rhythm.      Pulses: Normal pulses.   Pulmonary:      Effort: Pulmonary effort is normal.      Breath sounds: Normal breath sounds.   Musculoskeletal:      Comments: Tenderness throughout the entire left 4th digit. Skin is slightly red and warm. ROM is within normal limits.    Neurological:      Mental Status: He is alert.         ED Course            No results found for this or any previous visit (from the past 24  hour(s)).    Medications - No data to display    Assessments & Plan (with Medical Decision Making)     I have reviewed the nursing notes.  I have reviewed the findings, diagnosis, plan and need for follow up with the patient.  The patient has tenderness with mild redness and warmth in his left ring finger. Findings are concerning for early cellulitis. Recommended starting him on Keflex for 10 days. We discussed that if he develops fever, chills or worsening in symptoms then he should return here.     Patient verbally educated and given appropriate education sheets for the diagnoses and has no questions.  Take medications as directed.   Follow up with your Primary Care provider if symptoms increase or if further concerns develop, return to the ER      Discharge Medication List as of 12/9/2021  7:25 PM      START taking these medications    Details   cephALEXin (KEFLEX) 500 MG capsule Take 1 capsule (500 mg) by mouth 3 times daily for 10 days, Disp-30 capsule, R-0, E-Prescribe             Final diagnoses:   Cellulitis of left ring finger       SAMMI Acevedo-S  [employer]     I was present with the nurse practitioner student who participated in the service and in the documentation of the note. I have verified the history and personally performed the physical exam and medical decision making. I agree with the assessment and plan of care as documented in the note.     Roslyn PATRICK [facility]-BC  Family Nurse Practitioner        12/9/2021   [facility]     Roslyn Downing APRN CNP  12/09/21 2042

## 2021-12-10 NOTE — ED TRIAGE NOTES
Patient presents to urgent care for left ring finger swelling and stiffness. Patient states he feels nauseated that started today. Thinks he might have an infection in that finger.

## 2022-04-04 ENCOUNTER — HOSPITAL ENCOUNTER (EMERGENCY)
Facility: HOSPITAL | Age: 37
Discharge: HOME | End: 2022-04-04
Attending: NURSE PRACTITIONER | Admitting: NURSE PRACTITIONER
Payer: OTHER MISCELLANEOUS

## 2022-04-04 VITALS
HEART RATE: 79 BPM | OXYGEN SATURATION: 98 % | SYSTOLIC BLOOD PRESSURE: 153 MMHG | TEMPERATURE: 98.6 F | DIASTOLIC BLOOD PRESSURE: 84 MMHG | RESPIRATION RATE: 18 BRPM

## 2022-04-04 DIAGNOSIS — L03.012 CELLULITIS OF LEFT RING FINGER: ICD-10-CM

## 2022-04-04 PROCEDURE — 99283 EMERGENCY DEPT VISIT LOW MDM: CPT | Performed by: NURSE PRACTITIONER

## 2022-04-04 PROCEDURE — 99283 EMERGENCY DEPT VISIT LOW MDM: CPT

## 2022-04-04 RX ORDER — CEPHALEXIN 500 MG/1
500 CAPSULE ORAL 3 TIMES DAILY
Qty: 30 CAPSULE | Refills: 0 | Status: SHIPPED | OUTPATIENT
Start: 2022-04-04 | End: 2022-04-14

## 2022-04-04 NOTE — ED NOTES
Pt presents with c/o numbness/tingling, pain, cool extremity of left hand onset this morning.  Pt had a work accident in the past with a , causing disfigurement to left pinky and ring finger.  Pt reports pain in ring finger that has been increasing today, denies new injury.  Hand is cool to the touch, radial pulse is strong, cap refill <3 sec.     Pt reports that in the past when his hand cramps up he can massage it and it feels better, this has not worked today.

## 2022-04-04 NOTE — ED PROVIDER NOTES
History     Chief Complaint   Patient presents with     Hand Pain     nuerological hand issue     HPI   History of presenting illness given by patient.    Mason Garcia is a 36 year old male who presents for evaluation of tingling and pain to his left fourth digit.  Patient had traumatic left hand injury involving fourth and fifth digits in 2018 where he lost his distal and middle phalanx of the fifth digit and on his fourth digit he lost the distal phalanx.  Today he noted some hot and cold sensations in his left hand, increased pain that he is rating a 6 out of 10, tingling pressure along his ring finger, and loss of motor control of what he has left of this fourth digit.  He has no motor control of the fifth digit due to the injury.  He denies any recurrent injury.     Allergies:  Allergies   Allergen Reactions     Sudafed [Pseudoephedrine]        Problem List:    Patient Active Problem List    Diagnosis Date Noted     Suicidal ideation 03/04/2021     Priority: Medium     Amputation of finger, left 02/25/2021     Priority: Medium     History of open reduction and internal fixation (ORIF) procedure 02/25/2021     Priority: Medium     Pain in left hand 02/25/2021     Priority: Medium     Weakness of left hand 02/25/2021     Priority: Medium     Suicidal behavior 01/20/2019     Priority: Medium     PTSD (post-traumatic stress disorder) 11/03/2018     Priority: Medium     Severe episode of recurrent major depressive disorder, without psychotic features (H) 11/02/2018     Priority: Medium     Crushing injury of left hand, sequela 10/16/2018     Priority: Medium     Depression with suicidal ideation 08/27/2018     Priority: Medium     Overweight (BMI 25.0-29.9) 08/16/2018     Priority: Medium     Amputation finger, sequela (H) 08/03/2018     Priority: Medium     Neuroma of hand 08/03/2018     Priority: Medium     Open fracture of left hand with routine healing 01/19/2018     Priority: Medium     Work related injury  01/19/2018     Priority: Medium     Perforated appendicitis 02/07/2017     Priority: Medium     Recurrent major depressive disorder, in full remission (H) 01/10/2017     Priority: Medium        Past Medical History:    History reviewed. No pertinent past medical history.    Past Surgical History:    Past Surgical History:   Procedure Laterality Date     LAPAROSCOPIC APPENDECTOMY N/A 2/7/2017    Procedure: LAPAROSCOPIC APPENDECTOMY;  Surgeon: Chanel Pagan DO;  Location: [facility]       Family History:    Family History   Problem Relation Age of Onset     Cardiovascular Maternal Grandmother         cardiovascular disease     Respiratory Maternal Grandmother         Lung transplant       Social History:  Marital Status:  Single [1]  Social History     Tobacco Use     Smoking status: Current Every Day Smoker     Packs/day: 1.00     Years: 7.00     Pack years: 7.00     Types: Cigarettes     Smokeless tobacco: Former User   Substance Use Topics     Alcohol use: Yes     Comment: rare     Drug use: No        Medications:    cephALEXin (KEFLEX) 500 MG capsule  EPINEPHrine (ANY BX GENERIC EQUIV) 0.3 MG/0.3ML injection 2-pack          Review of Systems   Constitutional: Negative.    HENT: Negative.    Eyes: Negative.    Respiratory: Negative.    Cardiovascular: Negative.    Gastrointestinal: Negative.    Endocrine: Negative.    Genitourinary: Negative.    Musculoskeletal: Negative.         Pain and loss of motor control to the left fourth digit   Skin: Positive for color change.        Tingling, throbbing, cold extremity to the left fourth digit.   Allergic/Immunologic: Negative.    Neurological: Negative.    Hematological: Negative.    Psychiatric/Behavioral: Negative.        Physical Exam   BP: 153/84  Pulse: 79  Temp: 98.6  F (37  C)  Resp: 18  SpO2: 98 %      Physical Exam  Vitals and nursing note reviewed.   Constitutional:       Appearance: Normal appearance. He is normal weight.   HENT:      Head: Normocephalic.       Nose: Nose normal.      Mouth/Throat:      Mouth: Mucous membranes are moist.      Pharynx: Oropharynx is clear.   Eyes:      Extraocular Movements: Extraocular movements intact.      Conjunctiva/sclera: Conjunctivae normal.      Pupils: Pupils are equal, round, and reactive to light.   Cardiovascular:      Rate and Rhythm: Normal rate and regular rhythm.      Pulses: Normal pulses.      Heart sounds: Normal heart sounds.   Pulmonary:      Effort: Pulmonary effort is normal.      Breath sounds: Normal breath sounds.   Abdominal:      General: Abdomen is flat. Bowel sounds are normal.      Palpations: Abdomen is soft.   Musculoskeletal:         General: Swelling and tenderness present. Normal range of motion.      Cervical back: Normal range of motion.      Comments: There is erythema and swelling to the tip of the left fourth digit.   Skin:     Findings: Erythema present.   Neurological:      General: No focal deficit present.      Mental Status: He is alert and oriented to person, place, and time.   Psychiatric:         Mood and Affect: Mood normal.         Behavior: Behavior normal.         Thought Content: Thought content normal.         Judgment: Judgment normal.         ED Course         Assessments & Plan (with Medical Decision Making)   36-year-old male presents with tingling, throbbing, and cold extremity to the fourth digit of the left hand.  Patient has significant trauma scars from old injury where he caught his hand in a  at work.  He has a loss of the distal phalanx to the fourth digit.  On assessment there is erythema, warmth at the tip, and swelling.      Discharge plan: I discussed with patient the swelling and the erythema being cellulitis.  I do not feel a fluctuant area of fluid so there is no need to drain at this time.  Patient will follow up with his surgeon for reevaluation as this is the second infection he has had in a short span of time less than 6 months.  Patient will take  antibiotics as prescribed and follow-up as needed.      I have reviewed the nursing notes.    I have reviewed the findings, diagnosis, plan and need for follow up with the patient.      Discharge Medication List as of 4/4/2022  5:40 PM      START taking these medications    Details   cephALEXin (KEFLEX) 500 MG capsule Take 1 capsule (500 mg) by mouth 3 times daily for 10 days, Disp-30 capsule, R-0, Local Print             Final diagnoses:   Cellulitis of left ring finger       4/4/2022   [facility]     Madeleine Burris, DARNELL CNP  04/06/22 [id number]

## 2022-04-04 NOTE — ED TRIAGE NOTES
Pt states he is having hot and cold feeling in left hand, 6/10 tingling/pressure pain along ring finger,. Pt has no AROM of fingers.   HX of  incident 2017 requiring surgery

## 2022-04-04 NOTE — DISCHARGE INSTRUCTIONS
Thank you for choosing [facility] for your healthcare needs today.  For your cellulitis of your fourth finger of your left hand, please take your antibiotics 3 times a day as prescribed for 10 days.  follow-up with your hand surgeon for reevaluation as this is your second infection in 4 months.  If your symptoms worsen or you develop any new concerning symptoms please return to ER.  Thank you

## 2022-04-06 ASSESSMENT — ENCOUNTER SYMPTOMS
EYES NEGATIVE: 1
ENDOCRINE NEGATIVE: 1
NEUROLOGICAL NEGATIVE: 1
PSYCHIATRIC NEGATIVE: 1
COLOR CHANGE: 1
RESPIRATORY NEGATIVE: 1
CONSTITUTIONAL NEGATIVE: 1
MUSCULOSKELETAL NEGATIVE: 1
HEMATOLOGIC/LYMPHATIC NEGATIVE: 1
CARDIOVASCULAR NEGATIVE: 1
GASTROINTESTINAL NEGATIVE: 1
ALLERGIC/IMMUNOLOGIC NEGATIVE: 1

## 2022-05-04 ENCOUNTER — HOSPITAL ENCOUNTER (EMERGENCY)
Facility: HOSPITAL | Age: 37
Discharge: HOME | End: 2022-05-04
Attending: STUDENT IN AN ORGANIZED HEALTH CARE EDUCATION/TRAINING PROGRAM | Admitting: STUDENT IN AN ORGANIZED HEALTH CARE EDUCATION/TRAINING PROGRAM
Payer: COMMERCIAL

## 2022-05-04 VITALS
DIASTOLIC BLOOD PRESSURE: 87 MMHG | OXYGEN SATURATION: 95 % | HEART RATE: 111 BPM | BODY MASS INDEX: 29.16 KG/M2 | WEIGHT: 220 LBS | TEMPERATURE: 99.3 F | RESPIRATION RATE: 18 BRPM | SYSTOLIC BLOOD PRESSURE: 129 MMHG | HEIGHT: 73 IN

## 2022-05-04 DIAGNOSIS — J06.9 VIRAL URI: ICD-10-CM

## 2022-05-04 DIAGNOSIS — G47.9 DIFFICULTY SLEEPING: ICD-10-CM

## 2022-05-04 DIAGNOSIS — Z20.822 SUSPECTED COVID-19 VIRUS INFECTION: ICD-10-CM

## 2022-05-04 LAB
FLUAV RNA SPEC QL NAA+PROBE: NEGATIVE
FLUBV RNA RESP QL NAA+PROBE: NEGATIVE
RSV RNA SPEC NAA+PROBE: NEGATIVE
SARS-COV-2 RNA RESP QL NAA+PROBE: NEGATIVE

## 2022-05-04 PROCEDURE — C9803 HOPD COVID-19 SPEC COLLECT: HCPCS

## 2022-05-04 PROCEDURE — 96372 THER/PROPH/DIAG INJ SC/IM: CPT | Performed by: STUDENT IN AN ORGANIZED HEALTH CARE EDUCATION/TRAINING PROGRAM

## 2022-05-04 PROCEDURE — 99284 EMERGENCY DEPT VISIT MOD MDM: CPT | Performed by: STUDENT IN AN ORGANIZED HEALTH CARE EDUCATION/TRAINING PROGRAM

## 2022-05-04 PROCEDURE — 250N000013 HC RX MED GY IP 250 OP 250 PS 637: Performed by: STUDENT IN AN ORGANIZED HEALTH CARE EDUCATION/TRAINING PROGRAM

## 2022-05-04 PROCEDURE — 87637 SARSCOV2&INF A&B&RSV AMP PRB: CPT | Performed by: STUDENT IN AN ORGANIZED HEALTH CARE EDUCATION/TRAINING PROGRAM

## 2022-05-04 PROCEDURE — 250N000011 HC RX IP 250 OP 636: Performed by: STUDENT IN AN ORGANIZED HEALTH CARE EDUCATION/TRAINING PROGRAM

## 2022-05-04 PROCEDURE — 99284 EMERGENCY DEPT VISIT MOD MDM: CPT | Mod: 25

## 2022-05-04 RX ORDER — ACETAMINOPHEN 325 MG/1
650 TABLET ORAL ONCE
Status: COMPLETED | OUTPATIENT
Start: 2022-05-04 | End: 2022-05-04

## 2022-05-04 RX ORDER — KETOROLAC TROMETHAMINE 15 MG/ML
15 INJECTION, SOLUTION INTRAMUSCULAR; INTRAVENOUS ONCE
Status: COMPLETED | OUTPATIENT
Start: 2022-05-04 | End: 2022-05-04

## 2022-05-04 RX ORDER — ONDANSETRON 4 MG/1
4 TABLET, ORALLY DISINTEGRATING ORAL ONCE
Status: COMPLETED | OUTPATIENT
Start: 2022-05-04 | End: 2022-05-04

## 2022-05-04 RX ORDER — ONDANSETRON 4 MG/1
4 TABLET, ORALLY DISINTEGRATING ORAL EVERY 8 HOURS PRN
Qty: 10 TABLET | Refills: 0 | Status: SHIPPED | OUTPATIENT
Start: 2022-05-04 | End: 2022-05-07

## 2022-05-04 RX ADMIN — ONDANSETRON 4 MG: 4 TABLET, ORALLY DISINTEGRATING ORAL at 08:08

## 2022-05-04 RX ADMIN — KETOROLAC TROMETHAMINE 15 MG: 15 INJECTION, SOLUTION INTRAMUSCULAR; INTRAVENOUS at 08:08

## 2022-05-04 RX ADMIN — ACETAMINOPHEN 650 MG: 325 TABLET ORAL at 08:08

## 2022-05-04 NOTE — ED NOTES
Discharge instructions reviewed with patient.  Rx has been e-scribed to pharmacy of choice  Pt stating he is feeling little better after the medications.  No questions or concerns.  Copy of AVS in hand on discharge along with work note.

## 2022-05-04 NOTE — DISCHARGE INSTRUCTIONS
- Please take Tylenol, Ibuprofen, and Zofran for your symptoms  - Please return to the Emergency Room if you do not improve, feel worse, or have any new or concerning symptoms. We would especially want to see you back if you experience difficulty breathing or chest pain  - Please follow up with a primary care physician in 2-3 days

## 2022-05-04 NOTE — ED PROVIDER NOTES
History     Chief Complaint   Patient presents with     Insomnia     Pharyngitis     HPI  Mason Garcia is a 36 year old male with no significant PMH presenting with feeling unwell for the past 2 days. He has a sore throat, runny nose, mild cough. He vomited once yesterday but otherwise has no abdominal pain. Does have some mild nausea. Still tolerating PO. Has ear fullness over past 24 hours. Last tried ibuprofen last night but ran out. Over the past few days he has had a lot of difficulty sleeping because he feels so unwell. No CP/SOB. Has felt warm/sweaty at home. No hx of immunocompromise.     Allergies:  Allergies   Allergen Reactions     Sudafed [Pseudoephedrine]        Problem List:    Patient Active Problem List    Diagnosis Date Noted     Suicidal ideation 03/04/2021     Priority: Medium     Amputation of finger, left 02/25/2021     Priority: Medium     History of open reduction and internal fixation (ORIF) procedure 02/25/2021     Priority: Medium     Pain in left hand 02/25/2021     Priority: Medium     Weakness of left hand 02/25/2021     Priority: Medium     Suicidal behavior 01/20/2019     Priority: Medium     PTSD (post-traumatic stress disorder) 11/03/2018     Priority: Medium     Severe episode of recurrent major depressive disorder, without psychotic features (H) 11/02/2018     Priority: Medium     Crushing injury of left hand, sequela 10/16/2018     Priority: Medium     Depression with suicidal ideation 08/27/2018     Priority: Medium     Overweight (BMI 25.0-29.9) 08/16/2018     Priority: Medium     Amputation finger, sequela (H) 08/03/2018     Priority: Medium     Neuroma of hand 08/03/2018     Priority: Medium     Open fracture of left hand with routine healing 01/19/2018     Priority: Medium     Work related injury 01/19/2018     Priority: Medium     Perforated appendicitis 02/07/2017     Priority: Medium     Recurrent major depressive disorder, in full remission (H) 01/10/2017      "Priority: Medium        Past Medical History:    No past medical history on file.    Past Surgical History:    Past Surgical History:   Procedure Laterality Date     LAPAROSCOPIC APPENDECTOMY N/A 2/7/2017    Procedure: LAPAROSCOPIC APPENDECTOMY;  Surgeon: Chanel Pagan DO;  Location: [facility]       Family History:    Family History   Problem Relation Age of Onset     Cardiovascular Maternal Grandmother         cardiovascular disease     Respiratory Maternal Grandmother         Lung transplant       Social History:  Marital Status:  Single [1]  Social History     Tobacco Use     Smoking status: Current Every Day Smoker     Packs/day: 1.00     Years: 7.00     Pack years: 7.00     Types: Cigarettes     Smokeless tobacco: Former User   Substance Use Topics     Alcohol use: Yes     Comment: rare     Drug use: No        Medications:    EPINEPHrine (ANY BX GENERIC EQUIV) 0.3 MG/0.3ML injection 2-pack  ondansetron (ZOFRAN ODT) 4 MG ODT tab      Review of Systems   All other systems reviewed and are negative.      Physical Exam   BP: 129/87  Pulse: 111  Temp: 99.3  F (37.4  C)  Resp: 18  Height: 185.4 cm (6' 1\")  Weight: 99.8 kg (220 lb)  SpO2: 95 %      Physical Exam  Vitals and nursing note reviewed.   Constitutional:       General: He is not in acute distress.     Appearance: He is well-developed. He is not ill-appearing or toxic-appearing.   HENT:      Head: Normocephalic.      Right Ear: Tympanic membrane normal.      Left Ear: Tympanic membrane normal.      Nose: Congestion and rhinorrhea present.      Mouth/Throat:      Mouth: Mucous membranes are moist.      Pharynx: Posterior oropharyngeal erythema present. No oropharyngeal exudate.      Tonsils: No tonsillar exudate or tonsillar abscesses.   Eyes:      Conjunctiva/sclera: Conjunctivae normal.      Pupils: Pupils are equal, round, and reactive to light.   Cardiovascular:      Rate and Rhythm: Normal rate and regular rhythm.      Heart sounds: Normal heart sounds. "   Pulmonary:      Effort: Pulmonary effort is normal. No respiratory distress.      Breath sounds: Normal breath sounds. No wheezing, rhonchi or rales.   Abdominal:      General: Bowel sounds are normal.      Palpations: Abdomen is soft.   Musculoskeletal:      Cervical back: Normal range of motion.   Skin:     General: Skin is warm.      Capillary Refill: Capillary refill takes less than 2 seconds.   Neurological:      General: No focal deficit present.      Mental Status: He is alert and oriented to person, place, and time.   Psychiatric:         Mood and Affect: Mood normal.         ED Course           Procedures            No results found for this or any previous visit (from the past 24 hour(s)).    Medications   ondansetron (ZOFRAN-ODT) ODT tab 4 mg (4 mg Oral Given 5/4/22 0808)   acetaminophen (TYLENOL) tablet 650 mg (650 mg Oral Given 5/4/22 0808)   ketorolac (TORADOL) injection 15 mg (15 mg Intramuscular Given 5/4/22 0808)       Assessments & Plan (with Medical Decision Making)     I have reviewed the nursing notes.    Symptoms consistent with viral URI. Less concern for strep pharyngitis. Could be influenza/covid. No significant difficulty breathing. Biggest complaint is not being able ot sleep well. Do not suspect pneumonia. Do not suspect cardiac etiology. He is overall quite well appearing except for mild tachycardia. Suspect that this will resolve with symptomatic treatment. No evidence of rapidly progressing bacterial infection. No rash. No allergy like symptoms.    Findings were discussed with the patient. Additional verbal instructions were discussed with the patient as well. Instructed to follow up with a primary care provider within 3 days. Also discussed specific warning signs and instructed to return to the ED if there are any concerns. Patient voiced understanding of instructions, questions were answered and the patient was discharged home in stable condition.      I have reviewed the findings,  diagnosis, plan and need for follow up with the patient.    New Prescriptions    ONDANSETRON (ZOFRAN ODT) 4 MG ODT TAB    Take 1 tablet (4 mg) by mouth every 8 hours as needed for nausea       Final diagnoses:   Viral URI   Suspected COVID-19 virus infection   Difficulty sleeping       5/4/2022   [facility]     Ananth Blanco MD  05/04/22 0896

## 2022-05-04 NOTE — Clinical Note
Mason Garcia was seen and treated in our emergency department on 5/4/2022.  He may return to work on 05/07/2022.       If you have any questions or concerns, please don't hesitate to call.      Ananth Blanco MD

## 2022-05-05 ENCOUNTER — TELEPHONE (OUTPATIENT)
Dept: NURSING | Facility: CLINIC | Age: 37
End: 2022-05-05

## 2022-05-05 NOTE — TELEPHONE ENCOUNTER
Patient calling for covid test results which are negative. Patient asking for results to be emailed to him.   Informed about [employer] account to get lab results and gave help line phone number.   Doris Reese RN   05/05/22 10:34 AM  [facility] Nurse Advisor

## 2022-05-14 ENCOUNTER — HEALTH MAINTENANCE LETTER (OUTPATIENT)
Age: 37
End: 2022-05-14

## 2022-06-22 NOTE — PROGRESS NOTES
03/15/18 1500   General Information/History   Start Of Care Date 03/15/18   Referring Physician Dr. Jones   Orders Evaluate And Treat As Indicated;Other   Other Orders splint per protocol, exercise per protocol, modalities as needed. home program   Orders Date 02/28/18   Medical Diagnosis Traumatic hand injury with traumatic amputation, fractures, extensor tendon injuries   Precautions/Limitations Pt is 8 weeks post op   Additional Occupational Profile Info/Pertinent history of current problem Pt is a 33 yo right handed male who was injured when his left hand was caught in a .  There was extensive injury to the hand.  Surgery on 1/19/19   Previous treatment or current condition splinted and advised in desensitization    How/Where did it occur During contact with an object   Onset date of current episode/exacerbation 01/19/18   Date of surgery 01/19/18   Surgical procedure complete amputation 5th digit, repair of all three phalanx of 4th finger, extensor tendon repair   Chronicity New   Hand Dominance Right   Affected side Left   Functional limitations perform required work activities;perform activities of daily living;perform desired leisure / sports activities   Reported Symptoms Other;Pain;Loss of Motion/Stiffness;Numbness;Edema  (sensitive to cold)   QuickDASH [Functional Disability Questionnaire; 0-100 (0=no dysfunction; 100=dysfunction)] Open Dash   Open Jar 0   Heavy Household Chores 3   Carry a shopping bag 1   Wash back 3   Cut food with knife 3   Recreational activities 4   Social activities 2   Work, daily activities 5   Pain 3   Tingling 3   Sleeping 2   QuickDASH Sum 29   QuickDASH Count 10   QuickDASH Disability/Symptom Score 47.5   Prior level of function Independent ADL;Independent IADL   Important Activities games on computer, archery,    Living environment Apartment/condo   Patient role/Employment history Employed   Occupation    Employment Status Currently not working due to  present problem   Primary Job Tasks Lifting;Operating a machine;Carrying;Prolonged standing;Gripping/pinching;Pushing/pulling   Patient/Family goals statement get some  strength back, get middle finger curl a little better   Fall Risk Screen   Fall screen completed by OT   Have you fallen 2 or more times in the past year? No   Have you fallen and had an injury in the past year? No   Is patient a fall risk? No   Pain   Pain Primary Pain Report   Primary Pain Report   Location distal 4th and 5th finger   Radiation Hand   Pain Quality Aching   Frequency Constant   Scale 3/10   Pain Is Worse In The A.m.   Pain Is Exacerbated By (cold, being hit)   Pain Is Relieved By Heat   Progression Since Onset Unchanged   Edema   Edema Ring   Edema Comments pt had an infection about a week and a half ago and finger swelled more, not as swollen today.  skin over 4th digit is shiny   Ring (measured in cm)   P1  - Left 6.9   PIP  - Left 7   P2  - Left 5.8   ROM   ROM AROM   AROM   AROM Ring;Long   Long   MCP Extension - Left 0   MCP Flexion - Left 89   PIP Extension - Left -9   PIP Flexion - Left 74   DIP Extension - Left -5   DIP Flexion - Left 53   Ring   MCP Extension - Left 0   MCP Flexion - Left 84   PIP Extension - Left -35   PIP Flexion - Left 49   DIP Extension - Left -18   DIP Flexion - Left 26   Strength   Strength Strength    Avg - Left 24    Avg - Right 112   Therapy Interventions   Planned Therapy Interventions ROM;Manual Therapy;Stretching;Strengthening;Scar Management;Edema Management;Splinting;Paraffin;Ultrasound   Clinical Impression   Criteria for Skilled Therapeutic Interventions Met yes;treatment indicated   OT Diagnosis L hand trauma with multiple injuries   Influenced by the following impairments Pain;Decreased range of motion;Decreased strength;Edema   Assessment of Occupational Performance 5 or more Performance Deficits   Identified Performance Deficits work, household chores, bathing, cutting,  recreation, sleep   Clinical Decision Making (Complexity) Moderate complexity   Therapy Frequency 2x/week   Predicted Duration of Therapy Intervention (days/wks) 4 weeks   Risks and Benefits of Treatment have been explained. Yes   Patient, Family & other staff in agreement with plan of care Yes   Hand Goals   Hand Goals Eating;Hygiene/Toileting;Sports/Recreation   Eating   Current Functional Task Cutting food   Previous Performance Level Independent   Current Performance Level Moderate difficulty   Goal Target Task Cut food   Goal Target Performance Level Mild difficulty   Due Date 04/12/18   Hygiene/Toileting   Current Functional Task (holding a bar of soap)   Previous Performance Level Independent   Current Performance Level Severe difficulty   Goal Target Task (hold a bar of soap)   Goal Target Performance Level Moderate difficulty   Due Date 04/12/18   Sports/Recreation   Current Functional Task Gripping   Previous Performance Level Independent   Curent Performance Level Unable   Goal Target Task (hold bow)   Goal Target Performance Level Moderate difficulty   Due Date 04/12/18   Total Evaluation Time   Total Evaluation Time (Minutes) 35      Holger Davis Gotlin

## 2022-08-02 ENCOUNTER — APPOINTMENT (OUTPATIENT)
Dept: GENERAL RADIOLOGY | Facility: HOSPITAL | Age: 37
End: 2022-08-02
Attending: PHYSICIAN ASSISTANT
Payer: COMMERCIAL

## 2022-08-02 ENCOUNTER — HOSPITAL ENCOUNTER (EMERGENCY)
Facility: HOSPITAL | Age: 37
Discharge: HOME | End: 2022-08-02
Attending: PHYSICIAN ASSISTANT | Admitting: PHYSICIAN ASSISTANT
Payer: COMMERCIAL

## 2022-08-02 VITALS
RESPIRATION RATE: 16 BRPM | SYSTOLIC BLOOD PRESSURE: 162 MMHG | TEMPERATURE: 99.6 F | DIASTOLIC BLOOD PRESSURE: 98 MMHG | OXYGEN SATURATION: 98 % | HEART RATE: 89 BPM

## 2022-08-02 DIAGNOSIS — S97.81XA CRUSHING INJURY OF RIGHT FOOT, INITIAL ENCOUNTER: ICD-10-CM

## 2022-08-02 PROCEDURE — G0463 HOSPITAL OUTPT CLINIC VISIT: HCPCS

## 2022-08-02 PROCEDURE — 250N000013 HC RX MED GY IP 250 OP 250 PS 637: Performed by: PHYSICIAN ASSISTANT

## 2022-08-02 PROCEDURE — 99213 OFFICE O/P EST LOW 20 MIN: CPT | Performed by: PHYSICIAN ASSISTANT

## 2022-08-02 PROCEDURE — 73630 X-RAY EXAM OF FOOT: CPT | Mod: RT

## 2022-08-02 RX ORDER — IBUPROFEN 600 MG/1
600 TABLET, FILM COATED ORAL ONCE
Status: COMPLETED | OUTPATIENT
Start: 2022-08-02 | End: 2022-08-02

## 2022-08-02 RX ADMIN — IBUPROFEN 600 MG: 600 TABLET ORAL at 21:41

## 2022-08-02 ASSESSMENT — ENCOUNTER SYMPTOMS
CONSTITUTIONAL NEGATIVE: 1
ARTHRALGIAS: 1
PSYCHIATRIC NEGATIVE: 1
CARDIOVASCULAR NEGATIVE: 1
RESPIRATORY NEGATIVE: 1

## 2022-08-02 NOTE — LETTER
[facility]  [address] [place] [id number]  Main: [phone]  Dept: [phone]      August 2, 2022      Re: Mason Garcia      TO WHOM IT MAY CONCERN:    Mason Garcia was evaluated acute injury. He may perform light duty through Friday August 5th, then he may return to normal activity unless otherwise advised by specialty/primary care.         Sincerely,      SAMMI Benjamin, PA-C   8/2/2022   10:01 PM

## 2022-08-03 NOTE — ED PROVIDER NOTES
History     Chief Complaint   Patient presents with     Foot Pain     Right       HPI  Mason Garcia is a 36 year old male who presents with right foot injury that occurred earlier today.  He was helping process firewood for the winter and a friend tossed a split piece of wood that landed on the top of the patient's right foot.  He reports fairly intense swelling,, pain and difficulty with weightbearing.    Allergies:  Allergies   Allergen Reactions     Sudafed [Pseudoephedrine]        Problem List:    Patient Active Problem List    Diagnosis Date Noted     Suicidal ideation 03/04/2021     Priority: Medium     Amputation of finger, left 02/25/2021     Priority: Medium     History of open reduction and internal fixation (ORIF) procedure 02/25/2021     Priority: Medium     Pain in left hand 02/25/2021     Priority: Medium     Weakness of left hand 02/25/2021     Priority: Medium     Suicidal behavior 01/20/2019     Priority: Medium     PTSD (post-traumatic stress disorder) 11/03/2018     Priority: Medium     Severe episode of recurrent major depressive disorder, without psychotic features (H) 11/02/2018     Priority: Medium     Crushing injury of left hand, sequela 10/16/2018     Priority: Medium     Depression with suicidal ideation 08/27/2018     Priority: Medium     Overweight (BMI 25.0-29.9) 08/16/2018     Priority: Medium     Amputation finger, sequela (H) 08/03/2018     Priority: Medium     Neuroma of hand 08/03/2018     Priority: Medium     Open fracture of left hand with routine healing 01/19/2018     Priority: Medium     Work related injury 01/19/2018     Priority: Medium     Perforated appendicitis 02/07/2017     Priority: Medium     Recurrent major depressive disorder, in full remission (H) 01/10/2017     Priority: Medium        Past Medical History:    History reviewed. No pertinent past medical history.    Past Surgical History:    Past Surgical History:   Procedure Laterality Date     LAPAROSCOPIC  APPENDECTOMY N/A 2/7/2017    Procedure: LAPAROSCOPIC APPENDECTOMY;  Surgeon: Chanel Pagan DO;  Location: [facility] OR       Family History:    Family History   Problem Relation Age of Onset     Cardiovascular Maternal Grandmother         cardiovascular disease     Respiratory Maternal Grandmother         Lung transplant       Social History:  Marital Status:  Single [1]  Social History     Tobacco Use     Smoking status: Current Every Day Smoker     Packs/day: 1.00     Years: 7.00     Pack years: 7.00     Types: Vaping Device     Smokeless tobacco: Former User   Substance Use Topics     Alcohol use: Never     Comment: rare     Drug use: No        Medications:    EPINEPHrine (ANY BX GENERIC EQUIV) 0.3 MG/0.3ML injection 2-pack          Review of Systems   Constitutional: Negative.    Respiratory: Negative.    Cardiovascular: Negative.    Musculoskeletal: Positive for arthralgias and gait problem.   Skin: Negative.    Psychiatric/Behavioral: Negative.        Physical Exam   BP: 162/98  Pulse: 89  Temp: 99.6  F (37.6  C)  Resp: 16  SpO2: 98 %      Physical Exam  Vitals and nursing note reviewed.   Constitutional:       General: He is not in acute distress.     Appearance: He is well-developed.   Cardiovascular:      Rate and Rhythm: Normal rate.   Pulmonary:      Effort: Pulmonary effort is normal.   Musculoskeletal:      Right ankle: No swelling. No tenderness.      Right foot: Normal range of motion. Swelling and tenderness present. No deformity or laceration.        Feet:    Skin:     General: Skin is warm and dry.   Neurological:      Mental Status: He is alert and oriented to person, place, and time.         ED Course     No results found for this or any previous visit (from the past 24 hour(s)).    Medications   ibuprofen (ADVIL/MOTRIN) tablet 600 mg (600 mg Oral Given 8/2/22 2141)       Assessments & Plan (with Medical Decision Making)     I have reviewed the nursing notes.  I have reviewed the findings, diagnosis,  plan and need for follow up with the patient.    Discharge Medication List as of 8/2/2022 10:04 PM          Final diagnoses:   Crushing injury of right foot, initial encounter   I do not appreciate any fracture on x-rays obtained tonight.  Patient is placed in hard soled shoe for comfort and advised to rest for the next 3 to 7 days, elevating as practical.  He will rotate ibuprofen and Tylenol along with ice for pain.  I recommended he get rechecked with any concerns 7 to 10 days after the injury.  He should seek care sooner with any pain or swelling out of proportion.    8/2/2022   [facility]     David Johnson PA  08/02/22 4423

## 2022-08-09 ENCOUNTER — APPOINTMENT (OUTPATIENT)
Dept: GENERAL RADIOLOGY | Facility: HOSPITAL | Age: 37
End: 2022-08-09
Attending: PHYSICIAN ASSISTANT
Payer: COMMERCIAL

## 2022-08-09 ENCOUNTER — HOSPITAL ENCOUNTER (EMERGENCY)
Facility: HOSPITAL | Age: 37
Discharge: HOME | End: 2022-08-10
Attending: PHYSICIAN ASSISTANT | Admitting: PHYSICIAN ASSISTANT
Payer: COMMERCIAL

## 2022-08-09 DIAGNOSIS — R50.9 FEBRILE ILLNESS: ICD-10-CM

## 2022-08-09 LAB
ALBUMIN SERPL-MCNC: 3.8 G/DL (ref 3.4–5)
ALBUMIN UR-MCNC: NEGATIVE MG/DL
ALP SERPL-CCNC: 73 U/L (ref 40–150)
ALT SERPL W P-5'-P-CCNC: 65 U/L (ref 0–70)
ANION GAP SERPL CALCULATED.3IONS-SCNC: 8 MMOL/L (ref 3–14)
APPEARANCE UR: CLEAR
AST SERPL W P-5'-P-CCNC: 30 U/L (ref 0–45)
BASOPHILS # BLD AUTO: 0 10E3/UL (ref 0–0.2)
BASOPHILS NFR BLD AUTO: 0 %
BILIRUB SERPL-MCNC: 0.5 MG/DL (ref 0.2–1.3)
BILIRUB UR QL STRIP: NEGATIVE
BUN SERPL-MCNC: 14 MG/DL (ref 7–30)
CALCIUM SERPL-MCNC: 8.6 MG/DL (ref 8.5–10.1)
CHLORIDE BLD-SCNC: 105 MMOL/L (ref 94–109)
CO2 SERPL-SCNC: 25 MMOL/L (ref 20–32)
COLOR UR AUTO: ABNORMAL
CREAT SERPL-MCNC: 1 MG/DL (ref 0.66–1.25)
EOSINOPHIL # BLD AUTO: 0 10E3/UL (ref 0–0.7)
EOSINOPHIL NFR BLD AUTO: 0 %
ERYTHROCYTE [DISTWIDTH] IN BLOOD BY AUTOMATED COUNT: 12.5 % (ref 10–15)
FLUAV RNA SPEC QL NAA+PROBE: NEGATIVE
FLUBV RNA RESP QL NAA+PROBE: NEGATIVE
GFR SERPL CREATININE-BSD FRML MDRD: >90 ML/MIN/1.73M2
GLUCOSE BLD-MCNC: 117 MG/DL (ref 70–99)
GLUCOSE UR STRIP-MCNC: NEGATIVE MG/DL
HCT VFR BLD AUTO: 44.5 % (ref 40–53)
HGB BLD-MCNC: 16 G/DL (ref 13.3–17.7)
HGB UR QL STRIP: NEGATIVE
IMM GRANULOCYTES # BLD: 0 10E3/UL
IMM GRANULOCYTES NFR BLD: 0 %
KETONES UR STRIP-MCNC: NEGATIVE MG/DL
LACTATE SERPL-SCNC: 1.2 MMOL/L (ref 0.7–2)
LEUKOCYTE ESTERASE UR QL STRIP: NEGATIVE
LYMPHOCYTES # BLD AUTO: 1.3 10E3/UL (ref 0.8–5.3)
LYMPHOCYTES NFR BLD AUTO: 14 %
MCH RBC QN AUTO: 30.5 PG (ref 26.5–33)
MCHC RBC AUTO-ENTMCNC: 36 G/DL (ref 31.5–36.5)
MCV RBC AUTO: 85 FL (ref 78–100)
MONOCYTES # BLD AUTO: 1.1 10E3/UL (ref 0–1.3)
MONOCYTES NFR BLD AUTO: 12 %
MUCOUS THREADS #/AREA URNS LPF: PRESENT /LPF
NEUTROPHILS # BLD AUTO: 6.7 10E3/UL (ref 1.6–8.3)
NEUTROPHILS NFR BLD AUTO: 74 %
NITRATE UR QL: NEGATIVE
NRBC # BLD AUTO: 0 10E3/UL
NRBC BLD AUTO-RTO: 0 /100
PH UR STRIP: 7 [PH] (ref 4.7–8)
PLATELET # BLD AUTO: 176 10E3/UL (ref 150–450)
POTASSIUM BLD-SCNC: 3.6 MMOL/L (ref 3.4–5.3)
PROT SERPL-MCNC: 7.8 G/DL (ref 6.8–8.8)
RBC # BLD AUTO: 5.24 10E6/UL (ref 4.4–5.9)
RBC URINE: 1 /HPF
RSV RNA SPEC NAA+PROBE: NEGATIVE
SARS-COV-2 RNA RESP QL NAA+PROBE: NEGATIVE
SODIUM SERPL-SCNC: 138 MMOL/L (ref 133–144)
SP GR UR STRIP: 1.02 (ref 1–1.03)
SQUAMOUS EPITHELIAL: 0 /HPF
UROBILINOGEN UR STRIP-MCNC: NORMAL MG/DL
WBC # BLD AUTO: 9.3 10E3/UL (ref 4–11)
WBC URINE: <1 /HPF

## 2022-08-09 PROCEDURE — 71045 X-RAY EXAM CHEST 1 VIEW: CPT

## 2022-08-09 PROCEDURE — 99284 EMERGENCY DEPT VISIT MOD MDM: CPT | Performed by: PHYSICIAN ASSISTANT

## 2022-08-09 PROCEDURE — 86618 LYME DISEASE ANTIBODY: CPT

## 2022-08-09 PROCEDURE — 81001 URINALYSIS AUTO W/SCOPE: CPT | Performed by: PHYSICIAN ASSISTANT

## 2022-08-09 PROCEDURE — 87798 DETECT AGENT NOS DNA AMP: CPT | Performed by: PHYSICIAN ASSISTANT

## 2022-08-09 PROCEDURE — 87077 CULTURE AEROBIC IDENTIFY: CPT | Performed by: PHYSICIAN ASSISTANT

## 2022-08-09 PROCEDURE — 250N000013 HC RX MED GY IP 250 OP 250 PS 637: Performed by: PHYSICIAN ASSISTANT

## 2022-08-09 PROCEDURE — 96375 TX/PRO/DX INJ NEW DRUG ADDON: CPT

## 2022-08-09 PROCEDURE — 36415 COLL VENOUS BLD VENIPUNCTURE: CPT | Performed by: PHYSICIAN ASSISTANT

## 2022-08-09 PROCEDURE — 87637 SARSCOV2&INF A&B&RSV AMP PRB: CPT | Performed by: PHYSICIAN ASSISTANT

## 2022-08-09 PROCEDURE — 83605 ASSAY OF LACTIC ACID: CPT | Performed by: PHYSICIAN ASSISTANT

## 2022-08-09 PROCEDURE — 99285 EMERGENCY DEPT VISIT HI MDM: CPT | Mod: 25

## 2022-08-09 PROCEDURE — 85025 COMPLETE CBC W/AUTO DIFF WBC: CPT | Performed by: PHYSICIAN ASSISTANT

## 2022-08-09 PROCEDURE — 258N000003 HC RX IP 258 OP 636: Performed by: PHYSICIAN ASSISTANT

## 2022-08-09 PROCEDURE — 96361 HYDRATE IV INFUSION ADD-ON: CPT

## 2022-08-09 PROCEDURE — 80053 COMPREHEN METABOLIC PANEL: CPT | Performed by: PHYSICIAN ASSISTANT

## 2022-08-09 PROCEDURE — 96374 THER/PROPH/DIAG INJ IV PUSH: CPT

## 2022-08-09 PROCEDURE — 250N000011 HC RX IP 250 OP 636: Performed by: PHYSICIAN ASSISTANT

## 2022-08-09 PROCEDURE — C9803 HOPD COVID-19 SPEC COLLECT: HCPCS

## 2022-08-09 RX ORDER — DOXYCYCLINE 100 MG/1
100 CAPSULE ORAL 2 TIMES DAILY
Qty: 28 CAPSULE | Refills: 0 | Status: SHIPPED | OUTPATIENT
Start: 2022-08-09 | End: 2022-08-23

## 2022-08-09 RX ORDER — ONDANSETRON 2 MG/ML
4 INJECTION INTRAMUSCULAR; INTRAVENOUS EVERY 30 MIN PRN
Status: DISCONTINUED | OUTPATIENT
Start: 2022-08-09 | End: 2022-08-10 | Stop reason: HOSPADM

## 2022-08-09 RX ORDER — KETOROLAC TROMETHAMINE 30 MG/ML
30 INJECTION, SOLUTION INTRAMUSCULAR; INTRAVENOUS ONCE
Status: COMPLETED | OUTPATIENT
Start: 2022-08-09 | End: 2022-08-09

## 2022-08-09 RX ORDER — DOXYCYCLINE 100 MG/1
100 CAPSULE ORAL ONCE
Status: COMPLETED | OUTPATIENT
Start: 2022-08-09 | End: 2022-08-09

## 2022-08-09 RX ADMIN — SODIUM CHLORIDE 1000 ML: 9 INJECTION, SOLUTION INTRAVENOUS at 22:23

## 2022-08-09 RX ADMIN — DOXYCYCLINE HYCLATE 100 MG: 100 CAPSULE ORAL at 23:00

## 2022-08-09 RX ADMIN — ONDANSETRON 4 MG: 2 INJECTION INTRAMUSCULAR; INTRAVENOUS at 22:37

## 2022-08-09 RX ADMIN — SODIUM CHLORIDE 1000 ML: 9 INJECTION, SOLUTION INTRAVENOUS at 23:04

## 2022-08-09 RX ADMIN — KETOROLAC TROMETHAMINE 30 MG: 30 INJECTION, SOLUTION INTRAMUSCULAR; INTRAVENOUS at 22:24

## 2022-08-09 ASSESSMENT — ENCOUNTER SYMPTOMS
CHEST TIGHTNESS: 0
EYE REDNESS: 1
FEVER: 1
SHORTNESS OF BREATH: 0
NAUSEA: 1
DIZZINESS: 1
CHILLS: 1
LIGHT-HEADEDNESS: 1
VOMITING: 1
ACTIVITY CHANGE: 1
MYALGIAS: 1
APPETITE CHANGE: 1
FATIGUE: 1
WEAKNESS: 1
ROS GI COMMENTS: SEE HPI
ABDOMINAL PAIN: 0
COUGH: 0

## 2022-08-09 ASSESSMENT — ACTIVITIES OF DAILY LIVING (ADL): ADLS_ACUITY_SCORE: 35

## 2022-08-10 ENCOUNTER — APPOINTMENT (OUTPATIENT)
Dept: CT IMAGING | Facility: HOSPITAL | Age: 37
End: 2022-08-10
Attending: INTERNAL MEDICINE
Payer: COMMERCIAL

## 2022-08-10 VITALS
RESPIRATION RATE: 14 BRPM | OXYGEN SATURATION: 94 % | HEART RATE: 97 BPM | SYSTOLIC BLOOD PRESSURE: 120 MMHG | TEMPERATURE: 99.4 F | DIASTOLIC BLOOD PRESSURE: 74 MMHG

## 2022-08-10 LAB — B BURGDOR IGG+IGM SER QL: 0.54

## 2022-08-10 PROCEDURE — 250N000011 HC RX IP 250 OP 636: Performed by: PHYSICIAN ASSISTANT

## 2022-08-10 PROCEDURE — 74177 CT ABD & PELVIS W/CONTRAST: CPT

## 2022-08-10 PROCEDURE — 96376 TX/PRO/DX INJ SAME DRUG ADON: CPT

## 2022-08-10 RX ORDER — IOPAMIDOL 755 MG/ML
100 INJECTION, SOLUTION INTRAVASCULAR ONCE
Status: COMPLETED | OUTPATIENT
Start: 2022-08-10 | End: 2022-08-10

## 2022-08-10 RX ADMIN — IOPAMIDOL 100 ML: 755 INJECTION, SOLUTION INTRAVENOUS at 00:25

## 2022-08-10 RX ADMIN — ONDANSETRON 4 MG: 2 INJECTION INTRAMUSCULAR; INTRAVENOUS at 00:09

## 2022-08-10 ASSESSMENT — ACTIVITIES OF DAILY LIVING (ADL): ADLS_ACUITY_SCORE: 35

## 2022-08-10 NOTE — DISCHARGE INSTRUCTIONS
"Your work-up today was unrevealing for an emergent cause of your symptoms.  This should not be construed as \"nothing is wrong.  I believe that at this time it would be reasonable to treat you for a possible tickborne illness and follow you clinically.  Start doxycycline as prescribed.  We will call with the results of your tick panel.  This can take several days.  Please follow-up in the clinic in the next few days for recheck.  Is very important to return to this emergency department for any new or worsening symptoms.What to expect when you have contrast    During your exam, we will inject  contrast  into your vein or artery. (Contrast is a clear liquid with iodine in it. It shows up on X-rays.)    You may feel warm or hot. You may have a metal taste in your mouth and a slight upset stomach. You may also feel pressure near the kidneys and bladder. These effects will last about 1 to 3 minutes.    Please tell us if you have:   Sneezing    Itching   Hives    Swelling in the face   A hoarse voice   Breathing problems   Other new symptoms    Serious problems are rare.  They may include:   Irregular heartbeat    Seizures   Kidney failure             Tissue damage   Shock     Death    If you have any problems during the exam, we  will treat them right away.    When you get home    Call your hospital if you have any new symptoms in the next 2 days, like hives or swelling. (Phone numbers are at the bottom of this page.) Or call your family doctor.     If you have wheezing or trouble breathing, call 911.    Self-care  -Drink at least 4 extra glasses of water today.   This reduces the stress on your kidneys.  -Keep taking your regular medicines.    The contrast will pass out of your body in your  Urine(pee). This will happen in the next 24 hours. You  will not feel this. Your urine will not  change color.    If you have kidney problems or take metformin    Drink 4 to 8 large glasses of water for the next  2 days, if you are " not on a fluid restriction.    ?If you take metformin (Glucophage or Glucovance) for diabetes, keep taking it.      ?Your kidney function tests are abnormal.  If you take Metformin, do not take it for 48 hours. Please go to your clinic for a blood test within 3 days after your exam before the restarting this medicine.     (Note to provider:please give patient prescription for lab tests.)    ?Special instructions: ***    I have read and understand the above information.    Patient Sign Here:______________________________________Date:________Time:______    Staff Sign Here:________________________________________Date:_______Time:______      Radiology Departments:     ?[facility]: [phone] ?[place]: [phone]     ?[place]: [phone] ?[facility]:[phone]      ?[place]: [phone]  ?[facility]: [phone]  ?[facility]:[phone]    ?[facility]:[phone]  ?[facility]:[phone]

## 2022-08-10 NOTE — ED TRIAGE NOTES
Pt presents via ems for feeling ill. Pt reports he was driving and had to pull over because he felt lightheaded, hot, and had an emesis outside his vehicle. He was about to call 911 and a  pulled up behind him and called for Ems.     Go Painting, OMAR, RN on 8/9/2022 at 10:10 PM

## 2022-08-10 NOTE — ED PROVIDER NOTES
History     Chief Complaint   Patient presents with     Nausea, Vomiting, & Diarrhea     Dizziness     The history is provided by the patient.     Mason Garcia is a 36 year old male who presented to the emergency department via EMS for evaluation of nausea, vomiting, body aches, and fever.  Patient also reports dizziness with ambulation.  Symptoms are rather abrupt in onset.  He was just leaving a [employer] group when he had abrupt onset of the above symptoms.  Found to be tachycardic and febrile by EMS.  Denies any respiratory symptoms.  Denies any lower urinary tract symptoms.    Allergies:  Allergies   Allergen Reactions     Sudafed [Pseudoephedrine]        Problem List:    Patient Active Problem List    Diagnosis Date Noted     Suicidal ideation 03/04/2021     Priority: Medium     Amputation of finger, left 02/25/2021     Priority: Medium     History of open reduction and internal fixation (ORIF) procedure 02/25/2021     Priority: Medium     Pain in left hand 02/25/2021     Priority: Medium     Weakness of left hand 02/25/2021     Priority: Medium     Suicidal behavior 01/20/2019     Priority: Medium     PTSD (post-traumatic stress disorder) 11/03/2018     Priority: Medium     Severe episode of recurrent major depressive disorder, without psychotic features (H) 11/02/2018     Priority: Medium     Crushing injury of left hand, sequela 10/16/2018     Priority: Medium     Depression with suicidal ideation 08/27/2018     Priority: Medium     Overweight (BMI 25.0-29.9) 08/16/2018     Priority: Medium     Amputation finger, sequela (H) 08/03/2018     Priority: Medium     Neuroma of hand 08/03/2018     Priority: Medium     Open fracture of left hand with routine healing 01/19/2018     Priority: Medium     Work related injury 01/19/2018     Priority: Medium     Perforated appendicitis 02/07/2017     Priority: Medium     Recurrent major depressive disorder, in full remission (H) 01/10/2017     Priority: Medium         Past Medical History:    No past medical history on file.    Past Surgical History:    Past Surgical History:   Procedure Laterality Date     LAPAROSCOPIC APPENDECTOMY N/A 2/7/2017    Procedure: LAPAROSCOPIC APPENDECTOMY;  Surgeon: Chanel Pagan DO;  Location: [facility]       Family History:    Family History   Problem Relation Age of Onset     Cardiovascular Maternal Grandmother         cardiovascular disease     Respiratory Maternal Grandmother         Lung transplant       Social History:  Marital Status:  Single [1]  Social History     Tobacco Use     Smoking status: Current Every Day Smoker     Packs/day: 1.00     Years: 7.00     Pack years: 7.00     Types: Vaping Device     Smokeless tobacco: Former User   Substance Use Topics     Alcohol use: Never     Comment: rare     Drug use: No        Medications:    doxycycline hyclate (VIBRAMYCIN) 100 MG capsule  EPINEPHrine (ANY BX GENERIC EQUIV) 0.3 MG/0.3ML injection 2-pack          Review of Systems   Constitutional: Positive for activity change, appetite change, chills, fatigue and fever.   Eyes: Positive for redness.   Respiratory: Negative for cough, chest tightness and shortness of breath.    Cardiovascular: Negative for chest pain.   Gastrointestinal: Positive for nausea and vomiting. Negative for abdominal pain.        See HPI   Genitourinary: Negative.    Musculoskeletal: Positive for myalgias.   Skin: Negative.    Allergic/Immunologic: Negative for immunocompromised state.   Neurological: Positive for dizziness, weakness and light-headedness.       Physical Exam   BP: 163/84  Pulse: 118  Temp: (!) 103  F (39.4  C)  Resp: 20  SpO2: 96 %      Physical Exam  Vitals and nursing note reviewed.   Constitutional:       General: He is not in acute distress.     Appearance: Normal appearance. He is normal weight. He is not ill-appearing, toxic-appearing or diaphoretic.   Eyes:      Comments: Bilateral mild conjunctival injection.   Cardiovascular:      Rate and  Rhythm: Regular rhythm.      Comments: Tachycardia  Pulmonary:      Effort: Pulmonary effort is normal.   Abdominal:      General: There is no distension.      Palpations: Abdomen is soft.      Tenderness: There is no abdominal tenderness.   Musculoskeletal:      Right lower leg: No edema.      Left lower leg: No edema.   Skin:     General: Skin is warm and dry.      Capillary Refill: Capillary refill takes less than 2 seconds.   Neurological:      General: No focal deficit present.      Mental Status: He is alert and oriented to person, place, and time.   Psychiatric:         Mood and Affect: Mood normal.         ED Course              ED Course as of 08/09/22 2257   Tue Aug 09, 2022   2256 Influenza and COVID are negative.     Procedures              Critical Care time:  none               Results for orders placed or performed during the hospital encounter of 08/09/22 (from the past 24 hour(s))   Symptomatic; Yes; 8/9/2022 Influenza A/B & SARS-CoV2 (COVID-19) Virus PCR Multiplex Nasopharyngeal    Specimen: Nasopharyngeal; Swab   Result Value Ref [facility]    Influenza A PCR Negative Negative    Influenza B PCR Negative Negative    RSV PCR Negative Negative    SARS CoV2 PCR Negative Negative    Narrative    Testing was performed using the Xpert Xpress CoV2/Flu/RSV Assay on the [employer] GeneXpert Instrument. This test should be ordered for the detection of SARS-CoV-2 and influenza viruses in individuals who meet clinical and/or epidemiological criteria. Test performance is unknown in asymptomatic patients. This test is for in vitro diagnostic use under the FDA EUA for laboratories certified under CLIA to perform high or moderate complexity testing. This test has not been FDA cleared or approved. A negative result does not rule out the presence of PCR inhibitors in the specimen or target RNA in concentration below the limit of detection for the assay. If only one viral target is positive but coinfection with multiple  targets is suspected, the sample should be re-tested with another FDA cleared, approved, or authorized test, if coinfection would change clinical management. This test was validated by the [facility]. These laboratories are certified under the Clinical  Laboratory Improvement Amendments of 1988 (CLIA-88) as qualified to perform high complexity laboratory testing.   CBC with platelets differential    Narrative    The following orders were created for panel order CBC with platelets differential.  Procedure                               Abnormality         Status                     ---------                               -----------         ------                     CBC with platelets and d...[[id number]]                      Final result                 Please view results for these tests on the individual orders.   Comprehensive metabolic panel   Result Value Ref [facility]    Sodium 138 133 - 144 mmol/L    Potassium 3.6 3.4 - 5.3 mmol/L    Chloride 105 94 - 109 mmol/L    Carbon Dioxide (CO2) 25 20 - 32 mmol/L    Anion Gap 8 3 - 14 mmol/L    Urea Nitrogen 14 7 - 30 mg/dL    Creatinine 1.00 0.66 - 1.25 mg/dL    Calcium 8.6 8.5 - 10.1 mg/dL    Glucose 117 (H) 70 - 99 mg/dL    Alkaline Phosphatase 73 40 - 150 U/L    AST 30 0 - 45 U/L    ALT 65 0 - 70 U/L    Protein Total 7.8 6.8 - 8.8 g/dL    Albumin 3.8 3.4 - 5.0 g/dL    Bilirubin Total 0.5 0.2 - 1.3 mg/dL    GFR Estimate >90 >60 mL/min/1.73m2   Lactic acid whole blood   Result Value Ref [facility]    Lactic Acid 1.2 0.7 - 2.0 mmol/L   CBC with platelets and differential   Result Value Ref [facility]    WBC Count 9.3 4.0 - 11.0 10e3/uL    RBC Count 5.24 4.40 - 5.90 10e6/uL    Hemoglobin 16.0 13.3 - 17.7 g/dL    Hematocrit 44.5 40.0 - 53.0 %    MCV 85 78 - 100 fL    MCH 30.5 26.5 - 33.0 pg    MCHC 36.0 31.5 - 36.5 g/dL    RDW 12.5 10.0 - 15.0 %    Platelet Count 176 150 - 450 10e3/uL    % Neutrophils 74 %    % Lymphocytes 14 %    % Monocytes 12 %    % Eosinophils  0 %    % Basophils 0 %    % Immature Granulocytes 0 %    NRBCs per 100 WBC 0 <1 /100    Absolute Neutrophils 6.7 1.6 - 8.3 10e3/uL    Absolute Lymphocytes 1.3 0.8 - 5.3 10e3/uL    Absolute Monocytes 1.1 0.0 - 1.3 10e3/uL    Absolute Eosinophils 0.0 0.0 - 0.7 10e3/uL    Absolute Basophils 0.0 0.0 - 0.2 10e3/uL    Absolute Immature Granulocytes 0.0 <=0.4 10e3/uL    Absolute NRBCs 0.0 10e3/uL       Medications   0.9% sodium chloride BOLUS (1,000 mLs Intravenous New Bag 8/9/22 2223)   ondansetron (ZOFRAN) injection 4 mg (4 mg Intravenous Given 8/9/22 2237)   doxycycline hyclate (VIBRAMYCIN) capsule 100 mg (has no administration in time range)   ketorolac (TORADOL) injection 30 mg (30 mg Intravenous Given 8/9/22 2224)       Assessments & Plan (with Medical Decision Making)   Findings as above.  36-year-old male with abrupt onset of fevers, chills, vomiting, body aches.  Flu and COVID-negative.  I believe is reasonable to start the patient on treatment for possible tickborne illness.  Lyme, anaplasmosis, and babesiosis will be sent.  Doxycycline started tonight.  IV fluids.  Plan will be for discharge home with close follow-up.  Monitored over a protracted timeframe in the emergency department.  Patient felt comfortable going home.  He had no significant abdominal pain.  He has undergone appendectomy in the past.  Does not appear to be an intra-abdominal catastrophe causing his symptoms.    This document was prepared using a combination of typing and voice generated software.  While every attempt was made for accuracy, spelling and grammatical errors may exist.    I have reviewed the nursing notes.    I have reviewed the findings, diagnosis, plan and need for follow up with the patient.       New Prescriptions    DOXYCYCLINE HYCLATE (VIBRAMYCIN) 100 MG CAPSULE    Take 1 capsule (100 mg) by mouth 2 times daily for 14 days       Final diagnoses:   Fever of unknown origin       8/9/2022   [facility]     Waage,  Megan ALLEN PA-C  08/09/22 0123       Megan Bonner PA-C  08/09/22 7384

## 2022-08-11 NOTE — ED NOTES
Critical Lab result for positive blood culture. Donovan CHAPA updated as he was provider yesterday.     [employer] Painting MSN, RN on 8/10/2022 at 8:49 PM

## 2022-08-15 LAB
A PHAGOCYTOPH DNA BLD QL NAA+PROBE: NOT DETECTED
B MICROTI DNA BLD QL NAA+PROBE: NOT DETECTED
BABESIA DNA BLD QL NAA+PROBE: NOT DETECTED
BACTERIA BLD CULT: ABNORMAL
BACTERIA BLD CULT: NO GROWTH
E CHAFFEENSIS DNA BLD QL NAA+PROBE: NOT DETECTED
E EWINGII DNA SPEC QL NAA+PROBE: NOT DETECTED
EHRLICHIA DNA SPEC QL NAA+PROBE: NOT DETECTED

## 2022-09-04 ENCOUNTER — HEALTH MAINTENANCE LETTER (OUTPATIENT)
Age: 37
End: 2022-09-04

## 2022-10-27 ENCOUNTER — HOSPITAL ENCOUNTER (EMERGENCY)
Facility: HOSPITAL | Age: 37
Discharge: HOME | End: 2022-10-27
Attending: NURSE PRACTITIONER | Admitting: NURSE PRACTITIONER
Payer: COMMERCIAL

## 2022-10-27 VITALS
SYSTOLIC BLOOD PRESSURE: 155 MMHG | RESPIRATION RATE: 18 BRPM | DIASTOLIC BLOOD PRESSURE: 104 MMHG | HEART RATE: 78 BPM | TEMPERATURE: 97.9 F | OXYGEN SATURATION: 97 %

## 2022-10-27 DIAGNOSIS — K04.7 DENTAL INFECTION: ICD-10-CM

## 2022-10-27 PROCEDURE — 96372 THER/PROPH/DIAG INJ SC/IM: CPT | Performed by: NURSE PRACTITIONER

## 2022-10-27 PROCEDURE — G0463 HOSPITAL OUTPT CLINIC VISIT: HCPCS | Mod: 25

## 2022-10-27 PROCEDURE — 250N000011 HC RX IP 250 OP 636: Performed by: NURSE PRACTITIONER

## 2022-10-27 PROCEDURE — 99213 OFFICE O/P EST LOW 20 MIN: CPT | Performed by: NURSE PRACTITIONER

## 2022-10-27 RX ORDER — KETOROLAC TROMETHAMINE 15 MG/ML
15 INJECTION, SOLUTION INTRAMUSCULAR; INTRAVENOUS ONCE
Status: COMPLETED | OUTPATIENT
Start: 2022-10-27 | End: 2022-10-27

## 2022-10-27 RX ORDER — KETOROLAC TROMETHAMINE 10 MG/1
10 TABLET, FILM COATED ORAL EVERY 6 HOURS PRN
Qty: 20 TABLET | Refills: 0 | Status: SHIPPED | OUTPATIENT
Start: 2022-10-27 | End: 2023-06-13

## 2022-10-27 RX ADMIN — KETOROLAC TROMETHAMINE 15 MG: 15 INJECTION, SOLUTION INTRAMUSCULAR; INTRAVENOUS at 14:28

## 2022-10-27 ASSESSMENT — ENCOUNTER SYMPTOMS
LIGHT-HEADEDNESS: 0
DIZZINESS: 0
NECK PAIN: 1
ACTIVITY CHANGE: 1
CHILLS: 0
SHORTNESS OF BREATH: 0
HEADACHES: 1
FEVER: 0
VOMITING: 0
NECK STIFFNESS: 1
EYES NEGATIVE: 1
SINUS PAIN: 0
SINUS PRESSURE: 0
NAUSEA: 0
FACIAL SWELLING: 0

## 2022-10-27 NOTE — ED TRIAGE NOTES
Patient presents to urgent care with lower left dental pain. States it started a could days ago and got really bad last night. States the pain goes to his ear. States he has a dental appointment on November 2nd but the pain is so bad he dont know if he can wait that long. Pain scale of 10/10. Cool water does help a little with the pain. States he is really just wondering if he can get some sort of antibiotic and something for the pain.

## 2022-10-27 NOTE — ED TRIAGE NOTES
Patient presents with c/o lower left dental pain, that started a couple days ago but got bad last night. Patient reports that pain radiates into ear.

## 2022-10-27 NOTE — DISCHARGE INSTRUCTIONS
"Augmentin as prescribed for infection.  -Increase fluids.   -Complete all antibiotics even if feeling better.    -Taking antibiotics with food may decrease the symptoms, of an upset stomach, that can occur when taking antibiotics. Antibiotics frequently cause diarrhea. Probiotics or yogurt may help prevent or decrease these symptoms.   -Return to be reevaluated if symptoms do not improve, or worsen.  Do not take ibuprofen if taking ketorolac (Toradol.)  Take ONE or the other   After 7:30 pm may start:ketorolac OR Ibuprofen 600 to 800 mg (3 - 4 tabs of over the counter med) every six to eight hours as needed;not to exceed maximum amount of 3200 mg in 24 hours. Take with food. Tylenol 650 to 1000 mg every four to six hours as needed (not to exceed more than 4000 mg in a 24 hour period). May use interchangeably. Suggest medicating around the clock for the next 24-48 hours.     Apply a cold pack or ice compress for up to 20 minutes several times a day. This is to help reduce pain and relieve swelling. Cover it with a thin, dry cloth before putting it on your skin.    Rinse your mouth with warm saltwater several times per day. This will help reduce irritation, gum swelling, and pain.  Good oral hygiene is imperitive. Brush your at least twice a day. Use a fluoride toothpaste and soft-bristle toothbrush.  Eat a healthy diet that doesn t include many sugary foods and drinks.  Call ASAP to schedule an appointment to see a dentist.   Our  department can try to assist you if you are having difficulty finding a dentist, dental coverage, or paying for dental care.  You can reach them by calling [phone] and asking for .  Return to ED/UC if symptoms increase or concerns develop such as those discussed and listed on the \"When to go the Emergency Room\" portion of your discharge instructions.         "

## 2022-10-27 NOTE — ED PROVIDER NOTES
History     Chief Complaint   Patient presents with     Dental Pain     HPI  Mason Garcia is a 36 year old male who presents with left lower dental pain that started 2 days ago.  Accompanied with left ear pain, headaches, and neck pain and stiffness.  Has been taking acetaminophen and ibuprofen at home with minimal relief of his discomfort.  Last dose 800 mg ibuprofen at 130.  Had last dentist appointment 1 to 2 months ago.  Has an appointment scheduled for November 2, 2022.  Vapes.  Denies fevers, chills, nausea, vomiting, shortness of breath, dizziness, and lightheadedness.    Allergies:  Allergies   Allergen Reactions     Sudafed [Pseudoephedrine]      Venlafaxine Other (See Comments)     Constant suicidal ideation       Problem List:    Patient Active Problem List    Diagnosis Date Noted     Suicidal ideation 03/04/2021     Priority: Medium     Amputation of finger, left 02/25/2021     Priority: Medium     History of open reduction and internal fixation (ORIF) procedure 02/25/2021     Priority: Medium     Pain in left hand 02/25/2021     Priority: Medium     Weakness of left hand 02/25/2021     Priority: Medium     Suicidal behavior 01/20/2019     Priority: Medium     PTSD (post-traumatic stress disorder) 11/03/2018     Priority: Medium     Severe episode of recurrent major depressive disorder, without psychotic features (H) 11/02/2018     Priority: Medium     Crushing injury of left hand, sequela 10/16/2018     Priority: Medium     Depression with suicidal ideation 08/27/2018     Priority: Medium     Overweight (BMI 25.0-29.9) 08/16/2018     Priority: Medium     Amputation finger, sequela (H) 08/03/2018     Priority: Medium     Neuroma of hand 08/03/2018     Priority: Medium     Open fracture of left hand with routine healing 01/19/2018     Priority: Medium     Work related injury 01/19/2018     Priority: Medium     Perforated appendicitis 02/07/2017     Priority: Medium     Recurrent major depressive  disorder, in full remission (H) 01/10/2017     Priority: Medium        Past Medical History:    History reviewed. No pertinent past medical history.    Past Surgical History:    Past Surgical History:   Procedure Laterality Date     LAPAROSCOPIC APPENDECTOMY N/A 2/7/2017    Procedure: LAPAROSCOPIC APPENDECTOMY;  Surgeon: Chanel Pagan DO;  Location: [facility]       Family History:    Family History   Problem Relation Age of Onset     Cardiovascular Maternal Grandmother         cardiovascular disease     Respiratory Maternal Grandmother         Lung transplant       Social History:  Marital Status:  Single [1]  Social History     Tobacco Use     Smoking status: Every Day     Packs/day: 1.00     Years: 7.00     Pack years: 7.00     Types: Vaping Device, Cigarettes     Smokeless tobacco: Former   Substance Use Topics     Alcohol use: Never     Comment: rare     Drug use: No        Medications:    amoxicillin-clavulanate (AUGMENTIN) 875-125 MG tablet  EPINEPHrine (ANY BX GENERIC EQUIV) 0.3 MG/0.3ML injection 2-pack  ketorolac (TORADOL) 10 MG tablet          Review of Systems   Constitutional: Positive for activity change. Negative for chills and fever.   HENT: Positive for dental problem and ear pain (left). Negative for facial swelling, sinus pressure and sinus pain.    Eyes: Negative.    Respiratory: Negative for shortness of breath.    Gastrointestinal: Negative for nausea and vomiting.   Musculoskeletal: Positive for neck pain and neck stiffness.   Neurological: Positive for headaches. Negative for dizziness and light-headedness.       Physical Exam   BP: (!) 158/119  Pulse: 78  Temp: 97.9  F (36.6  C)  Resp: 18  SpO2: 97 %      Physical Exam  Vitals and nursing note reviewed.   Constitutional:       General: He is in acute distress (mild).      Appearance: He is normal weight.   HENT:      Head: Normocephalic.      Jaw: There is normal jaw occlusion. Tenderness (Mild) present. No trismus or pain on movement.         Right Ear: Tympanic membrane and ear canal normal.      Left Ear: Ear canal normal. Tympanic membrane is erythematous (mild).      Nose: Nose normal.   Cardiovascular:      Rate and Rhythm: Normal rate.   Pulmonary:      Effort: Pulmonary effort is normal.   Musculoskeletal:      Cervical back: Tenderness present.   Lymphadenopathy:      Cervical: No cervical adenopathy.   Skin:     General: Skin is warm and dry.      Capillary Refill: Capillary refill takes less than 2 seconds.   Neurological:      Mental Status: He is alert and oriented to person, place, and time.   Psychiatric:         Behavior: Behavior normal.         ED Course          Procedures             No results found for this or any previous visit (from the past 24 hour(s)).    Medications   ketorolac (TORADOL) injection 15 mg (15 mg Intramuscular Given 10/27/22 1428)       Assessments & Plan (with Medical Decision Making)     I have reviewed the nursing notes.    I have reviewed the findings, diagnosis, plan and need for follow up with the patient.  (K04.7) Dental infection  Comment: 36 year old male who presents with left lower dental pain that started 2 days ago.  Accompanied with left ear pain, headaches, and neck pain and stiffness.  Has been taking acetaminophen and ibuprofen at home with minimal relief of his discomfort.  Last dose 800 mg ibuprofen at 130.  Had last dentist appointment 1 to 2 months ago.  Has an appointment scheduled for November 2, 2022.  Vapes.  Denies fevers, chills, nausea, vomiting, shortness of breath, dizziness, and lightheadedness.    MDM: mild to moderate gingival swelling and erythema noted around tooth # 18. Very mild left sided facial swelling.    Ketorolac 15 mg given IM    Plan: Augmentin twice daily for 7 days and ketorolac every 6 hours as needed.  Education provided and/or discussed for this/these medications and dental abscess with facial cellulitis.  -Increase fluids.   -Complete all antibiotics even if  "feeling better.    -Taking antibiotics with food may decrease the symptoms, of an upset stomach, that can occur when taking antibiotics. Antibiotics frequently cause diarrhea. Probiotics or yogurt may help prevent or decrease these symptoms.   -Return to be reevaluated if symptoms do not improve, or worsen.  Do not take ibuprofen if taking ketorolac (Toradol.)  Take ONE or the other   After 7:30 pm may start:ketorolac OR Ibuprofen 600 to 800 mg (3 - 4 tabs of over the counter med) every six to eight hours as needed;not to exceed maximum amount of 3200 mg in 24 hours. Take with food. Tylenol 650 to 1000 mg every four to six hours as needed (not to exceed more than 4000 mg in a 24 hour period). May use interchangeably. Suggest medicating around the clock for the next 24-48 hours.     Apply a cold pack or ice compress for up to 20 minutes several times a day. This is to help reduce pain and relieve swelling. Cover it with a thin, dry cloth before putting it on your skin.    Rinse your mouth with warm saltwater several times per day. This will help reduce irritation, gum swelling, and pain.  Good oral hygiene is imperitive. Brush your at least twice a day. Use a fluoride toothpaste and soft-bristle toothbrush.  Eat a healthy diet that doesn t include many sugary foods and drinks.  Call ASAP to schedule an appointment to see a dentist.   Our  department can try to assist you if you are having difficulty finding a dentist, dental coverage, or paying for dental care.  You can reach them by calling [phone] and asking for .  Return to ED/UC if symptoms increase or concerns develop such as those discussed and listed on the \"When to go the Emergency Room\" portion of your discharge instructions.   These discharge instructions and medications were reviewed with him and understanding verbalized.    This document was prepared using a combination of typing and voice generated software.  While every " attempt was made for accuracy, spelling and grammatical errors may exist.    New Prescriptions    AMOXICILLIN-CLAVULANATE (AUGMENTIN) 875-125 MG TABLET    Take 1 tablet by mouth 2 times daily for 7 days    KETOROLAC (TORADOL) 10 MG TABLET    Take 1 tablet (10 mg) by mouth every 6 hours as needed for moderate pain       Final diagnoses:   Dental infection       10/27/2022   [facility]       Marry Baptiste, CNP  10/27/22 [id number]

## 2022-12-16 NOTE — PROGRESS NOTES
Group Therapy Progress Notes     Client Initial Individualized Goals for Treatment: Will report on symptoms and identify skills to use to manage anxiety symptoms and Will Improve Mindfulness / Stay in the Here and Now Regularly practice intentionally focusing on what is occurring in the environment, what you are sensing, thoughts that are popping into your head, emotions that you are feeling, without judging any of it, just noticing it.  Regular practice is ideally stopping to do this on schedule, 4 times a day for four minutes each time.  Intentionally bringing your attention to something beautiful, pleasant, or interesting that is occurring or is present in your immediate environment or experience on a regular basis.    Area of Treatment Focus:  Symptom Stabilization and Management    Therapeutic Interventions/Treatment Strategies:  Facilitate increased self awareness  Provide education regarding warning signs and support systemts  Teach adaptive coping skills and communication skills    Response to Treatment Strategies:  Accepted Feedback, Gave Feedback, Listened , Focused on Goals, Attentive, Accepted Support and Alert    Name of Groups:  Network Development - Time: 11:10-12:00    Description and Therapeutic Outcome:   Discussed the steps to consider when thinking about network or support development I.e. Getting to know yourself/insight, development of physical and emotional resources, maria g your passion not people, and being proactive and invite people into your life. Provided handout on rating and identifying symptoms of depression and anxiety, Identification of what is helpful and not helpful when experiencing anxiety or depressive episodes, and levels of support during these episodes.  Discussed identifying who in your support group is best suited for different symptoms. Lastly discussed ways to build and assess support. Provided group members with a list of chemical and mental health resources.  Mason  identified what his warning signs are to mental health crisis.  Discussed who and what is his support system and what he can do to help himself remain and safe and healthy.  Encouraged Mason to discuss what specific support he needs from each member of his support group.         Is this a Weekly Review of the Progress on the Treatment Plan?  NO    Are Treatment Plan Goals being addressed?  YES      Are Treatment Plan Strategies to Address Goals Effective?  YES      Are there any current contracts in place?  NO              Age appropriate behavior

## 2023-04-24 ENCOUNTER — HOSPITAL ENCOUNTER (EMERGENCY)
Facility: HOSPITAL | Age: 38
Discharge: HOME | End: 2023-04-24
Payer: OTHER MISCELLANEOUS

## 2023-04-24 ENCOUNTER — APPOINTMENT (OUTPATIENT)
Dept: GENERAL RADIOLOGY | Facility: HOSPITAL | Age: 38
End: 2023-04-24
Payer: OTHER MISCELLANEOUS

## 2023-04-24 VITALS
SYSTOLIC BLOOD PRESSURE: 167 MMHG | RESPIRATION RATE: 18 BRPM | TEMPERATURE: 98.6 F | DIASTOLIC BLOOD PRESSURE: 82 MMHG | OXYGEN SATURATION: 97 % | HEART RATE: 74 BPM

## 2023-04-24 DIAGNOSIS — L03.90 CELLULITIS: ICD-10-CM

## 2023-04-24 DIAGNOSIS — L03.012 CELLULITIS OF FINGER OF LEFT HAND: ICD-10-CM

## 2023-04-24 PROCEDURE — 87070 CULTURE OTHR SPECIMN AEROBIC: CPT

## 2023-04-24 PROCEDURE — 99213 OFFICE O/P EST LOW 20 MIN: CPT

## 2023-04-24 PROCEDURE — 87205 SMEAR GRAM STAIN: CPT

## 2023-04-24 PROCEDURE — 73140 X-RAY EXAM OF FINGER(S): CPT | Mod: LT

## 2023-04-24 PROCEDURE — G0463 HOSPITAL OUTPT CLINIC VISIT: HCPCS

## 2023-04-24 RX ORDER — SULFAMETHOXAZOLE/TRIMETHOPRIM 800-160 MG
1 TABLET ORAL 2 TIMES DAILY
Qty: 14 TABLET | Refills: 0 | Status: SHIPPED | OUTPATIENT
Start: 2023-04-24 | End: 2023-05-01

## 2023-04-24 ASSESSMENT — ENCOUNTER SYMPTOMS
NAUSEA: 0
VOMITING: 0
APPETITE CHANGE: 0
SHORTNESS OF BREATH: 0
FEVER: 0
ACTIVITY CHANGE: 0
ABDOMINAL PAIN: 0
COUGH: 0
MYALGIAS: 1
DIARRHEA: 0
CHILLS: 0

## 2023-04-24 NOTE — ED TRIAGE NOTES
Patient presents to urgent care for a cyst on the end of his left ring finger. Patient states it popped and he was able to pull bone fragments out of it.

## 2023-04-24 NOTE — DISCHARGE INSTRUCTIONS
Yogurt or probiotic while taking the antibiotics.     Tylenol and ibuprofen as needed for pain.     Return with any increased pain and redness or concerns.

## 2023-04-24 NOTE — ED TRIAGE NOTES
"Patient presents with complaints of what he states was a possible cyst on the end of his finger. States it softened from doing dishes so he popped it. He has a saw accident earlier in the year and states he was abl;e to get \"like 4 bone fragments out of it\"      "

## 2023-04-24 NOTE — ED PROVIDER NOTES
History     Chief Complaint   Patient presents with     Wound Check     HPI  Mason Garcia is a 37 year old male who presents to the urgent care with a two day history of redness and purulent discharge from left ring finger. He has a previous history of a work injury from 2018 which resulted in the loss of his distal and middle of 5th digit and tip of the 4th (ring) finger tip. He states that he was doing dishes on yesterday when a cyst on tip of finger opened. He was then able to drain purulent discharge and hard fragments, which he feels were bone. CMS intact. He is not a diabetic but does smoke 1PPD. No recent abx. No otc medications taken.       Allergies:  Allergies   Allergen Reactions     Sudafed [Pseudoephedrine]      Venlafaxine Other (See Comments)     Constant suicidal ideation       Problem List:    Patient Active Problem List    Diagnosis Date Noted     Suicidal ideation 03/04/2021     Priority: Medium     Amputation of finger, left 02/25/2021     Priority: Medium     History of open reduction and internal fixation (ORIF) procedure 02/25/2021     Priority: Medium     Pain in left hand 02/25/2021     Priority: Medium     Weakness of left hand 02/25/2021     Priority: Medium     Suicidal behavior 01/20/2019     Priority: Medium     PTSD (post-traumatic stress disorder) 11/03/2018     Priority: Medium     Severe episode of recurrent major depressive disorder, without psychotic features (H) 11/02/2018     Priority: Medium     Crushing injury of left hand, sequela 10/16/2018     Priority: Medium     Depression with suicidal ideation 08/27/2018     Priority: Medium     Overweight (BMI 25.0-29.9) 08/16/2018     Priority: Medium     Amputation finger, sequela (H) 08/03/2018     Priority: Medium     Neuroma of hand 08/03/2018     Priority: Medium     Open fracture of left hand with routine healing 01/19/2018     Priority: Medium     Work related injury 01/19/2018     Priority: Medium     Perforated  appendicitis 02/07/2017     Priority: Medium     Recurrent major depressive disorder, in full remission (H) 01/10/2017     Priority: Medium        Past Medical History:    History reviewed. No pertinent past medical history.    Past Surgical History:    Past Surgical History:   Procedure Laterality Date     LAPAROSCOPIC APPENDECTOMY N/A 2/7/2017    Procedure: LAPAROSCOPIC APPENDECTOMY;  Surgeon: Chanel Pagan DO;  Location: [facility]       Family History:    Family History   Problem Relation Age of Onset     Cardiovascular Maternal Grandmother         cardiovascular disease     Respiratory Maternal Grandmother         Lung transplant       Social History:  Marital Status:  Single [1]  Social History     Tobacco Use     Smoking status: Every Day     Packs/day: 1.00     Years: 7.00     Pack years: 7.00     Types: Vaping Device, Cigarettes     Smokeless tobacco: Former   Substance Use Topics     Alcohol use: Never     Comment: rare     Drug use: No        Medications:    ketorolac (TORADOL) 10 MG tablet  sulfamethoxazole-trimethoprim (BACTRIM DS) 800-160 MG tablet  EPINEPHrine (ANY BX GENERIC EQUIV) 0.3 MG/0.3ML injection 2-pack          Review of Systems   Constitutional: Negative for activity change, appetite change, chills and fever.   Respiratory: Negative for cough and shortness of breath.    Cardiovascular: Negative for chest pain.   Gastrointestinal: Negative for abdominal pain, diarrhea, nausea and vomiting.   Musculoskeletal: Positive for myalgias (left ring finger).   All other systems reviewed and are negative.      Physical Exam   BP: 167/82  Pulse: 74  Temp: 98.6  F (37  C)  Resp: 18  SpO2: 97 %      Physical Exam  Vitals and nursing note reviewed.   Constitutional:       General: He is not in acute distress.     Appearance: Normal appearance. He is not ill-appearing.   Cardiovascular:      Rate and Rhythm: Normal rate and regular rhythm.      Pulses: Normal pulses.      Heart sounds: Normal heart sounds. No  murmur heard.  Pulmonary:      Effort: Pulmonary effort is normal. No respiratory distress.      Breath sounds: Normal breath sounds. No stridor. No wheezing, rhonchi or rales.   Musculoskeletal:         General: Swelling and tenderness present.      Right hand: Swelling, deformity (chronic, from previous injury in 2018), tenderness and bony tenderness present. No lacerations. Normal range of motion. Normal strength. Normal sensation. There is no disruption of two-point discrimination. Normal capillary refill. Normal pulse.        Hands:       Comments: Left 4th finger, amputation of tip     Skin:     General: Skin is warm and dry.      Capillary Refill: Capillary refill takes less than 2 seconds.      Findings: Erythema (left ring finger tip) present.   Neurological:      General: No focal deficit present.      Mental Status: He is alert and oriented to person, place, and time.         ED Course                 Procedures           Results for orders placed or performed during the hospital encounter of 04/24/23 (from the past 24 hour(s))   Fingers XR, 2-3 views, left    Narrative    PROCEDURE:  XR FINGER LEFT G/E 2 VIEWS    HISTORY: history of trauma, purulent discharge. Rule out infection    COMPARISON:  Radiographs 5/7/2021    TECHNIQUE:  XR FINGER LEFT 2 VIEWS    FINDINGS:  Sequela of amputation of the fifth phalanx. Fusion of the  distal interphalangeal joint of the fourth digit. Stable well-defined  cortical irregularity along the proximal interphalangeal joint of the  fourth digit, consistent with sequela of prior injury. No fracture or  dislocation is identified. No erosive osseous change. No foreign body  is seen.       Impression    IMPRESSION:   No acute osseous abnormality or erosive osseous change.    PIETER HEWITT MD         SYSTEM ID:  [id number]       Medications - No data to display    Assessments & Plan (with Medical Decision Making)     I have reviewed the nursing notes.    I have reviewed the  findings, diagnosis, plan and need for follow up with the patient.    Mason Garcia is a 37 year old male who presents to the urgent care with a two day history of redness and purulent discharge from left ring finger. He has a previous history of a work injury from 2018 which resulted in the loss of his distal and middle of 5th digit and tip of the 4th (ring) finger tip. He states that he was doing dishes on yesterday when a cyst on tip of finger opened. He was then able to drain purulent discharge and hard fragments, which he feels were bone. CMS intact. He is not a diabetic but does smoke 1PPD. No recent abx. No otc medications taken.     MDM: xray of left 4th finger:negative for acute osseous abnormality or erosive osseous change, per radiologist.   Wound culture pending.   VSS and afebrile. Lungs clear, heart tones regular. Erythema and swelling noted to tip of left 4th finger. There is some purulent discharge noted. There is no tenderness along the flexor tendon sheath, no fusiform swelling of finger, no pain with passive extension. Finger is not held in slight flexion. Less suspicious for flexor sheath infection. Mason concerned about possible bone fragments as he drained multiple hard pieces of out finger yesterday.     (L03.90) Cellulitis  Plan: bactrim prescribed. Tylenol and ibuprofen as needed for pain. Yogurt or probiotic while taking antibiotics. Return with any increased pain or redness, numbness/tingling, fevers, chills, or concerns.         New Prescriptions    SULFAMETHOXAZOLE-TRIMETHOPRIM (BACTRIM DS) 800-160 MG TABLET    Take 1 tablet by mouth 2 times daily for 7 days       Final diagnoses:   Cellulitis       4/24/2023   [facility]     Heather León NP  04/24/23 1448

## 2023-04-28 LAB
BACTERIA WND CULT: NORMAL
GRAM STAIN RESULT: NORMAL
GRAM STAIN RESULT: NORMAL

## 2023-06-13 ENCOUNTER — HOSPITAL ENCOUNTER (EMERGENCY)
Facility: HOSPITAL | Age: 38
Discharge: HOME | End: 2023-06-13
Attending: STUDENT IN AN ORGANIZED HEALTH CARE EDUCATION/TRAINING PROGRAM | Admitting: STUDENT IN AN ORGANIZED HEALTH CARE EDUCATION/TRAINING PROGRAM
Payer: MEDICAID

## 2023-06-13 VITALS
RESPIRATION RATE: 16 BRPM | SYSTOLIC BLOOD PRESSURE: 159 MMHG | WEIGHT: 222.44 LBS | HEART RATE: 80 BPM | DIASTOLIC BLOOD PRESSURE: 92 MMHG | OXYGEN SATURATION: 97 % | TEMPERATURE: 98.4 F | BODY MASS INDEX: 29.35 KG/M2

## 2023-06-13 DIAGNOSIS — K40.90 RIGHT INGUINAL HERNIA: ICD-10-CM

## 2023-06-13 PROCEDURE — 99282 EMERGENCY DEPT VISIT SF MDM: CPT | Performed by: STUDENT IN AN ORGANIZED HEALTH CARE EDUCATION/TRAINING PROGRAM

## 2023-06-13 PROCEDURE — 99282 EMERGENCY DEPT VISIT SF MDM: CPT

## 2023-06-13 NOTE — DISCHARGE INSTRUCTIONS
Please follow-up with your primary care doctor in 1 week to discuss any further concerns.    Please return to the ER if the hernia returns and does not go away.

## 2023-06-13 NOTE — ED NOTES
Patient is discharging to home given information on hernia and to f/u with pcp in 1 week . Patient stated understanding of these instructions and is discharging by private auto.

## 2023-06-13 NOTE — ED NOTES
Patient has small bulge in pelvic region, non painful, normal bowel movements just concerned that it is the first time he noticed it.

## 2023-06-13 NOTE — ED PROVIDER NOTES
History     Chief Complaint   Patient presents with     Groin Pain     HPI  Mason Garcia is a 37 year old male who Zentz with a bulge in his right groin.  He noticed a bulge in his right groin when he was having a bowel movement. The bulge has now gone away. No pain. Currently asymptomatic. No other symptoms. No trauma to the abdomen. No testicular swelling. No burning with urination.    Allergies:  Allergies   Allergen Reactions     Sudafed [Pseudoephedrine]      Venlafaxine Other (See Comments)     Constant suicidal ideation       Problem List:    Patient Active Problem List    Diagnosis Date Noted     Suicidal ideation 03/04/2021     Priority: Medium     Amputation of finger, left 02/25/2021     Priority: Medium     History of open reduction and internal fixation (ORIF) procedure 02/25/2021     Priority: Medium     Pain in left hand 02/25/2021     Priority: Medium     Weakness of left hand 02/25/2021     Priority: Medium     Suicidal behavior 01/20/2019     Priority: Medium     PTSD (post-traumatic stress disorder) 11/03/2018     Priority: Medium     Severe episode of recurrent major depressive disorder, without psychotic features (H) 11/02/2018     Priority: Medium     Crushing injury of left hand, sequela 10/16/2018     Priority: Medium     Depression with suicidal ideation 08/27/2018     Priority: Medium     Overweight (BMI 25.0-29.9) 08/16/2018     Priority: Medium     Amputation finger, sequela (H) 08/03/2018     Priority: Medium     Neuroma of hand 08/03/2018     Priority: Medium     Open fracture of left hand with routine healing 01/19/2018     Priority: Medium     Work related injury 01/19/2018     Priority: Medium     Perforated appendicitis 02/07/2017     Priority: Medium     Recurrent major depressive disorder, in full remission (H) 01/10/2017     Priority: Medium        Past Medical History:    No past medical history on file.    Past Surgical History:    Past Surgical History:   Procedure  Laterality Date     LAPAROSCOPIC APPENDECTOMY N/A 2/7/2017    Procedure: LAPAROSCOPIC APPENDECTOMY;  Surgeon: Chanel Pagan DO;  Location: [facility]       Family History:    Family History   Problem Relation Age of Onset     Cardiovascular Maternal Grandmother         cardiovascular disease     Respiratory Maternal Grandmother         Lung transplant       Social History:  Marital Status:  Single [1]  Social History     Tobacco Use     Smoking status: Every Day     Packs/day: 1.00     Years: 7.00     Pack years: 7.00     Types: Vaping Device, Cigarettes     Smokeless tobacco: Former   Substance Use Topics     Alcohol use: Never     Comment: rare     Drug use: No        Medications:    EPINEPHrine (ANY BX GENERIC EQUIV) 0.3 MG/0.3ML injection 2-pack          Review of Systems   All other systems reviewed and are negative.      Physical Exam   BP: 159/92  Pulse: 80  Temp: 98.4  F (36.9  C)  Resp: 16  Weight: 100.9 kg (222 lb 7.1 oz)  SpO2: 97 %      Physical Exam  Vitals and nursing note reviewed.   Constitutional:       Appearance: Normal appearance.   HENT:      Head: Normocephalic.   Eyes:      Pupils: Pupils are equal, round, and reactive to light.   Cardiovascular:      Rate and Rhythm: Normal rate and regular rhythm.      Pulses: Normal pulses.      Heart sounds: Normal heart sounds.   Pulmonary:      Effort: Pulmonary effort is normal.      Breath sounds: Normal breath sounds.   Abdominal:      General: Abdomen is flat.      Palpations: Abdomen is soft.      Tenderness: There is no abdominal tenderness.   Genitourinary:     Comments: There is currently no bulge or tenderness, but he describes that there was previously a bulge in the right inguinal canal.  Musculoskeletal:         General: Normal range of motion.      Cervical back: Normal range of motion.   Skin:     General: Skin is warm.      Capillary Refill: Capillary refill takes less than 2 seconds.   Neurological:      General: No focal deficit present.       Mental Status: He is alert.   Psychiatric:         Mood and Affect: Mood normal.         ED Course                 Procedures              No results found for this or any previous visit (from the past 24 hour(s)).    Medications - No data to display    Assessments & Plan (with Medical Decision Making)     I have reviewed the nursing notes.    History and exam consistent with a reduced right inguinal hernia.  No other acute findings.  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  I discussed that this could in fact cause the hernia to have fat or bowel come back into it but there is no way to know if this is going to happen. I discussed that if symptoms return and do not self resolve, he should come to ER, but otherwise recommend following up with PCP and potentially surgeon if it becomes an issues. No indication for other testing.    Findings were discussed with the patient. Additional verbal instructions were discussed with the patient as well. Instructed to follow up with a primary care provider within 1 week. Also discussed specific warning signs and instructed to return to the ED if there are any concerns. Patient voiced understanding of instructions, questions were answered and the patient was discharged home in stable condition.      I have reviewed the findings, diagnosis, plan and need for follow up with the patient.    New Prescriptions    No medications on file       Final diagnoses:   Right inguinal hernia       6/13/2023   [facility]     Ananth Blanco MD  06/13/23 0830

## 2023-08-04 ENCOUNTER — HOSPITAL ENCOUNTER (EMERGENCY)
Facility: HOSPITAL | Age: 38
Discharge: HOME | End: 2023-08-04
Attending: EMERGENCY MEDICINE | Admitting: EMERGENCY MEDICINE
Payer: MEDICAID

## 2023-08-04 ENCOUNTER — APPOINTMENT (OUTPATIENT)
Dept: GENERAL RADIOLOGY | Facility: HOSPITAL | Age: 38
End: 2023-08-04
Attending: EMERGENCY MEDICINE
Payer: MEDICAID

## 2023-08-04 VITALS
BODY MASS INDEX: 27.83 KG/M2 | HEIGHT: 73 IN | DIASTOLIC BLOOD PRESSURE: 67 MMHG | WEIGHT: 210 LBS | RESPIRATION RATE: 22 BRPM | HEART RATE: 88 BPM | SYSTOLIC BLOOD PRESSURE: 120 MMHG | OXYGEN SATURATION: 98 % | TEMPERATURE: 99.1 F

## 2023-08-04 DIAGNOSIS — J18.9 PNEUMONIA DUE TO INFECTIOUS ORGANISM, UNSPECIFIED LATERALITY, UNSPECIFIED PART OF LUNG: ICD-10-CM

## 2023-08-04 PROCEDURE — 94640 AIRWAY INHALATION TREATMENT: CPT

## 2023-08-04 PROCEDURE — 93005 ELECTROCARDIOGRAM TRACING: CPT

## 2023-08-04 PROCEDURE — 99284 EMERGENCY DEPT VISIT MOD MDM: CPT | Mod: 25

## 2023-08-04 PROCEDURE — 99284 EMERGENCY DEPT VISIT MOD MDM: CPT | Performed by: EMERGENCY MEDICINE

## 2023-08-04 PROCEDURE — 93010 ELECTROCARDIOGRAM REPORT: CPT | Performed by: INTERNAL MEDICINE

## 2023-08-04 PROCEDURE — 999N000157 HC STATISTIC RCP TIME EA 10 MIN

## 2023-08-04 PROCEDURE — 250N000009 HC RX 250: Performed by: EMERGENCY MEDICINE

## 2023-08-04 PROCEDURE — 71046 X-RAY EXAM CHEST 2 VIEWS: CPT

## 2023-08-04 RX ORDER — ALBUTEROL SULFATE 90 UG/1
2 AEROSOL, METERED RESPIRATORY (INHALATION) EVERY 6 HOURS PRN
Qty: 18 G | Refills: 0 | Status: SHIPPED | OUTPATIENT
Start: 2023-08-04

## 2023-08-04 RX ORDER — IPRATROPIUM BROMIDE AND ALBUTEROL SULFATE 2.5; .5 MG/3ML; MG/3ML
3 SOLUTION RESPIRATORY (INHALATION) ONCE
Status: COMPLETED | OUTPATIENT
Start: 2023-08-04 | End: 2023-08-04

## 2023-08-04 RX ORDER — BENZONATATE 200 MG/1
200 CAPSULE ORAL 3 TIMES DAILY PRN
Qty: 12 CAPSULE | Refills: 0 | Status: SHIPPED | OUTPATIENT
Start: 2023-08-04

## 2023-08-04 RX ADMIN — IPRATROPIUM BROMIDE AND ALBUTEROL SULFATE 3 ML: .5; 3 SOLUTION RESPIRATORY (INHALATION) at 12:40

## 2023-08-04 ASSESSMENT — ENCOUNTER SYMPTOMS
MUSCULOSKELETAL NEGATIVE: 1
EYES NEGATIVE: 1
FEVER: 1
GASTROINTESTINAL NEGATIVE: 1
COUGH: 1
SHORTNESS OF BREATH: 1
NEUROLOGICAL NEGATIVE: 1
SORE THROAT: 1
ENDOCRINE NEGATIVE: 1
CARDIOVASCULAR NEGATIVE: 1

## 2023-08-04 ASSESSMENT — ACTIVITIES OF DAILY LIVING (ADL): ADLS_ACUITY_SCORE: 35

## 2023-08-04 NOTE — ED TRIAGE NOTES
Pt in for evaluation of shortness of breath and chest pain ongoing the last 3-4 days. Has cough that is intermittently productive as well. Pain is mid left sided and does not radiate. No change in pain with coughing. Has had intermittent chills with temp 100.9 yesterday.  Has been taking tylenol and aleve intermittently since hernia repair surgery on 7/25 at [facility].

## 2023-08-04 NOTE — ED PROVIDER NOTES
History     Chief Complaint   Patient presents with    Shortness of Breath    Chest Pain     HPI  Mason Garcia is a 37 year old male who to the emergency room complaining of fevers chills and cough for the past few days.  Patient states today he had developed some left-sided chest discomfort as well.  He states he had a fever of 100 201.  He states that this morning he had some pain in his left chest into his left shoulder.  He states he also feels slightly short of breath.  He does admit to a sore throat.    Allergies:  Allergies   Allergen Reactions    Sudafed [Pseudoephedrine]     Venlafaxine Other (See Comments)     Constant suicidal ideation       Problem List:    Patient Active Problem List    Diagnosis Date Noted    Suicidal ideation 03/04/2021     Priority: Medium    Amputation of finger, left 02/25/2021     Priority: Medium    History of open reduction and internal fixation (ORIF) procedure 02/25/2021     Priority: Medium    Pain in left hand 02/25/2021     Priority: Medium    Weakness of left hand 02/25/2021     Priority: Medium    Suicidal behavior 01/20/2019     Priority: Medium    PTSD (post-traumatic stress disorder) 11/03/2018     Priority: Medium    Severe episode of recurrent major depressive disorder, without psychotic features (H) 11/02/2018     Priority: Medium    Crushing injury of left hand, sequela 10/16/2018     Priority: Medium    Depression with suicidal ideation 08/27/2018     Priority: Medium    Overweight (BMI 25.0-29.9) 08/16/2018     Priority: Medium    Amputation finger, sequela (H) 08/03/2018     Priority: Medium    Neuroma of hand 08/03/2018     Priority: Medium    Open fracture of left hand with routine healing 01/19/2018     Priority: Medium    Work related injury 01/19/2018     Priority: Medium    Perforated appendicitis 02/07/2017     Priority: Medium    Recurrent major depressive disorder, in full remission (H) 01/10/2017     Priority: Medium        Past Medical History:   "  History reviewed. No pertinent past medical history.    Past Surgical History:    Past Surgical History:   Procedure Laterality Date    LAPAROSCOPIC APPENDECTOMY N/A 2/7/2017    Procedure: LAPAROSCOPIC APPENDECTOMY;  Surgeon: Chanel Pagan DO;  Location: [facility]       Family History:    Family History   Problem Relation Age of Onset    Cardiovascular Maternal Grandmother         cardiovascular disease    Respiratory Maternal Grandmother         Lung transplant       Social History:  Marital Status:  Single [1]  Social History     Tobacco Use    Smoking status: Every Day     Packs/day: 1.00     Years: 7.00     Pack years: 7.00     Types: Vaping Device, Cigarettes    Smokeless tobacco: Former   Substance Use Topics    Alcohol use: Never     Comment: rare    Drug use: No        Medications:    albuterol (PROAIR HFA/PROVENTIL HFA/VENTOLIN HFA) 108 (90 Base) MCG/ACT inhaler  amoxicillin-clavulanate (AUGMENTIN) 875-125 MG tablet  benzonatate (TESSALON) 200 MG capsule  EPINEPHrine (ANY BX GENERIC EQUIV) 0.3 MG/0.3ML injection 2-pack          Review of Systems   Constitutional:  Positive for fever.   HENT:  Positive for sore throat.    Eyes: Negative.    Respiratory:  Positive for cough and shortness of breath.    Cardiovascular: Negative.    Gastrointestinal: Negative.    Endocrine: Negative.    Genitourinary: Negative.    Musculoskeletal: Negative.    Neurological: Negative.    All other systems reviewed found unremarkable  Physical Exam   BP: 120/67  Pulse: 88  Temp: 99.1  F (37.3  C)  Resp: 22  Height: 185.4 cm (6' 1\")  Weight: 95.3 kg (210 lb)  SpO2: 98 %      Physical Exam 37-year-old gentleman who is awake alert oriented person place and time very pleasant and cooperative.  Does not appear acutely dyspneic.  HEENT normocephalic extraocular muscles intact and pupils equally react light.  Tongue midline contact oropharynx is erythematous.  No exudate.  Neck is supple his range of motion pain.  There is nontender " anterior cervical adenopathy.  Lung sounds are slightly diminished bilaterally.  Patient has inspiratory crackles in the left.  He is mild end expiratory wheeze bilaterally.  Heart maintains regular rate and rhythm S1-S2 sounds are appreciated.  The abdomen is soft and nontender.  Extremities of range of motion 5/5 strength no edema.  Neurologic exam no focal deficit.  Dermatologic exam no diffuse skin rashes    ED Course              ED Course as of 08/04/23 1306   Fri Aug 04, 2023   1301 Tremors stable throughout his stay in the department.  He was given a DuoNeb breathing treatment.  I will start him on Augmentin.  Also prescribe a ProAir inhaler and Tessalon for his cough.  I will advise him to be rechecked by his primary care provider as soon as possible.              EKG is obtained and interpreted by myself.  It shows a ventricular rate of 85 bpm.  OH interval is 120 ms.  Corrected QT is 404 ms.  There is no ST segment elevation or depression.  No inappropriate T wave inversion.  This appears to be an essentially normal EKG             Results for orders placed or performed during the hospital encounter of 08/04/23 (from the past 24 hour(s))   XR Chest 2 Views    Narrative    PROCEDURE:  XR CHEST 2 VIEWS    HISTORY: cough,dyspnea, .    COMPARISON:  2/25/2017, 8/9/2022.    FINDINGS:  The cardiomediastinal contours are normal. The trachea is midline.  Recurrent subtle infiltrate is questioned in the right upper lobe. No  effusion or pneumothorax.    No suspicious osseous lesion or subdiaphragmatic free air.      Impression    IMPRESSION:      Question subtle recurrent right upper lobe infiltrate.    KELSEY FARLEY MD         SYSTEM ID:  [id number]       Medications   ipratropium - albuterol 0.5 mg/2.5 mg/3 mL (DUONEB) neb solution 3 mL (3 mLs Nebulization $Given 8/4/23 1240)       Assessments & Plan (with Medical Decision Making)     I have reviewed the nursing notes.    I have reviewed the findings,  diagnosis, plan and need for follow up with the patient.          Medical Decision Making  The patient's presentation was of moderate complexity (an acute illness with systemic symptoms).    The patient's evaluation involved:  review of external note(s) from 2 sources (see separate area of note for details)    The patient's management necessitated moderate risk (prescription drug management including medications given in the ED).        New Prescriptions    ALBUTEROL (PROAIR HFA/PROVENTIL HFA/VENTOLIN HFA) 108 (90 BASE) MCG/ACT INHALER    Inhale 2 puffs into the lungs every 6 hours as needed for shortness of breath, wheezing or cough    AMOXICILLIN-CLAVULANATE (AUGMENTIN) 875-125 MG TABLET    Take 1 tablet by mouth 2 times daily for 10 days    BENZONATATE (TESSALON) 200 MG CAPSULE    Take 1 capsule (200 mg) by mouth 3 times daily as needed for cough       Final diagnoses:   Pneumonia due to infectious organism, unspecified laterality, unspecified part of lung       8/4/2023   [facility]       Pradeep Morales MD  08/04/23 7836

## 2023-08-04 NOTE — ED NOTES
Pt states he has had a cough for a couple of days, recently had hernia repair 7/25, surgical sites are approximated, no drainage, look to be healing well, positive bowel sounds. Pt has a frequent dry cough, RML and RLL have crackles with expiratory wheezes. Pt states he has had a slight fever 100-101. Pt states he awoke this morning with left shoulder/chest pain and rates it 1-2/10. Pt does states he is easily winded, SOB with any activity. Pt is a smoker.

## 2023-08-04 NOTE — Clinical Note
Mason Garcia was seen and treated in our emergency department on 8/4/2023.  He may return to work on 08/07/2023.       If you have any questions or concerns, please don't hesitate to call.      Pradeep Morales MD

## 2023-08-30 ENCOUNTER — HOSPITAL ENCOUNTER (EMERGENCY)
Facility: HOSPITAL | Age: 38
Discharge: HOME | End: 2023-08-30
Payer: MEDICAID

## 2023-08-30 ENCOUNTER — APPOINTMENT (OUTPATIENT)
Dept: GENERAL RADIOLOGY | Facility: HOSPITAL | Age: 38
End: 2023-08-30
Payer: MEDICAID

## 2023-08-30 VITALS
DIASTOLIC BLOOD PRESSURE: 84 MMHG | SYSTOLIC BLOOD PRESSURE: 145 MMHG | TEMPERATURE: 99.5 F | RESPIRATION RATE: 22 BRPM | OXYGEN SATURATION: 95 % | HEART RATE: 90 BPM

## 2023-08-30 DIAGNOSIS — R05.9 COUGH, UNSPECIFIED TYPE: Primary | ICD-10-CM

## 2023-08-30 DIAGNOSIS — R05.9 COUGH: ICD-10-CM

## 2023-08-30 PROCEDURE — 93005 ELECTROCARDIOGRAM TRACING: CPT

## 2023-08-30 PROCEDURE — 71046 X-RAY EXAM CHEST 2 VIEWS: CPT

## 2023-08-30 PROCEDURE — 93010 ELECTROCARDIOGRAM REPORT: CPT | Performed by: HOSPITALIST

## 2023-08-30 PROCEDURE — G0463 HOSPITAL OUTPT CLINIC VISIT: HCPCS | Mod: 25

## 2023-08-30 PROCEDURE — 99214 OFFICE O/P EST MOD 30 MIN: CPT

## 2023-08-30 RX ORDER — ALBUTEROL SULFATE 90 UG/1
2 AEROSOL, METERED RESPIRATORY (INHALATION) EVERY 6 HOURS PRN
Qty: 18 G | Refills: 0 | Status: SHIPPED | OUTPATIENT
Start: 2023-08-30

## 2023-08-30 RX ORDER — PREDNISONE 20 MG/1
TABLET ORAL
Qty: 10 TABLET | Refills: 0 | Status: SHIPPED | OUTPATIENT
Start: 2023-08-30

## 2023-08-30 RX ORDER — CETIRIZINE HYDROCHLORIDE 10 MG/1
10 TABLET ORAL DAILY
Qty: 30 TABLET | Refills: 0 | Status: SHIPPED | OUTPATIENT
Start: 2023-08-30 | End: 2023-09-29

## 2023-08-30 RX ORDER — AMOXICILLIN 500 MG/1
CAPSULE ORAL
COMMUNITY
Start: 2022-10-27

## 2023-08-30 RX ORDER — VARENICLINE TARTRATE 1 MG/1
1 TABLET, FILM COATED ORAL
COMMUNITY
Start: 2023-08-16

## 2023-08-30 ASSESSMENT — ENCOUNTER SYMPTOMS
UNEXPECTED WEIGHT CHANGE: 1
DIZZINESS: 0
WHEEZING: 1
COUGH: 1
ACTIVITY CHANGE: 1
MYALGIAS: 0
NAUSEA: 0
DIARRHEA: 0
FATIGUE: 1
CHEST TIGHTNESS: 0
APPETITE CHANGE: 1
RHINORRHEA: 0
VOMITING: 0
SORE THROAT: 0
ABDOMINAL PAIN: 0

## 2023-08-30 NOTE — Clinical Note
Mason Garcia was seen and treated in our emergency department on 8/30/2023.  He may return to work on 09/03/2023.       If you have any questions or concerns, please don't hesitate to call.      Heather León, NP

## 2023-08-30 NOTE — DISCHARGE INSTRUCTIONS
Prednisone daily for 5 days. Avoid ibuprofen and Aleve while taking as this can cause bleeding in the stomach.     Zyrtec daily for 7-10 days.     Albuterol inhaler every 6 hours.     Return with any chest pain, shortness of breath, or other concerns.     Follow up in the clinic within one week.

## 2023-08-30 NOTE — ED PROVIDER NOTES
History     Chief Complaint   Patient presents with    Shortness of Breath     HPI  Mason Garcia is a 37 year old male who presents to the urgent care with a 2 week history of a dry, non productive cough. He was treated for pneumonia on 8/4 with Augmentin and an albuterol inhaler, felt better for about a week before symptoms return. He also notes that he had a syncopal episode after getting up to use the bathroom last night. He has been eating and drinking but states he has had a noticeable weight loss after right inguinal hernia surgery on 7/25/23. He denies shortness of breath, chest pressure, chest tightness, dizziness, n/v/d, abd pain, decreased oral intake, rashes, and myalgias. No OTC medications taken today. He is a daily 1 PPD smoker. Works at [employer].     Allergies:  Allergies   Allergen Reactions    Sudafed [Pseudoephedrine]     Venlafaxine Other (See Comments)     Constant suicidal ideation       Problem List:    Patient Active Problem List    Diagnosis Date Noted    Suicidal ideation 03/04/2021     Priority: Medium    Amputation of finger, left 02/25/2021     Priority: Medium    History of open reduction and internal fixation (ORIF) procedure 02/25/2021     Priority: Medium    Pain in left hand 02/25/2021     Priority: Medium    Weakness of left hand 02/25/2021     Priority: Medium    Suicidal behavior 01/20/2019     Priority: Medium    PTSD (post-traumatic stress disorder) 11/03/2018     Priority: Medium    Severe episode of recurrent major depressive disorder, without psychotic features (H) 11/02/2018     Priority: Medium    Crushing injury of left hand, sequela 10/16/2018     Priority: Medium    Depression with suicidal ideation 08/27/2018     Priority: Medium    Overweight (BMI 25.0-29.9) 08/16/2018     Priority: Medium    Amputation finger, sequela (H) 08/03/2018     Priority: Medium    Neuroma of hand 08/03/2018     Priority: Medium    Open fracture of left hand with routine healing 01/19/2018      Priority: Medium    Work related injury 01/19/2018     Priority: Medium    Perforated appendicitis 02/07/2017     Priority: Medium    Recurrent major depressive disorder, in full remission (H) 01/10/2017     Priority: Medium        Past Medical History:    No past medical history on file.    Past Surgical History:    Past Surgical History:   Procedure Laterality Date    LAPAROSCOPIC APPENDECTOMY N/A 2/7/2017    Procedure: LAPAROSCOPIC APPENDECTOMY;  Surgeon: Chanel Pagan DO;  Location: [facility]       Family History:    Family History   Problem Relation Age of Onset    Cardiovascular Maternal Grandmother         cardiovascular disease    Respiratory Maternal Grandmother         Lung transplant       Social History:  Marital Status:  Single [1]  Social History     Tobacco Use    Smoking status: Every Day     Packs/day: 1.00     Years: 7.00     Pack years: 7.00     Types: Vaping Device, Cigarettes    Smokeless tobacco: Former   Substance Use Topics    Alcohol use: Never     Comment: rare    Drug use: No        Medications:    albuterol (PROAIR HFA/PROVENTIL HFA/VENTOLIN HFA) 108 (90 Base) MCG/ACT inhaler  albuterol (PROAIR HFA/PROVENTIL HFA/VENTOLIN HFA) 108 (90 Base) MCG/ACT inhaler  amoxicillin (AMOXIL) 500 MG capsule  cetirizine (ZYRTEC) 10 MG tablet  predniSONE (DELTASONE) 20 MG tablet  varenicline (CHANTIX JEFERSON) 0.5 MG X 11 & 1 MG X 42 tablet  varenicline (CHANTIX) 1 MG tablet  benzonatate (TESSALON) 200 MG capsule  EPINEPHrine (ANY BX GENERIC EQUIV) 0.3 MG/0.3ML injection 2-pack          Review of Systems   Constitutional:  Positive for activity change, appetite change, fatigue and unexpected weight change.   HENT:  Positive for ear pain (right). Negative for congestion, rhinorrhea and sore throat.    Respiratory:  Positive for cough and wheezing. Negative for chest tightness.    Cardiovascular:  Negative for chest pain.   Gastrointestinal:  Negative for abdominal pain, diarrhea, nausea and vomiting.    Musculoskeletal:  Negative for myalgias.   Skin:  Negative for pallor and rash.   Neurological:  Positive for syncope (one episode last night). Negative for dizziness.   All other systems reviewed and are negative.      Physical Exam   BP: 145/84  Pulse: 90  Temp: 99.5  F (37.5  C)  Resp: 22  SpO2: 95 %      Physical Exam  Vitals and nursing note reviewed.   Constitutional:       General: He is in acute distress.      Appearance: He is well-developed and normal weight. He is ill-appearing. He is not toxic-appearing or diaphoretic.   HENT:      Mouth/Throat:      Mouth: Mucous membranes are moist.      Pharynx: Oropharynx is clear. No oropharyngeal exudate.   Cardiovascular:      Rate and Rhythm: Normal rate and regular rhythm.      Heart sounds: No murmur heard.  Pulmonary:      Effort: No tachypnea or bradypnea.      Breath sounds: No stridor. Examination of the right-upper field reveals wheezing. Examination of the right-middle field reveals wheezing. Wheezing present. No decreased breath sounds, rhonchi or rales.   Skin:     General: Skin is warm and dry.      Capillary Refill: Capillary refill takes less than 2 seconds.      Coloration: Skin is not pale.      Findings: No erythema or rash.   Neurological:      General: No focal deficit present.      Mental Status: He is alert and oriented to person, place, and time.   Psychiatric:         Mood and Affect: Mood normal. Mood is not anxious.         Behavior: Behavior normal.         ED Course                 Procedures              EKG Interpretation:      Interpreted by Heather León NP  Time reviewed: 1001  Symptoms at time of EKG: None   Rhythm: normal sinus   Rate: Normal  Axis: Normal  Ectopy: none  Conduction: normal  ST Segments/ T Waves: No ST-T wave changes and No acute ischemic changes  Q Waves: none  Comparison to prior: Unchanged from 8/4/23    Clinical Impression: normal EKG                 Results for orders placed or performed during the hospital  encounter of 08/30/23 (from the past 24 hour(s))   Chest XR,  PA & LAT    Narrative    Exam:  XR CHEST 2 VIEWS    HISTORY: 3 week history of cough.    COMPARISON:  8/4/2023, 8/9/2022    FINDINGS:     The cardiomediastinal contours are normal.      No focal consolidation, effusion, or pneumothorax.      No acute osseous abnormality.       Impression    IMPRESSION:      No acute cardiopulmonary process.      STEVEN GARCIA MD         SYSTEM ID:  [id number]       Medications - No data to display    Assessments & Plan (with Medical Decision Making)     I have reviewed the nursing notes.    I have reviewed the findings, diagnosis, plan and need for follow up with the patient.  Mason Garcia is a 37 year old male who presents to the urgent care with a 2 week history of a dry, non productive cough. He was treated for pneumonia on 8/4 with Augmentin and an albuterol inhaler, felt better for about a week before symptoms return. He also notes that he had a syncopal episode after getting up to use the bathroom last night. He has been eating and drinking but states he has had a noticeable weight loss after right inguinal hernia surgery on 7/25/23. He denies shortness of breath, chest pressure, chest tightness, dizziness, n/v/d, abd pain, decreased oral intake, rashes, and myalgias. No OTC medications taken today. He is a daily 1 PPD smoker. Works at [employer].     MDM: CXR:no acute cardiopulmonary process.    VSS and afebrile. EKG NSR with no ectopy or ST elevation. Lungs clear with expiratory wheezes to right. No audible wheezes or stridor. Skin pink and warm. Heart tones regular. TM clear bilaterally. There is no erythema to posterior oropharynx. He is ill appearing but non toxic in appearance. No tachycardia or hemoptysis. No leg swelling. Well's criteria 0, less suspicious for PE. Daily smoker, no OTC medications taken. Will prescribe prednisone, zyrtec, and refill albuterol inhaler. He has an appt with PCP in the clinic  tomorrow. Discussed return with any chest pain, shortness of breath, dizziness, or other concerns. He is in agreement with plan.     (R05.9) Cough  Plan: Prednisone daily for 5 days. Avoid ibuprofen and Aleve while taking as this can cause bleeding in the stomach.     Zyrtec daily for 7-10 days.     Albuterol inhaler every 6 hours.     Return with any chest pain, shortness of breath, or other concerns.     Follow up in the clinic within one week. Understanding verbalized.            New Prescriptions    ALBUTEROL (PROAIR HFA/PROVENTIL HFA/VENTOLIN HFA) 108 (90 BASE) MCG/ACT INHALER    Inhale 2 puffs into the lungs every 6 hours as needed for shortness of breath, wheezing or cough    CETIRIZINE (ZYRTEC) 10 MG TABLET    Take 1 tablet (10 mg) by mouth daily for 30 doses    PREDNISONE (DELTASONE) 20 MG TABLET    Take two tablets (= 40mg) each day for 5 (five) days       Final diagnoses:   Cough       8/30/2023   [facility]       Heather León NP  08/30/23 1012

## 2023-08-30 NOTE — ED TRIAGE NOTES
"Patient presents to urgent care for SOB and coughing due to having pneumonia 2-3 weeks ago. Patient states \"he never got better after taking his antibiotics.\" Patient also had a syncopal yesterday , found himself laying on the bathroom floor. He reports SOB with mostly activity.         "

## 2023-12-10 ENCOUNTER — HEALTH MAINTENANCE LETTER (OUTPATIENT)
Age: 38
End: 2023-12-10

## 2024-05-12 ENCOUNTER — HOSPITAL ENCOUNTER (EMERGENCY)
Facility: HOSPITAL | Age: 39
Discharge: HOME | End: 2024-05-12
Attending: PHYSICIAN ASSISTANT | Admitting: PHYSICIAN ASSISTANT
Payer: COMMERCIAL

## 2024-05-12 VITALS
TEMPERATURE: 97.9 F | SYSTOLIC BLOOD PRESSURE: 148 MMHG | HEART RATE: 80 BPM | DIASTOLIC BLOOD PRESSURE: 89 MMHG | OXYGEN SATURATION: 96 % | RESPIRATION RATE: 19 BRPM

## 2024-05-12 DIAGNOSIS — T30.0 BURN: ICD-10-CM

## 2024-05-12 PROCEDURE — G0463 HOSPITAL OUTPT CLINIC VISIT: HCPCS

## 2024-05-12 PROCEDURE — 99213 OFFICE O/P EST LOW 20 MIN: CPT | Performed by: PHYSICIAN ASSISTANT

## 2024-05-12 PROCEDURE — 250N000009 HC RX 250: Performed by: PHYSICIAN ASSISTANT

## 2024-05-12 RX ORDER — SILVER SULFADIAZINE 10 MG/G
CREAM TOPICAL ONCE
Status: COMPLETED | OUTPATIENT
Start: 2024-05-12 | End: 2024-05-12

## 2024-05-12 RX ADMIN — SILVER SULFADIAZINE: 10 CREAM TOPICAL at 14:56

## 2024-05-12 ASSESSMENT — ENCOUNTER SYMPTOMS
WOUND: 1
FEVER: 0
CARDIOVASCULAR NEGATIVE: 1
RESPIRATORY NEGATIVE: 1

## 2024-05-12 ASSESSMENT — COLUMBIA-SUICIDE SEVERITY RATING SCALE - C-SSRS
1. IN THE PAST MONTH, HAVE YOU WISHED YOU WERE DEAD OR WISHED YOU COULD GO TO SLEEP AND NOT WAKE UP?: NO
2. HAVE YOU ACTUALLY HAD ANY THOUGHTS OF KILLING YOURSELF IN THE PAST MONTH?: NO
6. HAVE YOU EVER DONE ANYTHING, STARTED TO DO ANYTHING, OR PREPARED TO DO ANYTHING TO END YOUR LIFE?: NO

## 2024-05-12 NOTE — ED TRIAGE NOTES
MARKY De Souza CNP assessed patient in triage and determined patient Urgent Care appropriate. Will be seen in Urgent Care.
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
## 2024-05-12 NOTE — DISCHARGE INSTRUCTIONS
Apply silvadene once daily after gently cleaning area.   You can do wound cares once daily for 3-7 days, depending on improvement.   Ideally recheck in 3-5 days, but certainly get it seen if worsening

## 2024-05-12 NOTE — ED PROVIDER NOTES
History     Chief Complaint   Patient presents with    Wound Check     HPI  Mason Garcia is a 38 year old male who presents for wound check after grease burn last week. He was using , lightly sprayed area and this took off a yellow patch/slough. Exposed area is red and beefy and he just wants this checked out.     Allergies:  Allergies   Allergen Reactions    Pseudoephedrine Nausea and Shortness Of Breath    Venlafaxine Other (See Comments)     Constant suicidal ideation       Problem List:    Patient Active Problem List    Diagnosis Date Noted    Suicidal ideation 03/04/2021     Priority: Medium    Amputation of finger, left 02/25/2021     Priority: Medium    History of open reduction and internal fixation (ORIF) procedure 02/25/2021     Priority: Medium    Pain in left hand 02/25/2021     Priority: Medium    Weakness of left hand 02/25/2021     Priority: Medium    Suicidal behavior 01/20/2019     Priority: Medium    PTSD (post-traumatic stress disorder) 11/03/2018     Priority: Medium    Severe episode of recurrent major depressive disorder, without psychotic features (H) 11/02/2018     Priority: Medium    Crushing injury of left hand, sequela 10/16/2018     Priority: Medium    Depression with suicidal ideation 08/27/2018     Priority: Medium    Overweight (BMI 25.0-29.9) 08/16/2018     Priority: Medium    Amputation finger, sequela (H24) 08/03/2018     Priority: Medium    Neuroma of hand 08/03/2018     Priority: Medium    Open fracture of left hand with routine healing 01/19/2018     Priority: Medium    Work related injury 01/19/2018     Priority: Medium    Perforated appendicitis 02/07/2017     Priority: Medium    Recurrent major depressive disorder, in full remission (H24) 01/10/2017     Priority: Medium        Past Medical History:    No past medical history on file.    Past Surgical History:    Past Surgical History:   Procedure Laterality Date    LAPAROSCOPIC APPENDECTOMY N/A 2/7/2017     Procedure: LAPAROSCOPIC APPENDECTOMY;  Surgeon: Chanel Pagan DO;  Location: [facility]       Family History:    Family History   Problem Relation Age of Onset    Cardiovascular Maternal Grandmother         cardiovascular disease    Respiratory Maternal Grandmother         Lung transplant       Social History:  Marital Status:  Single [1]  Social History     Tobacco Use    Smoking status: Every Day     Current packs/day: 1.00     Average packs/day: 1 pack/day for 7.0 years (7.0 ttl pk-yrs)     Types: Vaping Device, Cigarettes    Smokeless tobacco: Former   Substance Use Topics    Alcohol use: Never     Comment: rare    Drug use: No        Medications:    albuterol (PROAIR HFA/PROVENTIL HFA/VENTOLIN HFA) 108 (90 Base) MCG/ACT inhaler  albuterol (PROAIR HFA/PROVENTIL HFA/VENTOLIN HFA) 108 (90 Base) MCG/ACT inhaler  amoxicillin (AMOXIL) 500 MG capsule  benzonatate (TESSALON) 200 MG capsule  EPINEPHrine (ANY BX GENERIC EQUIV) 0.3 MG/0.3ML injection 2-pack  predniSONE (DELTASONE) 20 MG tablet  varenicline (CHANTIX JEFERSON) 0.5 MG X 11 & 1 MG X 42 tablet  varenicline (CHANTIX) 1 MG tablet          Review of Systems   Constitutional:  Negative for fever.   Respiratory: Negative.     Cardiovascular: Negative.    Skin:  Positive for wound.       Physical Exam   BP: 148/89  Pulse: 80  Temp: 97.9  F (36.6  C)  Resp: 19  SpO2: 96 %      Physical Exam  Vitals and nursing note reviewed.   Constitutional:       General: He is not in acute distress.     Appearance: He is not toxic-appearing.   Cardiovascular:      Rate and Rhythm: Normal rate.   Pulmonary:      Effort: Pulmonary effort is normal.   Musculoskeletal:      Right lower leg: Laceration (approx 3cm L x 2 cm wide area of granular tissue, surrounding areas are scabbed over/resolving) present.        Legs:    Neurological:      Mental Status: He is alert and oriented to person, place, and time.         ED Course     No results found for this or any previous visit (from the past 24  hour(s)).    Medications   silver sulfADIAZINE (SILVADENE) 1 % cream ( Topical $Given 5/12/24 2494)       Assessments & Plan (with Medical Decision Making)     I have reviewed the nursing notes.  I have reviewed the findings, diagnosis, plan and need for follow up with the patient.  Discharge Medication List as of 5/12/2024  2:59 PM          Final diagnoses:   Burn    TDAP is already up to date. Silvadene/dressing changes. Discussed stages of healing/reassured this is granular tissue. Ideally recheck in 3-5 days, seeking care sooner with concern for worsening/infection.     5/12/2024   [facility]       David Johnson, PA  05/12/24 3096

## 2024-10-20 NOTE — PROGRESS NOTES
Note dictated.  
PROGRESS NOTE       DATE OF SERVICE:  2017      Mr. Mason Geronimo was seen today for followup of his laparoscopic appendectomy carried out for perforated appendicitis 2 weeks ago tomorrow.  He was in hospital until the following Friday because of fevers, was maintained on IV antibiotics and then oral antibiotics.  He did see Dr. Patten last Monday and had his drain removed at that time.  He does state that he is getting along reasonably well.  He is having very minimal abdominal pain.  There has been no further fever or chills.  He has been eating though his appetite has not been great.  Bowels are functioning regularly.  He feels a bit of suprapubic discomfort when he has a bowel movement.      PHYSICAL EXAMINATION:  On examination today, he looks well.  His abdomen is soft.  He had some very minimal tenderness in the right lower quadrant.  There were no masses.  There is no hepatosplenomegaly.  No evidence of any guarding.  His wounds look good except for the suprapubic incision.  He has some slight redness and superficial irritation of the wound with some slight drainage.  This really looks more like a very mild folliculitis.  I suggested that he keep the area clean with some soap and water.  He might benefit by using some triple antibiotic ointment or Neosporin ointment.      He is now 2 weeks post surgery.  I think he can return to normal activities.  I did fill out a form for him to return to work without restrictions starting tomorrow.  I warned him he will be a bit sore as he becomes more active.  He was happy to get back to work.  At the moment I have not set up an appointment to see him again, but he will return if there are concerns.         JEREMY ALONZO MD             D: 2017 13:05   T: 2017 13:52   MT: PAM      Name:     MASON GERONIMO   MRN:      [id number]        Account:      [id number]   :      1985           Visit Date:   2017      Document: [id number]    
dizziness

## 2024-12-30 ENCOUNTER — HOSPITAL ENCOUNTER (EMERGENCY)
Facility: HOSPITAL | Age: 39
Discharge: LEFT BEFORE DISCHARGE | End: 2024-12-30

## 2024-12-30 VITALS
TEMPERATURE: 98.5 F | RESPIRATION RATE: 22 BRPM | DIASTOLIC BLOOD PRESSURE: 66 MMHG | OXYGEN SATURATION: 96 % | HEART RATE: 91 BPM | SYSTOLIC BLOOD PRESSURE: 120 MMHG

## 2024-12-30 DIAGNOSIS — U07.1 COVID-19 VIRUS INFECTION: ICD-10-CM

## 2024-12-30 LAB
FLUAV RNA SPEC QL NAA+PROBE: NEGATIVE
FLUBV RNA RESP QL NAA+PROBE: NEGATIVE
RSV RNA SPEC NAA+PROBE: NEGATIVE
SARS-COV-2 RNA RESP QL NAA+PROBE: POSITIVE

## 2024-12-30 PROCEDURE — 99281 EMR DPT VST MAYX REQ PHY/QHP: CPT | Performed by: PHYSICIAN ASSISTANT

## 2024-12-30 PROCEDURE — 250N000013 HC RX MED GY IP 250 OP 250 PS 637: Performed by: INTERNAL MEDICINE

## 2024-12-30 PROCEDURE — 87637 SARSCOV2&INF A&B&RSV AMP PRB: CPT | Performed by: PHYSICIAN ASSISTANT

## 2024-12-30 PROCEDURE — 99281 EMR DPT VST MAYX REQ PHY/QHP: CPT

## 2024-12-30 RX ORDER — ACETAMINOPHEN 325 MG/1
325 TABLET ORAL ONCE
Status: DISCONTINUED | OUTPATIENT
Start: 2024-12-30 | End: 2024-12-30

## 2024-12-30 RX ORDER — ACETAMINOPHEN 325 MG/1
975 TABLET ORAL ONCE
Status: COMPLETED | OUTPATIENT
Start: 2024-12-30 | End: 2024-12-30

## 2024-12-30 RX ADMIN — ACETAMINOPHEN 975 MG: 325 TABLET, FILM COATED ORAL at 21:10

## 2024-12-31 ASSESSMENT — ACTIVITIES OF DAILY LIVING (ADL)
ADLS_ACUITY_SCORE: 42

## 2024-12-31 NOTE — ED NOTES
Patient reports he is feeling better and would like to go home.  He does not want to be seen by a provider.   Encouraged him to follow-up w/ PCP or return to the ED/urgent care if needed, verbalized understanding.

## 2025-02-12 ENCOUNTER — APPOINTMENT (OUTPATIENT)
Dept: CT IMAGING | Facility: HOSPITAL | Age: 40
End: 2025-02-12
Attending: EMERGENCY MEDICINE
Payer: COMMERCIAL

## 2025-02-12 ENCOUNTER — HOSPITAL ENCOUNTER (EMERGENCY)
Facility: HOSPITAL | Age: 40
Discharge: HOME | End: 2025-02-12
Attending: EMERGENCY MEDICINE
Payer: COMMERCIAL

## 2025-02-12 VITALS
WEIGHT: 244.71 LBS | HEART RATE: 78 BPM | TEMPERATURE: 98.3 F | OXYGEN SATURATION: 96 % | RESPIRATION RATE: 13 BRPM | BODY MASS INDEX: 32.29 KG/M2 | SYSTOLIC BLOOD PRESSURE: 131 MMHG | DIASTOLIC BLOOD PRESSURE: 83 MMHG

## 2025-02-12 DIAGNOSIS — R07.81 PLEURITIC CHEST PAIN: ICD-10-CM

## 2025-02-12 DIAGNOSIS — R19.7 NAUSEA VOMITING AND DIARRHEA: ICD-10-CM

## 2025-02-12 DIAGNOSIS — R11.2 NAUSEA VOMITING AND DIARRHEA: ICD-10-CM

## 2025-02-12 DIAGNOSIS — R55 PRE-SYNCOPE: ICD-10-CM

## 2025-02-12 LAB
ALBUMIN SERPL BCG-MCNC: 4 G/DL (ref 3.5–5.2)
ALBUMIN UR-MCNC: NEGATIVE MG/DL
ALP SERPL-CCNC: 72 U/L (ref 40–150)
ALT SERPL W P-5'-P-CCNC: 52 U/L (ref 0–70)
ANION GAP SERPL CALCULATED.3IONS-SCNC: 10 MMOL/L (ref 7–15)
APPEARANCE UR: CLEAR
AST SERPL W P-5'-P-CCNC: 29 U/L (ref 0–45)
BASOPHILS # BLD AUTO: 0 10E3/UL (ref 0–0.2)
BASOPHILS NFR BLD AUTO: 0 %
BILIRUB SERPL-MCNC: 0.6 MG/DL
BILIRUB UR QL STRIP: NEGATIVE
BUN SERPL-MCNC: 12.4 MG/DL (ref 6–20)
CALCIUM SERPL-MCNC: 7.9 MG/DL (ref 8.8–10.4)
CHLORIDE SERPL-SCNC: 104 MMOL/L (ref 98–107)
COLOR UR AUTO: ABNORMAL
CREAT SERPL-MCNC: 1.09 MG/DL (ref 0.67–1.17)
CRP SERPL-MCNC: 67.47 MG/L
EGFRCR SERPLBLD CKD-EPI 2021: 89 ML/MIN/1.73M2
EOSINOPHIL # BLD AUTO: 0 10E3/UL (ref 0–0.7)
EOSINOPHIL NFR BLD AUTO: 0 %
ERYTHROCYTE [DISTWIDTH] IN BLOOD BY AUTOMATED COUNT: 12.8 % (ref 10–15)
FLUAV RNA SPEC QL NAA+PROBE: NEGATIVE
FLUBV RNA RESP QL NAA+PROBE: NEGATIVE
GLUCOSE SERPL-MCNC: 104 MG/DL (ref 70–99)
GLUCOSE UR STRIP-MCNC: NEGATIVE MG/DL
HCO3 SERPL-SCNC: 24 MMOL/L (ref 22–29)
HCT VFR BLD AUTO: 44.4 % (ref 40–53)
HGB BLD-MCNC: 15.6 G/DL (ref 13.3–17.7)
HGB UR QL STRIP: NEGATIVE
HOLD SPECIMEN: NORMAL
HOLD SPECIMEN: NORMAL
IMM GRANULOCYTES # BLD: 0 10E3/UL
IMM GRANULOCYTES NFR BLD: 0 %
KETONES UR STRIP-MCNC: NEGATIVE MG/DL
LACTATE SERPL-SCNC: 1.1 MMOL/L (ref 0.7–2)
LEUKOCYTE ESTERASE UR QL STRIP: NEGATIVE
LYMPHOCYTES # BLD AUTO: 1.1 10E3/UL (ref 0.8–5.3)
LYMPHOCYTES NFR BLD AUTO: 24 %
MAGNESIUM SERPL-MCNC: 1.8 MG/DL (ref 1.7–2.3)
MCH RBC QN AUTO: 29.9 PG (ref 26.5–33)
MCHC RBC AUTO-ENTMCNC: 35.1 G/DL (ref 31.5–36.5)
MCV RBC AUTO: 85 FL (ref 78–100)
MONOCYTES # BLD AUTO: 0.6 10E3/UL (ref 0–1.3)
MONOCYTES NFR BLD AUTO: 14 %
MUCOUS THREADS #/AREA URNS LPF: PRESENT /LPF
NEUTROPHILS # BLD AUTO: 2.9 10E3/UL (ref 1.6–8.3)
NEUTROPHILS NFR BLD AUTO: 62 %
NITRATE UR QL: NEGATIVE
NRBC # BLD AUTO: 0 10E3/UL
NRBC BLD AUTO-RTO: 0 /100
PH UR STRIP: 6 [PH] (ref 4.7–8)
PLATELET # BLD AUTO: 155 10E3/UL (ref 150–450)
POTASSIUM SERPL-SCNC: 3.5 MMOL/L (ref 3.4–5.3)
PROCALCITONIN SERPL IA-MCNC: 0.22 NG/ML
PROT SERPL-MCNC: 6.7 G/DL (ref 6.4–8.3)
RBC # BLD AUTO: 5.22 10E6/UL (ref 4.4–5.9)
RBC URINE: 2 /HPF
RSV RNA SPEC NAA+PROBE: NEGATIVE
SARS-COV-2 RNA RESP QL NAA+PROBE: NEGATIVE
SODIUM SERPL-SCNC: 138 MMOL/L (ref 135–145)
SP GR UR STRIP: 1.01 (ref 1–1.03)
SQUAMOUS EPITHELIAL: <1 /HPF
TROPONIN T SERPL HS-MCNC: 6 NG/L
TROPONIN T SERPL HS-MCNC: 7 NG/L
UROBILINOGEN UR STRIP-MCNC: NORMAL MG/DL
WBC # BLD AUTO: 4.6 10E3/UL (ref 4–11)
WBC URINE: <1 /HPF

## 2025-02-12 PROCEDURE — 250N000011 HC RX IP 250 OP 636: Performed by: EMERGENCY MEDICINE

## 2025-02-12 PROCEDURE — 74177 CT ABD & PELVIS W/CONTRAST: CPT

## 2025-02-12 PROCEDURE — 99285 EMERGENCY DEPT VISIT HI MDM: CPT | Mod: 25

## 2025-02-12 PROCEDURE — 71275 CT ANGIOGRAPHY CHEST: CPT

## 2025-02-12 PROCEDURE — 84145 PROCALCITONIN (PCT): CPT | Performed by: EMERGENCY MEDICINE

## 2025-02-12 PROCEDURE — 81003 URINALYSIS AUTO W/O SCOPE: CPT | Performed by: EMERGENCY MEDICINE

## 2025-02-12 PROCEDURE — 36415 COLL VENOUS BLD VENIPUNCTURE: CPT | Performed by: EMERGENCY MEDICINE

## 2025-02-12 PROCEDURE — 83605 ASSAY OF LACTIC ACID: CPT | Performed by: EMERGENCY MEDICINE

## 2025-02-12 PROCEDURE — 84484 ASSAY OF TROPONIN QUANT: CPT | Performed by: EMERGENCY MEDICINE

## 2025-02-12 PROCEDURE — 80053 COMPREHEN METABOLIC PANEL: CPT | Performed by: EMERGENCY MEDICINE

## 2025-02-12 PROCEDURE — 83735 ASSAY OF MAGNESIUM: CPT | Performed by: EMERGENCY MEDICINE

## 2025-02-12 PROCEDURE — 99284 EMERGENCY DEPT VISIT MOD MDM: CPT | Performed by: EMERGENCY MEDICINE

## 2025-02-12 PROCEDURE — 96374 THER/PROPH/DIAG INJ IV PUSH: CPT | Mod: XU

## 2025-02-12 PROCEDURE — 82040 ASSAY OF SERUM ALBUMIN: CPT | Performed by: EMERGENCY MEDICINE

## 2025-02-12 PROCEDURE — 87637 SARSCOV2&INF A&B&RSV AMP PRB: CPT | Performed by: EMERGENCY MEDICINE

## 2025-02-12 PROCEDURE — 250N000009 HC RX 250: Performed by: EMERGENCY MEDICINE

## 2025-02-12 PROCEDURE — 250N000011 HC RX IP 250 OP 636: Performed by: RADIOLOGY

## 2025-02-12 PROCEDURE — 85014 HEMATOCRIT: CPT | Performed by: EMERGENCY MEDICINE

## 2025-02-12 PROCEDURE — 96361 HYDRATE IV INFUSION ADD-ON: CPT

## 2025-02-12 PROCEDURE — 96375 TX/PRO/DX INJ NEW DRUG ADDON: CPT | Mod: XU

## 2025-02-12 PROCEDURE — 93010 ELECTROCARDIOGRAM REPORT: CPT | Performed by: INTERNAL MEDICINE

## 2025-02-12 PROCEDURE — 93005 ELECTROCARDIOGRAM TRACING: CPT

## 2025-02-12 PROCEDURE — 85025 COMPLETE CBC W/AUTO DIFF WBC: CPT | Performed by: EMERGENCY MEDICINE

## 2025-02-12 PROCEDURE — 86140 C-REACTIVE PROTEIN: CPT | Performed by: EMERGENCY MEDICINE

## 2025-02-12 RX ORDER — ONDANSETRON 2 MG/ML
4 INJECTION INTRAMUSCULAR; INTRAVENOUS EVERY 30 MIN PRN
Status: DISCONTINUED | OUTPATIENT
Start: 2025-02-12 | End: 2025-02-12 | Stop reason: HOSPADM

## 2025-02-12 RX ORDER — IOPAMIDOL 755 MG/ML
92 INJECTION, SOLUTION INTRAVASCULAR ONCE
Status: COMPLETED | OUTPATIENT
Start: 2025-02-12 | End: 2025-02-12

## 2025-02-12 RX ORDER — ONDANSETRON 4 MG/1
4 TABLET, ORALLY DISINTEGRATING ORAL EVERY 8 HOURS PRN
Qty: 15 TABLET | Refills: 0 | Status: SHIPPED | OUTPATIENT
Start: 2025-02-12 | End: 2025-02-17

## 2025-02-12 RX ADMIN — IOPAMIDOL 92 ML: 755 INJECTION, SOLUTION INTRAVENOUS at 10:22

## 2025-02-12 RX ADMIN — PANTOPRAZOLE SODIUM 40 MG: 40 INJECTION, POWDER, FOR SOLUTION INTRAVENOUS at 12:10

## 2025-02-12 RX ADMIN — ONDANSETRON 4 MG: 2 INJECTION INTRAMUSCULAR; INTRAVENOUS at 09:46

## 2025-02-12 ASSESSMENT — COLUMBIA-SUICIDE SEVERITY RATING SCALE - C-SSRS
2. HAVE YOU ACTUALLY HAD ANY THOUGHTS OF KILLING YOURSELF IN THE PAST MONTH?: NO
1. IN THE PAST MONTH, HAVE YOU WISHED YOU WERE DEAD OR WISHED YOU COULD GO TO SLEEP AND NOT WAKE UP?: NO
6. HAVE YOU EVER DONE ANYTHING, STARTED TO DO ANYTHING, OR PREPARED TO DO ANYTHING TO END YOUR LIFE?: NO

## 2025-02-12 ASSESSMENT — ACTIVITIES OF DAILY LIVING (ADL)
ADLS_ACUITY_SCORE: 42
ADLS_ACUITY_SCORE: 46
ADLS_ACUITY_SCORE: 46

## 2025-02-12 NOTE — Clinical Note
Mason Garcia was seen and treated in our emergency department on 2/12/2025.  He may return to work on 02/16/2025.       If you have any questions or concerns, please don't hesitate to call.      Sadiq Ziegler MD

## 2025-02-12 NOTE — ED TRIAGE NOTES
Pt presents via [facility] with c/o fever and vomiting yesterday then today while at work had sudden onset of midsternal chest pain that he rated 10/10. Pt denies cardiac history but has been in the ER for chest pain.

## 2025-02-12 NOTE — ED NOTES
Denies pain. States feeling much better. Discharge instructions reviewed. Verbalized understanding. Denies questions or concerns. Declines wheelchair and states he wants to walk out. Significant other here and driving patient home. Patient ambulated out of ER independently with steady gait.

## 2025-02-12 NOTE — ED PROVIDER NOTES
History     Chief Complaint   Patient presents with    Chest Pain     HPI  Mason Garcia is a 39 year old male who presents to the ED brought by EMS after having a presyncopal episode with associated chest pain that is pleuritic in nature.  Patient yesterday morning woke up feeling ill general myalgias fatigue diarrhea followed by several episodes of vomiting.  He rested drink small amounts of fluid yesterday and was feeling better this morning he decided to go to work at [employer].  He started to feel worse and then was trying to lift an object up onto a shelf and prior was having some substernal chest pain it became worse and 10 out of 10 and he was lightheaded and eased himself onto the ground he remembers his friend repetitively calling his name but he could not respond.  Ambulance was called.  He did not remember if he was diaphoretic at the time he does not remember any severe nausea he has some slight nausea now.  His chest pain was worse with deep inspiration it is currently a 1 out of 10.  He had normal vital signs blood sugar and EKG on scene.  Patient feels improved at this point other than some mild nausea.  He has had some coffee and water today no food    Allergies:  Allergies   Allergen Reactions    Pseudoephedrine Nausea and Shortness Of Breath    Venlafaxine Other (See Comments)     Constant suicidal ideation       Problem List:    Patient Active Problem List    Diagnosis Date Noted    Suicidal ideation 03/04/2021     Priority: Medium    Amputation of finger, left 02/25/2021     Priority: Medium    History of open reduction and internal fixation (ORIF) procedure 02/25/2021     Priority: Medium    Pain in left hand 02/25/2021     Priority: Medium    Weakness of left hand 02/25/2021     Priority: Medium    Suicidal behavior 01/20/2019     Priority: Medium    PTSD (post-traumatic stress disorder) 11/03/2018     Priority: Medium    Severe episode of recurrent major depressive disorder, without  psychotic features (H) 11/02/2018     Priority: Medium    Crushing injury of left hand, sequela 10/16/2018     Priority: Medium    Depression with suicidal ideation 08/27/2018     Priority: Medium    Overweight (BMI 25.0-29.9) 08/16/2018     Priority: Medium    Amputation finger, sequela 08/03/2018     Priority: Medium    Neuroma of hand 08/03/2018     Priority: Medium    Open fracture of left hand with routine healing 01/19/2018     Priority: Medium    Work related injury 01/19/2018     Priority: Medium    Perforated appendicitis 02/07/2017     Priority: Medium    Recurrent major depressive disorder, in full remission 01/10/2017     Priority: Medium        Past Medical History:    No past medical history on file.    Past Surgical History:    Past Surgical History:   Procedure Laterality Date    LAPAROSCOPIC APPENDECTOMY N/A 2/7/2017    Procedure: LAPAROSCOPIC APPENDECTOMY;  Surgeon: Chanel Pagan DO;  Location: [facility]       Family History:    Family History   Problem Relation Age of Onset    Cardiovascular Maternal Grandmother         cardiovascular disease    Respiratory Maternal Grandmother         Lung transplant       Social History:  Marital Status:  Single [1]  Social History     Tobacco Use    Smoking status: Every Day     Current packs/day: 1.00     Average packs/day: 1 pack/day for 7.0 years (7.0 ttl pk-yrs)     Types: Vaping Device, Cigarettes    Smokeless tobacco: Former   Substance Use Topics    Alcohol use: Never     Comment: rare    Drug use: No        Medications:    albuterol (PROAIR HFA/PROVENTIL HFA/VENTOLIN HFA) 108 (90 Base) MCG/ACT inhaler  EPINEPHrine (ANY BX GENERIC EQUIV) 0.3 MG/0.3ML injection 2-pack  ondansetron (ZOFRAN ODT) 4 MG ODT tab          Review of Systems   All other systems reviewed and are negative.      Physical Exam   BP: (!) 147/94  Pulse: 71  Temp: 98  F (36.7  C)  Resp: 16  Weight: 111 kg (244 lb 11.4 oz)  SpO2: 96 %      Physical Exam  Vitals and nursing note reviewed.    Constitutional:       General: He is not in acute distress.     Appearance: Normal appearance. He is well-developed. He is not ill-appearing, toxic-appearing or diaphoretic.   HENT:      Head: Normocephalic and atraumatic.   Eyes:      General: No scleral icterus.     Extraocular Movements: Extraocular movements intact.      Conjunctiva/sclera: Conjunctivae normal.   Cardiovascular:      Rate and Rhythm: Normal rate and regular rhythm.      Heart sounds: Normal heart sounds.   Pulmonary:      Effort: Pulmonary effort is normal. No respiratory distress.      Breath sounds: Normal breath sounds.   Abdominal:      General: Bowel sounds are normal.      Palpations: Abdomen is soft.      Tenderness: There is no abdominal tenderness.   Musculoskeletal:         General: No deformity. Normal range of motion.      Cervical back: Normal range of motion and neck supple.      Right lower leg: No edema.      Left lower leg: No edema.   Skin:     General: Skin is warm and dry.      Capillary Refill: Capillary refill takes less than 2 seconds.   Neurological:      General: No focal deficit present.      Mental Status: He is alert and oriented to person, place, and time.   Psychiatric:         Mood and Affect: Mood normal.         Behavior: Behavior normal.         ED Course     ED Course as of 02/12/25 1235   Wed Feb 12, 2025   1233 Patient feels much improved he is asymptomatic CTA chest CBC chemistry profile repeat serial troponins were all negative initial EKG and EMS EKG was negative.  Appears she likely sick with norovirus which was endemic in the community and had a presyncopal episode with some musculoskeletal chest wall pain as it was pleuritic in nature he was lifting an object over his head at the time that it occurred.  Cardiac investigations were negative in the ED.  Dietary modifications oral hydration discussed     Procedures              Critical Care time:  none            EKG done at 0 923 reviewed at 0 923  agree with below interpretation  Results for orders placed or performed during the hospital encounter of 02/12/25 (from the past 24 hours)   EKG 12-lead, tracing only   Result Value Ref Range    Systolic Blood Pressure  mmHg    Diastolic Blood Pressure  mmHg    Ventricular Rate 78 BPM    Atrial Rate 78 BPM    MO Interval 144 ms    QRS Duration 94 ms     ms    QTc 424 ms    P Axis 59 degrees    R AXIS 5 degrees    T Axis 23 degrees    Interpretation ECG       Sinus rhythm with sinus arrhythmia  Normal ECG  No previous ECGs available     Influenza A/B, RSV and SARS-CoV2 PCR (COVID-19) Nasopharyngeal    Specimen: Nasopharyngeal; Swab   Result Value Ref Range    Influenza A PCR Negative Negative    Influenza B PCR Negative Negative    RSV PCR Negative Negative    SARS CoV2 PCR Negative Negative    Narrative    Testing was performed using the Xpert Xpress CoV2/Flu/RSV Assay on the Cepheid GeneXpert Instrument. This test should be ordered for the detection of SARS-CoV2, influenza, and RSV viruses in individuals with signs and symptoms of respiratory tract infection. This test is for in vitro diagnostic use under the [place] FDA for laboratories certified under CLIA to perform high or moderate complexity testing. This test has been [place] FDA cleared. A negative result does not rule out the presence of PCR inhibitors in the specimen or target RNA in concentration below the limit of detection for the assay. If only one viral target is positive but coinfection with multiple targets is suspected, the sample should be re-tested with another FDA cleared, approved, or authorized test, if coninfection would change clinical management. This test was validated by the [facility]. These laboratories are certified under the Clinical Laboratory Improvement Amendments of 1988 (CLIA-88) as qualified to perfom high complexity laboratory testing.   CBC with platelets differential    Narrative    The following orders were  created for panel order CBC with platelets differential.  Procedure                               Abnormality         Status                     ---------                               -----------         ------                     CBC with platelets and d...[[id number]]                      Final result               RBC and Platelet Morphology[[id number]]                                                   Please view results for these tests on the individual orders.   Comprehensive metabolic panel   Result Value Ref Range    Sodium 138 135 - 145 mmol/L    Potassium 3.5 3.4 - 5.3 mmol/L    Carbon Dioxide (CO2) 24 22 - 29 mmol/L    Anion Gap 10 7 - 15 mmol/L    Urea Nitrogen 12.4 6.0 - 20.0 mg/dL    Creatinine 1.09 0.67 - 1.17 mg/dL    GFR Estimate 89 >60 mL/min/1.73m2    Calcium 7.9 (L) 8.8 - 10.4 mg/dL    Chloride 104 98 - 107 mmol/L    Glucose 104 (H) 70 - 99 mg/dL    Alkaline Phosphatase 72 40 - 150 U/L    AST 29 0 - 45 U/L    ALT 52 0 - 70 U/L    Protein Total 6.7 6.4 - 8.3 g/dL    Albumin 4.0 3.5 - 5.2 g/dL    Bilirubin Total 0.6 <=1.2 mg/dL   Lactic acid whole blood with 1x repeat in 2 hr when >2   Result Value Ref Range    Lactic Acid, Initial 1.1 0.7 - 2.0 mmol/L   Procalcitonin   Result Value Ref Range    Procalcitonin 0.22 <0.50 ng/mL   Troponin T, High Sensitivity   Result Value Ref Range    Troponin T, High Sensitivity 6 <=22 ng/L   Magnesium   Result Value Ref Range    Magnesium 1.8 1.7 - 2.3 mg/dL   CRP inflammation   Result Value Ref Range    CRP Inflammation 67.47 (H) <5.00 mg/L   CBC with platelets and differential   Result Value Ref Range    WBC Count 4.6 4.0 - 11.0 10e3/uL    RBC Count 5.22 4.40 - 5.90 10e6/uL    Hemoglobin 15.6 13.3 - 17.7 g/dL    Hematocrit 44.4 40.0 - 53.0 %    MCV 85 78 - 100 fL    MCH 29.9 26.5 - 33.0 pg    MCHC 35.1 31.5 - 36.5 g/dL    RDW 12.8 10.0 - 15.0 %    Platelet Count 155 150 - 450 10e3/uL    % Neutrophils 62 %    % Lymphocytes 24 %    % Monocytes 14 %    % Eosinophils  "0 %    % Basophils 0 %    % Immature Granulocytes 0 %    NRBCs per 100 WBC 0 <1 /100    Absolute Neutrophils 2.9 1.6 - 8.3 10e3/uL    Absolute Lymphocytes 1.1 0.8 - 5.3 10e3/uL    Absolute Monocytes 0.6 0.0 - 1.3 10e3/uL    Absolute Eosinophils 0.0 0.0 - 0.7 10e3/uL    Absolute Basophils 0.0 0.0 - 0.2 10e3/uL    Absolute Immature Granulocytes 0.0 <=0.4 10e3/uL    Absolute NRBCs 0.0 10e3/uL   Extra Tube    Narrative    The following orders were created for panel order Extra Tube.  Procedure                               Abnormality         Status                     ---------                               -----------         ------                     Extra Blue Top Tube[[id number]]                              Final result               Extra Red Top Tube[[id number]]                               Final result                 Please view results for these tests on the individual orders.   Extra Blue Top Tube   Result Value Ref Range    Hold Specimen JIC    Extra Red Top Tube   Result Value Ref Range    Hold Specimen JIC    CT Chest (PE) Abdomen Pelvis w Contrast    Narrative    EXAM: CT CHEST PE ABDOMEN PELVIS W CONTRAST, 2/12/2025 10:33 AM    HISTORY: pleuritic cp, presyncope with collapse, NVD, bilateral  inguinal hernia repairs, right \"has a new bubble\"    COMPARISON: CT abdomen and pelvis 8/10/2022    TECHNIQUE:   Imaging protocol: Multiplanar CT images of the chest, abdomen, and  pelvis after administration of intravenous contrast. Meds given:  ISOVUE 370  92mL.  Acquisition: This CT exam was performed using one or more the  following dose reduction techniques: automated exposure control,  adjustment of the mA and/or kV according to patient size, and/or  iterative reconstruction technique.  Processing: 3D rendering on independent workstation using Maximum  Intensity Projection (MIP) was performed and archived to PACS. 3D  reconstructions are interpreted and reported by supervising  radiologist.    FINDINGS: "     CHEST:    VESSELS AND HEART: There is adequate contrast bolus in the study.  Contrast bolus adequately opacifies the pulmonary arteries. No acute  pulmonary emboli to the subsegmental level. Aorta is within normal  limits. No cardiomegaly. No significant pericardial effusion.    LUNGS: Central airways are clear. No pulmonary mass. Scattered discoid  and subpleural atelectasis. No acute airspace opacities or  consolidation. 4 mm right upper lobe nodule near the right minor  fissure, statistically benign.  PLEURA: Within normal limits.  LYMPH NODES: No enlarged lymph nodes.  THYROID: Within normal limits.    ABDOMEN/PELVIS:  LIVER: Normal contour. No suspicious liver lesions.  GALLBLADDER: No radio-opaque stones. No gallbladder wall thickening.  BILE DUCTS: No biliary tract dilatation.  PANCREAS: Within normal limits.  SPLEEN: Within normal limits.  ADRENALS: Within normal limits.    KIDNEYS/URETERS: Within normal limits.  URINARY BLADDER: Within normal limits.  REPRODUCTIVE ORGANS: No pelvic masses.    BOWEL: No bowel dilatation or wall thickening. Air-fluid levels in the  large and small bowel, nonspecific.  PERITONEUM/RETROPERITONEUM: No free air or free fluid. Small  fat-containing umbilical hernia.  VESSELS: Aorta is within normal limits.  LYMPH NODES: There are no pathologically enlarged lymph nodes.    BONES AND SOFT TISSUES:  No suspicious osseous lesions. Bilateral inguinal changes. Slight  peritoneal bulging along the inner margin of the left inguinal canal  without significant herniation of fat. Degenerative periarticular  changes and spinal spondylosis.      Impression    IMPRESSION:   No acute pulmonary emboli to the subsegmental level.    No mass or acute focal inflammatory change.    Nonspecific air-fluid levels in the large and small bowel, suggestive  of enterocolitis, diarrheal illness.    PIETER HEWITT MD         SYSTEM ID:  [id number]   Troponin T, High Sensitivity   Result Value Ref Range     Troponin T, High Sensitivity 7 <=22 ng/L   UA with Microscopic reflex to Culture    Specimen: Urine, Midstream   Result Value Ref Range    Color Urine Light Yellow Colorless, Straw, Light Yellow, Yellow    Appearance Urine Clear Clear    Glucose Urine Negative Negative mg/dL    Bilirubin Urine Negative Negative    Ketones Urine Negative Negative mg/dL    Specific Gravity Urine 1.010 1.003 - 1.035    Blood Urine Negative Negative    pH Urine 6.0 4.7 - 8.0    Protein Albumin Urine Negative Negative mg/dL    Urobilinogen Urine Normal Normal, 2.0 mg/dL    Nitrite Urine Negative Negative    Leukocyte Esterase Urine Negative Negative    Mucus Urine Present (A) None Seen /LPF    RBC Urine 2 <=2 /HPF    WBC Urine <1 <=5 /HPF    Squamous Epithelials Urine <1 <=1 /HPF    Narrative    Urine Culture not indicated       Medications   sodium chloride 0.9% BOLUS 1,000 mL (0 mLs Intravenous Stopped 2/12/25 1040)   ondansetron (ZOFRAN) injection 4 mg (4 mg Intravenous $Given 2/12/25 0946)   sodium chloride (PF) 0.9% PF flush 100 mL (100 mLs Intravenous $Given 2/12/25 1022)   iopamidol (ISOVUE-370) solution 92 mL (92 mLs Intravenous $Given 2/12/25 1022)   pantoprazole (PROTONIX) IV push injection 40 mg (40 mg Intravenous $Given 2/12/25 1210)       Assessments & Plan (with Medical Decision Making)     I have reviewed the nursing notes.    I have reviewed the findings, diagnosis, plan and need for follow up with the patient.           Medical Decision Making  The patient's presentation was of moderate complexity (an acute illness with systemic symptoms).    The patient's evaluation involved:  ordering and/or review of 3+ test(s) in this encounter (EKG CT scan multiple lab tests)    The patient's management necessitated moderate risk (prescription drug management including medications given in the ED).      See ER note above.  His nausea vomiting and diarrhea likely norovirus related which is endemic in the community.  Patient was  exerting himself and likely had some vasovagal element of pleuritic chest pain appears likely that it was muscular in nature as he is lifting a heavy object over his head when this occurred, his symptoms have resolved.  New Prescriptions    ONDANSETRON (ZOFRAN ODT) 4 MG ODT TAB    Take 1 tablet (4 mg) by mouth every 8 hours as needed for nausea.       Final diagnoses:   Nausea vomiting and diarrhea   Pleuritic chest pain   Pre-syncope       2/12/2025   [facility]       Sadiq Ziegler MD  02/12/25 7306

## 2025-02-13 LAB
ATRIAL RATE - MUSE: 78 BPM
DIASTOLIC BLOOD PRESSURE - MUSE: NORMAL MMHG
INTERPRETATION ECG - MUSE: NORMAL
P AXIS - MUSE: 59 DEGREES
PR INTERVAL - MUSE: 144 MS
QRS DURATION - MUSE: 94 MS
QT - MUSE: 372 MS
QTC - MUSE: 424 MS
R AXIS - MUSE: 5 DEGREES
SYSTOLIC BLOOD PRESSURE - MUSE: NORMAL MMHG
T AXIS - MUSE: 23 DEGREES
VENTRICULAR RATE- MUSE: 78 BPM

## 2025-02-17 ENCOUNTER — MEDICAL CORRESPONDENCE (OUTPATIENT)
Dept: NUCLEAR MEDICINE | Facility: HOSPITAL | Age: 40
End: 2025-02-17

## 2025-02-28 ENCOUNTER — HOSPITAL ENCOUNTER (OUTPATIENT)
Dept: NUCLEAR MEDICINE | Facility: HOSPITAL | Age: 40
Setting detail: NUCLEAR MEDICINE
Discharge: HOME | End: 2025-02-28
Payer: COMMERCIAL

## 2025-02-28 ENCOUNTER — HOSPITAL ENCOUNTER (OUTPATIENT)
Dept: CARDIOLOGY | Facility: HOSPITAL | Age: 40
Setting detail: NUCLEAR MEDICINE
Discharge: HOME | End: 2025-02-28
Payer: COMMERCIAL

## 2025-02-28 DIAGNOSIS — R07.9 CHEST PAIN: ICD-10-CM

## 2025-02-28 PROCEDURE — 343N000001 HC RX 343 MED OP 636: Performed by: STUDENT IN AN ORGANIZED HEALTH CARE EDUCATION/TRAINING PROGRAM

## 2025-02-28 PROCEDURE — A9500 TC99M SESTAMIBI: HCPCS | Performed by: STUDENT IN AN ORGANIZED HEALTH CARE EDUCATION/TRAINING PROGRAM

## 2025-02-28 PROCEDURE — 93018 CV STRESS TEST I&R ONLY: CPT | Performed by: INTERNAL MEDICINE

## 2025-02-28 PROCEDURE — 93017 CV STRESS TEST TRACING ONLY: CPT

## 2025-02-28 PROCEDURE — 78452 HT MUSCLE IMAGE SPECT MULT: CPT

## 2025-02-28 PROCEDURE — 93016 CV STRESS TEST SUPVJ ONLY: CPT | Performed by: INTERNAL MEDICINE

## 2025-02-28 PROCEDURE — 258N000003 HC RX IP 258 OP 636: Performed by: INTERNAL MEDICINE

## 2025-02-28 RX ORDER — SODIUM CHLORIDE 9 MG/ML
INJECTION, SOLUTION INTRAVENOUS ONCE
Status: COMPLETED | OUTPATIENT
Start: 2025-02-28 | End: 2025-02-28

## 2025-02-28 RX ADMIN — Medication 10 MILLICURIE: at 07:36

## 2025-02-28 RX ADMIN — Medication 30.6 MILLICURIE: at 09:42

## 2025-02-28 RX ADMIN — SODIUM CHLORIDE: 9 INJECTION, SOLUTION INTRAVENOUS at 09:47

## 2025-03-02 LAB
CV BLOOD PRESSURE: 68 MMHG
CV STRESS MAX HR HE: 157
NUC STRESS EJECTION FRACTION: 72 %
RATE PRESSURE PRODUCT: NORMAL
STRESS ECHO BASELINE DIASTOLIC HE: 76
STRESS ECHO BASELINE HR: 56 BPM
STRESS ECHO BASELINE SYSTOLIC BP: 116
STRESS ECHO CALCULATED PERCENT HR: 87 %
STRESS ECHO LAST STRESS DIASTOLIC BP: 78
STRESS ECHO LAST STRESS SYSTOLIC BP: 142
STRESS ECHO POST ESTIMATED WORKLOAD: 12.1 METS
STRESS ECHO POST EXERCISE DUR MIN: 10 MIN
STRESS ECHO POST EXERCISE DUR SEC: 10 SEC
STRESS ECHO TARGET HR: 181

## (undated) DEVICE — CANISTER-SUCTION 2000CC

## (undated) DEVICE — SOL-NACL 0.9% 1000ML

## (undated) DEVICE — GLV-7.0 PROTEXIS PI CLASSIC LF/PF

## (undated) DEVICE — CATH TRAY-16FR METER W/STATLOCK LATEX]

## (undated) DEVICE — SYRINGE-ASEPTO IRRIGATION

## (undated) DEVICE — ANTI-FOG AGENT

## (undated) DEVICE — BLADE-SCALPEL #15

## (undated) DEVICE — Device

## (undated) DEVICE — IRRIGATION-H2O 1000ML

## (undated) DEVICE — LIGHT HANDLE COVER

## (undated) DEVICE — TUBING-INSUFFLATION/LAPAROFLATOR W/FILTER

## (undated) DEVICE — LINEAR CUTTER-45MM ECHELON FLEX ARTICULATING

## (undated) DEVICE — DRSG-TEGADERM SMALL #1624

## (undated) DEVICE — IRRIGATION-NACL 1000ML

## (undated) DEVICE — STERI-STRIP-1/2" X 4"

## (undated) DEVICE — SCISSOR-ENDOPATH 5MM CURVED

## (undated) DEVICE — TROCAR SYSTEM-BLUNT TIP 12X100MM KII BALLOON

## (undated) DEVICE — TUBE-LUKI-MUCOUS SPEC. TRAP

## (undated) DEVICE — GLV-6.5 PROTEXIS PI CLASSIC LF/PF

## (undated) DEVICE — PACK-LAPAROTOMY-CUSTOM

## (undated) DEVICE — DRSG-SPONGE STERILE 4 X 4

## (undated) DEVICE — RELOAD-WHITE 45MM ECHELON ENDOPATH

## (undated) DEVICE — CAUTERY PAD-POLYHESIVE II ADULT

## (undated) DEVICE — WANDS-INSULSCAN

## (undated) DEVICE — INZII RETRIEVAL SYSTEM-10MM

## (undated) DEVICE — LABEL-STERILE PREPRINTED FOR OR

## (undated) DEVICE — NDL-18G 1 1/2" NON-SAFETY

## (undated) DEVICE — SUTURE-MONOCRYL 4-0 PS-2 Y496G

## (undated) DEVICE — DRAIN-JP BULB RESERVOIR 100CC

## (undated) DEVICE — DRSG-TEGADERM MEDIUM #1626

## (undated) DEVICE — SUTURE-VICRYL 0 UR-6 J603H

## (undated) DEVICE — TUBE-SALEM SUMP 18FR STOMACH SUCTION

## (undated) DEVICE — SUCTION-IRRIGATION STRYKEFLOW II (STRYKER)

## (undated) DEVICE — BLANKET-BAIR UPPER BODY

## (undated) DEVICE — APPLICATOR-CHLORAPREP 26ML TINTED CHG 2%+ 70% IPA-SURGICAL

## (undated) DEVICE — DRSG-SPONGE-STERILE 2 X 2

## (undated) DEVICE — DISSECTOR-ENDOSCOPIC BLUNT TIP 5MM

## (undated) DEVICE — SCD SLEEVE-KNEE REG.

## (undated) RX ORDER — GLYCOPYRROLATE 0.2 MG/ML
INJECTION, SOLUTION INTRAMUSCULAR; INTRAVENOUS
Status: DISPENSED
Start: 2017-02-07

## (undated) RX ORDER — ONDANSETRON 2 MG/ML
INJECTION INTRAMUSCULAR; INTRAVENOUS
Status: DISPENSED
Start: 2017-02-07

## (undated) RX ORDER — LIDOCAINE HYDROCHLORIDE 20 MG/ML
INJECTION, SOLUTION EPIDURAL; INFILTRATION; INTRACAUDAL; PERINEURAL
Status: DISPENSED
Start: 2017-02-07

## (undated) RX ORDER — PHENYLEPHRINE HCL IN 0.9% NACL 1 MG/10 ML
SYRINGE (ML) INTRAVENOUS
Status: DISPENSED
Start: 2017-02-07

## (undated) RX ORDER — CEFAZOLIN SODIUM 1 G/3ML
INJECTION, POWDER, FOR SOLUTION INTRAMUSCULAR; INTRAVENOUS
Status: DISPENSED
Start: 2017-02-07

## (undated) RX ORDER — KETOROLAC TROMETHAMINE 30 MG/ML
INJECTION, SOLUTION INTRAMUSCULAR; INTRAVENOUS
Status: DISPENSED
Start: 2017-02-07

## (undated) RX ORDER — DEXAMETHASONE SODIUM PHOSPHATE 10 MG/ML
INJECTION, SOLUTION INTRAMUSCULAR; INTRAVENOUS
Status: DISPENSED
Start: 2017-02-07

## (undated) RX ORDER — FENTANYL CITRATE 50 UG/ML
INJECTION, SOLUTION INTRAMUSCULAR; INTRAVENOUS
Status: DISPENSED
Start: 2017-02-07

## (undated) RX ORDER — NEOSTIGMINE METHYLSULFATE 1 MG/ML
VIAL (ML) INJECTION
Status: DISPENSED
Start: 2017-02-07

## (undated) RX ORDER — PROPOFOL 10 MG/ML
INJECTION, EMULSION INTRAVENOUS
Status: DISPENSED
Start: 2017-02-07